# Patient Record
Sex: MALE | Race: WHITE | NOT HISPANIC OR LATINO | Employment: UNEMPLOYED | URBAN - METROPOLITAN AREA
[De-identification: names, ages, dates, MRNs, and addresses within clinical notes are randomized per-mention and may not be internally consistent; named-entity substitution may affect disease eponyms.]

---

## 2020-04-23 ENCOUNTER — HOSPITAL ENCOUNTER (INPATIENT)
Facility: HOSPITAL | Age: 25
LOS: 20 days | Discharge: HOME | End: 2020-05-13
Attending: EMERGENCY MEDICINE | Admitting: INTERNAL MEDICINE

## 2020-04-23 ENCOUNTER — APPOINTMENT (OUTPATIENT)
Dept: GENERAL RADIOLOGY | Facility: HOSPITAL | Age: 25
End: 2020-04-23

## 2020-04-23 ENCOUNTER — APPOINTMENT (OUTPATIENT)
Dept: ULTRASOUND IMAGING | Facility: HOSPITAL | Age: 25
End: 2020-04-23

## 2020-04-23 DIAGNOSIS — K72.00 SEPSIS WITH ACUTE LIVER FAILURE WITHOUT HEPATIC COMA OR SEPTIC SHOCK, DUE TO UNSPECIFIED ORGANISM (HCC): Primary | ICD-10-CM

## 2020-04-23 DIAGNOSIS — R79.89 ELEVATED LACTIC ACID LEVEL: ICD-10-CM

## 2020-04-23 DIAGNOSIS — K70.11 ASCITES DUE TO ALCOHOLIC HEPATITIS: ICD-10-CM

## 2020-04-23 DIAGNOSIS — A41.9 SEPSIS WITH ACUTE RENAL FAILURE WITHOUT SEPTIC SHOCK, DUE TO UNSPECIFIED ORGANISM, UNSPECIFIED ACUTE RENAL FAILURE TYPE (HCC): ICD-10-CM

## 2020-04-23 DIAGNOSIS — F10.10 ALCOHOL ABUSE: ICD-10-CM

## 2020-04-23 DIAGNOSIS — R65.20 SEPSIS WITH ACUTE RENAL FAILURE WITHOUT SEPTIC SHOCK, DUE TO UNSPECIFIED ORGANISM, UNSPECIFIED ACUTE RENAL FAILURE TYPE (HCC): ICD-10-CM

## 2020-04-23 DIAGNOSIS — R65.20 SEPSIS WITH ACUTE LIVER FAILURE WITHOUT HEPATIC COMA OR SEPTIC SHOCK, DUE TO UNSPECIFIED ORGANISM (HCC): Primary | ICD-10-CM

## 2020-04-23 DIAGNOSIS — N17.9 SEPSIS WITH ACUTE RENAL FAILURE WITHOUT SEPTIC SHOCK, DUE TO UNSPECIFIED ORGANISM, UNSPECIFIED ACUTE RENAL FAILURE TYPE (HCC): ICD-10-CM

## 2020-04-23 DIAGNOSIS — D72.829 LEUKOCYTOSIS, UNSPECIFIED TYPE: ICD-10-CM

## 2020-04-23 DIAGNOSIS — B17.9 ACUTE HEPATITIS: ICD-10-CM

## 2020-04-23 DIAGNOSIS — N17.9 ACUTE RENAL FAILURE, UNSPECIFIED ACUTE RENAL FAILURE TYPE (HCC): ICD-10-CM

## 2020-04-23 DIAGNOSIS — R09.02 HYPOXIA: ICD-10-CM

## 2020-04-23 DIAGNOSIS — R13.12 OROPHARYNGEAL DYSPHAGIA: ICD-10-CM

## 2020-04-23 DIAGNOSIS — A41.9 SEPSIS WITH ACUTE LIVER FAILURE WITHOUT HEPATIC COMA OR SEPTIC SHOCK, DUE TO UNSPECIFIED ORGANISM (HCC): Primary | ICD-10-CM

## 2020-04-23 DIAGNOSIS — J96.01 ACUTE RESPIRATORY FAILURE WITH HYPOXIA (HCC): ICD-10-CM

## 2020-04-23 DIAGNOSIS — K92.2 ACUTE UPPER GI BLEED: ICD-10-CM

## 2020-04-23 DIAGNOSIS — R41.841 COGNITIVE COMMUNICATION DEFICIT: ICD-10-CM

## 2020-04-23 PROBLEM — D72.825 BANDEMIA: Status: ACTIVE | Noted: 2020-04-23

## 2020-04-23 PROBLEM — K70.10 ACUTE ALCOHOLIC HEPATITIS: Status: ACTIVE | Noted: 2020-04-23

## 2020-04-23 LAB
ALBUMIN SERPL-MCNC: 2 G/DL (ref 3.5–5.2)
ALBUMIN/GLOB SERPL: 0.6 G/DL
ALP SERPL-CCNC: 173 U/L (ref 39–117)
ALT SERPL W P-5'-P-CCNC: 90 U/L (ref 1–41)
AMMONIA BLD-SCNC: 101 UMOL/L (ref 16–60)
AMYLASE SERPL-CCNC: 69 U/L (ref 28–100)
ANION GAP SERPL CALCULATED.3IONS-SCNC: 22 MMOL/L (ref 5–15)
APTT PPP: 44.3 SECONDS (ref 24–37)
ARTERIAL PATENCY WRIST A: ABNORMAL
AST SERPL-CCNC: 229 U/L (ref 1–40)
ATMOSPHERIC PRESS: ABNORMAL MM[HG]
BASE EXCESS BLDA CALC-SCNC: 2.4 MMOL/L (ref 0–2)
BASOPHILS # BLD MANUAL: 0.42 10*3/MM3 (ref 0–0.2)
BASOPHILS NFR BLD AUTO: 1 % (ref 0–1.5)
BDY SITE: ABNORMAL
BILIRUB SERPL-MCNC: 8.3 MG/DL (ref 0.2–1.2)
BODY TEMPERATURE: 37 C
BUN BLD-MCNC: 47 MG/DL (ref 6–20)
BUN/CREAT SERPL: 18.1 (ref 7–25)
CALCIUM SPEC-SCNC: 7.8 MG/DL (ref 8.6–10.5)
CHLORIDE SERPL-SCNC: 88 MMOL/L (ref 98–107)
CK SERPL-CCNC: 124 U/L (ref 20–200)
CO2 BLDA-SCNC: 25.6 MMOL/L (ref 22–33)
CO2 SERPL-SCNC: 24 MMOL/L (ref 22–29)
COHGB MFR BLD: 2.2 % (ref 0–2)
CREAT BLD-MCNC: 2.59 MG/DL (ref 0.76–1.27)
D-LACTATE SERPL-SCNC: 6.6 MMOL/L (ref 0.5–2)
DEPRECATED RDW RBC AUTO: 63.2 FL (ref 37–54)
EOSINOPHIL # BLD MANUAL: 0.42 10*3/MM3 (ref 0–0.4)
EOSINOPHIL NFR BLD MANUAL: 1 % (ref 0.3–6.2)
ERYTHROCYTE [DISTWIDTH] IN BLOOD BY AUTOMATED COUNT: 16.9 % (ref 12.3–15.4)
ETHANOL BLD-MCNC: <10 MG/DL (ref 0–10)
GFR SERPL CREATININE-BSD FRML MDRD: 30 ML/MIN/1.73
GFR SERPL CREATININE-BSD FRML MDRD: 37 ML/MIN/1.73
GLOBULIN UR ELPH-MCNC: 3.5 GM/DL
GLUCOSE BLD-MCNC: 111 MG/DL (ref 65–99)
HCO3 BLDA-SCNC: 24.7 MMOL/L (ref 20–26)
HCT VFR BLD AUTO: 25.5 % (ref 37.5–51)
HCT VFR BLD CALC: 24 %
HGB BLD-MCNC: 8.9 G/DL (ref 13–17.7)
HGB BLDA-MCNC: 7.8 G/DL (ref 13.5–17.5)
HOLD SPECIMEN: NORMAL
HOLD SPECIMEN: NORMAL
HOROWITZ INDEX BLD+IHG-RTO: 44 %
INR PPP: 1.88 (ref 0.85–1.16)
LIPASE SERPL-CCNC: 148 U/L (ref 13–60)
LYMPHOCYTES # BLD MANUAL: 7.48 10*3/MM3 (ref 0.7–3.1)
LYMPHOCYTES NFR BLD MANUAL: 18 % (ref 19.6–45.3)
LYMPHOCYTES NFR BLD MANUAL: 5 % (ref 5–12)
MACROCYTES BLD QL SMEAR: ABNORMAL
MCH RBC QN AUTO: 36.5 PG (ref 26.6–33)
MCHC RBC AUTO-ENTMCNC: 34.9 G/DL (ref 31.5–35.7)
MCV RBC AUTO: 104.5 FL (ref 79–97)
METHGB BLD QL: ABNORMAL
MODALITY: ABNORMAL
MONOCYTES # BLD AUTO: 2.08 10*3/MM3 (ref 0.1–0.9)
NEUTROPHILS # BLD AUTO: 31.16 10*3/MM3 (ref 1.7–7)
NEUTROPHILS NFR BLD MANUAL: 61 % (ref 42.7–76)
NEUTS BAND NFR BLD MANUAL: 14 % (ref 0–5)
NOTE: ABNORMAL
OXYHGB MFR BLDV: 86 % (ref 94–99)
PCO2 BLDA: 28.4 MM HG (ref 35–45)
PCO2 TEMP ADJ BLD: 28.4 MM HG (ref 35–48)
PH BLDA: 7.55 PH UNITS (ref 7.35–7.45)
PH, TEMP CORRECTED: 7.55 PH UNITS
PLAT MORPH BLD: NORMAL
PLATELET # BLD AUTO: 380 10*3/MM3 (ref 140–450)
PMV BLD AUTO: 10.5 FL (ref 6–12)
PO2 BLDA: 53.9 MM HG (ref 83–108)
PO2 TEMP ADJ BLD: 53.9 MM HG (ref 83–108)
POTASSIUM BLD-SCNC: 4 MMOL/L (ref 3.5–5.2)
PROCALCITONIN SERPL-MCNC: 2.62 NG/ML (ref 0.1–0.25)
PROT SERPL-MCNC: 5.5 G/DL (ref 6–8.5)
PROTHROMBIN TIME: 21.3 SECONDS (ref 11.5–14)
RBC # BLD AUTO: 2.44 10*6/MM3 (ref 4.14–5.8)
SCAN SLIDE: NORMAL
SMUDGE CELLS BLD QL SMEAR: ABNORMAL
SODIUM BLD-SCNC: 134 MMOL/L (ref 136–145)
VENTILATOR MODE: ABNORMAL
WBC NRBC COR # BLD: 41.55 10*3/MM3 (ref 3.4–10.8)
WHOLE BLOOD HOLD SPECIMEN: NORMAL
WHOLE BLOOD HOLD SPECIMEN: NORMAL

## 2020-04-23 PROCEDURE — 82805 BLOOD GASES W/O2 SATURATION: CPT

## 2020-04-23 PROCEDURE — 36600 WITHDRAWAL OF ARTERIAL BLOOD: CPT

## 2020-04-23 PROCEDURE — 85025 COMPLETE CBC W/AUTO DIFF WBC: CPT | Performed by: EMERGENCY MEDICINE

## 2020-04-23 PROCEDURE — 86901 BLOOD TYPING SEROLOGIC RH(D): CPT | Performed by: PHYSICIAN ASSISTANT

## 2020-04-23 PROCEDURE — 82150 ASSAY OF AMYLASE: CPT | Performed by: PHYSICIAN ASSISTANT

## 2020-04-23 PROCEDURE — 82550 ASSAY OF CK (CPK): CPT | Performed by: NURSE PRACTITIONER

## 2020-04-23 PROCEDURE — 85007 BL SMEAR W/DIFF WBC COUNT: CPT | Performed by: EMERGENCY MEDICINE

## 2020-04-23 PROCEDURE — 86850 RBC ANTIBODY SCREEN: CPT | Performed by: PHYSICIAN ASSISTANT

## 2020-04-23 PROCEDURE — 93005 ELECTROCARDIOGRAM TRACING: CPT | Performed by: EMERGENCY MEDICINE

## 2020-04-23 PROCEDURE — 86900 BLOOD TYPING SEROLOGIC ABO: CPT | Performed by: PHYSICIAN ASSISTANT

## 2020-04-23 PROCEDURE — 85610 PROTHROMBIN TIME: CPT | Performed by: PHYSICIAN ASSISTANT

## 2020-04-23 PROCEDURE — 99223 1ST HOSP IP/OBS HIGH 75: CPT | Performed by: INTERNAL MEDICINE

## 2020-04-23 PROCEDURE — 83690 ASSAY OF LIPASE: CPT | Performed by: EMERGENCY MEDICINE

## 2020-04-23 PROCEDURE — 85730 THROMBOPLASTIN TIME PARTIAL: CPT | Performed by: NURSE PRACTITIONER

## 2020-04-23 PROCEDURE — 25010000002 ONDANSETRON PER 1 MG: Performed by: PHYSICIAN ASSISTANT

## 2020-04-23 PROCEDURE — 80053 COMPREHEN METABOLIC PANEL: CPT | Performed by: EMERGENCY MEDICINE

## 2020-04-23 PROCEDURE — 25010000002 OCTREOTIDE PER 25 MCG: Performed by: PHYSICIAN ASSISTANT

## 2020-04-23 PROCEDURE — 83605 ASSAY OF LACTIC ACID: CPT | Performed by: EMERGENCY MEDICINE

## 2020-04-23 PROCEDURE — 84145 PROCALCITONIN (PCT): CPT | Performed by: EMERGENCY MEDICINE

## 2020-04-23 PROCEDURE — 80074 ACUTE HEPATITIS PANEL: CPT | Performed by: PHYSICIAN ASSISTANT

## 2020-04-23 PROCEDURE — 80307 DRUG TEST PRSMV CHEM ANLYZR: CPT | Performed by: NURSE PRACTITIONER

## 2020-04-23 PROCEDURE — 86923 COMPATIBILITY TEST ELECTRIC: CPT

## 2020-04-23 PROCEDURE — 87040 BLOOD CULTURE FOR BACTERIA: CPT | Performed by: EMERGENCY MEDICINE

## 2020-04-23 PROCEDURE — 99285 EMERGENCY DEPT VISIT HI MDM: CPT

## 2020-04-23 PROCEDURE — 82140 ASSAY OF AMMONIA: CPT | Performed by: PHYSICIAN ASSISTANT

## 2020-04-23 PROCEDURE — 85060 BLOOD SMEAR INTERPRETATION: CPT | Performed by: EMERGENCY MEDICINE

## 2020-04-23 PROCEDURE — 71045 X-RAY EXAM CHEST 1 VIEW: CPT

## 2020-04-23 RX ORDER — SODIUM CHLORIDE 0.9 % (FLUSH) 0.9 %
10 SYRINGE (ML) INJECTION AS NEEDED
Status: DISCONTINUED | OUTPATIENT
Start: 2020-04-23 | End: 2020-05-13 | Stop reason: HOSPADM

## 2020-04-23 RX ORDER — SODIUM CHLORIDE 0.9 % (FLUSH) 0.9 %
10 SYRINGE (ML) INJECTION EVERY 12 HOURS SCHEDULED
Status: DISCONTINUED | OUTPATIENT
Start: 2020-04-23 | End: 2020-04-25

## 2020-04-23 RX ORDER — DEXTROSE AND SODIUM CHLORIDE 5; .9 G/100ML; G/100ML
100 INJECTION, SOLUTION INTRAVENOUS CONTINUOUS
Status: DISCONTINUED | OUTPATIENT
Start: 2020-04-23 | End: 2020-04-26

## 2020-04-23 RX ORDER — SODIUM CHLORIDE 0.9 % (FLUSH) 0.9 %
10 SYRINGE (ML) INJECTION AS NEEDED
Status: DISCONTINUED | OUTPATIENT
Start: 2020-04-23 | End: 2020-04-25

## 2020-04-23 RX ORDER — ONDANSETRON 2 MG/ML
4 INJECTION INTRAMUSCULAR; INTRAVENOUS ONCE
Status: COMPLETED | OUTPATIENT
Start: 2020-04-23 | End: 2020-04-23

## 2020-04-23 RX ORDER — ONDANSETRON 2 MG/ML
4 INJECTION INTRAMUSCULAR; INTRAVENOUS EVERY 6 HOURS PRN
Status: DISCONTINUED | OUTPATIENT
Start: 2020-04-23 | End: 2020-05-13 | Stop reason: HOSPADM

## 2020-04-23 RX ORDER — NOREPINEPHRINE BIT/0.9 % NACL 8 MG/250ML
.02-.3 INFUSION BOTTLE (ML) INTRAVENOUS
Status: DISCONTINUED | OUTPATIENT
Start: 2020-04-23 | End: 2020-04-28

## 2020-04-23 RX ORDER — ALBUMIN (HUMAN) 12.5 G/50ML
50 SOLUTION INTRAVENOUS ONCE
Status: COMPLETED | OUTPATIENT
Start: 2020-04-23 | End: 2020-04-24

## 2020-04-23 RX ORDER — SODIUM CHLORIDE 0.9 % (FLUSH) 0.9 %
10 SYRINGE (ML) INJECTION EVERY 12 HOURS SCHEDULED
Status: DISCONTINUED | OUTPATIENT
Start: 2020-04-23 | End: 2020-04-28

## 2020-04-23 RX ORDER — SODIUM CHLORIDE 0.9 % (FLUSH) 0.9 %
10 SYRINGE (ML) INJECTION AS NEEDED
Status: DISCONTINUED | OUTPATIENT
Start: 2020-04-23 | End: 2020-05-01

## 2020-04-23 RX ORDER — PANTOPRAZOLE SODIUM 40 MG/10ML
80 INJECTION, POWDER, LYOPHILIZED, FOR SOLUTION INTRAVENOUS ONCE
Status: COMPLETED | OUTPATIENT
Start: 2020-04-23 | End: 2020-04-23

## 2020-04-23 RX ADMIN — THIAMINE HYDROCHLORIDE 250 MG: 100 INJECTION, SOLUTION INTRAMUSCULAR; INTRAVENOUS at 23:00

## 2020-04-23 RX ADMIN — OCTREOTIDE ACETATE 25 MCG/HR: 500 INJECTION, SOLUTION INTRAVENOUS; SUBCUTANEOUS at 23:31

## 2020-04-23 RX ADMIN — PANTOPRAZOLE SODIUM 80 MG: 40 INJECTION, POWDER, FOR SOLUTION INTRAVENOUS at 22:50

## 2020-04-23 RX ADMIN — ONDANSETRON 4 MG: 2 INJECTION INTRAMUSCULAR; INTRAVENOUS at 22:28

## 2020-04-23 RX ADMIN — SODIUM CHLORIDE 1000 ML: 9 INJECTION, SOLUTION INTRAVENOUS at 22:27

## 2020-04-23 RX ADMIN — SODIUM CHLORIDE 1000 ML: 9 INJECTION, SOLUTION INTRAVENOUS at 23:30

## 2020-04-24 ENCOUNTER — APPOINTMENT (OUTPATIENT)
Dept: CT IMAGING | Facility: HOSPITAL | Age: 25
End: 2020-04-24

## 2020-04-24 ENCOUNTER — APPOINTMENT (OUTPATIENT)
Dept: GENERAL RADIOLOGY | Facility: HOSPITAL | Age: 25
End: 2020-04-24

## 2020-04-24 ENCOUNTER — ANESTHESIA EVENT (OUTPATIENT)
Dept: GASTROENTEROLOGY | Facility: HOSPITAL | Age: 25
End: 2020-04-24

## 2020-04-24 ENCOUNTER — APPOINTMENT (OUTPATIENT)
Dept: CARDIOLOGY | Facility: HOSPITAL | Age: 25
End: 2020-04-24

## 2020-04-24 ENCOUNTER — ANESTHESIA (OUTPATIENT)
Dept: GASTROENTEROLOGY | Facility: HOSPITAL | Age: 25
End: 2020-04-24

## 2020-04-24 PROBLEM — N17.9 SEPSIS WITH ACUTE RENAL FAILURE WITHOUT SEPTIC SHOCK (HCC): Status: ACTIVE | Noted: 2020-04-24

## 2020-04-24 PROBLEM — K76.82 HEPATIC ENCEPHALOPATHY (HCC): Status: ACTIVE | Noted: 2020-04-24

## 2020-04-24 PROBLEM — A41.9 SEPSIS WITH ACUTE RENAL FAILURE WITHOUT SEPTIC SHOCK: Status: ACTIVE | Noted: 2020-04-24

## 2020-04-24 PROBLEM — F10.931 ALCOHOL WITHDRAWAL DELIRIUM: Status: ACTIVE | Noted: 2020-04-24

## 2020-04-24 PROBLEM — K70.11 ASCITES DUE TO ALCOHOLIC HEPATITIS: Status: ACTIVE | Noted: 2020-04-24

## 2020-04-24 PROBLEM — J96.01 ACUTE RESPIRATORY FAILURE WITH HYPOXIA: Status: ACTIVE | Noted: 2020-04-24

## 2020-04-24 PROBLEM — R65.20 SEPSIS WITH ACUTE RENAL FAILURE WITHOUT SEPTIC SHOCK (HCC): Status: ACTIVE | Noted: 2020-04-24

## 2020-04-24 PROBLEM — J90 PLEURAL EFFUSION ON LEFT: Status: ACTIVE | Noted: 2020-04-24

## 2020-04-24 LAB
ABO GROUP BLD: NORMAL
ABO GROUP BLD: NORMAL
ALBUMIN SERPL-MCNC: 3 G/DL (ref 3.5–5.2)
ALBUMIN/GLOB SERPL: 1.3 G/DL
ALP SERPL-CCNC: 117 U/L (ref 39–117)
ALPHA-FETOPROTEIN: 1.8 NG/ML (ref 0–8.3)
ALT SERPL W P-5'-P-CCNC: 68 U/L (ref 1–41)
AMPHET+METHAMPHET UR QL: NEGATIVE
AMPHETAMINES UR QL: NEGATIVE
ANION GAP SERPL CALCULATED.3IONS-SCNC: 16 MMOL/L (ref 5–15)
APPEARANCE FLD: CLEAR
APTT PPP: 44.8 SECONDS (ref 24–37)
ARTERIAL PATENCY WRIST A: ABNORMAL
AST SERPL-CCNC: 181 U/L (ref 1–40)
ATMOSPHERIC PRESS: ABNORMAL MM[HG]
BACTERIA UR QL AUTO: ABNORMAL /HPF
BARBITURATES UR QL SCN: POSITIVE
BASE EXCESS BLDA CALC-SCNC: 1.9 MMOL/L (ref 0–2)
BASOPHILS # BLD AUTO: 0.19 10*3/MM3 (ref 0–0.2)
BASOPHILS NFR BLD AUTO: 0.7 % (ref 0–1.5)
BDY SITE: ABNORMAL
BENZODIAZ UR QL SCN: POSITIVE
BH CV ECHO MEAS - AO MAX PG (FULL): 8.5 MMHG
BH CV ECHO MEAS - AO MAX PG: 17.6 MMHG
BH CV ECHO MEAS - AO MEAN PG (FULL): 3.8 MMHG
BH CV ECHO MEAS - AO MEAN PG: 7.8 MMHG
BH CV ECHO MEAS - AO ROOT AREA (BSA CORRECTED): 1.7
BH CV ECHO MEAS - AO ROOT AREA: 7.6 CM^2
BH CV ECHO MEAS - AO ROOT DIAM: 3.1 CM
BH CV ECHO MEAS - AO V2 MAX: 209.6 CM/SEC
BH CV ECHO MEAS - AO V2 MEAN: 127.2 CM/SEC
BH CV ECHO MEAS - AO V2 VTI: 37.3 CM
BH CV ECHO MEAS - AVA(I,A): 2.7 CM^2
BH CV ECHO MEAS - AVA(I,D): 2.7 CM^2
BH CV ECHO MEAS - AVA(V,A): 2.5 CM^2
BH CV ECHO MEAS - AVA(V,D): 2.5 CM^2
BH CV ECHO MEAS - BSA(HAYCOCK): 1.8 M^2
BH CV ECHO MEAS - BSA: 1.8 M^2
BH CV ECHO MEAS - BZI_BMI: 22 KILOGRAMS/M^2
BH CV ECHO MEAS - BZI_METRIC_HEIGHT: 172.7 CM
BH CV ECHO MEAS - BZI_METRIC_WEIGHT: 65.8 KG
BH CV ECHO MEAS - EDV(CUBED): 78 ML
BH CV ECHO MEAS - EDV(MOD-SP2): 93 ML
BH CV ECHO MEAS - EDV(MOD-SP4): 100 ML
BH CV ECHO MEAS - EDV(TEICH): 81.9 ML
BH CV ECHO MEAS - EF(CUBED): 92.4 %
BH CV ECHO MEAS - EF(MOD-BP): 75 %
BH CV ECHO MEAS - EF(MOD-SP2): 73.1 %
BH CV ECHO MEAS - EF(MOD-SP4): 79 %
BH CV ECHO MEAS - EF(TEICH): 88 %
BH CV ECHO MEAS - ESV(CUBED): 5.9 ML
BH CV ECHO MEAS - ESV(MOD-SP2): 25 ML
BH CV ECHO MEAS - ESV(MOD-SP4): 21 ML
BH CV ECHO MEAS - ESV(TEICH): 9.9 ML
BH CV ECHO MEAS - FS: 57.7 %
BH CV ECHO MEAS - IVS/LVPW: 0.81
BH CV ECHO MEAS - IVSD: 0.59 CM
BH CV ECHO MEAS - LA DIMENSION: 3.9 CM
BH CV ECHO MEAS - LA/AO: 1.3
BH CV ECHO MEAS - LAD MAJOR: 4.6 CM
BH CV ECHO MEAS - LAT PEAK E' VEL: 14.6 CM/SEC
BH CV ECHO MEAS - LATERAL E/E' RATIO: 8.8
BH CV ECHO MEAS - LV DIASTOLIC VOL/BSA (35-75): 56.1 ML/M^2
BH CV ECHO MEAS - LV MASS(C)D: 80.7 GRAMS
BH CV ECHO MEAS - LV MASS(C)DI: 45.2 GRAMS/M^2
BH CV ECHO MEAS - LV MAX PG: 9 MMHG
BH CV ECHO MEAS - LV MEAN PG: 4 MMHG
BH CV ECHO MEAS - LV SYSTOLIC VOL/BSA (12-30): 11.8 ML/M^2
BH CV ECHO MEAS - LV V1 MAX: 150.1 CM/SEC
BH CV ECHO MEAS - LV V1 MEAN: 91.5 CM/SEC
BH CV ECHO MEAS - LV V1 VTI: 29.2 CM
BH CV ECHO MEAS - LVIDD: 4.3 CM
BH CV ECHO MEAS - LVIDS: 1.8 CM
BH CV ECHO MEAS - LVLD AP2: 8.2 CM
BH CV ECHO MEAS - LVLD AP4: 9.1 CM
BH CV ECHO MEAS - LVLS AP2: 7 CM
BH CV ECHO MEAS - LVLS AP4: 5.7 CM
BH CV ECHO MEAS - LVOT AREA (M): 3.5 CM^2
BH CV ECHO MEAS - LVOT AREA: 3.5 CM^2
BH CV ECHO MEAS - LVOT DIAM: 2.1 CM
BH CV ECHO MEAS - LVPWD: 0.72 CM
BH CV ECHO MEAS - MED PEAK E' VEL: 17.2 CM/SEC
BH CV ECHO MEAS - MEDIAL E/E' RATIO: 7.5
BH CV ECHO MEAS - MV DEC SLOPE: 1456 CM/SEC^2
BH CV ECHO MEAS - MV E MAX VEL: 130.3 CM/SEC
BH CV ECHO MEAS - MV P1/2T MAX VEL: 217.2 CM/SEC
BH CV ECHO MEAS - MV P1/2T: 43.7 MSEC
BH CV ECHO MEAS - MVA P1/2T LCG: 1 CM^2
BH CV ECHO MEAS - MVA(P1/2T): 5 CM^2
BH CV ECHO MEAS - PA ACC SLOPE: 999.8 CM/SEC^2
BH CV ECHO MEAS - PA ACC TIME: 0.12 SEC
BH CV ECHO MEAS - PA PR(ACCEL): 23.1 MMHG
BH CV ECHO MEAS - RAP SYSTOLE: 8 MMHG
BH CV ECHO MEAS - RVSP: 24.8 MMHG
BH CV ECHO MEAS - SI(AO): 157.9 ML/M^2
BH CV ECHO MEAS - SI(CUBED): 40.5 ML/M^2
BH CV ECHO MEAS - SI(LVOT): 57.1 ML/M^2
BH CV ECHO MEAS - SI(MOD-SP2): 38.1 ML/M^2
BH CV ECHO MEAS - SI(MOD-SP4): 44.3 ML/M^2
BH CV ECHO MEAS - SI(TEICH): 40.4 ML/M^2
BH CV ECHO MEAS - SV(AO): 281.6 ML
BH CV ECHO MEAS - SV(CUBED): 72.1 ML
BH CV ECHO MEAS - SV(LVOT): 101.8 ML
BH CV ECHO MEAS - SV(MOD-SP2): 68 ML
BH CV ECHO MEAS - SV(MOD-SP4): 79 ML
BH CV ECHO MEAS - SV(TEICH): 72 ML
BH CV ECHO MEAS - TAPSE (>1.6): 2.5 CM2
BH CV ECHO MEAS - TR MAX PG: 16.8 MMHG
BH CV ECHO MEAS - TR MAX VEL: 205 CM/SEC
BH CV ECHO MEASUREMENTS AVERAGE E/E' RATIO: 8.19
BH CV VAS BP LEFT ARM: NORMAL MMHG
BH CV XLRA - RV BASE: 3.3 CM
BH CV XLRA - RV LENGTH: 5.8 CM
BH CV XLRA - RV MID: 3.7 CM
BILIRUB SERPL-MCNC: 9.8 MG/DL (ref 0.2–1.2)
BILIRUB UR QL STRIP: ABNORMAL
BLD GP AB SCN SERPL QL: NEGATIVE
BODY TEMPERATURE: 37 C
BUN BLD-MCNC: 53 MG/DL (ref 6–20)
BUN/CREAT SERPL: 24 (ref 7–25)
BUPRENORPHINE SERPL-MCNC: NEGATIVE NG/ML
CA-I SERPL ISE-MCNC: 1.03 MMOL/L (ref 1.12–1.32)
CALCIUM SPEC-SCNC: 7.5 MG/DL (ref 8.6–10.5)
CANNABINOIDS SERPL QL: POSITIVE
CHLORIDE SERPL-SCNC: 96 MMOL/L (ref 98–107)
CLARITY UR: ABNORMAL
CO2 BLDA-SCNC: 26.4 MMOL/L (ref 22–33)
CO2 SERPL-SCNC: 24 MMOL/L (ref 22–29)
COCAINE UR QL: NEGATIVE
COHGB MFR BLD: 1.9 % (ref 0–2)
COLOR FLD: YELLOW
COLOR UR: ABNORMAL
CREAT BLD-MCNC: 2.21 MG/DL (ref 0.76–1.27)
CREAT UR-MCNC: 168.9 MG/DL
CRP SERPL-MCNC: 4.58 MG/DL (ref 0–0.5)
CYTOLOGIST CVX/VAG CYTO: NORMAL
D-LACTATE SERPL-SCNC: 5.5 MMOL/L (ref 0.5–2)
DEPRECATED RDW RBC AUTO: 63.9 FL (ref 37–54)
EOSINOPHIL # BLD AUTO: 0.32 10*3/MM3 (ref 0–0.4)
EOSINOPHIL NFR BLD AUTO: 1.2 % (ref 0.3–6.2)
ERYTHROCYTE [DISTWIDTH] IN BLOOD BY AUTOMATED COUNT: 18.6 % (ref 12.3–15.4)
GFR SERPL CREATININE-BSD FRML MDRD: 36 ML/MIN/1.73
GLOBULIN UR ELPH-MCNC: 2.4 GM/DL
GLUCOSE BLD-MCNC: 118 MG/DL (ref 65–99)
GLUCOSE BLDC GLUCOMTR-MCNC: 106 MG/DL (ref 70–130)
GLUCOSE BLDC GLUCOMTR-MCNC: 114 MG/DL (ref 70–130)
GLUCOSE BLDC GLUCOMTR-MCNC: 126 MG/DL (ref 70–130)
GLUCOSE UR STRIP-MCNC: NEGATIVE MG/DL
HAV IGM SERPL QL IA: NORMAL
HBV CORE IGM SERPL QL IA: NORMAL
HBV SURFACE AB SER RIA-ACNC: NORMAL
HBV SURFACE AG SERPL QL IA: NORMAL
HCO3 BLDA-SCNC: 25.4 MMOL/L (ref 20–26)
HCT VFR BLD AUTO: 24.8 % (ref 37.5–51)
HCT VFR BLD AUTO: 26.2 % (ref 37.5–51)
HCT VFR BLD AUTO: 28.6 % (ref 37.5–51)
HCT VFR BLD CALC: 19.6 %
HCV AB SER DONR QL: NORMAL
HGB BLD-MCNC: 8.5 G/DL (ref 13–17.7)
HGB BLD-MCNC: 8.8 G/DL (ref 13–17.7)
HGB BLD-MCNC: 9.7 G/DL (ref 13–17.7)
HGB BLDA-MCNC: 6.4 G/DL (ref 13.5–17.5)
HGB UR QL STRIP.AUTO: NEGATIVE
HOLD SPECIMEN: NORMAL
HOROWITZ INDEX BLD+IHG-RTO: 100 %
HYALINE CASTS UR QL AUTO: ABNORMAL /LPF
IMM GRANULOCYTES # BLD AUTO: 0.25 10*3/MM3 (ref 0–0.05)
IMM GRANULOCYTES NFR BLD AUTO: 0.9 % (ref 0–0.5)
INR PPP: 1.97 (ref 0.85–1.16)
KETONES UR QL STRIP: ABNORMAL
LACTATE HOLD SPECIMEN: NORMAL
LEFT ATRIUM VOLUME INDEX: 29.2 ML/M^2
LEFT ATRIUM VOLUME: 52 ML
LEUKOCYTE ESTERASE UR QL STRIP.AUTO: ABNORMAL
LIPASE SERPL-CCNC: 110 U/L (ref 13–60)
LV EF 2D ECHO EST: 70 %
LYMPHOCYTES # BLD AUTO: 3.64 10*3/MM3 (ref 0.7–3.1)
LYMPHOCYTES NFR BLD AUTO: 13.7 % (ref 19.6–45.3)
LYMPHOCYTES NFR FLD MANUAL: 28 %
Lab: ABNORMAL
MACROPHAGE FLUID: 38 %
MAGNESIUM SERPL-MCNC: 1.8 MG/DL (ref 1.6–2.6)
MCH RBC QN AUTO: 33.7 PG (ref 26.6–33)
MCHC RBC AUTO-ENTMCNC: 33.6 G/DL (ref 31.5–35.7)
MCV RBC AUTO: 100.4 FL (ref 79–97)
MESOTHL CELL NFR FLD MANUAL: 16 %
METHADONE UR QL SCN: NEGATIVE
METHGB BLD QL: 0.6 % (ref 0–1.5)
MODALITY: ABNORMAL
MONOCYTES # BLD AUTO: 2.16 10*3/MM3 (ref 0.1–0.9)
MONOCYTES NFR BLD AUTO: 8.1 % (ref 5–12)
MONOCYTES NFR FLD: 12 %
NEUTROPHILS # BLD AUTO: 20.04 10*3/MM3 (ref 1.7–7)
NEUTROPHILS NFR BLD AUTO: 75.4 % (ref 42.7–76)
NEUTROPHILS NFR FLD MANUAL: 6 %
NITRITE UR QL STRIP: POSITIVE
NOTE: ABNORMAL
NOTIFIED BY: ABNORMAL
NOTIFIED WHO: ABNORMAL
NRBC BLD AUTO-RTO: 0 /100 WBC (ref 0–0.2)
OPIATES UR QL: NEGATIVE
OXYCODONE UR QL SCN: NEGATIVE
OXYHGB MFR BLDV: 84.2 % (ref 94–99)
PATH INTERP BLD-IMP: NORMAL
PCO2 BLDA: 33.1 MM HG (ref 35–45)
PCO2 TEMP ADJ BLD: 33.1 MM HG (ref 35–48)
PCP UR QL SCN: POSITIVE
PH BLDA: 7.49 PH UNITS (ref 7.35–7.45)
PH UR STRIP.AUTO: <=5 [PH] (ref 5–8)
PH, TEMP CORRECTED: 7.49 PH UNITS
PHOSPHATE SERPL-MCNC: 4.4 MG/DL (ref 2.5–4.5)
PLATELET # BLD AUTO: 228 10*3/MM3 (ref 140–450)
PMV BLD AUTO: 10.4 FL (ref 6–12)
PO2 BLDA: 53.8 MM HG (ref 83–108)
PO2 TEMP ADJ BLD: 53.8 MM HG (ref 83–108)
POTASSIUM BLD-SCNC: 3.8 MMOL/L (ref 3.5–5.2)
PROCALCITONIN SERPL-MCNC: 2.36 NG/ML (ref 0.1–0.25)
PROPOXYPH UR QL: NEGATIVE
PROT SERPL-MCNC: 5.4 G/DL (ref 6–8.5)
PROT UR QL STRIP: ABNORMAL
PROTHROMBIN TIME: 22.1 SECONDS (ref 11.5–14)
RBC # BLD AUTO: 2.61 10*6/MM3 (ref 4.14–5.8)
RBC # FLD AUTO: <2000 /MM3
RBC # UR: ABNORMAL /HPF
REF LAB TEST METHOD: ABNORMAL
RH BLD: POSITIVE
RH BLD: POSITIVE
SODIUM BLD-SCNC: 136 MMOL/L (ref 136–145)
SODIUM UR-SCNC: 27 MMOL/L
SP GR UR STRIP: 1.02 (ref 1–1.03)
SQUAMOUS #/AREA URNS HPF: ABNORMAL /HPF
T&S EXPIRATION DATE: NORMAL
TRICYCLICS UR QL SCN: NEGATIVE
UROBILINOGEN UR QL STRIP: ABNORMAL
VANCOMYCIN SERPL-MCNC: 11.8 MCG/ML (ref 5–40)
VENTILATOR MODE: ABNORMAL
WBC # FLD AUTO: 46 /MM3
WBC NRBC COR # BLD: 26.6 10*3/MM3 (ref 3.4–10.8)
WBC UR QL AUTO: ABNORMAL /HPF

## 2020-04-24 PROCEDURE — 25010000002 VANCOMYCIN 10 G RECONSTITUTED SOLUTION: Performed by: INTERNAL MEDICINE

## 2020-04-24 PROCEDURE — 82570 ASSAY OF URINE CREATININE: CPT | Performed by: NURSE PRACTITIONER

## 2020-04-24 PROCEDURE — 83605 ASSAY OF LACTIC ACID: CPT | Performed by: EMERGENCY MEDICINE

## 2020-04-24 PROCEDURE — 25010000002 SUCCINYLCHOLINE PER 20 MG: Performed by: NURSE ANESTHETIST, CERTIFIED REGISTERED

## 2020-04-24 PROCEDURE — 86901 BLOOD TYPING SEROLOGIC RH(D): CPT

## 2020-04-24 PROCEDURE — 89051 BODY FLUID CELL COUNT: CPT | Performed by: INTERNAL MEDICINE

## 2020-04-24 PROCEDURE — 25010000002 PHENYLEPHRINE PER 1 ML: Performed by: NURSE ANESTHETIST, CERTIFIED REGISTERED

## 2020-04-24 PROCEDURE — 49082 ABD PARACENTESIS: CPT | Performed by: INTERNAL MEDICINE

## 2020-04-24 PROCEDURE — 25010000002 ALBUMIN HUMAN 5% PER 50 ML: Performed by: NURSE PRACTITIONER

## 2020-04-24 PROCEDURE — 5A1945Z RESPIRATORY VENTILATION, 24-96 CONSECUTIVE HOURS: ICD-10-PCS | Performed by: INTERNAL MEDICINE

## 2020-04-24 PROCEDURE — 80306 DRUG TEST PRSMV INSTRMNT: CPT | Performed by: NURSE PRACTITIONER

## 2020-04-24 PROCEDURE — 71045 X-RAY EXAM CHEST 1 VIEW: CPT

## 2020-04-24 PROCEDURE — 71250 CT THORAX DX C-: CPT

## 2020-04-24 PROCEDURE — 25010000002 ALBUMIN HUMAN 25% PER 50 ML: Performed by: NURSE PRACTITIONER

## 2020-04-24 PROCEDURE — 82105 ALPHA-FETOPROTEIN SERUM: CPT | Performed by: PHYSICIAN ASSISTANT

## 2020-04-24 PROCEDURE — 25010000002 PROPOFOL 10 MG/ML EMULSION: Performed by: NURSE ANESTHETIST, CERTIFIED REGISTERED

## 2020-04-24 PROCEDURE — 86706 HEP B SURFACE ANTIBODY: CPT | Performed by: PHYSICIAN ASSISTANT

## 2020-04-24 PROCEDURE — 94799 UNLISTED PULMONARY SVC/PX: CPT

## 2020-04-24 PROCEDURE — 84145 PROCALCITONIN (PCT): CPT | Performed by: INTERNAL MEDICINE

## 2020-04-24 PROCEDURE — 93306 TTE W/DOPPLER COMPLETE: CPT

## 2020-04-24 PROCEDURE — 83690 ASSAY OF LIPASE: CPT | Performed by: NURSE PRACTITIONER

## 2020-04-24 PROCEDURE — 36600 WITHDRAWAL OF ARTERIAL BLOOD: CPT

## 2020-04-24 PROCEDURE — 87086 URINE CULTURE/COLONY COUNT: CPT | Performed by: INTERNAL MEDICINE

## 2020-04-24 PROCEDURE — 85610 PROTHROMBIN TIME: CPT | Performed by: INTERNAL MEDICINE

## 2020-04-24 PROCEDURE — 99222 1ST HOSP IP/OBS MODERATE 55: CPT | Performed by: PHYSICIAN ASSISTANT

## 2020-04-24 PROCEDURE — 83735 ASSAY OF MAGNESIUM: CPT | Performed by: NURSE PRACTITIONER

## 2020-04-24 PROCEDURE — P9016 RBC LEUKOCYTES REDUCED: HCPCS

## 2020-04-24 PROCEDURE — 25010000002 THIAMINE PER 100 MG: Performed by: INTERNAL MEDICINE

## 2020-04-24 PROCEDURE — 85730 THROMBOPLASTIN TIME PARTIAL: CPT | Performed by: INTERNAL MEDICINE

## 2020-04-24 PROCEDURE — 85014 HEMATOCRIT: CPT | Performed by: INTERNAL MEDICINE

## 2020-04-24 PROCEDURE — 25010000002 LORAZEPAM PER 2 MG: Performed by: NURSE PRACTITIONER

## 2020-04-24 PROCEDURE — 74176 CT ABD & PELVIS W/O CONTRAST: CPT

## 2020-04-24 PROCEDURE — 82330 ASSAY OF CALCIUM: CPT | Performed by: INTERNAL MEDICINE

## 2020-04-24 PROCEDURE — 86900 BLOOD TYPING SEROLOGIC ABO: CPT

## 2020-04-24 PROCEDURE — 80053 COMPREHEN METABOLIC PANEL: CPT | Performed by: NURSE PRACTITIONER

## 2020-04-24 PROCEDURE — 25010000002 VITAMIN K1 PER 1 MG: Performed by: PHYSICIAN ASSISTANT

## 2020-04-24 PROCEDURE — 74018 RADEX ABDOMEN 1 VIEW: CPT

## 2020-04-24 PROCEDURE — P9047 ALBUMIN (HUMAN), 25%, 50ML: HCPCS | Performed by: PHYSICIAN ASSISTANT

## 2020-04-24 PROCEDURE — 25010000002 THIAMINE PER 100 MG: Performed by: NURSE PRACTITIONER

## 2020-04-24 PROCEDURE — 36430 TRANSFUSION BLD/BLD COMPNT: CPT

## 2020-04-24 PROCEDURE — 87205 SMEAR GRAM STAIN: CPT | Performed by: INTERNAL MEDICINE

## 2020-04-24 PROCEDURE — 81001 URINALYSIS AUTO W/SCOPE: CPT | Performed by: INTERNAL MEDICINE

## 2020-04-24 PROCEDURE — 85018 HEMOGLOBIN: CPT | Performed by: INTERNAL MEDICINE

## 2020-04-24 PROCEDURE — 94002 VENT MGMT INPAT INIT DAY: CPT

## 2020-04-24 PROCEDURE — 82805 BLOOD GASES W/O2 SATURATION: CPT

## 2020-04-24 PROCEDURE — 84100 ASSAY OF PHOSPHORUS: CPT | Performed by: NURSE PRACTITIONER

## 2020-04-24 PROCEDURE — 25010000002 ALBUMIN HUMAN 25% PER 50 ML: Performed by: PHYSICIAN ASSISTANT

## 2020-04-24 PROCEDURE — 25010000002 VANCOMYCIN PER 500 MG

## 2020-04-24 PROCEDURE — P9047 ALBUMIN (HUMAN), 25%, 50ML: HCPCS | Performed by: NURSE PRACTITIONER

## 2020-04-24 PROCEDURE — P9041 ALBUMIN (HUMAN),5%, 50ML: HCPCS | Performed by: NURSE PRACTITIONER

## 2020-04-24 PROCEDURE — 87070 CULTURE OTHR SPECIMN AEROBIC: CPT | Performed by: INTERNAL MEDICINE

## 2020-04-24 PROCEDURE — 93306 TTE W/DOPPLER COMPLETE: CPT | Performed by: INTERNAL MEDICINE

## 2020-04-24 PROCEDURE — 85025 COMPLETE CBC W/AUTO DIFF WBC: CPT | Performed by: NURSE PRACTITIONER

## 2020-04-24 PROCEDURE — 94660 CPAP INITIATION&MGMT: CPT

## 2020-04-24 PROCEDURE — 80202 ASSAY OF VANCOMYCIN: CPT | Performed by: INTERNAL MEDICINE

## 2020-04-24 PROCEDURE — 86140 C-REACTIVE PROTEIN: CPT | Performed by: INTERNAL MEDICINE

## 2020-04-24 PROCEDURE — 99291 CRITICAL CARE FIRST HOUR: CPT | Performed by: INTERNAL MEDICINE

## 2020-04-24 PROCEDURE — 25010000002 NEOSTIGMINE 10 MG/10ML SOLUTION: Performed by: NURSE ANESTHETIST, CERTIFIED REGISTERED

## 2020-04-24 PROCEDURE — 0W9G3ZX DRAINAGE OF PERITONEAL CAVITY, PERCUTANEOUS APPROACH, DIAGNOSTIC: ICD-10-PCS | Performed by: INTERNAL MEDICINE

## 2020-04-24 PROCEDURE — 25010000002 OCTREOTIDE PER 25 MCG: Performed by: INTERNAL MEDICINE

## 2020-04-24 PROCEDURE — 82962 GLUCOSE BLOOD TEST: CPT

## 2020-04-24 PROCEDURE — 84300 ASSAY OF URINE SODIUM: CPT | Performed by: NURSE PRACTITIONER

## 2020-04-24 PROCEDURE — 0BH17EZ INSERTION OF ENDOTRACHEAL AIRWAY INTO TRACHEA, VIA NATURAL OR ARTIFICIAL OPENING: ICD-10-PCS | Performed by: INTERNAL MEDICINE

## 2020-04-24 PROCEDURE — 0DJ08ZZ INSPECTION OF UPPER INTESTINAL TRACT, VIA NATURAL OR ARTIFICIAL OPENING ENDOSCOPIC: ICD-10-PCS | Performed by: INTERNAL MEDICINE

## 2020-04-24 RX ORDER — ALBUMIN, HUMAN INJ 5% 5 %
500 SOLUTION INTRAVENOUS ONCE
Status: COMPLETED | OUTPATIENT
Start: 2020-04-24 | End: 2020-04-24

## 2020-04-24 RX ORDER — NEOSTIGMINE METHYLSULFATE 1 MG/ML
INJECTION, SOLUTION INTRAVENOUS AS NEEDED
Status: DISCONTINUED | OUTPATIENT
Start: 2020-04-24 | End: 2020-04-24

## 2020-04-24 RX ORDER — ESMOLOL HYDROCHLORIDE 10 MG/ML
INJECTION INTRAVENOUS AS NEEDED
Status: DISCONTINUED | OUTPATIENT
Start: 2020-04-24 | End: 2020-04-24 | Stop reason: SURG

## 2020-04-24 RX ORDER — LORAZEPAM 2 MG/ML
2 INJECTION INTRAMUSCULAR
Status: DISCONTINUED | OUTPATIENT
Start: 2020-04-24 | End: 2020-05-04

## 2020-04-24 RX ORDER — LORAZEPAM 2 MG/ML
1 INJECTION INTRAMUSCULAR
Status: DISCONTINUED | OUTPATIENT
Start: 2020-04-24 | End: 2020-05-04

## 2020-04-24 RX ORDER — PROPOFOL 10 MG/ML
VIAL (ML) INTRAVENOUS AS NEEDED
Status: DISCONTINUED | OUTPATIENT
Start: 2020-04-24 | End: 2020-04-24 | Stop reason: SURG

## 2020-04-24 RX ORDER — DEXMEDETOMIDINE HYDROCHLORIDE 4 UG/ML
.2-1.5 INJECTION, SOLUTION INTRAVENOUS
Status: DISCONTINUED | OUTPATIENT
Start: 2020-04-24 | End: 2020-05-06

## 2020-04-24 RX ORDER — LORAZEPAM 1 MG/1
2 TABLET ORAL
Status: DISCONTINUED | OUTPATIENT
Start: 2020-04-24 | End: 2020-05-04

## 2020-04-24 RX ORDER — ROCURONIUM BROMIDE 10 MG/ML
INJECTION, SOLUTION INTRAVENOUS AS NEEDED
Status: DISCONTINUED | OUTPATIENT
Start: 2020-04-24 | End: 2020-04-24 | Stop reason: SURG

## 2020-04-24 RX ORDER — NEOSTIGMINE METHYLSULFATE 1 MG/ML
INJECTION, SOLUTION INTRAVENOUS AS NEEDED
Status: DISCONTINUED | OUTPATIENT
Start: 2020-04-24 | End: 2020-04-24 | Stop reason: SURG

## 2020-04-24 RX ORDER — LORAZEPAM 1 MG/1
1 TABLET ORAL
Status: DISCONTINUED | OUTPATIENT
Start: 2020-04-24 | End: 2020-05-04

## 2020-04-24 RX ORDER — SODIUM CHLORIDE, SODIUM LACTATE, POTASSIUM CHLORIDE, CALCIUM CHLORIDE 600; 310; 30; 20 MG/100ML; MG/100ML; MG/100ML; MG/100ML
INJECTION, SOLUTION INTRAVENOUS CONTINUOUS PRN
Status: DISCONTINUED | OUTPATIENT
Start: 2020-04-24 | End: 2020-04-24 | Stop reason: SURG

## 2020-04-24 RX ORDER — SUCCINYLCHOLINE CHLORIDE 20 MG/ML
INJECTION INTRAMUSCULAR; INTRAVENOUS AS NEEDED
Status: DISCONTINUED | OUTPATIENT
Start: 2020-04-24 | End: 2020-04-24 | Stop reason: SURG

## 2020-04-24 RX ORDER — ALBUMIN (HUMAN) 12.5 G/50ML
50 SOLUTION INTRAVENOUS ONCE
Status: COMPLETED | OUTPATIENT
Start: 2020-04-24 | End: 2020-04-24

## 2020-04-24 RX ORDER — LIDOCAINE HYDROCHLORIDE 10 MG/ML
INJECTION, SOLUTION EPIDURAL; INFILTRATION; INTRACAUDAL; PERINEURAL AS NEEDED
Status: DISCONTINUED | OUTPATIENT
Start: 2020-04-24 | End: 2020-04-24 | Stop reason: SURG

## 2020-04-24 RX ORDER — DEXAMETHASONE SODIUM PHOSPHATE 4 MG/ML
INJECTION, SOLUTION INTRA-ARTICULAR; INTRALESIONAL; INTRAMUSCULAR; INTRAVENOUS; SOFT TISSUE AS NEEDED
Status: DISCONTINUED | OUTPATIENT
Start: 2020-04-24 | End: 2020-04-24

## 2020-04-24 RX ORDER — GLYCOPYRROLATE 0.2 MG/ML
INJECTION INTRAMUSCULAR; INTRAVENOUS AS NEEDED
Status: DISCONTINUED | OUTPATIENT
Start: 2020-04-24 | End: 2020-04-24 | Stop reason: SURG

## 2020-04-24 RX ORDER — DEXMEDETOMIDINE HYDROCHLORIDE 4 UG/ML
INJECTION, SOLUTION INTRAVENOUS
Status: COMPLETED
Start: 2020-04-24 | End: 2020-04-24

## 2020-04-24 RX ADMIN — SODIUM CHLORIDE 8 MG/HR: 900 INJECTION INTRAVENOUS at 00:31

## 2020-04-24 RX ADMIN — GLYCOPYRROLATE 0.4 MG: 0.2 INJECTION INTRAMUSCULAR; INTRAVENOUS at 11:39

## 2020-04-24 RX ADMIN — SODIUM CHLORIDE, PRESERVATIVE FREE 10 ML: 5 INJECTION INTRAVENOUS at 08:58

## 2020-04-24 RX ADMIN — LORAZEPAM 1 MG: 2 INJECTION INTRAMUSCULAR; INTRAVENOUS at 02:56

## 2020-04-24 RX ADMIN — DEXMEDETOMIDINE HYDROCHLORIDE 0.4 MCG/KG/HR: 4 INJECTION, SOLUTION INTRAVENOUS at 19:38

## 2020-04-24 RX ADMIN — SODIUM CHLORIDE 8 MG/HR: 900 INJECTION INTRAVENOUS at 08:57

## 2020-04-24 RX ADMIN — PHENYLEPHRINE HYDROCHLORIDE 160 MCG: 10 INJECTION INTRAVENOUS at 11:24

## 2020-04-24 RX ADMIN — SODIUM CHLORIDE 2 G: 900 INJECTION INTRAVENOUS at 00:30

## 2020-04-24 RX ADMIN — LIDOCAINE HYDROCHLORIDE 50 MG: 10 INJECTION, SOLUTION EPIDURAL; INFILTRATION; INTRACAUDAL; PERINEURAL at 11:14

## 2020-04-24 RX ADMIN — ALBUMIN HUMAN 500 ML: 0.05 INJECTION, SOLUTION INTRAVENOUS at 05:55

## 2020-04-24 RX ADMIN — SUCCINYLCHOLINE CHLORIDE 140 MG: 20 INJECTION, SOLUTION INTRAMUSCULAR; INTRAVENOUS; PARENTERAL at 11:14

## 2020-04-24 RX ADMIN — PROPOFOL 150 MG: 10 INJECTION, EMULSION INTRAVENOUS at 11:14

## 2020-04-24 RX ADMIN — PHENYLEPHRINE HYDROCHLORIDE 160 MCG: 10 INJECTION INTRAVENOUS at 11:30

## 2020-04-24 RX ADMIN — NEOSTIGMINE 3 MG: 1 INJECTION INTRAVENOUS at 11:39

## 2020-04-24 RX ADMIN — FOLIC ACID 100 ML/HR: 5 INJECTION, SOLUTION INTRAMUSCULAR; INTRAVENOUS; SUBCUTANEOUS at 02:34

## 2020-04-24 RX ADMIN — DEXMEDETOMIDINE HYDROCHLORIDE 0.6 MCG/KG/HR: 100 INJECTION, SOLUTION INTRAVENOUS at 11:06

## 2020-04-24 RX ADMIN — DEXMEDETOMIDINE HYDROCHLORIDE 400 MCG: 4 INJECTION, SOLUTION INTRAVENOUS at 11:30

## 2020-04-24 RX ADMIN — ALBUMIN HUMAN 50 G: 0.25 SOLUTION INTRAVENOUS at 13:48

## 2020-04-24 RX ADMIN — AZTREONAM 1 G: 1 INJECTION, POWDER, LYOPHILIZED, FOR SOLUTION INTRAMUSCULAR; INTRAVENOUS at 23:16

## 2020-04-24 RX ADMIN — SODIUM CHLORIDE, POTASSIUM CHLORIDE, SODIUM LACTATE AND CALCIUM CHLORIDE: 600; 310; 30; 20 INJECTION, SOLUTION INTRAVENOUS at 11:06

## 2020-04-24 RX ADMIN — ROCURONIUM BROMIDE 5 MG: 10 INJECTION INTRAVENOUS at 11:14

## 2020-04-24 RX ADMIN — PHENYLEPHRINE HYDROCHLORIDE 80 MCG: 10 INJECTION INTRAVENOUS at 11:22

## 2020-04-24 RX ADMIN — THIAMINE HYDROCHLORIDE 250 MG: 100 INJECTION, SOLUTION INTRAMUSCULAR; INTRAVENOUS at 08:57

## 2020-04-24 RX ADMIN — AZTREONAM 1 G: 1 INJECTION, POWDER, LYOPHILIZED, FOR SOLUTION INTRAMUSCULAR; INTRAVENOUS at 15:40

## 2020-04-24 RX ADMIN — ESMOLOL HYDROCHLORIDE 20 MG: 10 INJECTION, SOLUTION INTRAVENOUS at 11:14

## 2020-04-24 RX ADMIN — OCTREOTIDE ACETATE 25 MCG/HR: 500 INJECTION, SOLUTION INTRAVENOUS; SUBCUTANEOUS at 16:11

## 2020-04-24 RX ADMIN — SODIUM CHLORIDE 8 MG/HR: 900 INJECTION INTRAVENOUS at 05:55

## 2020-04-24 RX ADMIN — SODIUM CHLORIDE 10 MG: 900 INJECTION, SOLUTION INTRAVENOUS at 10:29

## 2020-04-24 RX ADMIN — ROCURONIUM BROMIDE 25 MG: 10 INJECTION INTRAVENOUS at 11:18

## 2020-04-24 RX ADMIN — THIAMINE HYDROCHLORIDE 250 MG: 100 INJECTION, SOLUTION INTRAMUSCULAR; INTRAVENOUS at 23:16

## 2020-04-24 RX ADMIN — DEXTROSE AND SODIUM CHLORIDE 100 ML/HR: 5; 900 INJECTION, SOLUTION INTRAVENOUS at 01:44

## 2020-04-24 RX ADMIN — RIFAXIMIN 550 MG: 550 TABLET ORAL at 13:47

## 2020-04-24 RX ADMIN — AZTREONAM 1 G: 1 INJECTION, POWDER, LYOPHILIZED, FOR SOLUTION INTRAMUSCULAR; INTRAVENOUS at 08:57

## 2020-04-24 RX ADMIN — SODIUM CHLORIDE 8 MG/HR: 900 INJECTION INTRAVENOUS at 23:16

## 2020-04-24 RX ADMIN — SODIUM CHLORIDE 1000 MG: 9 INJECTION, SOLUTION INTRAVENOUS at 01:23

## 2020-04-24 RX ADMIN — VANCOMYCIN HYDROCHLORIDE 500 MG: 500 INJECTION, POWDER, LYOPHILIZED, FOR SOLUTION INTRAVENOUS at 10:13

## 2020-04-24 RX ADMIN — LORAZEPAM 2 MG: 2 INJECTION INTRAMUSCULAR; INTRAVENOUS at 10:04

## 2020-04-24 RX ADMIN — THIAMINE HYDROCHLORIDE 250 MG: 100 INJECTION, SOLUTION INTRAMUSCULAR; INTRAVENOUS at 16:11

## 2020-04-24 RX ADMIN — ALBUMIN HUMAN 50 G: 0.25 SOLUTION INTRAVENOUS at 01:48

## 2020-04-24 NOTE — ANESTHESIA PROCEDURE NOTES
Airway  Urgency: elective    Date/Time: 4/24/2020 11:15 AM  Airway not difficult    General Information and Staff    Patient location during procedure: OR  CRNA: Zohreh Roque CRNA    Indications and Patient Condition  Indications for airway management: airway protection    Preoxygenated: yes  MILS not maintained throughout  Mask difficulty assessment: 0 - not attempted    Final Airway Details  Final airway type: endotracheal airway      Successful airway: ETT  Cuffed: yes   Successful intubation technique: video laryngoscopy and RSI  Endotracheal tube insertion site: oral  Blade size: 3  ETT size (mm): 7.5  Cormack-Lehane Classification: grade I - full view of glottis  Placement verified by: chest auscultation and capnometry   Measured from: teeth  ETT/EBT  to teeth (cm): 22  Number of attempts at approach: 1  Assessment: lips, teeth, and gum same as pre-op and atraumatic intubation    Additional Comments  Bilateral equal symmetric chest rise, bilateral breath sounds verified, epigastric sounds negative

## 2020-04-24 NOTE — ANESTHESIA POSTPROCEDURE EVALUATION
Patient: John Varma    Procedure Summary     Date:  04/24/20 Room / Location:  [facility] ENDOSCOPY 1 /  [facility]    Anesthesia Start:  1106 Anesthesia Stop:      Procedure:  ESOPHAGOGASTRODUODENOSCOPY (N/A ) Diagnosis:      Surgeon:  Brunner, Mark I, MD Provider:  Kris Leos MD    Anesthesia Type:  general ASA Status:  4          Anesthesia Type: general    Vitals  Vitals Value Taken Time   BP 94/64 4/24/2020 12:05 PM   Temp 98.1 °F (36.7 °C) 4/24/2020 11:10 AM   Pulse 83 4/24/2020 12:09 PM   Resp 30 4/24/2020 11:10 AM   SpO2 95 % 4/24/2020 12:09 PM   Vitals shown include unvalidated device data.        Post Anesthesia Care and Evaluation    Patient location during evaluation: ICU  Patient participation: complete - patient cannot participate  Post-procedure mental status: sedated.  Pain management: adequate  Airway patency: patent  Anesthetic complications: No anesthetic complications  PONV Status: none  Cardiovascular status: hemodynamically stable and acceptable  Respiratory status: acceptable and ETT  Hydration status: acceptable    Comments: Transported to ICU on 100% O2, vital signs stable, report given to ICU team.

## 2020-04-24 NOTE — ANESTHESIA PREPROCEDURE EVALUATION
Anesthesia Evaluation     Patient summary reviewed and Nursing notes reviewed                Airway   Mallampati: I  TM distance: >3 FB  Neck ROM: full  No difficulty expected  Dental - normal exam     Pulmonary - normal exam   (+) a smoker Current,     ROS comment: ON BIPAP  Cardiovascular - negative cardio ROS and normal exam        Neuro/Psych  (+) psychiatric history,     GI/Hepatic/Renal/Endo    (+)  GERD, GI bleeding , liver disease (ALCOHOLIC HEPATITIS), renal disease ARF,     Musculoskeletal (-) negative ROS    Abdominal  - normal exam    Bowel sounds: normal.   Substance History   (+) alcohol use,      OB/GYN negative ob/gyn ROS         Other                        Anesthesia Plan    ASA 4     general     intravenous induction   Postoperative Plan: Expected vent after surgery  Anesthetic plan, all risks, benefits, and alternatives have been provided, discussed and informed consent has been obtained with: patient.    Plan discussed with CRNA.

## 2020-04-25 ENCOUNTER — APPOINTMENT (OUTPATIENT)
Dept: GENERAL RADIOLOGY | Facility: HOSPITAL | Age: 25
End: 2020-04-25

## 2020-04-25 LAB
ALBUMIN SERPL-MCNC: 2.8 G/DL (ref 3.5–5.2)
ALBUMIN/GLOB SERPL: 1.2 G/DL
ALP SERPL-CCNC: 102 U/L (ref 39–117)
ALT SERPL W P-5'-P-CCNC: 82 U/L (ref 1–41)
AMMONIA BLD-SCNC: 84 UMOL/L (ref 16–60)
ANION GAP SERPL CALCULATED.3IONS-SCNC: 15 MMOL/L (ref 5–15)
ARTERIAL PATENCY WRIST A: ABNORMAL
AST SERPL-CCNC: 256 U/L (ref 1–40)
ATMOSPHERIC PRESS: ABNORMAL MM[HG]
BACTERIA SPEC AEROBE CULT: NO GROWTH
BASE EXCESS BLDA CALC-SCNC: 1 MMOL/L (ref 0–2)
BASOPHILS # BLD AUTO: 0.2 10*3/MM3 (ref 0–0.2)
BASOPHILS NFR BLD AUTO: 0.9 % (ref 0–1.5)
BDY SITE: ABNORMAL
BH BB BLOOD EXPIRATION DATE: NORMAL
BH BB BLOOD EXPIRATION DATE: NORMAL
BH BB BLOOD TYPE BARCODE: 6200
BH BB BLOOD TYPE BARCODE: 6200
BH BB DISPENSE STATUS: NORMAL
BH BB DISPENSE STATUS: NORMAL
BH BB PRODUCT CODE: NORMAL
BH BB PRODUCT CODE: NORMAL
BH BB UNIT NUMBER: NORMAL
BH BB UNIT NUMBER: NORMAL
BILIRUB SERPL-MCNC: 10.7 MG/DL (ref 0.2–1.2)
BODY TEMPERATURE: 37 C
BUN BLD-MCNC: 59 MG/DL (ref 6–20)
BUN/CREAT SERPL: 36 (ref 7–25)
CALCIUM SPEC-SCNC: 7.9 MG/DL (ref 8.6–10.5)
CHLORIDE SERPL-SCNC: 102 MMOL/L (ref 98–107)
CO2 BLDA-SCNC: 24.6 MMOL/L (ref 22–33)
CO2 SERPL-SCNC: 22 MMOL/L (ref 22–29)
COHGB MFR BLD: 1.4 % (ref 0–2)
CREAT BLD-MCNC: 1.64 MG/DL (ref 0.76–1.27)
CROSSMATCH INTERPRETATION: NORMAL
CROSSMATCH INTERPRETATION: NORMAL
DEPRECATED RDW RBC AUTO: 70.9 FL (ref 37–54)
DEPRECATED RDW RBC AUTO: 72.4 FL (ref 37–54)
EOSINOPHIL # BLD AUTO: 0.45 10*3/MM3 (ref 0–0.4)
EOSINOPHIL NFR BLD AUTO: 1.9 % (ref 0.3–6.2)
ERYTHROCYTE [DISTWIDTH] IN BLOOD BY AUTOMATED COUNT: 21 % (ref 12.3–15.4)
ERYTHROCYTE [DISTWIDTH] IN BLOOD BY AUTOMATED COUNT: 21 % (ref 12.3–15.4)
GFR SERPL CREATININE-BSD FRML MDRD: 51 ML/MIN/1.73
GLOBULIN UR ELPH-MCNC: 2.3 GM/DL
GLUCOSE BLD-MCNC: 103 MG/DL (ref 65–99)
GLUCOSE BLDC GLUCOMTR-MCNC: 101 MG/DL (ref 70–130)
GLUCOSE BLDC GLUCOMTR-MCNC: 106 MG/DL (ref 70–130)
GLUCOSE BLDC GLUCOMTR-MCNC: 120 MG/DL (ref 70–130)
GLUCOSE BLDC GLUCOMTR-MCNC: 154 MG/DL (ref 70–130)
HCO3 BLDA-SCNC: 23.7 MMOL/L (ref 20–26)
HCT VFR BLD AUTO: 24.2 % (ref 37.5–51)
HCT VFR BLD AUTO: 24.2 % (ref 37.5–51)
HCT VFR BLD AUTO: 24.3 % (ref 37.5–51)
HCT VFR BLD AUTO: 24.9 % (ref 37.5–51)
HCT VFR BLD AUTO: 25.2 % (ref 37.5–51)
HCT VFR BLD CALC: 26 %
HGB BLD-MCNC: 8.4 G/DL (ref 13–17.7)
HGB BLD-MCNC: 8.5 G/DL (ref 13–17.7)
HGB BLD-MCNC: 8.6 G/DL (ref 13–17.7)
HGB BLD-MCNC: 8.6 G/DL (ref 13–17.7)
HGB BLD-MCNC: 8.9 G/DL (ref 13–17.7)
HGB BLDA-MCNC: 8.5 G/DL (ref 13.5–17.5)
HOROWITZ INDEX BLD+IHG-RTO: 90 %
IMM GRANULOCYTES # BLD AUTO: 0.22 10*3/MM3 (ref 0–0.05)
IMM GRANULOCYTES NFR BLD AUTO: 0.9 % (ref 0–0.5)
INR PPP: 1.99 (ref 0.85–1.16)
LYMPHOCYTES # BLD AUTO: 2.56 10*3/MM3 (ref 0.7–3.1)
LYMPHOCYTES NFR BLD AUTO: 10.9 % (ref 19.6–45.3)
MAGNESIUM SERPL-MCNC: 1.8 MG/DL (ref 1.6–2.6)
MCH RBC QN AUTO: 34.3 PG (ref 26.6–33)
MCH RBC QN AUTO: 35 PG (ref 26.6–33)
MCHC RBC AUTO-ENTMCNC: 34.1 G/DL (ref 31.5–35.7)
MCHC RBC AUTO-ENTMCNC: 35.5 G/DL (ref 31.5–35.7)
MCV RBC AUTO: 100.4 FL (ref 79–97)
MCV RBC AUTO: 98.4 FL (ref 79–97)
METHGB BLD QL: 0.5 % (ref 0–1.5)
MODALITY: ABNORMAL
MONOCYTES # BLD AUTO: 1.74 10*3/MM3 (ref 0.1–0.9)
MONOCYTES NFR BLD AUTO: 7.4 % (ref 5–12)
MRSA DNA SPEC QL NAA+PROBE: POSITIVE
NEUTROPHILS # BLD AUTO: 18.26 10*3/MM3 (ref 1.7–7)
NEUTROPHILS NFR BLD AUTO: 78 % (ref 42.7–76)
NOTE: ABNORMAL
NRBC BLD AUTO-RTO: 0.1 /100 WBC (ref 0–0.2)
OXYHGB MFR BLDV: 98.9 % (ref 94–99)
PCO2 BLDA: 29.6 MM HG (ref 35–45)
PCO2 TEMP ADJ BLD: 29.6 MM HG (ref 35–48)
PEEP RESPIRATORY: 10 CM[H2O]
PH BLDA: 7.51 PH UNITS (ref 7.35–7.45)
PH, TEMP CORRECTED: 7.51 PH UNITS
PHOSPHATE SERPL-MCNC: 4.3 MG/DL (ref 2.5–4.5)
PLATELET # BLD AUTO: 212 10*3/MM3 (ref 140–450)
PLATELET # BLD AUTO: 218 10*3/MM3 (ref 140–450)
PMV BLD AUTO: 10.4 FL (ref 6–12)
PMV BLD AUTO: 10.6 FL (ref 6–12)
PO2 BLDA: 234 MM HG (ref 83–108)
PO2 TEMP ADJ BLD: 234 MM HG (ref 83–108)
POTASSIUM BLD-SCNC: 3.3 MMOL/L (ref 3.5–5.2)
POTASSIUM BLD-SCNC: 3.9 MMOL/L (ref 3.5–5.2)
PROT SERPL-MCNC: 5.1 G/DL (ref 6–8.5)
PROTHROMBIN TIME: 22.3 SECONDS (ref 11.5–14)
RBC # BLD AUTO: 2.46 10*6/MM3 (ref 4.14–5.8)
RBC # BLD AUTO: 2.48 10*6/MM3 (ref 4.14–5.8)
SET MECH RESP RATE: 12
SODIUM BLD-SCNC: 139 MMOL/L (ref 136–145)
TOTAL RATE: 12 BREATHS/MINUTE
UNIT  ABO: NORMAL
UNIT  ABO: NORMAL
UNIT  RH: NORMAL
UNIT  RH: NORMAL
VENTILATOR MODE: ABNORMAL
VT ON VENT VENT: 0.55 ML
WBC NRBC COR # BLD: 22.71 10*3/MM3 (ref 3.4–10.8)
WBC NRBC COR # BLD: 23.43 10*3/MM3 (ref 3.4–10.8)

## 2020-04-25 PROCEDURE — 85014 HEMATOCRIT: CPT | Performed by: INTERNAL MEDICINE

## 2020-04-25 PROCEDURE — 94799 UNLISTED PULMONARY SVC/PX: CPT

## 2020-04-25 PROCEDURE — 99291 CRITICAL CARE FIRST HOUR: CPT | Performed by: INTERNAL MEDICINE

## 2020-04-25 PROCEDURE — 36600 WITHDRAWAL OF ARTERIAL BLOOD: CPT

## 2020-04-25 PROCEDURE — 85025 COMPLETE CBC W/AUTO DIFF WBC: CPT | Performed by: INTERNAL MEDICINE

## 2020-04-25 PROCEDURE — 85018 HEMOGLOBIN: CPT | Performed by: INTERNAL MEDICINE

## 2020-04-25 PROCEDURE — 25010000002 LORAZEPAM PER 2 MG: Performed by: INTERNAL MEDICINE

## 2020-04-25 PROCEDURE — 85610 PROTHROMBIN TIME: CPT | Performed by: INTERNAL MEDICINE

## 2020-04-25 PROCEDURE — 84100 ASSAY OF PHOSPHORUS: CPT | Performed by: INTERNAL MEDICINE

## 2020-04-25 PROCEDURE — 99232 SBSQ HOSP IP/OBS MODERATE 35: CPT | Performed by: INTERNAL MEDICINE

## 2020-04-25 PROCEDURE — 36556 INSERT NON-TUNNEL CV CATH: CPT | Performed by: NURSE PRACTITIONER

## 2020-04-25 PROCEDURE — 71045 X-RAY EXAM CHEST 1 VIEW: CPT

## 2020-04-25 PROCEDURE — 82962 GLUCOSE BLOOD TEST: CPT

## 2020-04-25 PROCEDURE — 82805 BLOOD GASES W/O2 SATURATION: CPT

## 2020-04-25 PROCEDURE — 25010000002 VANCOMYCIN 10 G RECONSTITUTED SOLUTION: Performed by: INTERNAL MEDICINE

## 2020-04-25 PROCEDURE — 86708 HEPATITIS A ANTIBODY: CPT | Performed by: INTERNAL MEDICINE

## 2020-04-25 PROCEDURE — 85027 COMPLETE CBC AUTOMATED: CPT | Performed by: INTERNAL MEDICINE

## 2020-04-25 PROCEDURE — 25010000002 OCTREOTIDE PER 25 MCG: Performed by: INTERNAL MEDICINE

## 2020-04-25 PROCEDURE — 25010000002 THIAMINE PER 100 MG: Performed by: INTERNAL MEDICINE

## 2020-04-25 PROCEDURE — 25010000003 MAGNESIUM SULFATE 4 GM/100ML SOLUTION: Performed by: INTERNAL MEDICINE

## 2020-04-25 PROCEDURE — 80053 COMPREHEN METABOLIC PANEL: CPT | Performed by: INTERNAL MEDICINE

## 2020-04-25 PROCEDURE — B543ZZA ULTRASONOGRAPHY OF RIGHT JUGULAR VEINS, GUIDANCE: ICD-10-PCS | Performed by: NURSE PRACTITIONER

## 2020-04-25 PROCEDURE — 94003 VENT MGMT INPAT SUBQ DAY: CPT

## 2020-04-25 PROCEDURE — 87641 MR-STAPH DNA AMP PROBE: CPT | Performed by: INTERNAL MEDICINE

## 2020-04-25 PROCEDURE — 82140 ASSAY OF AMMONIA: CPT | Performed by: INTERNAL MEDICINE

## 2020-04-25 PROCEDURE — 94770: CPT

## 2020-04-25 PROCEDURE — 05HM33Z INSERTION OF INFUSION DEVICE INTO RIGHT INTERNAL JUGULAR VEIN, PERCUTANEOUS APPROACH: ICD-10-PCS | Performed by: NURSE PRACTITIONER

## 2020-04-25 PROCEDURE — 84132 ASSAY OF SERUM POTASSIUM: CPT | Performed by: INTERNAL MEDICINE

## 2020-04-25 PROCEDURE — 83735 ASSAY OF MAGNESIUM: CPT | Performed by: INTERNAL MEDICINE

## 2020-04-25 RX ORDER — POTASSIUM CHLORIDE 1.5 G/1.77G
40 POWDER, FOR SOLUTION ORAL AS NEEDED
Status: DISCONTINUED | OUTPATIENT
Start: 2020-04-25 | End: 2020-05-07

## 2020-04-25 RX ORDER — POTASSIUM CHLORIDE 750 MG/1
40 CAPSULE, EXTENDED RELEASE ORAL AS NEEDED
Status: DISCONTINUED | OUTPATIENT
Start: 2020-04-25 | End: 2020-05-07

## 2020-04-25 RX ORDER — MAGNESIUM SULFATE HEPTAHYDRATE 40 MG/ML
2 INJECTION, SOLUTION INTRAVENOUS AS NEEDED
Status: DISCONTINUED | OUTPATIENT
Start: 2020-04-25 | End: 2020-05-13 | Stop reason: HOSPADM

## 2020-04-25 RX ORDER — POTASSIUM CHLORIDE 7.45 MG/ML
10 INJECTION INTRAVENOUS
Status: DISCONTINUED | OUTPATIENT
Start: 2020-04-25 | End: 2020-05-07

## 2020-04-25 RX ORDER — PANTOPRAZOLE SODIUM 40 MG/10ML
40 INJECTION, POWDER, LYOPHILIZED, FOR SOLUTION INTRAVENOUS EVERY 12 HOURS SCHEDULED
Status: DISCONTINUED | OUTPATIENT
Start: 2020-04-25 | End: 2020-04-26

## 2020-04-25 RX ORDER — MAGNESIUM SULFATE HEPTAHYDRATE 40 MG/ML
4 INJECTION, SOLUTION INTRAVENOUS AS NEEDED
Status: DISCONTINUED | OUTPATIENT
Start: 2020-04-25 | End: 2020-05-13 | Stop reason: HOSPADM

## 2020-04-25 RX ADMIN — POTASSIUM CHLORIDE 40 MEQ: 1.5 FOR SOLUTION ORAL at 15:25

## 2020-04-25 RX ADMIN — VANCOMYCIN HYDROCHLORIDE 1000 MG: 10 INJECTION, POWDER, LYOPHILIZED, FOR SOLUTION INTRAVENOUS at 05:53

## 2020-04-25 RX ADMIN — AZTREONAM 1 G: 1 INJECTION, POWDER, LYOPHILIZED, FOR SOLUTION INTRAMUSCULAR; INTRAVENOUS at 08:25

## 2020-04-25 RX ADMIN — RIFAXIMIN 550 MG: 550 TABLET ORAL at 08:24

## 2020-04-25 RX ADMIN — SODIUM CHLORIDE 8 MG/HR: 900 INJECTION INTRAVENOUS at 04:13

## 2020-04-25 RX ADMIN — LORAZEPAM 2 MG: 2 INJECTION INTRAMUSCULAR; INTRAVENOUS at 18:32

## 2020-04-25 RX ADMIN — SODIUM CHLORIDE, PRESERVATIVE FREE 10 ML: 5 INJECTION INTRAVENOUS at 08:27

## 2020-04-25 RX ADMIN — SODIUM CHLORIDE 8 MG/HR: 900 INJECTION INTRAVENOUS at 08:25

## 2020-04-25 RX ADMIN — FOLIC ACID 100 ML/HR: 5 INJECTION, SOLUTION INTRAMUSCULAR; INTRAVENOUS; SUBCUTANEOUS at 08:27

## 2020-04-25 RX ADMIN — AZTREONAM 1 G: 1 INJECTION, POWDER, LYOPHILIZED, FOR SOLUTION INTRAMUSCULAR; INTRAVENOUS at 15:25

## 2020-04-25 RX ADMIN — MAGNESIUM SULFATE HEPTAHYDRATE 4 G: 40 INJECTION, SOLUTION INTRAVENOUS at 11:29

## 2020-04-25 RX ADMIN — OCTREOTIDE ACETATE 25 MCG/HR: 500 INJECTION, SOLUTION INTRAVENOUS; SUBCUTANEOUS at 08:24

## 2020-04-25 RX ADMIN — THIAMINE HYDROCHLORIDE 250 MG: 100 INJECTION, SOLUTION INTRAMUSCULAR; INTRAVENOUS at 15:25

## 2020-04-25 RX ADMIN — DEXMEDETOMIDINE HYDROCHLORIDE 1.2 MCG/KG/HR: 4 INJECTION, SOLUTION INTRAVENOUS at 21:20

## 2020-04-25 RX ADMIN — PANTOPRAZOLE SODIUM 40 MG: 40 INJECTION, POWDER, FOR SOLUTION INTRAVENOUS at 20:38

## 2020-04-25 RX ADMIN — DEXMEDETOMIDINE HYDROCHLORIDE 0.4 MCG/KG/HR: 4 INJECTION, SOLUTION INTRAVENOUS at 15:24

## 2020-04-25 RX ADMIN — SODIUM CHLORIDE, PRESERVATIVE FREE 10 ML: 5 INJECTION INTRAVENOUS at 20:38

## 2020-04-25 RX ADMIN — POTASSIUM CHLORIDE 40 MEQ: 1.5 FOR SOLUTION ORAL at 11:29

## 2020-04-25 RX ADMIN — THIAMINE HYDROCHLORIDE 250 MG: 100 INJECTION, SOLUTION INTRAMUSCULAR; INTRAVENOUS at 08:25

## 2020-04-25 RX ADMIN — RIFAXIMIN 550 MG: 550 TABLET ORAL at 20:38

## 2020-04-25 RX ADMIN — DEXMEDETOMIDINE HYDROCHLORIDE 0.4 MCG/KG/HR: 4 INJECTION, SOLUTION INTRAVENOUS at 05:58

## 2020-04-26 LAB
ANION GAP SERPL CALCULATED.3IONS-SCNC: 13 MMOL/L (ref 5–15)
ARTERIAL PATENCY WRIST A: ABNORMAL
ATMOSPHERIC PRESS: ABNORMAL MM[HG]
BACTERIA SPEC RESP CULT: NORMAL
BASE EXCESS BLDA CALC-SCNC: -1.7 MMOL/L (ref 0–2)
BDY SITE: ABNORMAL
BODY TEMPERATURE: 37 C
BUN BLD-MCNC: 62 MG/DL (ref 6–20)
BUN/CREAT SERPL: 55.4 (ref 7–25)
CALCIUM SPEC-SCNC: 7.8 MG/DL (ref 8.6–10.5)
CHLORIDE SERPL-SCNC: 107 MMOL/L (ref 98–107)
CO2 BLDA-SCNC: 22.9 MMOL/L (ref 22–33)
CO2 SERPL-SCNC: 20 MMOL/L (ref 22–29)
COHGB MFR BLD: 1.7 % (ref 0–2)
CREAT BLD-MCNC: 1.12 MG/DL (ref 0.76–1.27)
GFR SERPL CREATININE-BSD FRML MDRD: 80 ML/MIN/1.73
GLUCOSE BLD-MCNC: 94 MG/DL (ref 65–99)
GLUCOSE BLDC GLUCOMTR-MCNC: 135 MG/DL (ref 70–130)
GLUCOSE BLDC GLUCOMTR-MCNC: 143 MG/DL (ref 70–130)
GLUCOSE BLDC GLUCOMTR-MCNC: 156 MG/DL (ref 70–130)
GLUCOSE BLDC GLUCOMTR-MCNC: 92 MG/DL (ref 70–130)
GRAM STN SPEC: NORMAL
HAV AB SER QL IA: POSITIVE
HCO3 BLDA-SCNC: 21.9 MMOL/L (ref 20–26)
HCT VFR BLD AUTO: 26.1 % (ref 37.5–51)
HCT VFR BLD AUTO: 26.6 % (ref 37.5–51)
HCT VFR BLD CALC: 26.8 %
HGB BLD-MCNC: 8.9 G/DL (ref 13–17.7)
HGB BLD-MCNC: 8.9 G/DL (ref 13–17.7)
HGB BLDA-MCNC: 8.7 G/DL (ref 13.5–17.5)
HOROWITZ INDEX BLD+IHG-RTO: 45 %
MAGNESIUM SERPL-MCNC: 2.9 MG/DL (ref 1.6–2.6)
METHGB BLD QL: 0.7 % (ref 0–1.5)
MODALITY: ABNORMAL
NOTE: ABNORMAL
OXYHGB MFR BLDV: 92.6 % (ref 94–99)
PCO2 BLDA: 31.7 MM HG (ref 35–45)
PCO2 TEMP ADJ BLD: 31.7 MM HG (ref 35–48)
PEEP RESPIRATORY: 8 CM[H2O]
PH BLDA: 7.45 PH UNITS (ref 7.35–7.45)
PH, TEMP CORRECTED: 7.45 PH UNITS
PHOSPHATE SERPL-MCNC: 3.3 MG/DL (ref 2.5–4.5)
PO2 BLDA: 69.7 MM HG (ref 83–108)
PO2 TEMP ADJ BLD: 69.7 MM HG (ref 83–108)
POTASSIUM BLD-SCNC: 3.9 MMOL/L (ref 3.5–5.2)
SET MECH RESP RATE: 12
SODIUM BLD-SCNC: 140 MMOL/L (ref 136–145)
TOTAL RATE: 16 BREATHS/MINUTE
VENTILATOR MODE: ABNORMAL
VT ON VENT VENT: 0.46 ML

## 2020-04-26 PROCEDURE — 85018 HEMOGLOBIN: CPT | Performed by: INTERNAL MEDICINE

## 2020-04-26 PROCEDURE — 25010000002 ENOXAPARIN PER 10 MG: Performed by: INTERNAL MEDICINE

## 2020-04-26 PROCEDURE — 99291 CRITICAL CARE FIRST HOUR: CPT | Performed by: INTERNAL MEDICINE

## 2020-04-26 PROCEDURE — 85014 HEMATOCRIT: CPT | Performed by: INTERNAL MEDICINE

## 2020-04-26 PROCEDURE — 82962 GLUCOSE BLOOD TEST: CPT

## 2020-04-26 PROCEDURE — 94003 VENT MGMT INPAT SUBQ DAY: CPT

## 2020-04-26 PROCEDURE — 83735 ASSAY OF MAGNESIUM: CPT | Performed by: INTERNAL MEDICINE

## 2020-04-26 PROCEDURE — 84100 ASSAY OF PHOSPHORUS: CPT | Performed by: INTERNAL MEDICINE

## 2020-04-26 PROCEDURE — 94770: CPT

## 2020-04-26 PROCEDURE — 25010000002 OCTREOTIDE PER 25 MCG: Performed by: INTERNAL MEDICINE

## 2020-04-26 PROCEDURE — 82805 BLOOD GASES W/O2 SATURATION: CPT

## 2020-04-26 PROCEDURE — 25010000002 FUROSEMIDE PER 20 MG: Performed by: INTERNAL MEDICINE

## 2020-04-26 PROCEDURE — 94799 UNLISTED PULMONARY SVC/PX: CPT

## 2020-04-26 PROCEDURE — 25010000002 ONDANSETRON PER 1 MG: Performed by: INTERNAL MEDICINE

## 2020-04-26 PROCEDURE — 99232 SBSQ HOSP IP/OBS MODERATE 35: CPT | Performed by: INTERNAL MEDICINE

## 2020-04-26 PROCEDURE — 80048 BASIC METABOLIC PNL TOTAL CA: CPT | Performed by: INTERNAL MEDICINE

## 2020-04-26 PROCEDURE — 36600 WITHDRAWAL OF ARTERIAL BLOOD: CPT

## 2020-04-26 PROCEDURE — 25010000002 THIAMINE PER 100 MG: Performed by: INTERNAL MEDICINE

## 2020-04-26 RX ORDER — FUROSEMIDE 10 MG/ML
40 INJECTION INTRAMUSCULAR; INTRAVENOUS ONCE
Status: COMPLETED | OUTPATIENT
Start: 2020-04-26 | End: 2020-04-26

## 2020-04-26 RX ORDER — PANTOPRAZOLE SODIUM 40 MG/10ML
40 INJECTION, POWDER, LYOPHILIZED, FOR SOLUTION INTRAVENOUS
Status: DISCONTINUED | OUTPATIENT
Start: 2020-04-27 | End: 2020-05-08

## 2020-04-26 RX ADMIN — SODIUM CHLORIDE, PRESERVATIVE FREE 10 ML: 5 INJECTION INTRAVENOUS at 11:22

## 2020-04-26 RX ADMIN — RIFAXIMIN 550 MG: 550 TABLET ORAL at 11:27

## 2020-04-26 RX ADMIN — AZTREONAM 1 G: 1 INJECTION, POWDER, LYOPHILIZED, FOR SOLUTION INTRAMUSCULAR; INTRAVENOUS at 16:32

## 2020-04-26 RX ADMIN — FUROSEMIDE 40 MG: 10 INJECTION, SOLUTION INTRAMUSCULAR; INTRAVENOUS at 11:27

## 2020-04-26 RX ADMIN — FOLIC ACID 100 ML/HR: 5 INJECTION, SOLUTION INTRAMUSCULAR; INTRAVENOUS; SUBCUTANEOUS at 11:23

## 2020-04-26 RX ADMIN — DEXMEDETOMIDINE HYDROCHLORIDE 1.4 MCG/KG/HR: 4 INJECTION, SOLUTION INTRAVENOUS at 16:32

## 2020-04-26 RX ADMIN — ONDANSETRON 4 MG: 2 INJECTION INTRAMUSCULAR; INTRAVENOUS at 11:22

## 2020-04-26 RX ADMIN — AZTREONAM 1 G: 1 INJECTION, POWDER, LYOPHILIZED, FOR SOLUTION INTRAMUSCULAR; INTRAVENOUS at 00:04

## 2020-04-26 RX ADMIN — PANTOPRAZOLE SODIUM 40 MG: 40 INJECTION, POWDER, FOR SOLUTION INTRAVENOUS at 11:27

## 2020-04-26 RX ADMIN — OCTREOTIDE ACETATE 25 MCG/HR: 500 INJECTION, SOLUTION INTRAVENOUS; SUBCUTANEOUS at 06:15

## 2020-04-26 RX ADMIN — RIFAXIMIN 550 MG: 550 TABLET ORAL at 20:17

## 2020-04-26 RX ADMIN — AZTREONAM 1 G: 1 INJECTION, POWDER, LYOPHILIZED, FOR SOLUTION INTRAMUSCULAR; INTRAVENOUS at 11:23

## 2020-04-26 RX ADMIN — DEXMEDETOMIDINE HYDROCHLORIDE 1.4 MCG/KG/HR: 4 INJECTION, SOLUTION INTRAVENOUS at 12:17

## 2020-04-26 RX ADMIN — DEXMEDETOMIDINE HYDROCHLORIDE 1.4 MCG/KG/HR: 4 INJECTION, SOLUTION INTRAVENOUS at 12:22

## 2020-04-26 RX ADMIN — DEXMEDETOMIDINE HYDROCHLORIDE 1.4 MCG/KG/HR: 4 INJECTION, SOLUTION INTRAVENOUS at 20:17

## 2020-04-26 RX ADMIN — SODIUM CHLORIDE, PRESERVATIVE FREE 10 ML: 5 INJECTION INTRAVENOUS at 20:17

## 2020-04-26 RX ADMIN — ENOXAPARIN SODIUM 40 MG: 40 INJECTION SUBCUTANEOUS at 11:21

## 2020-04-26 RX ADMIN — DEXMEDETOMIDINE HYDROCHLORIDE 1.2 MCG/KG/HR: 4 INJECTION, SOLUTION INTRAVENOUS at 02:02

## 2020-04-27 ENCOUNTER — APPOINTMENT (OUTPATIENT)
Dept: GENERAL RADIOLOGY | Facility: HOSPITAL | Age: 25
End: 2020-04-27

## 2020-04-27 LAB
ALBUMIN SERPL-MCNC: 2.6 G/DL (ref 3.5–5.2)
ALP SERPL-CCNC: 135 U/L (ref 39–117)
ALT SERPL W P-5'-P-CCNC: 105 U/L (ref 1–41)
ANION GAP SERPL CALCULATED.3IONS-SCNC: 12 MMOL/L (ref 5–15)
ARTERIAL PATENCY WRIST A: ABNORMAL
AST SERPL-CCNC: 250 U/L (ref 1–40)
ATMOSPHERIC PRESS: ABNORMAL MM[HG]
BASE EXCESS BLDA CALC-SCNC: -1.7 MMOL/L (ref 0–2)
BDY SITE: ABNORMAL
BILIRUB CONJ SERPL-MCNC: 5.1 MG/DL (ref 0.2–0.3)
BILIRUB INDIRECT SERPL-MCNC: 3.1 MG/DL
BILIRUB SERPL-MCNC: 8.2 MG/DL (ref 0.2–1.2)
BODY TEMPERATURE: 37 C
BUN BLD-MCNC: 54 MG/DL (ref 6–20)
BUN/CREAT SERPL: 61.4 (ref 7–25)
CALCIUM SPEC-SCNC: 7.6 MG/DL (ref 8.6–10.5)
CHLORIDE SERPL-SCNC: 110 MMOL/L (ref 98–107)
CO2 BLDA-SCNC: 23 MMOL/L (ref 22–33)
CO2 SERPL-SCNC: 21 MMOL/L (ref 22–29)
COHGB MFR BLD: 1.8 % (ref 0–2)
CREAT BLD-MCNC: 0.88 MG/DL (ref 0.76–1.27)
DEPRECATED RDW RBC AUTO: 74.5 FL (ref 37–54)
ERYTHROCYTE [DISTWIDTH] IN BLOOD BY AUTOMATED COUNT: 21.2 % (ref 12.3–15.4)
GFR SERPL CREATININE-BSD FRML MDRD: 106 ML/MIN/1.73
GLUCOSE BLD-MCNC: 148 MG/DL (ref 65–99)
GLUCOSE BLDC GLUCOMTR-MCNC: 123 MG/DL (ref 70–130)
GLUCOSE BLDC GLUCOMTR-MCNC: 130 MG/DL (ref 70–130)
GLUCOSE BLDC GLUCOMTR-MCNC: 162 MG/DL (ref 70–130)
GLUCOSE BLDC GLUCOMTR-MCNC: 162 MG/DL (ref 70–130)
HCO3 BLDA-SCNC: 22 MMOL/L (ref 20–26)
HCT VFR BLD AUTO: 27.5 % (ref 37.5–51)
HCT VFR BLD CALC: 27.7 %
HGB BLD-MCNC: 9 G/DL (ref 13–17.7)
HGB BLDA-MCNC: 9 G/DL (ref 13.5–17.5)
HOROWITZ INDEX BLD+IHG-RTO: 40 %
MAGNESIUM SERPL-MCNC: 2.3 MG/DL (ref 1.6–2.6)
MCH RBC QN AUTO: 34.2 PG (ref 26.6–33)
MCHC RBC AUTO-ENTMCNC: 32.7 G/DL (ref 31.5–35.7)
MCV RBC AUTO: 104.6 FL (ref 79–97)
METHGB BLD QL: 0.4 % (ref 0–1.5)
MODALITY: ABNORMAL
NOTE: ABNORMAL
OXYHGB MFR BLDV: 82 % (ref 94–99)
PCO2 BLDA: 32.6 MM HG (ref 35–45)
PCO2 TEMP ADJ BLD: 32.6 MM HG (ref 35–48)
PH BLDA: 7.44 PH UNITS (ref 7.35–7.45)
PH, TEMP CORRECTED: 7.44 PH UNITS
PHOSPHATE SERPL-MCNC: 2.8 MG/DL (ref 2.5–4.5)
PLATELET # BLD AUTO: 232 10*3/MM3 (ref 140–450)
PMV BLD AUTO: 10.9 FL (ref 6–12)
PO2 BLDA: 47.3 MM HG (ref 83–108)
PO2 TEMP ADJ BLD: 47.3 MM HG (ref 83–108)
POTASSIUM BLD-SCNC: 3.8 MMOL/L (ref 3.5–5.2)
PROT SERPL-MCNC: 5.2 G/DL (ref 6–8.5)
RBC # BLD AUTO: 2.63 10*6/MM3 (ref 4.14–5.8)
SODIUM BLD-SCNC: 143 MMOL/L (ref 136–145)
VENTILATOR MODE: ABNORMAL
WBC NRBC COR # BLD: 25.08 10*3/MM3 (ref 3.4–10.8)

## 2020-04-27 PROCEDURE — 82805 BLOOD GASES W/O2 SATURATION: CPT

## 2020-04-27 PROCEDURE — 94799 UNLISTED PULMONARY SVC/PX: CPT

## 2020-04-27 PROCEDURE — 99291 CRITICAL CARE FIRST HOUR: CPT | Performed by: INTERNAL MEDICINE

## 2020-04-27 PROCEDURE — 25010000002 LORAZEPAM PER 2 MG: Performed by: INTERNAL MEDICINE

## 2020-04-27 PROCEDURE — 85027 COMPLETE CBC AUTOMATED: CPT | Performed by: INTERNAL MEDICINE

## 2020-04-27 PROCEDURE — 94770: CPT

## 2020-04-27 PROCEDURE — 99232 SBSQ HOSP IP/OBS MODERATE 35: CPT | Performed by: INTERNAL MEDICINE

## 2020-04-27 PROCEDURE — 36600 WITHDRAWAL OF ARTERIAL BLOOD: CPT

## 2020-04-27 PROCEDURE — 83735 ASSAY OF MAGNESIUM: CPT | Performed by: INTERNAL MEDICINE

## 2020-04-27 PROCEDURE — 25010000002 FUROSEMIDE PER 20 MG: Performed by: INTERNAL MEDICINE

## 2020-04-27 PROCEDURE — 25010000002 ENOXAPARIN PER 10 MG: Performed by: INTERNAL MEDICINE

## 2020-04-27 PROCEDURE — 25010000003 MAGNESIUM SULFATE 4 GM/100ML SOLUTION: Performed by: INTERNAL MEDICINE

## 2020-04-27 PROCEDURE — 84100 ASSAY OF PHOSPHORUS: CPT | Performed by: INTERNAL MEDICINE

## 2020-04-27 PROCEDURE — 25010000002 ONDANSETRON PER 1 MG: Performed by: INTERNAL MEDICINE

## 2020-04-27 PROCEDURE — 82962 GLUCOSE BLOOD TEST: CPT

## 2020-04-27 PROCEDURE — 74018 RADEX ABDOMEN 1 VIEW: CPT

## 2020-04-27 PROCEDURE — 80048 BASIC METABOLIC PNL TOTAL CA: CPT | Performed by: INTERNAL MEDICINE

## 2020-04-27 PROCEDURE — 80076 HEPATIC FUNCTION PANEL: CPT | Performed by: INTERNAL MEDICINE

## 2020-04-27 PROCEDURE — 25010000002 OCTREOTIDE PER 25 MCG: Performed by: INTERNAL MEDICINE

## 2020-04-27 PROCEDURE — 94003 VENT MGMT INPAT SUBQ DAY: CPT

## 2020-04-27 RX ORDER — BISACODYL 10 MG
10 SUPPOSITORY, RECTAL RECTAL DAILY PRN
Status: DISCONTINUED | OUTPATIENT
Start: 2020-04-27 | End: 2020-05-13 | Stop reason: HOSPADM

## 2020-04-27 RX ORDER — SPIRONOLACTONE 25 MG/1
25 TABLET ORAL DAILY
Status: DISCONTINUED | OUTPATIENT
Start: 2020-04-28 | End: 2020-05-07

## 2020-04-27 RX ORDER — SPIRONOLACTONE 25 MG/1
25 TABLET ORAL DAILY
Status: DISCONTINUED | OUTPATIENT
Start: 2020-04-27 | End: 2020-04-27

## 2020-04-27 RX ORDER — FUROSEMIDE 10 MG/ML
40 INJECTION INTRAMUSCULAR; INTRAVENOUS ONCE
Status: COMPLETED | OUTPATIENT
Start: 2020-04-27 | End: 2020-04-27

## 2020-04-27 RX ADMIN — DEXMEDETOMIDINE HYDROCHLORIDE 0.6 MCG/KG/HR: 4 INJECTION, SOLUTION INTRAVENOUS at 10:15

## 2020-04-27 RX ADMIN — LORAZEPAM 2 MG: 2 INJECTION INTRAMUSCULAR; INTRAVENOUS at 12:10

## 2020-04-27 RX ADMIN — LORAZEPAM 2 MG: 2 INJECTION INTRAMUSCULAR; INTRAVENOUS at 17:16

## 2020-04-27 RX ADMIN — FUROSEMIDE 40 MG: 10 INJECTION, SOLUTION INTRAMUSCULAR; INTRAVENOUS at 09:05

## 2020-04-27 RX ADMIN — DEXMEDETOMIDINE HYDROCHLORIDE 1.5 MCG/KG/HR: 4 INJECTION, SOLUTION INTRAVENOUS at 03:30

## 2020-04-27 RX ADMIN — LORAZEPAM 1 MG: 2 INJECTION INTRAMUSCULAR; INTRAVENOUS at 21:06

## 2020-04-27 RX ADMIN — LORAZEPAM 2 MG: 2 INJECTION INTRAMUSCULAR; INTRAVENOUS at 17:12

## 2020-04-27 RX ADMIN — SODIUM CHLORIDE, PRESERVATIVE FREE 10 ML: 5 INJECTION INTRAVENOUS at 09:11

## 2020-04-27 RX ADMIN — MAGNESIUM SULFATE HEPTAHYDRATE 4 G: 40 INJECTION, SOLUTION INTRAVENOUS at 09:03

## 2020-04-27 RX ADMIN — DEXMEDETOMIDINE HYDROCHLORIDE 1.5 MCG/KG/HR: 4 INJECTION, SOLUTION INTRAVENOUS at 14:37

## 2020-04-27 RX ADMIN — BISACODYL 10 MG: 10 SUPPOSITORY RECTAL at 18:39

## 2020-04-27 RX ADMIN — LORAZEPAM 2 MG: 2 INJECTION INTRAMUSCULAR; INTRAVENOUS at 01:50

## 2020-04-27 RX ADMIN — DEXMEDETOMIDINE HYDROCHLORIDE 1.5 MCG/KG/HR: 4 INJECTION, SOLUTION INTRAVENOUS at 22:36

## 2020-04-27 RX ADMIN — RIFAXIMIN 550 MG: 550 TABLET ORAL at 09:03

## 2020-04-27 RX ADMIN — AZTREONAM 1 G: 1 INJECTION, POWDER, LYOPHILIZED, FOR SOLUTION INTRAMUSCULAR; INTRAVENOUS at 00:49

## 2020-04-27 RX ADMIN — ENOXAPARIN SODIUM 40 MG: 40 INJECTION SUBCUTANEOUS at 10:17

## 2020-04-27 RX ADMIN — SPIRONOLACTONE 25 MG: 25 TABLET ORAL at 09:02

## 2020-04-27 RX ADMIN — PANTOPRAZOLE SODIUM 40 MG: 40 INJECTION, POWDER, FOR SOLUTION INTRAVENOUS at 05:59

## 2020-04-27 RX ADMIN — ONDANSETRON 4 MG: 2 INJECTION INTRAMUSCULAR; INTRAVENOUS at 01:50

## 2020-04-27 RX ADMIN — AZTREONAM 1 G: 1 INJECTION, POWDER, LYOPHILIZED, FOR SOLUTION INTRAMUSCULAR; INTRAVENOUS at 16:07

## 2020-04-27 RX ADMIN — RIFAXIMIN 550 MG: 550 TABLET ORAL at 20:53

## 2020-04-27 RX ADMIN — DEXMEDETOMIDINE HYDROCHLORIDE 1.5 MCG/KG/HR: 4 INJECTION, SOLUTION INTRAVENOUS at 18:39

## 2020-04-27 RX ADMIN — AZTREONAM 1 G: 1 INJECTION, POWDER, LYOPHILIZED, FOR SOLUTION INTRAMUSCULAR; INTRAVENOUS at 09:03

## 2020-04-28 ENCOUNTER — APPOINTMENT (OUTPATIENT)
Dept: ULTRASOUND IMAGING | Facility: HOSPITAL | Age: 25
End: 2020-04-28

## 2020-04-28 ENCOUNTER — APPOINTMENT (OUTPATIENT)
Dept: GENERAL RADIOLOGY | Facility: HOSPITAL | Age: 25
End: 2020-04-28

## 2020-04-28 LAB
ANION GAP SERPL CALCULATED.3IONS-SCNC: 11 MMOL/L (ref 5–15)
BACTERIA SPEC AEROBE CULT: NORMAL
BACTERIA SPEC AEROBE CULT: NORMAL
BASOPHILS # BLD AUTO: 0.19 10*3/MM3 (ref 0–0.2)
BASOPHILS NFR BLD AUTO: 0.8 % (ref 0–1.5)
BUN BLD-MCNC: 48 MG/DL (ref 6–20)
BUN/CREAT SERPL: 70.6 (ref 7–25)
CALCIUM SPEC-SCNC: 8 MG/DL (ref 8.6–10.5)
CHLORIDE SERPL-SCNC: 111 MMOL/L (ref 98–107)
CO2 SERPL-SCNC: 21 MMOL/L (ref 22–29)
CREAT BLD-MCNC: 0.68 MG/DL (ref 0.76–1.27)
DEPRECATED RDW RBC AUTO: 78.8 FL (ref 37–54)
EOSINOPHIL # BLD AUTO: 0.24 10*3/MM3 (ref 0–0.4)
EOSINOPHIL NFR BLD AUTO: 1 % (ref 0.3–6.2)
ERYTHROCYTE [DISTWIDTH] IN BLOOD BY AUTOMATED COUNT: 21.9 % (ref 12.3–15.4)
GFR SERPL CREATININE-BSD FRML MDRD: 142 ML/MIN/1.73
GLUCOSE BLD-MCNC: 137 MG/DL (ref 65–99)
GLUCOSE BLDC GLUCOMTR-MCNC: 103 MG/DL (ref 70–130)
GLUCOSE BLDC GLUCOMTR-MCNC: 140 MG/DL (ref 70–130)
GLUCOSE BLDC GLUCOMTR-MCNC: 153 MG/DL (ref 70–130)
GLUCOSE BLDC GLUCOMTR-MCNC: 173 MG/DL (ref 70–130)
HCT VFR BLD AUTO: 28.5 % (ref 37.5–51)
HGB BLD-MCNC: 9.4 G/DL (ref 13–17.7)
IMM GRANULOCYTES # BLD AUTO: 0.26 10*3/MM3 (ref 0–0.05)
IMM GRANULOCYTES NFR BLD AUTO: 1 % (ref 0–0.5)
LYMPHOCYTES # BLD AUTO: 2.45 10*3/MM3 (ref 0.7–3.1)
LYMPHOCYTES NFR BLD AUTO: 9.7 % (ref 19.6–45.3)
MAGNESIUM SERPL-MCNC: 2.5 MG/DL (ref 1.6–2.6)
MCH RBC QN AUTO: 35.5 PG (ref 26.6–33)
MCHC RBC AUTO-ENTMCNC: 33 G/DL (ref 31.5–35.7)
MCV RBC AUTO: 107.5 FL (ref 79–97)
MONOCYTES # BLD AUTO: 2.82 10*3/MM3 (ref 0.1–0.9)
MONOCYTES NFR BLD AUTO: 11.2 % (ref 5–12)
NEUTROPHILS # BLD AUTO: 19.25 10*3/MM3 (ref 1.7–7)
NEUTROPHILS NFR BLD AUTO: 76.3 % (ref 42.7–76)
NRBC BLD AUTO-RTO: 0 /100 WBC (ref 0–0.2)
PHOSPHATE SERPL-MCNC: 2.9 MG/DL (ref 2.5–4.5)
PLATELET # BLD AUTO: 253 10*3/MM3 (ref 140–450)
PMV BLD AUTO: 10.9 FL (ref 6–12)
POTASSIUM BLD-SCNC: 3.8 MMOL/L (ref 3.5–5.2)
RBC # BLD AUTO: 2.65 10*6/MM3 (ref 4.14–5.8)
SODIUM BLD-SCNC: 143 MMOL/L (ref 136–145)
WBC NRBC COR # BLD: 25.21 10*3/MM3 (ref 3.4–10.8)

## 2020-04-28 PROCEDURE — 25010000002 LORAZEPAM PER 2 MG: Performed by: INTERNAL MEDICINE

## 2020-04-28 PROCEDURE — C1751 CATH, INF, PER/CENT/MIDLINE: HCPCS

## 2020-04-28 PROCEDURE — 63710000001 INSULIN REGULAR HUMAN PER 5 UNITS: Performed by: INTERNAL MEDICINE

## 2020-04-28 PROCEDURE — 25010000002 ENOXAPARIN PER 10 MG: Performed by: INTERNAL MEDICINE

## 2020-04-28 PROCEDURE — 99232 SBSQ HOSP IP/OBS MODERATE 35: CPT | Performed by: INTERNAL MEDICINE

## 2020-04-28 PROCEDURE — 99291 CRITICAL CARE FIRST HOUR: CPT | Performed by: INTERNAL MEDICINE

## 2020-04-28 PROCEDURE — 80048 BASIC METABOLIC PNL TOTAL CA: CPT | Performed by: INTERNAL MEDICINE

## 2020-04-28 PROCEDURE — 02HV33Z INSERTION OF INFUSION DEVICE INTO SUPERIOR VENA CAVA, PERCUTANEOUS APPROACH: ICD-10-PCS | Performed by: FAMILY MEDICINE

## 2020-04-28 PROCEDURE — 71045 X-RAY EXAM CHEST 1 VIEW: CPT

## 2020-04-28 PROCEDURE — 94799 UNLISTED PULMONARY SVC/PX: CPT

## 2020-04-28 PROCEDURE — 85025 COMPLETE CBC W/AUTO DIFF WBC: CPT | Performed by: INTERNAL MEDICINE

## 2020-04-28 PROCEDURE — 25010000002 FUROSEMIDE PER 20 MG: Performed by: INTERNAL MEDICINE

## 2020-04-28 PROCEDURE — 76705 ECHO EXAM OF ABDOMEN: CPT

## 2020-04-28 PROCEDURE — 25010000002 DIAZEPAM PER 5 MG: Performed by: INTERNAL MEDICINE

## 2020-04-28 PROCEDURE — 74018 RADEX ABDOMEN 1 VIEW: CPT

## 2020-04-28 PROCEDURE — 25010000002 OCTREOTIDE PER 25 MCG: Performed by: INTERNAL MEDICINE

## 2020-04-28 PROCEDURE — 82962 GLUCOSE BLOOD TEST: CPT

## 2020-04-28 PROCEDURE — C1894 INTRO/SHEATH, NON-LASER: HCPCS

## 2020-04-28 PROCEDURE — 83735 ASSAY OF MAGNESIUM: CPT | Performed by: INTERNAL MEDICINE

## 2020-04-28 PROCEDURE — B548ZZA ULTRASONOGRAPHY OF SUPERIOR VENA CAVA, GUIDANCE: ICD-10-PCS | Performed by: FAMILY MEDICINE

## 2020-04-28 PROCEDURE — 84100 ASSAY OF PHOSPHORUS: CPT | Performed by: INTERNAL MEDICINE

## 2020-04-28 RX ORDER — DIAZEPAM 5 MG/ML
10 INJECTION, SOLUTION INTRAMUSCULAR; INTRAVENOUS EVERY 8 HOURS
Status: COMPLETED | OUTPATIENT
Start: 2020-04-28 | End: 2020-05-01

## 2020-04-28 RX ORDER — SODIUM CHLORIDE 0.9 % (FLUSH) 0.9 %
20 SYRINGE (ML) INJECTION AS NEEDED
Status: DISCONTINUED | OUTPATIENT
Start: 2020-04-28 | End: 2020-05-13 | Stop reason: HOSPADM

## 2020-04-28 RX ORDER — LEVOFLOXACIN 5 MG/ML
500 INJECTION, SOLUTION INTRAVENOUS
Status: COMPLETED | OUTPATIENT
Start: 2020-04-29 | End: 2020-04-29

## 2020-04-28 RX ORDER — SODIUM CHLORIDE 0.9 % (FLUSH) 0.9 %
10 SYRINGE (ML) INJECTION AS NEEDED
Status: DISCONTINUED | OUTPATIENT
Start: 2020-04-28 | End: 2020-05-13 | Stop reason: HOSPADM

## 2020-04-28 RX ORDER — AMINO ACIDS/PROTEIN HYDROLYS 15G-100/30
30 LIQUID (ML) ORAL 2 TIMES DAILY
Status: DISCONTINUED | OUTPATIENT
Start: 2020-04-28 | End: 2020-05-13

## 2020-04-28 RX ORDER — SODIUM CHLORIDE 0.9 % (FLUSH) 0.9 %
10 SYRINGE (ML) INJECTION EVERY 12 HOURS SCHEDULED
Status: DISCONTINUED | OUTPATIENT
Start: 2020-04-28 | End: 2020-04-29

## 2020-04-28 RX ORDER — LACTULOSE 10 G/15ML
30 SOLUTION ORAL 2 TIMES DAILY
Status: DISCONTINUED | OUTPATIENT
Start: 2020-04-28 | End: 2020-04-30

## 2020-04-28 RX ORDER — FUROSEMIDE 10 MG/ML
40 INJECTION INTRAMUSCULAR; INTRAVENOUS ONCE
Status: COMPLETED | OUTPATIENT
Start: 2020-04-28 | End: 2020-04-28

## 2020-04-28 RX ADMIN — AZTREONAM 1 G: 1 INJECTION, POWDER, LYOPHILIZED, FOR SOLUTION INTRAMUSCULAR; INTRAVENOUS at 00:16

## 2020-04-28 RX ADMIN — RIFAXIMIN 550 MG: 550 TABLET ORAL at 20:39

## 2020-04-28 RX ADMIN — DEXMEDETOMIDINE HYDROCHLORIDE 1.4 MCG/KG/HR: 4 INJECTION, SOLUTION INTRAVENOUS at 05:50

## 2020-04-28 RX ADMIN — DEXMEDETOMIDINE HYDROCHLORIDE 1.5 MCG/KG/HR: 4 INJECTION, SOLUTION INTRAVENOUS at 02:00

## 2020-04-28 RX ADMIN — LORAZEPAM 2 MG: 2 INJECTION INTRAMUSCULAR; INTRAVENOUS at 13:01

## 2020-04-28 RX ADMIN — FUROSEMIDE 40 MG: 10 INJECTION, SOLUTION INTRAMUSCULAR; INTRAVENOUS at 08:10

## 2020-04-28 RX ADMIN — DEXMEDETOMIDINE HYDROCHLORIDE 1.4 MCG/KG/HR: 4 INJECTION, SOLUTION INTRAVENOUS at 13:01

## 2020-04-28 RX ADMIN — DIAZEPAM 10 MG: 5 INJECTION, SOLUTION INTRAMUSCULAR; INTRAVENOUS at 17:26

## 2020-04-28 RX ADMIN — SODIUM CHLORIDE, PRESERVATIVE FREE 10 ML: 5 INJECTION INTRAVENOUS at 20:39

## 2020-04-28 RX ADMIN — PANTOPRAZOLE SODIUM 40 MG: 40 INJECTION, POWDER, FOR SOLUTION INTRAVENOUS at 05:51

## 2020-04-28 RX ADMIN — LORAZEPAM 1 MG: 2 INJECTION INTRAMUSCULAR; INTRAVENOUS at 01:53

## 2020-04-28 RX ADMIN — DIAZEPAM 10 MG: 5 INJECTION, SOLUTION INTRAMUSCULAR; INTRAVENOUS at 09:59

## 2020-04-28 RX ADMIN — SPIRONOLACTONE 25 MG: 25 TABLET ORAL at 08:10

## 2020-04-28 RX ADMIN — ENOXAPARIN SODIUM 40 MG: 40 INJECTION SUBCUTANEOUS at 10:00

## 2020-04-28 RX ADMIN — LACTULOSE 30 ML: 20 SOLUTION ORAL at 10:00

## 2020-04-28 RX ADMIN — AZTREONAM 1 G: 1 INJECTION, POWDER, LYOPHILIZED, FOR SOLUTION INTRAMUSCULAR; INTRAVENOUS at 17:26

## 2020-04-28 RX ADMIN — LORAZEPAM 2 MG: 2 INJECTION INTRAMUSCULAR; INTRAVENOUS at 10:45

## 2020-04-28 RX ADMIN — LACTULOSE 30 ML: 20 SOLUTION ORAL at 20:39

## 2020-04-28 RX ADMIN — OCTREOTIDE ACETATE 25 MCG/HR: 500 INJECTION, SOLUTION INTRAVENOUS; SUBCUTANEOUS at 17:37

## 2020-04-28 RX ADMIN — LORAZEPAM 1 MG: 2 INJECTION INTRAMUSCULAR; INTRAVENOUS at 02:37

## 2020-04-28 RX ADMIN — AZTREONAM 1 G: 1 INJECTION, POWDER, LYOPHILIZED, FOR SOLUTION INTRAMUSCULAR; INTRAVENOUS at 08:14

## 2020-04-28 RX ADMIN — DEXMEDETOMIDINE HYDROCHLORIDE 1.4 MCG/KG/HR: 4 INJECTION, SOLUTION INTRAVENOUS at 20:41

## 2020-04-28 RX ADMIN — RIFAXIMIN 550 MG: 550 TABLET ORAL at 08:10

## 2020-04-28 RX ADMIN — LORAZEPAM 1 MG: 2 INJECTION INTRAMUSCULAR; INTRAVENOUS at 08:10

## 2020-04-28 RX ADMIN — DEXMEDETOMIDINE HYDROCHLORIDE 1.4 MCG/KG/HR: 4 INJECTION, SOLUTION INTRAVENOUS at 09:45

## 2020-04-28 RX ADMIN — LORAZEPAM 2 MG: 2 INJECTION INTRAMUSCULAR; INTRAVENOUS at 03:03

## 2020-04-28 RX ADMIN — SODIUM CHLORIDE, PRESERVATIVE FREE 10 ML: 5 INJECTION INTRAVENOUS at 08:12

## 2020-04-28 RX ADMIN — INSULIN HUMAN 2 UNITS: 100 INJECTION, SOLUTION PARENTERAL at 09:59

## 2020-04-28 RX ADMIN — DEXMEDETOMIDINE HYDROCHLORIDE 1.4 MCG/KG/HR: 4 INJECTION, SOLUTION INTRAVENOUS at 17:35

## 2020-04-28 RX ADMIN — OCTREOTIDE ACETATE 25 MCG/HR: 500 INJECTION, SOLUTION INTRAVENOUS; SUBCUTANEOUS at 00:17

## 2020-04-29 ENCOUNTER — ANESTHESIA (OUTPATIENT)
Dept: INTERVENTIONAL RADIOLOGY/VASCULAR | Facility: HOSPITAL | Age: 25
End: 2020-04-29

## 2020-04-29 ENCOUNTER — APPOINTMENT (OUTPATIENT)
Dept: GENERAL RADIOLOGY | Facility: HOSPITAL | Age: 25
End: 2020-04-29

## 2020-04-29 ENCOUNTER — ANESTHESIA EVENT (OUTPATIENT)
Dept: INTERVENTIONAL RADIOLOGY/VASCULAR | Facility: HOSPITAL | Age: 25
End: 2020-04-29

## 2020-04-29 ENCOUNTER — APPOINTMENT (OUTPATIENT)
Dept: INTERVENTIONAL RADIOLOGY/VASCULAR | Facility: HOSPITAL | Age: 25
End: 2020-04-29

## 2020-04-29 PROBLEM — Z95.828 S/P TIPS (TRANSJUGULAR INTRAHEPATIC PORTOSYSTEMIC SHUNT): Status: ACTIVE | Noted: 2020-04-29

## 2020-04-29 LAB
ABO GROUP BLD: NORMAL
ALBUMIN SERPL-MCNC: 2.2 G/DL (ref 3.5–5.2)
ALBUMIN/GLOB SERPL: 0.8 G/DL
ALP SERPL-CCNC: 126 U/L (ref 39–117)
ALT SERPL W P-5'-P-CCNC: 112 U/L (ref 1–41)
ANION GAP SERPL CALCULATED.3IONS-SCNC: 10 MMOL/L (ref 5–15)
ARTERIAL PATENCY WRIST A: ABNORMAL
AST SERPL-CCNC: 221 U/L (ref 1–40)
ATMOSPHERIC PRESS: ABNORMAL MM[HG]
BASE EXCESS BLDA CALC-SCNC: -2.7 MMOL/L (ref 0–2)
BASE EXCESS BLDA CALC-SCNC: -2.9 MMOL/L (ref 0–2)
BASE EXCESS BLDA CALC-SCNC: -3.1 MMOL/L (ref 0–2)
BDY SITE: ABNORMAL
BILIRUB SERPL-MCNC: 7.1 MG/DL (ref 0.2–1.2)
BLD GP AB SCN SERPL QL: NEGATIVE
BODY TEMPERATURE: 37 C
BUN BLD-MCNC: 35 MG/DL (ref 6–20)
BUN/CREAT SERPL: 76.1 (ref 7–25)
CALCIUM SPEC-SCNC: 7.7 MG/DL (ref 8.6–10.5)
CHLORIDE SERPL-SCNC: 117 MMOL/L (ref 98–107)
CO2 BLDA-SCNC: 21.4 MMOL/L (ref 22–33)
CO2 BLDA-SCNC: 21.7 MMOL/L (ref 22–33)
CO2 BLDA-SCNC: 21.8 MMOL/L (ref 22–33)
CO2 SERPL-SCNC: 19 MMOL/L (ref 22–29)
COHGB MFR BLD: 1.9 % (ref 0–2)
COHGB MFR BLD: 2 % (ref 0–2)
COHGB MFR BLD: 2.4 % (ref 0–2)
CREAT BLD-MCNC: 0.46 MG/DL (ref 0.76–1.27)
DEPRECATED RDW RBC AUTO: 81.5 FL (ref 37–54)
EPAP: 0
EPAP: 0
ERYTHROCYTE [DISTWIDTH] IN BLOOD BY AUTOMATED COUNT: 22.1 % (ref 12.3–15.4)
GFR SERPL CREATININE-BSD FRML MDRD: >150 ML/MIN/1.73
GLOBULIN UR ELPH-MCNC: 2.7 GM/DL
GLUCOSE BLD-MCNC: 132 MG/DL (ref 65–99)
GLUCOSE BLDC GLUCOMTR-MCNC: 138 MG/DL (ref 70–130)
GLUCOSE BLDC GLUCOMTR-MCNC: 146 MG/DL (ref 70–130)
GLUCOSE BLDC GLUCOMTR-MCNC: 191 MG/DL (ref 70–130)
HCO3 BLDA-SCNC: 20.5 MMOL/L (ref 20–26)
HCO3 BLDA-SCNC: 20.8 MMOL/L (ref 20–26)
HCO3 BLDA-SCNC: 20.9 MMOL/L (ref 20–26)
HCT VFR BLD AUTO: 22.8 % (ref 37.5–51)
HCT VFR BLD AUTO: 24.3 % (ref 37.5–51)
HCT VFR BLD AUTO: 26.7 % (ref 37.5–51)
HCT VFR BLD CALC: 23.1 %
HCT VFR BLD CALC: 23.6 %
HCT VFR BLD CALC: 25.2 %
HGB BLD-MCNC: 7.4 G/DL (ref 13–17.7)
HGB BLD-MCNC: 8 G/DL (ref 13–17.7)
HGB BLD-MCNC: 8.8 G/DL (ref 13–17.7)
HGB BLDA-MCNC: 7.5 G/DL (ref 13.5–17.5)
HGB BLDA-MCNC: 7.7 G/DL (ref 13.5–17.5)
HGB BLDA-MCNC: 8.2 G/DL (ref 13.5–17.5)
HOROWITZ INDEX BLD+IHG-RTO: 100 %
HOROWITZ INDEX BLD+IHG-RTO: 50 %
HOROWITZ INDEX BLD+IHG-RTO: 50 %
INR PPP: 1.98 (ref 0.85–1.16)
IPAP: 0
IPAP: 0
MAGNESIUM SERPL-MCNC: 2.1 MG/DL (ref 1.6–2.6)
MCH RBC QN AUTO: 35.6 PG (ref 26.6–33)
MCHC RBC AUTO-ENTMCNC: 33 G/DL (ref 31.5–35.7)
MCV RBC AUTO: 108.1 FL (ref 79–97)
METHGB BLD QL: 0.3 % (ref 0–1.5)
METHGB BLD QL: 0.3 % (ref 0–1.5)
METHGB BLD QL: ABNORMAL
MODALITY: ABNORMAL
NOTE: ABNORMAL
OXYHGB MFR BLDV: 91.3 % (ref 94–99)
OXYHGB MFR BLDV: 92.6 % (ref 94–99)
OXYHGB MFR BLDV: 94.6 % (ref 94–99)
PAW @ PEAK INSP FLOW SETTING VENT: 0 CMH2O
PAW @ PEAK INSP FLOW SETTING VENT: 0 CMH2O
PCO2 BLDA: 28.9 MM HG (ref 35–45)
PCO2 BLDA: 30.1 MM HG (ref 35–45)
PCO2 BLDA: 31.5 MM HG (ref 35–45)
PCO2 TEMP ADJ BLD: 28.9 MM HG (ref 35–48)
PCO2 TEMP ADJ BLD: 30.1 MM HG (ref 35–48)
PCO2 TEMP ADJ BLD: 31.5 MM HG (ref 35–48)
PEEP RESPIRATORY: 8 CM[H2O]
PH BLDA: 7.43 PH UNITS (ref 7.35–7.45)
PH BLDA: 7.45 PH UNITS (ref 7.35–7.45)
PH BLDA: 7.46 PH UNITS (ref 7.35–7.45)
PH, TEMP CORRECTED: 7.43 PH UNITS
PH, TEMP CORRECTED: 7.45 PH UNITS
PH, TEMP CORRECTED: 7.46 PH UNITS
PHOSPHATE SERPL-MCNC: 3.5 MG/DL (ref 2.5–4.5)
PLATELET # BLD AUTO: 254 10*3/MM3 (ref 140–450)
PMV BLD AUTO: 11.2 FL (ref 6–12)
PO2 BLDA: 59.1 MM HG (ref 83–108)
PO2 BLDA: 67.2 MM HG (ref 83–108)
PO2 BLDA: 78.9 MM HG (ref 83–108)
PO2 TEMP ADJ BLD: 59.1 MM HG (ref 83–108)
PO2 TEMP ADJ BLD: 67.2 MM HG (ref 83–108)
PO2 TEMP ADJ BLD: 78.9 MM HG (ref 83–108)
POTASSIUM BLD-SCNC: 3.9 MMOL/L (ref 3.5–5.2)
PROT SERPL-MCNC: 4.9 G/DL (ref 6–8.5)
PROTHROMBIN TIME: 22.2 SECONDS (ref 11.5–14)
RBC # BLD AUTO: 2.47 10*6/MM3 (ref 4.14–5.8)
RH BLD: POSITIVE
SODIUM BLD-SCNC: 146 MMOL/L (ref 136–145)
T&S EXPIRATION DATE: NORMAL
TOTAL RATE: 0 BREATHS/MINUTE
TOTAL RATE: 0 BREATHS/MINUTE
VENTILATOR MODE: ABNORMAL
WBC NRBC COR # BLD: 26.1 10*3/MM3 (ref 3.4–10.8)

## 2020-04-29 PROCEDURE — 25010000002 DEXAMETHASONE PER 1 MG: Performed by: NURSE ANESTHETIST, CERTIFIED REGISTERED

## 2020-04-29 PROCEDURE — 85610 PROTHROMBIN TIME: CPT | Performed by: INTERNAL MEDICINE

## 2020-04-29 PROCEDURE — 85018 HEMOGLOBIN: CPT | Performed by: NURSE PRACTITIONER

## 2020-04-29 PROCEDURE — C1725 CATH, TRANSLUMIN NON-LASER: HCPCS

## 2020-04-29 PROCEDURE — C1894 INTRO/SHEATH, NON-LASER: HCPCS

## 2020-04-29 PROCEDURE — 85027 COMPLETE CBC AUTOMATED: CPT | Performed by: INTERNAL MEDICINE

## 2020-04-29 PROCEDURE — 80053 COMPREHEN METABOLIC PANEL: CPT | Performed by: INTERNAL MEDICINE

## 2020-04-29 PROCEDURE — 25010000002 PHENYLEPHRINE PER 1 ML: Performed by: NURSE ANESTHETIST, CERTIFIED REGISTERED

## 2020-04-29 PROCEDURE — 94799 UNLISTED PULMONARY SVC/PX: CPT

## 2020-04-29 PROCEDURE — C1874 STENT, COATED/COV W/DEL SYS: HCPCS

## 2020-04-29 PROCEDURE — 03HB33Z INSERTION OF INFUSION DEVICE INTO RIGHT RADIAL ARTERY, PERCUTANEOUS APPROACH: ICD-10-PCS | Performed by: NURSE PRACTITIONER

## 2020-04-29 PROCEDURE — 25010000002 ONDANSETRON PER 1 MG: Performed by: NURSE ANESTHETIST, CERTIFIED REGISTERED

## 2020-04-29 PROCEDURE — 97110 THERAPEUTIC EXERCISES: CPT

## 2020-04-29 PROCEDURE — 25010000002 LEVOFLOXACIN PER 250 MG: Performed by: RADIOLOGY

## 2020-04-29 PROCEDURE — C1769 GUIDE WIRE: HCPCS

## 2020-04-29 PROCEDURE — 25010000002 DIAZEPAM PER 5 MG: Performed by: INTERNAL MEDICINE

## 2020-04-29 PROCEDURE — 85018 HEMOGLOBIN: CPT | Performed by: RADIOLOGY

## 2020-04-29 PROCEDURE — 97162 PT EVAL MOD COMPLEX 30 MIN: CPT

## 2020-04-29 PROCEDURE — 37182 INSERT HEPATIC SHUNT (TIPS): CPT

## 2020-04-29 PROCEDURE — 99291 CRITICAL CARE FIRST HOUR: CPT | Performed by: INTERNAL MEDICINE

## 2020-04-29 PROCEDURE — 84100 ASSAY OF PHOSPHORUS: CPT | Performed by: INTERNAL MEDICINE

## 2020-04-29 PROCEDURE — 94770: CPT

## 2020-04-29 PROCEDURE — 86850 RBC ANTIBODY SCREEN: CPT | Performed by: RADIOLOGY

## 2020-04-29 PROCEDURE — B51V1ZA FLUOROSCOPY OF OTHER VEINS USING LOW OSMOLAR CONTRAST, GUIDANCE: ICD-10-PCS | Performed by: RADIOLOGY

## 2020-04-29 PROCEDURE — 25010000003 LIDOCAINE 1 % SOLUTION: Performed by: NURSE ANESTHETIST, CERTIFIED REGISTERED

## 2020-04-29 PROCEDURE — 86901 BLOOD TYPING SEROLOGIC RH(D): CPT | Performed by: RADIOLOGY

## 2020-04-29 PROCEDURE — 25010000002 PROPOFOL 10 MG/ML EMULSION: Performed by: NURSE ANESTHETIST, CERTIFIED REGISTERED

## 2020-04-29 PROCEDURE — 36620 INSERTION CATHETER ARTERY: CPT | Performed by: NURSE PRACTITIONER

## 2020-04-29 PROCEDURE — 83735 ASSAY OF MAGNESIUM: CPT | Performed by: INTERNAL MEDICINE

## 2020-04-29 PROCEDURE — 94002 VENT MGMT INPAT INIT DAY: CPT

## 2020-04-29 PROCEDURE — 25010000002 LORAZEPAM PER 2 MG: Performed by: INTERNAL MEDICINE

## 2020-04-29 PROCEDURE — 82962 GLUCOSE BLOOD TEST: CPT

## 2020-04-29 PROCEDURE — 85014 HEMATOCRIT: CPT | Performed by: NURSE PRACTITIONER

## 2020-04-29 PROCEDURE — 86900 BLOOD TYPING SEROLOGIC ABO: CPT | Performed by: RADIOLOGY

## 2020-04-29 PROCEDURE — 25010000002 FENTANYL CITRATE (PF) 100 MCG/2ML SOLUTION: Performed by: INTERNAL MEDICINE

## 2020-04-29 PROCEDURE — 06183J4 BYPASS PORTAL VEIN TO HEPATIC VEIN WITH SYNTHETIC SUBSTITUTE, PERCUTANEOUS APPROACH: ICD-10-PCS | Performed by: RADIOLOGY

## 2020-04-29 PROCEDURE — 85014 HEMATOCRIT: CPT | Performed by: RADIOLOGY

## 2020-04-29 PROCEDURE — 82805 BLOOD GASES W/O2 SATURATION: CPT

## 2020-04-29 PROCEDURE — 0BH17EZ INSERTION OF ENDOTRACHEAL AIRWAY INTO TRACHEA, VIA NATURAL OR ARTIFICIAL OPENING: ICD-10-PCS | Performed by: RADIOLOGY

## 2020-04-29 PROCEDURE — 25010000002 IOPAMIDOL 61 % SOLUTION

## 2020-04-29 PROCEDURE — 71045 X-RAY EXAM CHEST 1 VIEW: CPT

## 2020-04-29 PROCEDURE — 5A1945Z RESPIRATORY VENTILATION, 24-96 CONSECUTIVE HOURS: ICD-10-PCS | Performed by: RADIOLOGY

## 2020-04-29 PROCEDURE — 36600 WITHDRAWAL OF ARTERIAL BLOOD: CPT

## 2020-04-29 RX ORDER — DEXAMETHASONE SODIUM PHOSPHATE 4 MG/ML
INJECTION, SOLUTION INTRA-ARTICULAR; INTRALESIONAL; INTRAMUSCULAR; INTRAVENOUS; SOFT TISSUE AS NEEDED
Status: DISCONTINUED | OUTPATIENT
Start: 2020-04-29 | End: 2020-04-29 | Stop reason: SURG

## 2020-04-29 RX ORDER — CHLORHEXIDINE GLUCONATE 0.12 MG/ML
15 RINSE ORAL EVERY 12 HOURS SCHEDULED
Status: DISCONTINUED | OUTPATIENT
Start: 2020-04-29 | End: 2020-05-02

## 2020-04-29 RX ORDER — DIPHENHYDRAMINE HYDROCHLORIDE 50 MG/ML
12.5 INJECTION INTRAMUSCULAR; INTRAVENOUS EVERY 8 HOURS PRN
Status: DISCONTINUED | OUTPATIENT
Start: 2020-04-29 | End: 2020-04-29

## 2020-04-29 RX ORDER — ONDANSETRON 2 MG/ML
INJECTION INTRAMUSCULAR; INTRAVENOUS AS NEEDED
Status: DISCONTINUED | OUTPATIENT
Start: 2020-04-29 | End: 2020-04-29 | Stop reason: SURG

## 2020-04-29 RX ORDER — ROCURONIUM BROMIDE 10 MG/ML
INJECTION, SOLUTION INTRAVENOUS AS NEEDED
Status: DISCONTINUED | OUTPATIENT
Start: 2020-04-29 | End: 2020-04-29 | Stop reason: SURG

## 2020-04-29 RX ORDER — ALBUTEROL SULFATE 2.5 MG/3ML
2.5 SOLUTION RESPIRATORY (INHALATION) EVERY 4 HOURS PRN
Status: DISCONTINUED | OUTPATIENT
Start: 2020-04-29 | End: 2020-05-13 | Stop reason: HOSPADM

## 2020-04-29 RX ORDER — FENTANYL CITRATE 50 UG/ML
INJECTION, SOLUTION INTRAMUSCULAR; INTRAVENOUS
Status: DISPENSED
Start: 2020-04-29 | End: 2020-04-30

## 2020-04-29 RX ORDER — FUROSEMIDE 40 MG/1
40 TABLET ORAL DAILY
Status: DISCONTINUED | OUTPATIENT
Start: 2020-04-29 | End: 2020-04-30

## 2020-04-29 RX ORDER — LIDOCAINE HYDROCHLORIDE 10 MG/ML
INJECTION, SOLUTION INFILTRATION; PERINEURAL AS NEEDED
Status: DISCONTINUED | OUTPATIENT
Start: 2020-04-29 | End: 2020-04-29 | Stop reason: SURG

## 2020-04-29 RX ORDER — FENTANYL CITRATE 50 UG/ML
50 INJECTION, SOLUTION INTRAMUSCULAR; INTRAVENOUS
Status: DISCONTINUED | OUTPATIENT
Start: 2020-04-29 | End: 2020-05-04

## 2020-04-29 RX ORDER — NOREPINEPHRINE BIT/0.9 % NACL 8 MG/250ML
.02-.3 INFUSION BOTTLE (ML) INTRAVENOUS
Status: DISCONTINUED | OUTPATIENT
Start: 2020-04-29 | End: 2020-04-29

## 2020-04-29 RX ORDER — LIDOCAINE HYDROCHLORIDE 10 MG/ML
INJECTION, SOLUTION EPIDURAL; INFILTRATION; INTRACAUDAL; PERINEURAL
Status: COMPLETED
Start: 2020-04-29 | End: 2020-04-29

## 2020-04-29 RX ORDER — PROPOFOL 10 MG/ML
VIAL (ML) INTRAVENOUS AS NEEDED
Status: DISCONTINUED | OUTPATIENT
Start: 2020-04-29 | End: 2020-04-29 | Stop reason: SURG

## 2020-04-29 RX ORDER — SODIUM CHLORIDE, SODIUM LACTATE, POTASSIUM CHLORIDE, CALCIUM CHLORIDE 600; 310; 30; 20 MG/100ML; MG/100ML; MG/100ML; MG/100ML
INJECTION, SOLUTION INTRAVENOUS CONTINUOUS PRN
Status: DISCONTINUED | OUTPATIENT
Start: 2020-04-29 | End: 2020-04-29 | Stop reason: SURG

## 2020-04-29 RX ADMIN — LORAZEPAM 2 MG: 2 INJECTION INTRAMUSCULAR; INTRAVENOUS at 01:43

## 2020-04-29 RX ADMIN — LORAZEPAM 2 MG: 2 INJECTION INTRAMUSCULAR; INTRAVENOUS at 03:54

## 2020-04-29 RX ADMIN — PROPOFOL 200 MG: 10 INJECTION, EMULSION INTRAVENOUS at 10:52

## 2020-04-29 RX ADMIN — LORAZEPAM 1 MG: 2 INJECTION INTRAMUSCULAR; INTRAVENOUS at 00:21

## 2020-04-29 RX ADMIN — LACTULOSE 30 ML: 20 SOLUTION ORAL at 20:36

## 2020-04-29 RX ADMIN — Medication 30 ML: at 20:36

## 2020-04-29 RX ADMIN — LEVOFLOXACIN 500 MG: 5 INJECTION, SOLUTION INTRAVENOUS at 11:18

## 2020-04-29 RX ADMIN — PHENYLEPHRINE HYDROCHLORIDE 80 MCG: 10 INJECTION, SOLUTION INTRAMUSCULAR; INTRAVENOUS; SUBCUTANEOUS at 12:36

## 2020-04-29 RX ADMIN — IOPAMIDOL 30 ML: 612 INJECTION, SOLUTION INTRAVENOUS at 10:15

## 2020-04-29 RX ADMIN — PHENYLEPHRINE HYDROCHLORIDE 80 MCG: 10 INJECTION, SOLUTION INTRAMUSCULAR; INTRAVENOUS; SUBCUTANEOUS at 13:17

## 2020-04-29 RX ADMIN — PHENYLEPHRINE HYDROCHLORIDE 80 MCG: 10 INJECTION, SOLUTION INTRAMUSCULAR; INTRAVENOUS; SUBCUTANEOUS at 11:45

## 2020-04-29 RX ADMIN — PHENYLEPHRINE HYDROCHLORIDE 80 MCG: 10 INJECTION, SOLUTION INTRAMUSCULAR; INTRAVENOUS; SUBCUTANEOUS at 11:05

## 2020-04-29 RX ADMIN — DEXMEDETOMIDINE HYDROCHLORIDE 1.4 MCG/KG/HR: 4 INJECTION, SOLUTION INTRAVENOUS at 06:13

## 2020-04-29 RX ADMIN — ONDANSETRON 4 MG: 2 INJECTION INTRAMUSCULAR; INTRAVENOUS at 13:36

## 2020-04-29 RX ADMIN — ROCURONIUM BROMIDE 10 MG: 10 SOLUTION INTRAVENOUS at 11:57

## 2020-04-29 RX ADMIN — PANTOPRAZOLE SODIUM 40 MG: 40 INJECTION, POWDER, FOR SOLUTION INTRAVENOUS at 06:09

## 2020-04-29 RX ADMIN — AZTREONAM 1 G: 1 INJECTION, POWDER, LYOPHILIZED, FOR SOLUTION INTRAMUSCULAR; INTRAVENOUS at 09:20

## 2020-04-29 RX ADMIN — DEXMEDETOMIDINE HYDROCHLORIDE 1.4 MCG/KG/HR: 4 INJECTION, SOLUTION INTRAVENOUS at 10:18

## 2020-04-29 RX ADMIN — PHENYLEPHRINE HYDROCHLORIDE 80 MCG: 10 INJECTION, SOLUTION INTRAMUSCULAR; INTRAVENOUS; SUBCUTANEOUS at 13:01

## 2020-04-29 RX ADMIN — PHENYLEPHRINE HYDROCHLORIDE 80 MCG: 10 INJECTION, SOLUTION INTRAMUSCULAR; INTRAVENOUS; SUBCUTANEOUS at 13:43

## 2020-04-29 RX ADMIN — IOPAMIDOL 20 ML: 612 INJECTION, SOLUTION INTRAVENOUS at 12:15

## 2020-04-29 RX ADMIN — LIDOCAINE HYDROCHLORIDE 4 ML: 10 INJECTION, SOLUTION EPIDURAL; INFILTRATION; INTRACAUDAL; PERINEURAL at 11:20

## 2020-04-29 RX ADMIN — RIFAXIMIN 550 MG: 550 TABLET ORAL at 20:35

## 2020-04-29 RX ADMIN — PHENYLEPHRINE HYDROCHLORIDE 80 MCG: 10 INJECTION, SOLUTION INTRAMUSCULAR; INTRAVENOUS; SUBCUTANEOUS at 12:59

## 2020-04-29 RX ADMIN — DEXMEDETOMIDINE HYDROCHLORIDE 1.4 MCG/KG/HR: 4 INJECTION, SOLUTION INTRAVENOUS at 03:41

## 2020-04-29 RX ADMIN — LORAZEPAM 2 MG: 2 INJECTION INTRAMUSCULAR; INTRAVENOUS at 17:29

## 2020-04-29 RX ADMIN — LIDOCAINE HYDROCHLORIDE 50 MG: 10 INJECTION, SOLUTION INFILTRATION; PERINEURAL at 10:52

## 2020-04-29 RX ADMIN — SPIRONOLACTONE 25 MG: 25 TABLET ORAL at 10:18

## 2020-04-29 RX ADMIN — DEXMEDETOMIDINE HYDROCHLORIDE 1.4 MCG/KG/HR: 4 INJECTION, SOLUTION INTRAVENOUS at 00:07

## 2020-04-29 RX ADMIN — PHENYLEPHRINE HYDROCHLORIDE 80 MCG: 10 INJECTION, SOLUTION INTRAMUSCULAR; INTRAVENOUS; SUBCUTANEOUS at 11:29

## 2020-04-29 RX ADMIN — PHENYLEPHRINE HYDROCHLORIDE 80 MCG: 10 INJECTION, SOLUTION INTRAMUSCULAR; INTRAVENOUS; SUBCUTANEOUS at 13:07

## 2020-04-29 RX ADMIN — SODIUM CHLORIDE, POTASSIUM CHLORIDE, SODIUM LACTATE AND CALCIUM CHLORIDE: 600; 310; 30; 20 INJECTION, SOLUTION INTRAVENOUS at 10:42

## 2020-04-29 RX ADMIN — RIFAXIMIN 550 MG: 550 TABLET ORAL at 10:17

## 2020-04-29 RX ADMIN — IOPAMIDOL 37 ML: 612 INJECTION, SOLUTION INTRAVENOUS at 10:15

## 2020-04-29 RX ADMIN — DIAZEPAM 10 MG: 5 INJECTION, SOLUTION INTRAMUSCULAR; INTRAVENOUS at 01:15

## 2020-04-29 RX ADMIN — FENTANYL CITRATE 50 MCG: 0.05 INJECTION, SOLUTION INTRAMUSCULAR; INTRAVENOUS at 17:27

## 2020-04-29 RX ADMIN — Medication 30 ML: at 10:18

## 2020-04-29 RX ADMIN — DEXMEDETOMIDINE HYDROCHLORIDE 1.4 MCG/KG/HR: 4 INJECTION, SOLUTION INTRAVENOUS at 18:34

## 2020-04-29 RX ADMIN — PHENYLEPHRINE HYDROCHLORIDE 80 MCG: 10 INJECTION, SOLUTION INTRAMUSCULAR; INTRAVENOUS; SUBCUTANEOUS at 13:16

## 2020-04-29 RX ADMIN — DEXMEDETOMIDINE HYDROCHLORIDE 1.4 MCG/KG/HR: 4 INJECTION, SOLUTION INTRAVENOUS at 22:04

## 2020-04-29 RX ADMIN — LORAZEPAM 2 MG: 2 INJECTION INTRAMUSCULAR; INTRAVENOUS at 21:01

## 2020-04-29 RX ADMIN — AZTREONAM 1 G: 1 INJECTION, POWDER, LYOPHILIZED, FOR SOLUTION INTRAMUSCULAR; INTRAVENOUS at 00:07

## 2020-04-29 RX ADMIN — DEXAMETHASONE SODIUM PHOSPHATE 8 MG: 4 INJECTION, SOLUTION INTRAMUSCULAR; INTRAVENOUS at 11:09

## 2020-04-29 RX ADMIN — DIAZEPAM 10 MG: 5 INJECTION, SOLUTION INTRAMUSCULAR; INTRAVENOUS at 09:15

## 2020-04-29 RX ADMIN — AZTREONAM 1 G: 1 INJECTION, POWDER, LYOPHILIZED, FOR SOLUTION INTRAMUSCULAR; INTRAVENOUS at 16:29

## 2020-04-29 RX ADMIN — ROCURONIUM BROMIDE 50 MG: 10 SOLUTION INTRAVENOUS at 10:52

## 2020-04-29 RX ADMIN — Medication 30 ML: at 00:06

## 2020-04-29 RX ADMIN — DIAZEPAM 10 MG: 5 INJECTION, SOLUTION INTRAMUSCULAR; INTRAVENOUS at 16:29

## 2020-04-29 RX ADMIN — CHLORHEXIDINE GLUCONATE 0.12% ORAL RINSE 15 ML: 1.2 LIQUID ORAL at 20:36

## 2020-04-29 RX ADMIN — ROCURONIUM BROMIDE 20 MG: 10 SOLUTION INTRAVENOUS at 12:43

## 2020-04-29 NOTE — ANESTHESIA POSTPROCEDURE EVALUATION
Patient: John Varma    Procedure Summary     Date:  04/29/20 Room / Location:  [facility] INTERVENTIONAL RADIOLOGY    Anesthesia Start:  1042 Anesthesia Stop:      Procedure:  IR TRANSJUGULAR INTRAHEPATIC PORTOSYSTEMIC SHUNT Diagnosis:  (TIPS)    Scheduled Providers:   Provider:  Kris Leos MD    Anesthesia Type:  general ASA Status:  4          Anesthesia Type: general    Vitals  Vitals Value Taken Time   BP 98/47 4/29/2020  2:21 PM   Temp     Pulse 72 4/29/2020  2:21 PM   Resp     SpO2 85 % 4/29/2020  2:21 PM           Post Anesthesia Care and Evaluation    Patient location during evaluation: ICU  Patient participation: complete - patient cannot participate  Pain management: adequate  Airway patency: patent  Anesthetic complications: No anesthetic complications    Cardiovascular status: hypotensive  Respiratory status: unstable and ventilator  Hydration status: acceptable

## 2020-04-29 NOTE — ANESTHESIA PROCEDURE NOTES
Airway  Urgency: elective    Date/Time: 4/29/2020 10:53 AM  Airway not difficult    General Information and Staff    Patient location during procedure: OR  CRNA: Mindy Panda CRNA    Indications and Patient Condition  Indications for airway management: airway protection    Preoxygenated: yes  MILS not maintained throughout  Mask difficulty assessment: 0 - not attempted    Final Airway Details  Final airway type: endotracheal airway      Successful airway: ETT  Cuffed: yes   Successful intubation technique: direct laryngoscopy and video laryngoscopy  Endotracheal tube insertion site: oral  Blade: Henley  Blade size: 3  ETT size (mm): 7.5  Cormack-Lehane Classification: grade I - full view of glottis  Placement verified by: chest auscultation and capnometry   Measured from: lips  ETT/EBT  to lips (cm): 20  Number of attempts at approach: 1  Assessment: lips, teeth, and gum same as pre-op and atraumatic intubation    Additional Comments  Negative epigastric sounds, Breath sound equal bilaterally with symmetric chest rise and fall

## 2020-04-29 NOTE — ANESTHESIA PREPROCEDURE EVALUATION
Anesthesia Evaluation     Patient summary reviewed and Nursing notes reviewed   NPO Solid Status: > 8 hours  NPO Liquid Status: > 8 hours           Airway   Mallampati: I  TM distance: >3 FB  Neck ROM: full  No difficulty expected  Dental      Pulmonary    (+) pleural effusion (L side ),     ROS comment: Acute resp failure w hypoxia   Cardiovascular         Neuro/Psych  (+) psychiatric history,       ROS Comment: DT'S FROM ETOH W/D   GI/Hepatic/Renal/Endo    (+)  GERD, GI bleeding , liver disease, renal disease, diabetes mellitus type 2 using insulin,     Musculoskeletal     Abdominal    Substance History   (+) alcohol use,      OB/GYN          Other        ROS/Med Hx Other: Ativan Precedex Valium   Admitted with GI Bleed   Etoh Hepatitis c ascites   sepsis possible resp origin ARF   Elevated PT and INR       Phys Exam Other: VL size 3 GRADE 1 View   4/24/20 Farmer               Anesthesia Plan    ASA 4     general     intravenous induction     Anesthetic plan, all risks, benefits, and alternatives have been provided, discussed and informed consent has been obtained with: patient.    Plan discussed with CRNA.

## 2020-04-30 ENCOUNTER — APPOINTMENT (OUTPATIENT)
Dept: GENERAL RADIOLOGY | Facility: HOSPITAL | Age: 25
End: 2020-04-30

## 2020-04-30 LAB
ANION GAP SERPL CALCULATED.3IONS-SCNC: 10 MMOL/L (ref 5–15)
APPEARANCE FLD: ABNORMAL
ARTERIAL PATENCY WRIST A: ABNORMAL
ATMOSPHERIC PRESS: ABNORMAL MM[HG]
BACTERIA FLD CULT: NORMAL
BASE EXCESS BLDA CALC-SCNC: -2.6 MMOL/L (ref 0–2)
BASOPHILS # BLD AUTO: 0.06 10*3/MM3 (ref 0–0.2)
BASOPHILS NFR BLD AUTO: 0.2 % (ref 0–1.5)
BDY SITE: ABNORMAL
BODY TEMPERATURE: 37 C
BUN BLD-MCNC: 49 MG/DL (ref 6–20)
BUN/CREAT SERPL: 80.3 (ref 7–25)
CALCIUM SPEC-SCNC: 8 MG/DL (ref 8.6–10.5)
CHLORIDE SERPL-SCNC: 115 MMOL/L (ref 98–107)
CO2 BLDA-SCNC: 21.1 MMOL/L (ref 22–33)
CO2 SERPL-SCNC: 19 MMOL/L (ref 22–29)
COHGB MFR BLD: 2 % (ref 0–2)
COLOR FLD: ABNORMAL
CREAT BLD-MCNC: 0.61 MG/DL (ref 0.76–1.27)
DEPRECATED RDW RBC AUTO: 89.4 FL (ref 37–54)
EOSINOPHIL # BLD AUTO: 0.01 10*3/MM3 (ref 0–0.4)
EOSINOPHIL NFR BLD AUTO: 0 % (ref 0.3–6.2)
ERYTHROCYTE [DISTWIDTH] IN BLOOD BY AUTOMATED COUNT: 22.8 % (ref 12.3–15.4)
GFR SERPL CREATININE-BSD FRML MDRD: >150 ML/MIN/1.73
GLUCOSE BLD-MCNC: 203 MG/DL (ref 65–99)
GLUCOSE BLDC GLUCOMTR-MCNC: 223 MG/DL (ref 70–130)
GLUCOSE BLDC GLUCOMTR-MCNC: 240 MG/DL (ref 70–130)
GLUCOSE BLDC GLUCOMTR-MCNC: 263 MG/DL (ref 70–130)
GLUCOSE BLDC GLUCOMTR-MCNC: 288 MG/DL (ref 70–130)
HCO3 BLDA-SCNC: 20.3 MMOL/L (ref 20–26)
HCT VFR BLD AUTO: 22.9 % (ref 37.5–51)
HCT VFR BLD AUTO: 23 % (ref 37.5–51)
HCT VFR BLD CALC: 23.5 %
HGB BLD-MCNC: 7.5 G/DL (ref 13–17.7)
HGB BLD-MCNC: 7.5 G/DL (ref 13–17.7)
HGB BLDA-MCNC: 7.7 G/DL (ref 13.5–17.5)
HOROWITZ INDEX BLD+IHG-RTO: 50 %
IMM GRANULOCYTES # BLD AUTO: 0.3 10*3/MM3 (ref 0–0.05)
IMM GRANULOCYTES NFR BLD AUTO: 1 % (ref 0–0.5)
LYMPHOCYTES # BLD AUTO: 2.46 10*3/MM3 (ref 0.7–3.1)
LYMPHOCYTES NFR BLD AUTO: 8.2 % (ref 19.6–45.3)
LYMPHOCYTES NFR FLD MANUAL: 12 %
MACROCYTES BLD QL SMEAR: NORMAL
MACROPHAGE FLUID: 1 %
MAGNESIUM SERPL-MCNC: 2.3 MG/DL (ref 1.6–2.6)
MCH RBC QN AUTO: 36.1 PG (ref 26.6–33)
MCHC RBC AUTO-ENTMCNC: 32.8 G/DL (ref 31.5–35.7)
MCV RBC AUTO: 110.1 FL (ref 79–97)
MESOTHL CELL NFR FLD MANUAL: 4 %
METHGB BLD QL: 0.5 % (ref 0–1.5)
MODALITY: ABNORMAL
MONOCYTES # BLD AUTO: 3.5 10*3/MM3 (ref 0.1–0.9)
MONOCYTES NFR BLD AUTO: 11.7 % (ref 5–12)
MONOCYTES NFR FLD: 6 %
NEUTROPHILS # BLD AUTO: 23.64 10*3/MM3 (ref 1.7–7)
NEUTROPHILS NFR BLD AUTO: 78.9 % (ref 42.7–76)
NEUTROPHILS NFR FLD MANUAL: 77 %
NOTE: ABNORMAL
NRBC BLD AUTO-RTO: 0 /100 WBC (ref 0–0.2)
OXYHGB MFR BLDV: 91.9 % (ref 94–99)
PCO2 BLDA: 27.1 MM HG (ref 35–45)
PCO2 TEMP ADJ BLD: 27.1 MM HG (ref 35–48)
PEEP RESPIRATORY: 8 CM[H2O]
PH BLDA: 7.48 PH UNITS (ref 7.35–7.45)
PH, TEMP CORRECTED: 7.48 PH UNITS
PHOSPHATE SERPL-MCNC: 4.5 MG/DL (ref 2.5–4.5)
PLAT MORPH BLD: NORMAL
PLATELET # BLD AUTO: 275 10*3/MM3 (ref 140–450)
PMV BLD AUTO: 11.7 FL (ref 6–12)
PO2 BLDA: 64.4 MM HG (ref 83–108)
PO2 TEMP ADJ BLD: 64.4 MM HG (ref 83–108)
POTASSIUM BLD-SCNC: 4.7 MMOL/L (ref 3.5–5.2)
RBC # BLD AUTO: 2.08 10*6/MM3 (ref 4.14–5.8)
RBC # FLD AUTO: ABNORMAL /MM3
SODIUM BLD-SCNC: 144 MMOL/L (ref 136–145)
VENTILATOR MODE: ABNORMAL
WBC # FLD AUTO: 1018 /MM3
WBC MORPH BLD: NORMAL
WBC NRBC COR # BLD: 29.97 10*3/MM3 (ref 3.4–10.8)

## 2020-04-30 PROCEDURE — 99232 SBSQ HOSP IP/OBS MODERATE 35: CPT | Performed by: INTERNAL MEDICINE

## 2020-04-30 PROCEDURE — 89051 BODY FLUID CELL COUNT: CPT | Performed by: INTERNAL MEDICINE

## 2020-04-30 PROCEDURE — 63710000001 INSULIN REGULAR HUMAN PER 5 UNITS: Performed by: INTERNAL MEDICINE

## 2020-04-30 PROCEDURE — 25010000002 ONDANSETRON PER 1 MG: Performed by: INTERNAL MEDICINE

## 2020-04-30 PROCEDURE — 82805 BLOOD GASES W/O2 SATURATION: CPT

## 2020-04-30 PROCEDURE — 84100 ASSAY OF PHOSPHORUS: CPT | Performed by: INTERNAL MEDICINE

## 2020-04-30 PROCEDURE — 25010000002 DIAZEPAM PER 5 MG: Performed by: INTERNAL MEDICINE

## 2020-04-30 PROCEDURE — 0W9G3ZZ DRAINAGE OF PERITONEAL CAVITY, PERCUTANEOUS APPROACH: ICD-10-PCS | Performed by: INTERNAL MEDICINE

## 2020-04-30 PROCEDURE — 94770: CPT

## 2020-04-30 PROCEDURE — 94799 UNLISTED PULMONARY SVC/PX: CPT

## 2020-04-30 PROCEDURE — 85025 COMPLETE CBC W/AUTO DIFF WBC: CPT | Performed by: NURSE PRACTITIONER

## 2020-04-30 PROCEDURE — 82962 GLUCOSE BLOOD TEST: CPT

## 2020-04-30 PROCEDURE — 85007 BL SMEAR W/DIFF WBC COUNT: CPT | Performed by: NURSE PRACTITIONER

## 2020-04-30 PROCEDURE — 71045 X-RAY EXAM CHEST 1 VIEW: CPT

## 2020-04-30 PROCEDURE — 83735 ASSAY OF MAGNESIUM: CPT | Performed by: INTERNAL MEDICINE

## 2020-04-30 PROCEDURE — 94003 VENT MGMT INPAT SUBQ DAY: CPT

## 2020-04-30 PROCEDURE — 25010000002 FENTANYL CITRATE (PF) 100 MCG/2ML SOLUTION: Performed by: INTERNAL MEDICINE

## 2020-04-30 PROCEDURE — 25010000002 ENOXAPARIN PER 10 MG: Performed by: INTERNAL MEDICINE

## 2020-04-30 PROCEDURE — 80048 BASIC METABOLIC PNL TOTAL CA: CPT | Performed by: INTERNAL MEDICINE

## 2020-04-30 PROCEDURE — 49083 ABD PARACENTESIS W/IMAGING: CPT | Performed by: INTERNAL MEDICINE

## 2020-04-30 PROCEDURE — 99291 CRITICAL CARE FIRST HOUR: CPT | Performed by: INTERNAL MEDICINE

## 2020-04-30 RX ORDER — LACTULOSE 10 G/15ML
30 SOLUTION ORAL 2 TIMES DAILY
Status: DISCONTINUED | OUTPATIENT
Start: 2020-04-30 | End: 2020-05-01

## 2020-04-30 RX ORDER — FUROSEMIDE 40 MG/1
40 TABLET ORAL DAILY
Status: DISCONTINUED | OUTPATIENT
Start: 2020-05-01 | End: 2020-05-02

## 2020-04-30 RX ADMIN — AZTREONAM 1 G: 1 INJECTION, POWDER, LYOPHILIZED, FOR SOLUTION INTRAMUSCULAR; INTRAVENOUS at 00:02

## 2020-04-30 RX ADMIN — DEXMEDETOMIDINE HYDROCHLORIDE 1.4 MCG/KG/HR: 4 INJECTION, SOLUTION INTRAVENOUS at 06:00

## 2020-04-30 RX ADMIN — AZTREONAM 1 G: 1 INJECTION, POWDER, LYOPHILIZED, FOR SOLUTION INTRAMUSCULAR; INTRAVENOUS at 07:57

## 2020-04-30 RX ADMIN — INSULIN HUMAN 3 UNITS: 100 INJECTION, SOLUTION PARENTERAL at 06:14

## 2020-04-30 RX ADMIN — DIAZEPAM 10 MG: 5 INJECTION, SOLUTION INTRAMUSCULAR; INTRAVENOUS at 09:07

## 2020-04-30 RX ADMIN — CHLORHEXIDINE GLUCONATE 0.12% ORAL RINSE 15 ML: 1.2 LIQUID ORAL at 20:15

## 2020-04-30 RX ADMIN — INSULIN HUMAN 4 UNITS: 100 INJECTION, SOLUTION PARENTERAL at 11:48

## 2020-04-30 RX ADMIN — INSULIN HUMAN 3 UNITS: 100 INJECTION, SOLUTION PARENTERAL at 23:55

## 2020-04-30 RX ADMIN — LACTULOSE 30 ML: 20 SOLUTION ORAL at 08:02

## 2020-04-30 RX ADMIN — Medication 30 ML: at 08:31

## 2020-04-30 RX ADMIN — DEXMEDETOMIDINE HYDROCHLORIDE 1.4 MCG/KG/HR: 4 INJECTION, SOLUTION INTRAVENOUS at 02:05

## 2020-04-30 RX ADMIN — SPIRONOLACTONE 25 MG: 25 TABLET ORAL at 08:02

## 2020-04-30 RX ADMIN — DEXMEDETOMIDINE HYDROCHLORIDE 1.5 MCG/KG/HR: 4 INJECTION, SOLUTION INTRAVENOUS at 22:01

## 2020-04-30 RX ADMIN — DEXMEDETOMIDINE HYDROCHLORIDE 1.5 MCG/KG/HR: 4 INJECTION, SOLUTION INTRAVENOUS at 09:57

## 2020-04-30 RX ADMIN — ENOXAPARIN SODIUM 40 MG: 40 INJECTION SUBCUTANEOUS at 10:34

## 2020-04-30 RX ADMIN — DEXMEDETOMIDINE HYDROCHLORIDE 1.5 MCG/KG/HR: 4 INJECTION, SOLUTION INTRAVENOUS at 18:21

## 2020-04-30 RX ADMIN — FENTANYL CITRATE 50 MCG: 0.05 INJECTION, SOLUTION INTRAMUSCULAR; INTRAVENOUS at 20:56

## 2020-04-30 RX ADMIN — FENTANYL CITRATE 50 MCG: 0.05 INJECTION, SOLUTION INTRAMUSCULAR; INTRAVENOUS at 00:59

## 2020-04-30 RX ADMIN — FENTANYL CITRATE 50 MCG: 0.05 INJECTION, SOLUTION INTRAMUSCULAR; INTRAVENOUS at 04:10

## 2020-04-30 RX ADMIN — LACTULOSE 30 ML: 20 SOLUTION ORAL at 20:24

## 2020-04-30 RX ADMIN — DIAZEPAM 10 MG: 5 INJECTION, SOLUTION INTRAMUSCULAR; INTRAVENOUS at 18:21

## 2020-04-30 RX ADMIN — PANTOPRAZOLE SODIUM 40 MG: 40 INJECTION, POWDER, FOR SOLUTION INTRAVENOUS at 06:00

## 2020-04-30 RX ADMIN — ONDANSETRON 4 MG: 2 INJECTION INTRAMUSCULAR; INTRAVENOUS at 00:33

## 2020-04-30 RX ADMIN — CHLORHEXIDINE GLUCONATE 0.12% ORAL RINSE 15 ML: 1.2 LIQUID ORAL at 08:02

## 2020-04-30 RX ADMIN — RIFAXIMIN 550 MG: 550 TABLET ORAL at 20:24

## 2020-04-30 RX ADMIN — FENTANYL CITRATE 50 MCG: 0.05 INJECTION, SOLUTION INTRAMUSCULAR; INTRAVENOUS at 09:56

## 2020-04-30 RX ADMIN — INSULIN HUMAN 4 UNITS: 100 INJECTION, SOLUTION PARENTERAL at 18:20

## 2020-04-30 RX ADMIN — DIAZEPAM 10 MG: 5 INJECTION, SOLUTION INTRAMUSCULAR; INTRAVENOUS at 02:06

## 2020-04-30 RX ADMIN — DEXMEDETOMIDINE HYDROCHLORIDE 1.5 MCG/KG/HR: 4 INJECTION, SOLUTION INTRAVENOUS at 13:54

## 2020-04-30 RX ADMIN — FUROSEMIDE 40 MG: 40 TABLET ORAL at 08:02

## 2020-04-30 RX ADMIN — AZTREONAM 1 G: 1 INJECTION, POWDER, LYOPHILIZED, FOR SOLUTION INTRAMUSCULAR; INTRAVENOUS at 15:45

## 2020-04-30 RX ADMIN — Medication 30 ML: at 23:55

## 2020-04-30 RX ADMIN — INSULIN HUMAN 2 UNITS: 100 INJECTION, SOLUTION PARENTERAL at 00:02

## 2020-04-30 RX ADMIN — RIFAXIMIN 550 MG: 550 TABLET ORAL at 08:02

## 2020-05-01 LAB
ANION GAP SERPL CALCULATED.3IONS-SCNC: 12 MMOL/L (ref 5–15)
ARTERIAL PATENCY WRIST A: ABNORMAL
ATMOSPHERIC PRESS: ABNORMAL MM[HG]
BASE EXCESS BLDA CALC-SCNC: -1.8 MMOL/L (ref 0–2)
BASOPHILS # BLD AUTO: 0.05 10*3/MM3 (ref 0–0.2)
BASOPHILS NFR BLD AUTO: 0.2 % (ref 0–1.5)
BDY SITE: ABNORMAL
BODY TEMPERATURE: 37 C
BUN BLD-MCNC: 36 MG/DL (ref 6–20)
BUN/CREAT SERPL: 83.7 (ref 7–25)
CALCIUM SPEC-SCNC: 7.6 MG/DL (ref 8.6–10.5)
CHLORIDE SERPL-SCNC: 118 MMOL/L (ref 98–107)
CO2 BLDA-SCNC: 21.9 MMOL/L (ref 22–33)
CO2 SERPL-SCNC: 17 MMOL/L (ref 22–29)
COHGB MFR BLD: 1.7 % (ref 0–2)
CREAT BLD-MCNC: 0.43 MG/DL (ref 0.76–1.27)
DEPRECATED RDW RBC AUTO: 92 FL (ref 37–54)
EOSINOPHIL # BLD AUTO: 0.04 10*3/MM3 (ref 0–0.4)
EOSINOPHIL NFR BLD AUTO: 0.1 % (ref 0.3–6.2)
ERYTHROCYTE [DISTWIDTH] IN BLOOD BY AUTOMATED COUNT: 22.9 % (ref 12.3–15.4)
GFR SERPL CREATININE-BSD FRML MDRD: >150 ML/MIN/1.73
GLUCOSE BLD-MCNC: 281 MG/DL (ref 65–99)
GLUCOSE BLDC GLUCOMTR-MCNC: 153 MG/DL (ref 70–130)
GLUCOSE BLDC GLUCOMTR-MCNC: 177 MG/DL (ref 70–130)
GLUCOSE BLDC GLUCOMTR-MCNC: 239 MG/DL (ref 70–130)
GLUCOSE BLDC GLUCOMTR-MCNC: 271 MG/DL (ref 70–130)
HBA1C MFR BLD: 4.3 % (ref 4.8–5.6)
HCO3 BLDA-SCNC: 21.1 MMOL/L (ref 20–26)
HCT VFR BLD AUTO: 23.1 % (ref 37.5–51)
HCT VFR BLD CALC: 22 %
HGB BLD-MCNC: 7.3 G/DL (ref 13–17.7)
HGB BLDA-MCNC: 7.2 G/DL (ref 13.5–17.5)
HOROWITZ INDEX BLD+IHG-RTO: 40 %
IMM GRANULOCYTES # BLD AUTO: 0.34 10*3/MM3 (ref 0–0.05)
IMM GRANULOCYTES NFR BLD AUTO: 1.2 % (ref 0–0.5)
LYMPHOCYTES # BLD AUTO: 2.3 10*3/MM3 (ref 0.7–3.1)
LYMPHOCYTES NFR BLD AUTO: 7.9 % (ref 19.6–45.3)
MAGNESIUM SERPL-MCNC: 2.1 MG/DL (ref 1.6–2.6)
MCH RBC QN AUTO: 35.8 PG (ref 26.6–33)
MCHC RBC AUTO-ENTMCNC: 31.6 G/DL (ref 31.5–35.7)
MCV RBC AUTO: 113.2 FL (ref 79–97)
METHGB BLD QL: 0.6 % (ref 0–1.5)
MODALITY: ABNORMAL
MONOCYTES # BLD AUTO: 3.72 10*3/MM3 (ref 0.1–0.9)
MONOCYTES NFR BLD AUTO: 12.8 % (ref 5–12)
NEUTROPHILS # BLD AUTO: 22.71 10*3/MM3 (ref 1.7–7)
NEUTROPHILS NFR BLD AUTO: 77.8 % (ref 42.7–76)
NOTE: ABNORMAL
NRBC BLD AUTO-RTO: 0 /100 WBC (ref 0–0.2)
OXYHGB MFR BLDV: 96.2 % (ref 94–99)
PCO2 BLDA: 27.1 MM HG (ref 35–45)
PCO2 TEMP ADJ BLD: 27.1 MM HG (ref 35–48)
PEEP RESPIRATORY: 5 CM[H2O]
PH BLDA: 7.5 PH UNITS (ref 7.35–7.45)
PH, TEMP CORRECTED: 7.5 PH UNITS
PHOSPHATE SERPL-MCNC: 3.2 MG/DL (ref 2.5–4.5)
PLATELET # BLD AUTO: 256 10*3/MM3 (ref 140–450)
PMV BLD AUTO: 11.8 FL (ref 6–12)
PO2 BLDA: 84.8 MM HG (ref 83–108)
PO2 TEMP ADJ BLD: 84.8 MM HG (ref 83–108)
POTASSIUM BLD-SCNC: 4.3 MMOL/L (ref 3.5–5.2)
PSV: 8 CMH2O
RBC # BLD AUTO: 2.04 10*6/MM3 (ref 4.14–5.8)
SODIUM BLD-SCNC: 147 MMOL/L (ref 136–145)
WBC NRBC COR # BLD: 29.16 10*3/MM3 (ref 3.4–10.8)

## 2020-05-01 PROCEDURE — 82962 GLUCOSE BLOOD TEST: CPT

## 2020-05-01 PROCEDURE — 63710000001 INSULIN DETEMIR PER 5 UNITS: Performed by: INTERNAL MEDICINE

## 2020-05-01 PROCEDURE — 63710000001 INSULIN REGULAR HUMAN PER 5 UNITS: Performed by: INTERNAL MEDICINE

## 2020-05-01 PROCEDURE — 94799 UNLISTED PULMONARY SVC/PX: CPT

## 2020-05-01 PROCEDURE — 25010000002 DIAZEPAM PER 5 MG: Performed by: INTERNAL MEDICINE

## 2020-05-01 PROCEDURE — 25010000002 LORAZEPAM PER 2 MG: Performed by: INTERNAL MEDICINE

## 2020-05-01 PROCEDURE — 99232 SBSQ HOSP IP/OBS MODERATE 35: CPT | Performed by: NURSE PRACTITIONER

## 2020-05-01 PROCEDURE — 25010000002 FENTANYL CITRATE (PF) 100 MCG/2ML SOLUTION: Performed by: INTERNAL MEDICINE

## 2020-05-01 PROCEDURE — 83036 HEMOGLOBIN GLYCOSYLATED A1C: CPT

## 2020-05-01 PROCEDURE — 82805 BLOOD GASES W/O2 SATURATION: CPT

## 2020-05-01 PROCEDURE — 25010000002 ENOXAPARIN PER 10 MG: Performed by: INTERNAL MEDICINE

## 2020-05-01 PROCEDURE — 99291 CRITICAL CARE FIRST HOUR: CPT | Performed by: INTERNAL MEDICINE

## 2020-05-01 PROCEDURE — 83735 ASSAY OF MAGNESIUM: CPT | Performed by: INTERNAL MEDICINE

## 2020-05-01 PROCEDURE — 85025 COMPLETE CBC W/AUTO DIFF WBC: CPT | Performed by: INTERNAL MEDICINE

## 2020-05-01 PROCEDURE — 94770: CPT

## 2020-05-01 PROCEDURE — 94003 VENT MGMT INPAT SUBQ DAY: CPT

## 2020-05-01 PROCEDURE — 80048 BASIC METABOLIC PNL TOTAL CA: CPT | Performed by: INTERNAL MEDICINE

## 2020-05-01 PROCEDURE — 84100 ASSAY OF PHOSPHORUS: CPT | Performed by: INTERNAL MEDICINE

## 2020-05-01 RX ORDER — LACTULOSE 10 G/15ML
30 SOLUTION ORAL 2 TIMES DAILY PRN
Status: DISCONTINUED | OUTPATIENT
Start: 2020-05-01 | End: 2020-05-04

## 2020-05-01 RX ADMIN — LORAZEPAM 2 MG: 2 INJECTION INTRAMUSCULAR; INTRAVENOUS at 16:40

## 2020-05-01 RX ADMIN — LORAZEPAM 2 MG: 2 INJECTION INTRAMUSCULAR; INTRAVENOUS at 21:01

## 2020-05-01 RX ADMIN — DIAZEPAM 10 MG: 5 INJECTION, SOLUTION INTRAMUSCULAR; INTRAVENOUS at 01:57

## 2020-05-01 RX ADMIN — INSULIN DETEMIR 10 UNITS: 100 INJECTION, SOLUTION SUBCUTANEOUS at 11:00

## 2020-05-01 RX ADMIN — SODIUM CHLORIDE, PRESERVATIVE FREE 10 ML: 5 INJECTION INTRAVENOUS at 08:30

## 2020-05-01 RX ADMIN — ENOXAPARIN SODIUM 40 MG: 40 INJECTION SUBCUTANEOUS at 10:18

## 2020-05-01 RX ADMIN — DEXMEDETOMIDINE HYDROCHLORIDE 1.5 MCG/KG/HR: 4 INJECTION, SOLUTION INTRAVENOUS at 09:30

## 2020-05-01 RX ADMIN — INSULIN HUMAN 2 UNITS: 100 INJECTION, SOLUTION PARENTERAL at 12:29

## 2020-05-01 RX ADMIN — CHLORHEXIDINE GLUCONATE 0.12% ORAL RINSE 15 ML: 1.2 LIQUID ORAL at 08:29

## 2020-05-01 RX ADMIN — LORAZEPAM 2 MG: 2 INJECTION INTRAMUSCULAR; INTRAVENOUS at 15:03

## 2020-05-01 RX ADMIN — FUROSEMIDE 40 MG: 40 TABLET ORAL at 08:29

## 2020-05-01 RX ADMIN — INSULIN HUMAN 2 UNITS: 100 INJECTION, SOLUTION PARENTERAL at 17:38

## 2020-05-01 RX ADMIN — DEXMEDETOMIDINE HYDROCHLORIDE 0.2 MCG/KG/HR: 4 INJECTION, SOLUTION INTRAVENOUS at 16:47

## 2020-05-01 RX ADMIN — INSULIN HUMAN 4 UNITS: 100 INJECTION, SOLUTION PARENTERAL at 05:45

## 2020-05-01 RX ADMIN — DEXMEDETOMIDINE HYDROCHLORIDE 1.5 MCG/KG/HR: 4 INJECTION, SOLUTION INTRAVENOUS at 01:57

## 2020-05-01 RX ADMIN — FENTANYL CITRATE 50 MCG: 0.05 INJECTION, SOLUTION INTRAMUSCULAR; INTRAVENOUS at 01:57

## 2020-05-01 RX ADMIN — SPIRONOLACTONE 25 MG: 25 TABLET ORAL at 08:29

## 2020-05-01 RX ADMIN — RIFAXIMIN 550 MG: 550 TABLET ORAL at 08:29

## 2020-05-01 RX ADMIN — PANTOPRAZOLE SODIUM 40 MG: 40 INJECTION, POWDER, FOR SOLUTION INTRAVENOUS at 05:45

## 2020-05-01 RX ADMIN — DEXMEDETOMIDINE HYDROCHLORIDE 1.5 MCG/KG/HR: 4 INJECTION, SOLUTION INTRAVENOUS at 05:44

## 2020-05-01 RX ADMIN — FENTANYL CITRATE 50 MCG: 0.05 INJECTION, SOLUTION INTRAMUSCULAR; INTRAVENOUS at 12:50

## 2020-05-01 RX ADMIN — Medication 30 ML: at 08:29

## 2020-05-01 RX ADMIN — FENTANYL CITRATE 50 MCG: 0.05 INJECTION, SOLUTION INTRAMUSCULAR; INTRAVENOUS at 16:45

## 2020-05-01 RX ADMIN — LORAZEPAM 1 MG: 2 INJECTION INTRAMUSCULAR; INTRAVENOUS at 12:50

## 2020-05-01 RX ADMIN — LACTULOSE 30 ML: 20 SOLUTION ORAL at 08:29

## 2020-05-01 RX ADMIN — FENTANYL CITRATE 50 MCG: 0.05 INJECTION, SOLUTION INTRAMUSCULAR; INTRAVENOUS at 04:21

## 2020-05-01 RX ADMIN — DEXMEDETOMIDINE HYDROCHLORIDE 1.1 MCG/KG/HR: 4 INJECTION, SOLUTION INTRAVENOUS at 20:14

## 2020-05-01 RX ADMIN — FENTANYL CITRATE 50 MCG: 0.05 INJECTION, SOLUTION INTRAMUSCULAR; INTRAVENOUS at 11:08

## 2020-05-01 NOTE — PLAN OF CARE
Problem: Patient Care Overview  Goal: Plan of Care Review  Flowsheets (Taken 5/1/2020 5947)  Plan of Care Reviewed With: patient  Outcome Summary: pt with no issues overnight. VSS. Pt had several BMs during the shift and had complaints of pain. PRN pain med administered during the shift with relief.

## 2020-05-01 NOTE — PROGRESS NOTES
"-GI Daily Progress Note-  Subjective:    Chief Complaint:  F/U decompensated cirrhosis    Patient remains intubated and sedated.  Unable to obtain any clinical information from patient.  Nursing notes, labs, imaging, vitals, and provider notes reviewed.  Patient does not appear to be any acute distress and remains calm while on mechanical ventilation.    Objective:    /50 (BP Location: Left arm, Patient Position: Lying)   Pulse 86   Temp 99.7 °F (37.6 °C) (Oral)   Resp 18   Ht 172.7 cm (68\")   Wt 78 kg (171 lb 15.3 oz)   SpO2 94%   BMI 26.15 kg/m²     Physical Exam   Constitutional: He appears well-developed and well-nourished. He is sleeping. He has a sickly appearance. He is sedated and intubated.   HENT:   Head: Normocephalic and atraumatic.   ETT in place; NJ tube in place and secured to nares.    Eyes: Scleral icterus is present.   Cardiovascular: Normal rate, regular rhythm and normal heart sounds. Exam reveals no gallop and no friction rub.   No murmur heard.  Pulmonary/Chest: Effort normal and breath sounds normal. No stridor. He is intubated. No respiratory distress. He has no wheezes. He has no rales.   ETT in place, receiving mechanical ventilation.    Abdominal: Soft. Bowel sounds are normal. He exhibits no distension and no mass. There is no tenderness. There is no rebound and no guarding. No hernia.   Genitourinary:   Genitourinary Comments: defer   Musculoskeletal: He exhibits edema.   Neurological:   Sedated;   Skin: Skin is warm, dry and intact.   Psychiatric:   sedated   Nursing note and vitals reviewed.      Lab  Lab Results   Component Value Date    WBC 29.16 (H) 05/01/2020    HGB 7.3 (L) 05/01/2020    HGB 7.5 (L) 04/30/2020    HGB 7.5 (L) 04/29/2020    .2 (H) 05/01/2020     05/01/2020    INR 1.98 (H) 04/29/2020    INR 1.99 (H) 04/25/2020    INR 1.97 (H) 04/24/2020    INR 1.88 (H) 04/23/2020       Lab Results   Component Value Date    GLUCOSE 281 (H) 05/01/2020    BUN " 36 (H) 05/01/2020    CREATININE 0.43 (L) 05/01/2020    EGFRIFNONA >150 05/01/2020    EGFRIFAFRI 37 (L) 04/23/2020    BCR 83.7 (H) 05/01/2020     (H) 05/01/2020    K 4.3 05/01/2020    CO2 17.0 (L) 05/01/2020    CALCIUM 7.6 (L) 05/01/2020    ALBUMIN 2.20 (L) 04/29/2020    ALKPHOS 126 (H) 04/29/2020    BILITOT 7.1 (H) 04/29/2020    BILIDIR 5.1 (H) 04/27/2020     (H) 04/29/2020     (H) 04/29/2020     Imaging:   CXR: 04/30/2020: read per radiology   FINDINGS: Right upper extremity PICC line is seen with its tip in the  distal SVC. The ET tube and NG tube appear to be in good position. The  heart is upper normal size. The vasculature is cephalized and there is  mild residual perihilar interstitial disease, which appears improved  from yesterday's study. There is again a small left pleural effusion.  There is probably some focal atelectasis in the medial left lung base,  but obscured by the left heart shadow.     IMPRESSION:  1.  Mildly improved perihilar interstitial disease, whether interstitial  edema or mild changes of ARDS.  2.  Very small left pleural effusion, and focal left basilar atelectasis  unchanged.  3.  No evidence of pneumothorax or other new chest pathology.    I have personally reviewed most recent cardiac tracings, lab results, microbiology results and radiology images and interpretations and agree with findings.     Assessment/Plan:   1.) Acute Blood Loss Anemia  >>> Today's H/H: 7.3/23.1  Platelets: 256  >>> Continue to trend with serial H/H.     2.) Gastric Varices (GOV type 1) with hemorrhage  >>> S/p TIPS per Dr. Walker.  >>> Noted pressure gradient reduction from 16 to 6.     3.) Large esophageal varices with red hanh sign  >>> no new s/s of esophageal varices bleeding.  >>> continue PPI therapy.    4.) Acute alcoholic hepatitis  >>> Bilirubin was trending down with most recent CMP.  >>> Cruz Discriminant Function was 56 at admission and was not a steroid candidate.   >>>  continue enteral feeds to increase protein.  >>> Will order AM CMP to reassess bili and LFTs    5.) Alcohol cirrhosis with ascites  >>> Meld-Na: 31.  Yesterday: 22  >>> Had large volume paracentesis on 4/24/2020 with 5.5L pulled and a repeat paracentesis on 4/30/2020 with 2.5L removed.  >>> Tap on 4/30/2020 fluid analysis was red, bloody with a WBC of 1,018 and RBC count of 201,000; corrected values on tap not concerning for SBP.  >>> Continue Xifaxin   >>> Continue Lactulose and titrate to maintain 3-4 bowel movements per day.  >>> will order AM INR.     6.) Alcohol withdrawal, ongoing.   >>> Continue CIWA and treating for s/s of withdraw.   >>> Continue treatment with thiamine and folic acid.     Tadeo Pierce, SMOOTH  05/01/20  12:40

## 2020-05-01 NOTE — PLAN OF CARE
Extubated at approximately 11:00. NC at 2L. Diuresed with adequate UOP. Restraints discontinued. Patient with moderate to severe visual hallucinations. Oriented to self and year only. PRN ativan given for withdrawal symptoms. Precedex gtt restarted per MD after patient heart rate elevated to 130s-150s. SBP stable. Arterial line discontinued per provider order.SLP/PT/OT orders resumed. Levemir added for consistent FSBS in the 200s. A1C 4.3.

## 2020-05-01 NOTE — PROGRESS NOTES
Intensive Care Follow-up     Hospital:  LOS: 8 days   Mr. John Varma, 24 y.o. male is followed for:   Acute GI bleeding            History of present illness:   Mr. Varma is a 23yo M with a history of ETOH abuse who presented to the [facility] ED with hematemesis on 4/23/20. He was recently admitted to the hospital in [place] and left AMA. He was then seen in the [place] ED and was diagnosed with possible cirrhosis vs hepatitis. He was not admitted there. He needed 6L at the time of admission and has progressed to Bipap overnight. He has acute renal failure as well. His MELD score is 32 at the time of admission which portends a 52% mortality at 3 months. His discriminate function is also elevated at 41.9 consistent with acute alcoholic hepatitis.   He underwent an EGD on 4/24/2020 showing esophageal varices, with stigmata of recent bleed.      Subjective   Interval History:  Patient stable this morning continues on mechanical ventilation although will wake up and follow commands.  He remains afebrile and normotensive.  He underwent a paracentesis yesterday with 2.5 L removed.  He has no signs of active bleeding.  Urine output remains adequate.         The patient's past medical, surgical and social history were reviewed and updated in Epic as appropriate.       Objective     Infusions:    dexmedetomidine 0.2-1.5 mcg/kg/hr Last Rate: 1.5 mcg/kg/hr (05/01/20 0930)     Medications:    chlorhexidine 15 mL Mouth/Throat Q12H   enoxaparin 40 mg Subcutaneous Q24H   furosemide 40 mg Nasogastric Daily   insulin detemir 10 Units Subcutaneous Daily   insulin regular 0-7 Units Subcutaneous Q6H   pantoprazole 40 mg Intravenous Q AM   PRO-STAT 30 mL Nasogastric BID   rifAXIMin 550 mg Nasogastric Q12H   spironolactone 25 mg Nasogastric Daily     I reviewed the patient's medications.    Vital Sign Min/Max for last 24 hours  Temp  Min: 98.6 °F (37 °C)  Max: 99.5 °F (37.5 °C)   BP  Min: 98/58  Max: 116/61   Pulse  Min: 84  Max:  98   Resp  Min: 26  Max: 30   SpO2  Min: 92 %  Max: 100 %   No data recorded       Input/Output for last 24 hour shift  04/30 0701 - 05/01 0700  In: 2876.2 [I.V.:715.5]  Out: 3940 [Urine:1350]   FiO2 (%):  [40 %] 40 %  S RR:  [12] 12  PEEP/CPAP (cm H2O):  [5 cm H20] 5 cm H20  NV SUP:  [8 cm H20] 8 cm H20  MAP (cm H2O):  [6.1-9.6] 8.4  GENERAL : lying in bed, NAD  RESPIRATORY/THORAX : ET tube in place, Coarse breath sounds bilaterally  CARDIOVASCULAR : Normal S1/S2, RRR. 1+ lower ext edema.  GASTROINTESTINAL : Soft, NT/ND. BS x 4 normoactive. No hepatosplenomegaly.  MUSCULOSKELETAL : No cyanosis, clubbing, or ischemia  NEUROLOGICAL: Moving all ext.    Results from last 7 days   Lab Units 05/01/20  0421 04/30/20 0517 04/29/20  2356  04/29/20  0511   WBC 10*3/mm3 29.16* 29.97*  --   --  26.10*   HEMOGLOBIN g/dL 7.3* 7.5* 7.5*   < > 8.8*   PLATELETS 10*3/mm3 256 275  --   --  254    < > = values in this interval not displayed.     Results from last 7 days   Lab Units 05/01/20  0421 04/30/20  0517 04/29/20  0511   SODIUM mmol/L 147* 144 146*   POTASSIUM mmol/L 4.3 4.7 3.9   CO2 mmol/L 17.0* 19.0* 19.0*   BUN mg/dL 36* 49* 35*   CREATININE mg/dL 0.43* 0.61* 0.46*   MAGNESIUM mg/dL 2.1 2.3 2.1   PHOSPHORUS mg/dL 3.2 4.5 3.5   GLUCOSE mg/dL 281* 203* 132*     Estimated Creatinine Clearance: 289.7 mL/min (A) (by C-G formula based on SCr of 0.43 mg/dL (L)).    Results from last 7 days   Lab Units 04/30/20  0347   PH, ARTERIAL pH units 7.484*   PCO2, ARTERIAL mm Hg 27.1*   PO2 ART mm Hg 64.4*       I reviewed the patient's new clinical results.  I reviewed the patient's new imaging results/reports including actual images and agree with reports.              Imaging Results (Last 24 Hours)     Procedure Component Value Units Date/Time    XR Chest 1 View [[id number]] Collected:  04/30/20 0838     Updated:  04/30/20 2122    Narrative:       EXAMINATION: XR CHEST 1 VW-04/30/2020:     INDICATION: Intubated, poss. aspiration;  A41.9-Sepsis, unspecified  organism; R65.20-Severe sepsis without septic shock; K72.00-Acute and  subacute hepatic failure without coma; R09.02-Hypoxemia; N17.9-Acute  kidney failure, unspecified; B17.9-Acute viral hepatitis, unspecified;  F10.10-Alcohol abuse, uncomplicated; K92.2-Gastrointestinal hemorrhage,  unspecified; R79.89-Other specified abnormal findings of blood.     COMPARISON: 04/29/2020.     FINDINGS: Right upper extremity PICC line is seen with its tip in the  distal SVC. The ET tube and NG tube appear to be in good position. The  heart is upper normal size. The vasculature is cephalized and there is  mild residual perihilar interstitial disease, which appears improved  from yesterday's study. There is again a small left pleural effusion.  There is probably some focal atelectasis in the medial left lung base,  but obscured by the left heart shadow.       Impression:       1.  Mildly improved perihilar interstitial disease, whether interstitial  edema or mild changes of ARDS.  2.  Very small left pleural effusion, and focal left basilar atelectasis  unchanged.  3.  No evidence of pneumothorax or other new chest pathology.     D:  04/30/2020  E:  04/30/2020            This report was finalized on 4/30/2020 9:19 PM by Dr. Moi Rich MD.       XR Chest 1 View [[id number]] Collected:  04/29/20 1513     Updated:  04/30/20 1038    Narrative:       EXAMINATION: XR CHEST 1 VW-      INDICATION: PICC placement; A41.9-Sepsis, unspecified organism;  R65.20-Severe sepsis without septic shock; K72.00-Acute and subacute  hepatic failure without coma; R09.02-Hypoxemia; N17.9-Acute kidney  failure, unspecified; B17.9-Acute viral hepatitis, unspecified;  F10.10-Alcohol abuse, uncomplicated; K92.2-Gastrointestinal hemorrhage,  unspecified; R79.89-Other specified abnormal findings of blood  chemistry.     COMPARISON: 04/28/2020.     FINDINGS: Portable chest reveals heart to be enlarged. Increased  pulmonary vascularity  bilaterally with bilateral pleural effusions. The  lines and tubes are in satisfactory position with nasogastric tube tip  below the level of the diaphragm. There is an endotracheal tube placed  in the interval with tip in satisfactory position above the antonina. PICC  line catheter identified on the right. Tip in the SVC.           Impression:       Right PICC line catheter tip in the SVC. Placement of an  endotracheal tube in satisfactory position above the antonina. Nasogastric  tube with tip below the diaphragm. The appearance of the chest is stable  with bilateral pleural effusions and increased pulmonary vascularity  bilaterally.     D:  04/29/2020  E:  04/29/2020     This report was finalized on 4/30/2020 10:35 AM by Dr. Carmen Morales MD.             Assessment/Plan   Impression        Acute variceal GI bleeding    Acute alcoholic hepatitis    BORIS (acute kidney injury) (CMS/HCC)    Bandemia    Acute upper GI bleeding    Sepsis with acute renal failure without septic shock (CMS/HCC)    Acute respiratory failure with hypoxia (CMS/HCC)    Alcohol withdrawal delirium (CMS/HCC)    Hepatic encephalopathy (CMS/HCC)    Ascites due to alcoholic hepatitis    Pleural effusion on left    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       Plan        Mr. Varma is a 25yo M with a history of ETOH abuse who presented to the [facility] ED with hematemesis on 4/23/20. He was recently admitted to the hospital in [place] and left AMA. He was then seen in the [place] ED and was diagnosed with possible cirrhosis vs hepatitis. He was not admitted there. He needed 6L remained intubated status post EGD on 4/24/2020. He had acute renal failure which gradually improved. His admission MELD score was 32 at the time of admission which portends a 52% mortality at 3 months. His admission discriminate function is also elevated at 41.9 consistent with acute alcoholic hepatitis.   He underwent an EGD on 4/24/2020  showing esophageal varices, with stigmata of recent bleed.  He also underwent a paracentesis on 4/24/2020 with removal of 5 L, fluid studies were negative for SBP.  He was extubated on 4/27/2020.  Patient underwent a TIPS procedure on 4/29/2020.  We continue to treat alcoholic withdrawal.  Patient returned to the ICU on 4/29/2020 on mechanical ventilation.  Patient underwent a second paracentesis on 4/30/2020 with 2.5 L removed.  Patient completed 7 days of aztreonam for possible infection on 4/30/2020.  Plan for extubation on 5/1/2020.     1. Patient to remain in the ICU as he is critically ill with acute hypoxic respiratory failure in the setting of an acute variceal GI bleed with underlying cirrhosis with possible sepsis and alcoholic hepatitis now undergoing severe alcohol withdrawal requiring Precedex drip.  He continues on mechanical ventilation status post TIPS.  2. Continue mechanical ventilation and wean vent, pressure support trial and possible extubation  3. Continue rifaximin and lactulose titrated 3-4 bowel movements a day for hepatic encephalopathy  4. Continue thiamine and folic acid  5. Continue spironolactone 25 mg daily  and 40 mg p.o. Lasix daily  6. Continue PPI  7. Continue Valium 10 mg every 8 hours for the next 1 day, continue Precedex drip  8. Continue tube feeds after the procedure will reassess swallowing in the next few days.  9. Start Levemir 10 units nightly, continue sliding scale insulin.  10. VTE prophylaxis with Lovenox  11. PPI  12. A.m. labs ordered  13. I updated the patient's father Fortino Varma over the phone, all questions were answered    Plan of care and goals reviewed with mulitdisciplinary/antibiotic stewardship team during rounds.   I discussed the patient's findings and my recommendations with patient, family and nursing staff     Critical Care time spent in direct patient care: 35 minutes (excluding procedure time, if applicable) including high complexity decision  making to assess, manipulate, and support vital organ system failure in this individual who has impairment of one or more vital organ systems such that there is a high probability of imminent or life threatening deterioration in the patient's condition.      Theodora Tan, DO  Pulmonary, Critical care and Sleep Medicine

## 2020-05-01 NOTE — NURSING NOTE
Daily Donovan screening done. Donovan today 14. No skin issues at this time. Skin interventions in place. Please contact WOC nurse as needed for concerns.

## 2020-05-01 NOTE — PROGRESS NOTES
Continued Stay Note  [facility]     Patient Name: John Varma  MRN: [id number]  Today's Date: 5/1/2020    Admit Date: 4/23/2020    Discharge Plan     Row Name 05/01/20 0927       Plan    Plan Comments  Spoke cheryl River RN c [facility] and she said to refer him when he is off the ventilator.  Discharge to [facility] would be best plan,  if he agrees to go. Spoke to him at  and he said he would go to [facility].          Discharge Codes    No documentation.       Expected Discharge Date and Time     Expected Discharge Date Expected Discharge Time    May 4, 2020             Brent Alvarado RN

## 2020-05-01 NOTE — PROGRESS NOTES
Adult Nutrition  Assessment/PES    Patient Name:  John Varma  YOB: 1995  MRN: [id number]  Admit Date:  4/23/2020    Assessment Date:  5/1/2020    Comments:  Pt tolerating EN support, w/ excellent delivery 104% daily goal volume; Na+ increasing , will increase FW per MDR discussion. Will continue to monitor EN support tolerance and adequacy of delivery.    Reason for Assessment     Row Name 05/01/20 1600          Reason for Assessment    Reason For Assessment  per organizational policy;identified at risk by screening criteria;TF/PN;follow-up protocol MDR, EN support monitor 30 mins     Diagnosis  liver disease;gastrointestinal disease acute GIB, esophageal varices, alcoholic hepatita, cirrhosis, ascites, hepatic encephalopathy, EtOH w/d,BORIS, sepsis HX GERD, substance abuse     Identified At Risk by Screening Criteria  tube feeding or parenteral nutrition         Nutrition/Diet History     Row Name 05/01/20 160          Nutrition/Diet History    Typical Food/Fluid Intake  RN reports pt doing great w/ PST, wakes and follows commands, MD states he will try to extubate later today, will add 10 units of insulin and give lactulose prn to titrate BMS.     Factors Affecting Nutritional Intake  altered gastrointestinal function;impaired cognitive status/motor control           Labs/Tests/Procedures/Meds     Row Name 05/01/20 9526          Labs/Procedures/Meds    Lab Results Reviewed  reviewed, pertinent     Lab Results Comments  glucoses elevated, Na+ 147        Medications    Pertinent Medications Reviewed  reviewed, pertinent     Pertinent Medications Comments  10 units of levemir added         Physical Findings     Row Name 05/01/20 8953          Physical Findings    Overall Physical Appearance  on ventilator support     Gastrointestinal  ascites     Tubes  nasogastric tube     Skin  jaundice;other (see comments) paracentesis incision           Nutrition Prescription Ordered     Row Name 05/01/20 0381        Nutrition Prescription PO    Current PO Diet  NPO       Nutrition Prescription EN    Enteral Route  NG    Product  Peptamen 1.5 (Vital 1.5)    Modulars  Liquid Protein (15 gm/30 mL)    Liquid Protein (15 gm/30 mL)  30 mL/1 packet    Protein Liquid Frequency  2 times a day    TF Delivery Method  Continuous    Continuous TF Goal Rate (mL/hr)  75 mL/hr    Continuous TF Goal Volume (mL)  1800 mL    Water flush (mL)   20 mL    Water Flush Frequency  Per hour        Evaluation of Received Nutrient/Fluid Intake     Row Name 05/01/20 1608          Nutrient/Fluid Evaluation    Number of Days Evaluated  1 day        Calories Evaluation    Enteral Calories (kcal)  2178     Other Calories (kcal)  200     Total Calories (kcal)  2378     % of Kcal Needs  103        Protein Evaluation    Enteral Protein (gm)  98     Other Protein (gm)  30     Total Protein (gm)  128     % of Protein Needs  110        Intake Assessment    Energy/Calorie Requirement Assessment  meeting needs     Protein Requirement Assessment  meeting needs     Fluid Requirement Assessment  not meeting needs     Tolerance  tolerating        Fluid Intake Evaluation    Enteral (Free Water) Fluid (mL)  1118     Free Water Flush Fluid (mL)  363     Total Free Water Intake (mL)  1481 mL        EN Evaluation    Number of Days EN Intake Evaluated  1 day     EN Average Volume Delivered (mL/day)  1452 mL/day     % Goal Volume   104 %               Problem/Interventions:  Problem 1     Row Name 05/01/20 1610          Nutrition Diagnoses Problem 1    Problem 1  No Nutrition Diagnosis at this Time     Inadequate Intake Type  Enteral     Macronutrient  Kcal;Protein     Micronutrient  Vitamin;Mineral     Etiology (related to)  Medical Diagnosis     Gastrointestinal  Gastrointestinal bleed;Esophageal varices     Pulmonary/Critical Care  Acute respiratory failure;Ventilator     Signs/Symptoms (evidenced by)  EN Intake Delivery     Percent (%) of EN goal  104 %     Resolved?   Yes               Intervention Goal     Row Name 05/01/20 1611          Intervention Goal    General  Nutrition support treatment;Meet nutritional needs for age/condition     TF/PN  Adjust TF/PN;Tolerate TF at goal         Nutrition Intervention     Row Name 05/01/20 1611          Nutrition Intervention    RD/Tech Action  Follow Tx progress;Care plan reviewd;Recommend/ordered     Recommended/Ordered  EN         Nutrition Prescription     Row Name 05/01/20 1612          Nutrition Prescription EN    Water flush (mL)   40 mL vo MD increase free water during MDR     Water Flush Frequency  Per hour        EN to Supply    TF Free H2O (mL)  1078 mL     Total Free H2O (mL/day)  2038 mL/day         Education/Evaluation     Row Name 05/01/20 1613          Monitor/Evaluation    Monitor  Per protocol;I&O;TF delivery/tolerance;Weight;Skin status;Symptoms           Electronically signed by:  Elissa Garay MS,RD,LD  05/01/20 16:14

## 2020-05-02 LAB
ALBUMIN SERPL-MCNC: 2.2 G/DL (ref 3.5–5.2)
ALBUMIN/GLOB SERPL: 0.7 G/DL
ALP SERPL-CCNC: 180 U/L (ref 39–117)
ALT SERPL W P-5'-P-CCNC: 126 U/L (ref 1–41)
ANION GAP SERPL CALCULATED.3IONS-SCNC: 9 MMOL/L (ref 5–15)
AST SERPL-CCNC: 148 U/L (ref 1–40)
BILIRUB SERPL-MCNC: 4.8 MG/DL (ref 0.2–1.2)
BUN BLD-MCNC: 28 MG/DL (ref 6–20)
BUN/CREAT SERPL: 52.8 (ref 7–25)
CALCIUM SPEC-SCNC: 7.8 MG/DL (ref 8.6–10.5)
CHLORIDE SERPL-SCNC: 119 MMOL/L (ref 98–107)
CO2 SERPL-SCNC: 21 MMOL/L (ref 22–29)
CREAT BLD-MCNC: 0.53 MG/DL (ref 0.76–1.27)
GFR SERPL CREATININE-BSD FRML MDRD: >150 ML/MIN/1.73
GLOBULIN UR ELPH-MCNC: 3 GM/DL
GLUCOSE BLD-MCNC: 256 MG/DL (ref 65–99)
GLUCOSE BLDC GLUCOMTR-MCNC: 266 MG/DL (ref 70–130)
GLUCOSE BLDC GLUCOMTR-MCNC: 271 MG/DL (ref 70–130)
GLUCOSE BLDC GLUCOMTR-MCNC: 288 MG/DL (ref 70–130)
INR PPP: 1.91 (ref 0.85–1.16)
MAGNESIUM SERPL-MCNC: 1.9 MG/DL (ref 1.6–2.6)
PHOSPHATE SERPL-MCNC: 2.8 MG/DL (ref 2.5–4.5)
POTASSIUM BLD-SCNC: 4.6 MMOL/L (ref 3.5–5.2)
PROT SERPL-MCNC: 5.2 G/DL (ref 6–8.5)
PROTHROMBIN TIME: 21.5 SECONDS (ref 11.5–14)
SODIUM BLD-SCNC: 149 MMOL/L (ref 136–145)

## 2020-05-02 PROCEDURE — 97164 PT RE-EVAL EST PLAN CARE: CPT

## 2020-05-02 PROCEDURE — 99291 CRITICAL CARE FIRST HOUR: CPT | Performed by: INTERNAL MEDICINE

## 2020-05-02 PROCEDURE — 63710000001 INSULIN REGULAR HUMAN PER 5 UNITS: Performed by: INTERNAL MEDICINE

## 2020-05-02 PROCEDURE — 99232 SBSQ HOSP IP/OBS MODERATE 35: CPT | Performed by: INTERNAL MEDICINE

## 2020-05-02 PROCEDURE — 25010000002 LEVOFLOXACIN PER 250 MG: Performed by: INTERNAL MEDICINE

## 2020-05-02 PROCEDURE — 82962 GLUCOSE BLOOD TEST: CPT

## 2020-05-02 PROCEDURE — 25010000002 LORAZEPAM PER 2 MG: Performed by: INTERNAL MEDICINE

## 2020-05-02 PROCEDURE — 97530 THERAPEUTIC ACTIVITIES: CPT

## 2020-05-02 PROCEDURE — 83735 ASSAY OF MAGNESIUM: CPT | Performed by: INTERNAL MEDICINE

## 2020-05-02 PROCEDURE — 63710000001 INSULIN DETEMIR PER 5 UNITS: Performed by: INTERNAL MEDICINE

## 2020-05-02 PROCEDURE — 25010000003 MAGNESIUM SULFATE 4 GM/100ML SOLUTION: Performed by: INTERNAL MEDICINE

## 2020-05-02 PROCEDURE — 92610 EVALUATE SWALLOWING FUNCTION: CPT

## 2020-05-02 PROCEDURE — 84100 ASSAY OF PHOSPHORUS: CPT | Performed by: INTERNAL MEDICINE

## 2020-05-02 PROCEDURE — 85610 PROTHROMBIN TIME: CPT | Performed by: NURSE PRACTITIONER

## 2020-05-02 PROCEDURE — 80053 COMPREHEN METABOLIC PANEL: CPT | Performed by: NURSE PRACTITIONER

## 2020-05-02 PROCEDURE — 97165 OT EVAL LOW COMPLEX 30 MIN: CPT

## 2020-05-02 PROCEDURE — 25010000002 ENOXAPARIN PER 10 MG: Performed by: INTERNAL MEDICINE

## 2020-05-02 RX ORDER — DEXTROSE MONOHYDRATE 50 MG/ML
100 INJECTION, SOLUTION INTRAVENOUS CONTINUOUS
Status: DISCONTINUED | OUTPATIENT
Start: 2020-05-02 | End: 2020-05-05

## 2020-05-02 RX ORDER — FUROSEMIDE 40 MG/1
40 TABLET ORAL
Status: DISCONTINUED | OUTPATIENT
Start: 2020-05-02 | End: 2020-05-04

## 2020-05-02 RX ORDER — LEVOFLOXACIN 5 MG/ML
500 INJECTION, SOLUTION INTRAVENOUS
Status: COMPLETED | OUTPATIENT
Start: 2020-05-02 | End: 2020-05-06

## 2020-05-02 RX ADMIN — DEXMEDETOMIDINE HYDROCHLORIDE 1.1 MCG/KG/HR: 4 INJECTION, SOLUTION INTRAVENOUS at 00:15

## 2020-05-02 RX ADMIN — LEVOFLOXACIN 500 MG: 5 INJECTION, SOLUTION INTRAVENOUS at 14:08

## 2020-05-02 RX ADMIN — ENOXAPARIN SODIUM 40 MG: 40 INJECTION SUBCUTANEOUS at 12:27

## 2020-05-02 RX ADMIN — DEXTROSE MONOHYDRATE 50 ML/HR: 50 INJECTION, SOLUTION INTRAVENOUS at 12:48

## 2020-05-02 RX ADMIN — DEXMEDETOMIDINE HYDROCHLORIDE 1.1 MCG/KG/HR: 4 INJECTION, SOLUTION INTRAVENOUS at 04:31

## 2020-05-02 RX ADMIN — INSULIN HUMAN 4 UNITS: 100 INJECTION, SOLUTION PARENTERAL at 06:09

## 2020-05-02 RX ADMIN — MAGNESIUM SULFATE HEPTAHYDRATE 4 G: 40 INJECTION, SOLUTION INTRAVENOUS at 06:14

## 2020-05-02 RX ADMIN — RIFAXIMIN 550 MG: 550 TABLET ORAL at 08:30

## 2020-05-02 RX ADMIN — PANTOPRAZOLE SODIUM 40 MG: 40 INJECTION, POWDER, FOR SOLUTION INTRAVENOUS at 06:08

## 2020-05-02 RX ADMIN — LORAZEPAM 2 MG: 2 INJECTION INTRAMUSCULAR; INTRAVENOUS at 04:59

## 2020-05-02 RX ADMIN — RIFAXIMIN 550 MG: 550 TABLET ORAL at 20:05

## 2020-05-02 RX ADMIN — INSULIN HUMAN 4 UNITS: 100 INJECTION, SOLUTION PARENTERAL at 12:48

## 2020-05-02 RX ADMIN — INSULIN HUMAN 4 UNITS: 100 INJECTION, SOLUTION PARENTERAL at 12:27

## 2020-05-02 RX ADMIN — DEXMEDETOMIDINE HYDROCHLORIDE 1 MCG/KG/HR: 4 INJECTION, SOLUTION INTRAVENOUS at 12:48

## 2020-05-02 RX ADMIN — FUROSEMIDE 40 MG: 40 TABLET ORAL at 08:30

## 2020-05-02 RX ADMIN — INSULIN HUMAN 4 UNITS: 100 INJECTION, SOLUTION PARENTERAL at 17:48

## 2020-05-02 RX ADMIN — DEXMEDETOMIDINE HYDROCHLORIDE 1.2 MCG/KG/HR: 4 INJECTION, SOLUTION INTRAVENOUS at 17:45

## 2020-05-02 RX ADMIN — SPIRONOLACTONE 25 MG: 25 TABLET ORAL at 08:30

## 2020-05-02 RX ADMIN — Medication 30 ML: at 08:30

## 2020-05-02 RX ADMIN — INSULIN HUMAN 4 UNITS: 100 INJECTION, SOLUTION PARENTERAL at 17:45

## 2020-05-02 RX ADMIN — CHLORHEXIDINE GLUCONATE 0.12% ORAL RINSE 15 ML: 1.2 LIQUID ORAL at 08:30

## 2020-05-02 RX ADMIN — LORAZEPAM 1 MG: 2 INJECTION INTRAMUSCULAR; INTRAVENOUS at 20:06

## 2020-05-02 RX ADMIN — INSULIN DETEMIR 10 UNITS: 100 INJECTION, SOLUTION SUBCUTANEOUS at 08:31

## 2020-05-02 RX ADMIN — DEXMEDETOMIDINE HYDROCHLORIDE 1.4 MCG/KG/HR: 4 INJECTION, SOLUTION INTRAVENOUS at 21:39

## 2020-05-02 RX ADMIN — INSULIN HUMAN 3 UNITS: 100 INJECTION, SOLUTION PARENTERAL at 00:00

## 2020-05-02 RX ADMIN — FUROSEMIDE 40 MG: 40 TABLET ORAL at 17:45

## 2020-05-02 RX ADMIN — DEXMEDETOMIDINE HYDROCHLORIDE 1.2 MCG/KG/HR: 4 INJECTION, SOLUTION INTRAVENOUS at 08:30

## 2020-05-02 RX ADMIN — Medication 30 ML: at 20:05

## 2020-05-02 NOTE — PLAN OF CARE
Problem: Patient Care Overview  Goal: Plan of Care Review  Outcome: Ongoing (interventions implemented as appropriate)  Flowsheets (Taken 5/2/2020 9871)  Progress: improving  Plan of Care Reviewed With: patient; father  Outcome Summary: Patient remains labile with mood. Precedex gtt infusing for agitation and still confused. States he wants to go home, refuses care at times. VSS.

## 2020-05-02 NOTE — THERAPY EVALUATION
Acute Care - Speech Language Pathology   Swallow Initial Evaluation [facility]   Clinical Swallow Evaluation     Patient Name: John Varma  : 1995  MRN: [id number]  Today's Date: 2020               Admit Date: 2020    Visit Dx:     ICD-10-CM ICD-9-CM   1. Sepsis with acute liver failure without hepatic coma or septic shock, due to unspecified organism (CMS/HCC) A41.9 038.9    R65.20 995.92    K72.00 570   2. Hypoxia R09.02 799.02   3. Acute renal failure, unspecified acute renal failure type (CMS/HCC) N17.9 584.9   4. Acute hepatitis B17.9 570   5. Alcohol abuse F10.10 305.00   6. Acute upper GI bleed K92.2 578.9   7. Elevated lactic acid level R79.89 276.2   8. Ascites due to alcoholic hepatitis K70.11 789.59   9. Leukocytosis, unspecified type D72.829 288.60   10. Sepsis with acute renal failure without septic shock, due to unspecified organism, unspecified acute renal failure type (CMS/HCC) A41.9 038.9    R65.20 995.92    N17.9 584.9   11. Dysphagia, unspecified type R13.10 787.20     Patient Active Problem List   Diagnosis   • Acute alcoholic hepatitis   • Acute variceal GI bleeding   • BORIS (acute kidney injury) (CMS/HCC)   • Bandemia   • Acute upper GI bleeding   • Sepsis with acute renal failure without septic shock (CMS/HCC)   • Acute respiratory failure with hypoxia (CMS/HCC)   • Alcohol withdrawal delirium (CMS/HCC)   • Hepatic encephalopathy (CMS/HCC)   • Ascites due to alcoholic hepatitis   • Pleural effusion on left   • S/P TIPS (transjugular intrahepatic portosystemic shunt) 2020     Past Medical History:   Diagnosis Date   • Alcohol abuse    • GERD (gastroesophageal reflux disease)    • GIB (gastrointestinal bleeding)    • Tobacco abuse      Past Surgical History:   Procedure Laterality Date   • ARM WOUND REPAIR / CLOSURE     • ENDOSCOPY N/A 2020    Procedure: ESOPHAGOGASTRODUODENOSCOPY;  Surgeon: Brunner, Mark I, MD;  Location: [facility] ENDOSCOPY;  Service:  Gastroenterology;  Laterality: N/A;        SWALLOW EVALUATION (last 72 hours)      SLP Adult Swallow Evaluation     Row Name 05/02/20 0915                   Rehab Evaluation    Document Type  evaluation  -        Subjective Information  no complaints  -SM        Patient Observations  cooperative alerts with cues  -           General Information    Patient Profile Reviewed  yes  -SM        Pertinent History Of Current Problem  Intubated 4/24-4/27, and again 4/29-5/1. s/p TIPA 4/29. Ativan last night 2' withdrawal  -        Current Method of Nutrition  NPO;nasogastric feedings  -        Prior Level of Function-Swallowing  safe, efficient swallowing in all situations  -        Plans/Goals Discussed with  patient  -SM        Barriers to Rehab  none identified  -        Patient's Goals for Discharge  return to PO diet  -           Pain Scale: FACES Pre/Post-Treatment    Pain: FACES Scale, Pretreatment  2-->hurts little bit  -SM        Pain: FACES Scale, Post-Treatment  2-->hurts little bit  -SM           Oral Musculature and Cranial Nerve Assessment    Oral Motor General Assessment  generalized oral motor weakness  -           Clinical Swallow Eval    Oral Prep Phase  impaired  -        Oral Transit  impaired  -        Pharyngeal Phase  suspected pharyngeal impairment  -           Oral Prep Concerns    Oral Prep Concerns  incomplete or weak lip closure around spoon  -        Incomplete or Weak Lip Closure Around Spoon  all consistencies  -           Oral Transit Concerns    Oral Transit Concerns  increased oral transit time  -        Increased Oral Transit Time  all consistencies  -           Pharyngeal Phase Concerns    Pharyngeal Phase Concerns  wet vocal quality;cough  -        Wet Vocal Quality  thin  -SM        Cough  thin  -SM        Pharyngeal Phase Concerns, Comment  Did not test puree. Not yet ready to consider PO intake. Generalized weakness and lethargy.   -           Clinical  Impression    SLP Swallowing Diagnosis  oral dysfunction;suspected pharyngeal dysfunction  -        Functional Impact  risk of aspiration/pneumonia  -           Recommendations    SLP Diet Recommendation  NPO;ice chips between meals after oral care, with supervision  -        Recommended Diagnostics  reassess via clinical swallow evaluation  -        SLP Rec. for Method of Medication Administration  meds via alternate route  -          User Key  (r) = Recorded By, (t) = Taken By, (c) = Cosigned By    Initials Name Effective Dates    Bernadette Alexander MS CCC-SLP 06/22/15 -           EDUCATION  The patient has been educated in the following areas:   Dysphagia (Swallowing Impairment).    SLP Recommendation and Plan  SLP Swallowing Diagnosis: oral dysfunction, suspected pharyngeal dysfunction  SLP Diet Recommendation: NPO, ice chips between meals after oral care, with supervision     SLP Rec. for Method of Medication Administration: meds via alternate route        Recommended Diagnostics: reassess via clinical swallow evaluation                      Plan of Care Reviewed With: patient           Time Calculation:   Time Calculation- SLP     Row Name 05/02/20 1244             Time Calculation- [facility]    SLP Start Time  0915  -      SLP Received On  05/02/20  -        User Key  (r) = Recorded By, (t) = Taken By, (c) = Cosigned By    Initials Name Provider Type    Bernadette Alexander MS CCC-SLP Speech and Language Pathologist          Therapy Charges for Today     Code Description Service Date Service Provider Modifiers Qty    02993563130 HC ST EVAL ORAL PHARYNG SWALLOW 3 5/2/2020 Bernadette Serra MS CCC-SLP GN 1               Bernadette Serra MS CCC-SLP  5/2/2020

## 2020-05-02 NOTE — PROGRESS NOTES
Pulmonary/Critical Care Follow-up     LOS: 9 days   Patient Care Team:  Provider, No Known as PCP - General        Chief Complaint   Patient presents with   • Abdominal Pain     Subjective      History of hospitalization (as of 5/1/2020 progress note):    Mr. Varma is a 25yo M with a history of ETOH abuse who presented to the [facility] ED with hematemesis on 4/23/20. He was recently admitted to the hospital in [place] and left AMA. He was then seen in the [place] ED and was diagnosed with possible cirrhosis vs hepatitis. He was not admitted there. He needed 6L at the time of admission and has progressed to Bipap overnight. He has acute renal failure as well. His MELD score is 32 at the time of admission which portends a 52% mortality at 3 months. His discriminate function is also elevated at 41.9 consistent with acute alcoholic hepatitis.   He underwent an EGD on 4/24/2020 showing esophageal varices, with stigmata of recent bleed.    (End of copied text).    Patient was extubated on 5/1/2020.    Interval History:   Patient is awake today but appears somewhat drowsy and confused.  He is tolerating tube feeding.  He continues to be edematous.  He is agitated at times.  No nausea or vomiting.  He is unable to relay any significant history.    History taken from: Staff, chart.    PMH/FH/Social History were reviewed and updated appropriately in the electronic medical record.     Review of Systems:    Review of 14 systems was completed with positives and pertinent negatives noted in the subjective section.  All other systems reviewed and are negative.   Exceptions are noted below:    Unable to obtain secondary to confusion.      Objective     Vital Signs  Temp:  [98.1 °F (36.7 °C)-100.2 °F (37.9 °C)] 100.1 °F (37.8 °C)  Heart Rate:  [] 93  Resp:  [18-24] 24  BP: ()/(46-61) 111/54  Arterial Line BP: (108-109)/(48-50) 109/48  05/01 0701 - 05/02 0700  In: 2456.1 [I.V.:491.1]  Out: 1285 [Urine:1285]  Body mass  index is 26.75 kg/m².     IV drips:    dexmedetomidine Last Rate: 1 mcg/kg/hr (05/02/20 0909)      Physical Exam:     Constitutional:    Somewhat drowsy appearing, cooperative, in no acute distress   Head:   Normocephalic, without obvious abnormality, atraumatic   Eyes:           Lids and lashes normal, anicteric sclera, no pallor, corneas clear, PER   ENMT:  Ears appear intact with no abnormalities noted     No oral lesions, no thrush, oral mucosa moist   Neck:  No adenopathy, supple, trachea midline, no thyromegaly, no JVD   Lungs/Resp:    Normal effort, symmetric chest rise, no crepitus, clear to      auscultation bilaterally, no chest wall tenderness                Heart/CV:   Regular rhythm and normal rate, normal S1 and S2, no            murmur   Abdomen/GI:    Normal bowel sounds, no masses, no organomegaly, soft, moderately distended, non-tender   :    Deferred   Extremities/MSK:  No clubbing or cyanosis.  2+ bilateral lower extremity edema.  Normal tone.    No deformities.    Pulses:  Pulses palpable and equal bilaterally   Skin:  No bleeding, bruising or rash.  Jaundiced.   Heme/Lymph:  No cervical or supraclavicular adenopathy.   Neurologic:    Psychiatric:    Moves all extremities with no obvious focal motor deficit.  Cranial nerves 2 - 12 grossly intact  Confused and disoriented, occasionally mildly agitated.    The above physical exam findings were reviewed and reflect exam findings as of today's exam.   Jacky Riojas MD 05/02/20 12:21      Results Review:     I reviewed the patient's new clinical results.   Results from last 7 days   Lab Units 05/02/20  0442 05/01/20  0421 04/30/20  0517 04/29/20  0511  04/27/20  0424   SODIUM mmol/L 149* 147* 144 146*   < > 143   POTASSIUM mmol/L 4.6 4.3 4.7 3.9   < > 3.8   CHLORIDE mmol/L 119* 118* 115* 117*   < > 110*   CO2 mmol/L 21.0* 17.0* 19.0* 19.0*   < > 21.0*   BUN mg/dL 28* 36* 49* 35*   < > 54*   CREATININE mg/dL 0.53* 0.43* 0.61* 0.46*   < > 0.88    CALCIUM mg/dL 7.8* 7.6* 8.0* 7.7*   < > 7.6*   BILIRUBIN mg/dL 4.8*  --   --  7.1*  --  8.2*   ALK PHOS U/L 180*  --   --  126*  --  135*   ALT (SGPT) U/L 126*  --   --  112*  --  105*   AST (SGOT) U/L 148*  --   --  221*  --  250*   GLUCOSE mg/dL 256* 281* 203* 132*   < > 148*    < > = values in this interval not displayed.     Results from last 7 days   Lab Units 05/01/20  0421 04/30/20  0517 04/29/20  2356  04/29/20  0511   WBC 10*3/mm3 29.16* 29.97*  --   --  26.10*   HEMOGLOBIN g/dL 7.3* 7.5* 7.5*   < > 8.8*   HEMATOCRIT % 23.1* 22.9* 23.0*   < > 26.7*   PLATELETS 10*3/mm3 256 275  --   --  254    < > = values in this interval not displayed.     Results from last 7 days   Lab Units 05/01/20  1016   PH, ARTERIAL pH units 7.499*   PO2 ART mm Hg 84.8   PCO2, ARTERIAL mm Hg 27.1*   HCO3 ART mmol/L 21.1     Results from last 7 days   Lab Units 05/02/20  0442 05/01/20  0421 04/30/20  0517   MAGNESIUM mg/dL 1.9 2.1 2.3   PHOSPHORUS mg/dL 2.8 3.2 4.5       I reviewed the patient's new imaging including images and reports.    Chest x-ray from 4/30/2020 shows endotracheal tube in place with low lung volumes and cardiomegaly.  It showed improved perihilar interstitial disease.        Medication Review:     enoxaparin 40 mg Subcutaneous Q24H   furosemide 40 mg Nasogastric Daily   insulin detemir 10 Units Subcutaneous Daily   insulin regular 0-7 Units Subcutaneous Q6H   pantoprazole 40 mg Intravenous Q AM   PRO-STAT 30 mL Nasogastric BID   rifAXIMin 550 mg Nasogastric Q12H   spironolactone 25 mg Nasogastric Daily       dexmedetomidine 0.2-1.5 mcg/kg/hr Last Rate: 1 mcg/kg/hr (05/02/20 0909)       Assessment/Plan       Acute variceal GI bleeding    Acute alcoholic hepatitis    BORIS (acute kidney injury) (CMS/HCC)    Bandemia    Acute upper GI bleeding    Sepsis with acute renal failure without septic shock (CMS/HCC)    Acute respiratory failure with hypoxia (CMS/HCC)    Alcohol withdrawal delirium (CMS/HCC)    Hepatic  encephalopathy (CMS/HCC)    Ascites due to alcoholic hepatitis    Pleural effusion on left    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    24 y.o. male with history of alcohol abuse who presented to [facility] emergency department on 4/23/2020 with hematemesis.  He was recently admitted to [facility] and left AGAINST MEDICAL ADVICE.  He was subsequently seen in [place] emergency department and diagnosed with cirrhosis versus hepatitis but was not admitted to the hospital.    The patient underwent EGD on 4/24/2020 which showed esophageal varices with stigmata of recent bleeding.  He underwent paracentesis of 5 L on 4/24/2020 and had no evidence of spontaneous bacterial peritonitis.  The patient subsequently underwent TIPS procedure by Dr. Walker on 4/29/2020.    The patient had acute renal failure on admission which has gradually improved.  His admission MELD score was 32 which portends a 52% mortality at 3 months.  His discriminate function was elevated at 41.9 consistent with acute alcoholic hepatitis.    The patient required intubation after his EGD for respiratory failure and was eventually extubated on 4/27/2020.  He was reintubated for TIPS procedure on 4/29/2020 and was extubated on 5/1/2020.  A second large volume paracentesis was done on 4/30/2020 with 2.5 L removed.    The patient completed 7 days of aztreonam for empiric coverage of possible infection on 4/30/2020.    The patient is critically ill secondary to ongoing encephalopathy and tenuous respiratory and gastrointestinal status and has high risk of life-threatening declining condition.  He requires continuous monitoring and frequent reassessment for consideration of adjustment in management to minimize this risk.  I checked on him multiple times today.    Plan:    1.  Speech evaluation.  2.  Monitor respiratory status postextubation.  3.  Continue rifaximin and lactulose for hepatic  encephalopathy.  4.  Continue thiamine and folic acid.  5.  Continue spironolactone 25 mg daily.  Increase Lasix to 40 mg twice daily.   6.  Discontinue scheduled Valium.  7.  Continue tube feeding.  8.  Monitor sodium.  9.  Monitor renal function and electrolytes.  10.  Precedex drip as needed.  11.  Ativan withdrawal protocol.  12.  Add regular insulin 4 units every 6 hours and continue Levemir and correction insulin.  13.  Lovenox for DVT prophylaxis.  14.  D5W at 50 mL/h for 10 hours.  15.  Continue proton pump inhibitor.    I updated the patient's father Fortino by phone.    Jacky Riojas MD  05/02/20  12:16        Critical care time : 38 minutes spent by me personally.(This excludes time spent performing separately reportable procedures and services). including high complexity decision making to assess, manipulate, and support vital organ system failure in this individual who has impairment of one or more vital organ systems such that there is a high probability of imminent or life threatening deterioration in the patient’s condition.    *. Please note that portions of this note were completed with [employer] - a voice recognition program.

## 2020-05-02 NOTE — THERAPY EVALUATION
[facility]ute [facility] - [facility]ational Therapy Initial Evaluation  [facility]     Patient Name: John Varma  : 1995  MRN: [id number]  Today's Date: 2020             Admit Date: 2020       ICD-10-CM ICD-9-CM   1. Sepsis with acute liver failure without hepatic coma or septic shock, due to unspecified organism (CMS/HCC) A41.9 038.9    R65.20 995.92    K72.00 570   2. Hypoxia R09.02 799.02   3. Acute renal failure, unspecified acute renal failure type (CMS/HCC) N17.9 584.9   4. Acute hepatitis B17.9 570   5. Alcohol abuse F10.10 305.00   6. Acute upper GI bleed K92.2 578.9   7. Elevated lactic acid level R79.89 276.2   8. Ascites due to alcoholic hepatitis K70.11 789.59   9. Leukocytosis, unspecified type D72.829 288.60   10. Sepsis with acute renal failure without septic shock, due to unspecified organism, unspecified acute renal failure type (CMS/HCC) A41.9 038.9    R65.20 995.92    N17.9 584.9   11. Dysphagia, unspecified type R13.10 787.20     Patient Active Problem List   Diagnosis   • Acute alcoholic hepatitis   • Acute variceal GI bleeding   • BORIS (acute kidney injury) (CMS/HCC)   • Bandemia   • Acute upper GI bleeding   • Sepsis with acute renal failure without septic shock (CMS/HCC)   • Acute respiratory failure with hypoxia (CMS/HCC)   • Alcohol withdrawal delirium (CMS/HCC)   • Hepatic encephalopathy (CMS/HCC)   • Ascites due to alcoholic hepatitis   • Pleural effusion on left   • S/P TIPS (transjugular intrahepatic portosystemic shunt) 2020     Past Medical History:   Diagnosis Date   • Alcohol abuse    • GERD (gastroesophageal reflux disease)    • GIB (gastrointestinal bleeding)    • Tobacco abuse      Past Surgical History:   Procedure Laterality Date   • ARM WOUND REPAIR / CLOSURE     • ENDOSCOPY N/A 2020    Procedure: ESOPHAGOGASTRODUODENOSCOPY;  Surgeon: Brunner, Mark I, MD;  Location: [facility] ENDOSCOPY;  Service: Gastroenterology;  Laterality: N/A;          OT ASSESSMENT FLOWSHEET  Getting migraine or headache right periorbital area.  Previously dry eye but doing OK now.  Notes one small bump on the right upper lid.  Massaging.    Had bilateral upper lid ptosis repair a few months ago. Her eyes are more open he is seeing better. She knows that they are not open all the way along.    On exam today, the incisions are actually healing very nicely. There is no significant erythema and there is only one small incisional, 1 mm area of incisional density in the central right upper lid. This is not raised and only palpable when she stretches her upper eyelid. Otherwise the incisions are fading nicely. She opens and closes well. Her lid margins are now above her pupil in about 1-1/2 mm to 2 mm on the left eye and 1 mm on the right eye.    Overall she is significantly improved and raised about 3-4 mm from preop photos. However, she has not open with a MRD about 4 mm bilaterally. She is much more functional and overall improved and there has been about a 1-2 mm drop after surgery. We discussed the options of retightening the lids and the pros and cons and risks and benefits. At this time she states she does not want to proceed with any further revision. I am not sure if significant tightening also will raise her leg much more. She has significant degeneration of the levator muscle. Further advancement will likely create more shortening and may cause some more risk of dry eye and temporary lagophthalmos. Overall he answered all questions and I asked her to continue to see ophthalmology as well. If her lids dropping or she has any other further questions and return to see me but otherwise we will leave her postoperative results where they are.       (last 12 hours)      Occupational Therapy Evaluation     Row Name 05/02/20 1059                   OT Evaluation Time/Intention    Subjective Information  no complaints  -CL        Document Type  evaluation  -CL        Mode of Treatment  occupational therapy  -CL        Patient Effort  good  -CL           General Information    Patient Profile Reviewed?  yes  -CL        Prior Level of Function  independent:;all household mobility;transfer;ADL's;dressing;bathing  -CL        Existing Precautions/Restrictions  fall;oxygen therapy device and L/min;other (see comments) NG, confusion, scrotal swelling  -CL        Barriers to Rehab  medically complex;cognitive status  -CL           Relationship/Environment    Lives With  parent(s)  -CL           Resource/Environmental Concerns    Current Living Arrangements  home/apartment/condo pt poor historian  -CL           Cognitive Assessment/Interventions    Additional Documentation  Cognitive Assessment/Intervention (Group)  -CL           Cognitive Assessment/Intervention- PT/OT    Affect/Mental Status (Cognitive)  confused;low arousal/lethargic  -CL        Orientation Status (Cognition)  oriented to;person;disoriented to;place;situation;time  -CL        Follows Commands (Cognition)  follows one step commands;50-74% accuracy;repetition of directions required;verbal cues/prompting required;increased processing time needed  -CL        Cognitive Function (Cognitive)  safety deficit  -CL        Safety Deficit (Cognitive)  moderate deficit;awareness of need for assistance;safety precautions awareness  -CL           Bed Mobility Assessment/Treatment    Bed Mobility Assessment/Treatment  supine-sit;sit-supine  -CL        Supine-Sit [place] (Bed Mobility)  maximum assist (25% patient effort);2 person assist;verbal cues  -CL        Sit-Supine [place] (Bed Mobility)  maximum assist (25% patient effort);2 person assist;verbal cues  -CL        Assistive Device (Bed Mobility)  bed  rails;draw sheet;head of bed elevated  -CL           Transfer Assessment/Treatment    Comment (Transfers)  Deferred d/t poor sitting balance/cognitive status  -CL           General ROM    GENERAL ROM COMMENTS  BUE grossly WFL  -CL           MMT (Manual Muscle Testing)    General MMT Comments  BUE grossly 3+/5  -CL           Motor Assessment/Interventions    Additional Documentation  Balance (Group);Therapeutic Exercise (Group)  -CL           Balance    Balance  static sitting balance  -CL           Static Sitting Balance    Level of [place] (Unsupported Sitting, Static Balance)  2 person assist;maximal assist, 25 to 49% patient effort  -CL        Sitting Position (Unsupported Sitting, Static Balance)  sitting on edge of bed  -CL        Time Able to Maintain Position (Unsupported Sitting, Static Balance)  3 to 4 minutes  -CL        Comment (Unsupported Sitting, Static Balance)  Pt w/ significant posterior lean, progressed to moments of Mod Ax1  -CL           Sensory Assessment/Intervention    Sensory General Assessment  no sensation deficits identified  -CL           Positioning and Restraints    Pre-Treatment Position  in bed  -CL        Post Treatment Position  bed  -CL        In Bed  notified nsg;fowlers;call light within reach;encouraged to call for assist;exit alarm on;side rails up x3;RUE elevated;LUE elevated;legs elevated;heels elevated  -CL           Pain Scale: FACES Pre/Post-Treatment    Pain: FACES Scale, Pretreatment  0-->no hurt  -CL        Pain: FACES Scale, Post-Treatment  0-->no hurt  -CL           Clinical Impression (OT)    OT Diagnosis  Decreased independence in ADLs.   -CL        Patient/Family Goals Statement (OT Eval)  Return to PLOF.   -CL        Criteria for Skilled Therapeutic Interventions Met (OT Eval)  yes;treatment indicated  -CL        Rehab Potential (OT Eval)  good, to achieve stated therapy goals  -CL        Therapy Frequency (OT Eval)  daily  -CL        Anticipated Equipment  Needs at Discharge (OT)  -- TBA further  -CL        Anticipated Discharge Disposition (OT)  inpatient rehabilitation facility  -CL           Vital Signs    Pre Systolic BP Rehab  -- VSS  -CL           OT Goals    Transfer Goal Selection (OT)  transfer, OT goal 1  -CL        Dressing Goal Selection (OT)  dressing, OT goal 1  -CL        Grooming Goal Selection (OT)  grooming, OT goal 1  -CL        Additional Documentation  Grooming Goal Selection (OT) (Row)  -CL           Transfer Goal 1 (OT)    Activity/Assistive Device (Transfer Goal 1, OT)  sit-to-stand/stand-to-sit;toilet  -CL        [place] Level/Cues Needed (Transfer Goal 1, OT)  minimum assist (75% or more patient effort);2 person assist;verbal cues required  -CL        Time Frame (Transfer Goal 1, OT)  long term goal (LTG);10 days  -CL        Progress/Outcome (Transfer Goal 1, OT)  goal ongoing  -CL           Dressing Goal 1 (OT)    Activity/Assistive Device (Dressing Goal 1, OT)  lower body dressing don/doff socks w/ AAD  -CL        [place]/Cues Needed (Dressing Goal 1, OT)  minimum assist (75% or more patient effort);verbal cues required  -CL        Time Frame (Dressing Goal 1, OT)  long term goal (LTG);10 days  -CL        Progress/Outcome (Dressing Goal 1, OT)  goal ongoing  -CL           Grooming Goal 1 (OT)    Activity/Device (Grooming Goal 1, OT)  hair care;wash face, hands unsupported sitting  -CL        [place] (Grooming Goal 1, OT)  supervision required  -CL        Time Frame (Grooming Goal 1, OT)  long term goal (LTG);10 days  -CL        Progress/Outcome (Grooming Goal 1, OT)  goal ongoing  -CL           OT Cognitive Goals    Orientation Goal Selection (OT)  orientation, OT goal 1  -CL           Orientation Goal 1 (OT)    Activity (Orientation Goal 1, OT)  oriented to person;oriented to place;oriented to time  -CL        [place]/Cues/Accuracy (Orientation Goal 1, OT)  with 75% accuracy;moderate cues for use of strategies  -CL         Time Frame (Orientation Goal 1, OT)  long term goal (LTG);10 days  -CL        Progress/Outcome (Orientation Goal 1, OT)  goal ongoing  -CL          User Key  (r) = Recorded By, (t) = Taken By, (c) = Cosigned By    Initials Name Effective Dates    Selena Martínez OT 04/03/18 -                OT Recommendation and Plan  Outcome Summary/Treatment Plan (OT)  Anticipated Equipment Needs at Discharge (OT): (TBA further)  Anticipated Discharge Disposition (OT): inpatient rehabilitation facility  Therapy Frequency (OT Eval): daily  Plan of Care Review  Plan of Care Reviewed With: patient  Plan of Care Reviewed With: patient  Outcome Summary: OT completed a brief chart review. Pt Max Ax2 for bed mobility, limited d/t confusion and lethargy. Recommend cont skilled IPOT POC. Recommend pt DC to IP rehab.    Outcome Measures     Row Name 05/02/20 1059             How much help from another is currently needed...    Putting on and taking off regular lower body clothing?  1  -CL      Bathing (including washing, rinsing, and drying)  1  -CL      Toileting (which includes using toilet bed pan or urinal)  1  -CL      Putting on and taking off regular upper body clothing  1  -CL      Taking care of personal grooming (such as brushing teeth)  2  -CL      Eating meals  1  -CL      AM-PAC 6 Clicks Score (OT)  7  -CL         Functional Assessment    Outcome Measure Options  AM-PAC 6 Clicks Daily Activity (OT)  -CL        User Key  (r) = Recorded By, (t) = Taken By, (c) = Cosigned By    Initials Name Provider Type    Selena Martínez OT Occupational Therapist          Time Calculation:   Time Calculation- OT     Row Name 05/02/20 1059             Time Calculation- OT    OT Start Time  1059  -CL      OT Received On  05/02/20  -CL      OT Goal Re-Cert Due Date  05/12/20  -CL        User Key  (r) = Recorded By, (t) = Taken By, (c) = Cosigned By    Initials Name Provider Type    Selena Martínez, OT Occupational Therapist        Therapy  Charges for Today     Code Description Service Date Service Provider Modifiers Qty    95547982922 HC OT EVAL LOW COMPLEXITY 4 5/2/2020 Selena Terrell, OT GO 1               Selena Terrell OT  5/2/2020

## 2020-05-02 NOTE — THERAPY RE-EVALUATION
Patient Name: John Varma  : 1995    MRN: [id number]                              Today's Date: 2020       Admit Date: 2020    Visit Dx:     ICD-10-CM ICD-9-CM   1. Sepsis with acute liver failure without hepatic coma or septic shock, due to unspecified organism (CMS/HCC) A41.9 038.9    R65.20 995.92    K72.00 570   2. Hypoxia R09.02 799.02   3. Acute renal failure, unspecified acute renal failure type (CMS/HCC) N17.9 584.9   4. Acute hepatitis B17.9 570   5. Alcohol abuse F10.10 305.00   6. Acute upper GI bleed K92.2 578.9   7. Elevated lactic acid level R79.89 276.2   8. Ascites due to alcoholic hepatitis K70.11 789.59   9. Leukocytosis, unspecified type D72.829 288.60   10. Sepsis with acute renal failure without septic shock, due to unspecified organism, unspecified acute renal failure type (CMS/HCC) A41.9 038.9    R65.20 995.92    N17.9 584.9   11. Dysphagia, unspecified type R13.10 787.20     Patient Active Problem List   Diagnosis   • Acute alcoholic hepatitis   • Acute variceal GI bleeding   • BORIS (acute kidney injury) (CMS/HCC)   • Bandemia   • Acute upper GI bleeding   • Sepsis with acute renal failure without septic shock (CMS/HCC)   • Acute respiratory failure with hypoxia (CMS/HCC)   • Alcohol withdrawal delirium (CMS/HCC)   • Hepatic encephalopathy (CMS/HCC)   • Ascites due to alcoholic hepatitis   • Pleural effusion on left   • S/P TIPS (transjugular intrahepatic portosystemic shunt) 2020     Past Medical History:   Diagnosis Date   • Alcohol abuse    • GERD (gastroesophageal reflux disease)    • GIB (gastrointestinal bleeding)    • Tobacco abuse      Past Surgical History:   Procedure Laterality Date   • ARM WOUND REPAIR / CLOSURE     • ENDOSCOPY N/A 2020    Procedure: ESOPHAGOGASTRODUODENOSCOPY;  Surgeon: Brunner, Mark I, MD;  [facility]: [facility] END[facility];  Service: Gastroenterology;  Laterality: N/A;     General Information     Row Name 20 1458          PT  Evaluation Time/Intention    Document Type  re-evaluation  -     Mode of Treatment  physical therapy  -     Row Name 05/02/20 1458          General Information    Patient Profile Reviewed?  yes  -LS     Prior Level of Function  -- see IE  -LS     Existing Precautions/Restrictions  fall;oxygen therapy device and L/min;other (see comments) NG; scrotal swelling  -     Barriers to Rehab  medically complex;cognitive status  -     Row Name 05/02/20 1458          Cognitive Assessment/Intervention- PT/OT    Orientation Status (Cognition)  oriented to;person;disoriented to;place;time  -     Row Name 05/02/20 1458          Safety Issues, Functional Mobility    Impairments Affecting Function (Mobility)  balance;cognition;endurance/activity tolerance;shortness of breath;strength;postural/trunk control  -       User Key  (r) = Recorded By, (t) = Taken By, (c) = Cosigned By    Initials Name Provider Type    LS Savanah Matthews, PT Physical Therapist        Mobility     Row Name 05/02/20 1500          Bed Mobility Assessment/Treatment    Supine-Sit [place] (Bed Mobility)  maximum assist (25% patient effort);2 person assist;verbal cues  -     Sit-Supine [place] (Bed Mobility)  maximum assist (25% patient effort);2 person assist;verbal cues  -     Assistive Device (Bed Mobility)  bed rails;draw sheet;head of bed elevated  -[facility] Name 05/02/20 1500          Transfer Assessment/Treatment    Comment (Transfers)  deferred attempt at STS due to cognition and poor trunk control  -[facility] Name 05/02/20 1500          Bed-Chair Transfer    Bed-Chair [place] (Transfers)  unable to assess  -[facility] Name 05/02/20 1500          Sit-Stand Transfer    Sit-Stand [place] (Transfers)  unable to assess  -[facility] Name 05/02/20 1500          Gait/Stairs Assessment/Training    [place] Level (Gait)  unable to assess  -       User Key  (r) = Recorded By, (t) = Taken By, (c) = Cosigned By    Initials  Name Provider Type     Savanah Matthews, PT Physical Therapist        Obj/Interventions     Row Name 05/02/20 1501          General ROM    GENERAL ROM COMMENTS  BLE WFL  -LS     Row Name 05/02/20 1501          MMT (Manual Muscle Testing)    General MMT Comments  BLE grossly 3-/5; difficulty following commands for formal testing  -LS     Row Name 05/02/20 1501          Static Sitting Balance    Level of [place] (Unsupported Sitting, Static Balance)  maximal assist, 25 to 49% patient effort;2 person assist  -LS     Sitting Position (Unsupported Sitting, Static Balance)  sitting on edge of bed  -LS     Time Able to Maintain Position (Unsupported Sitting, Static Balance)  3 to 4 minutes  -LS     Comment (Unsupported Sitting, Static Balance)  post lean; cues for upright  -LS     Row Name 05/02/20 1501          Sensory Assessment/Intervention    Sensory General Assessment  no sensation deficits identified BLE  -LS       User Key  (r) = Recorded By, (t) = Taken By, (c) = Cosigned By    Initials Name Provider Type     Savanah Matthews, PT Physical Therapist        Goals/Plan     Row Name 05/02/20 1505          Bed Mobility Goal 1 (PT)    Activity/Assistive Device (Bed Mobility Goal 1, PT)  sit to supine/supine to sit  -LS     [place] Level/Cues Needed (Bed Mobility Goal 1, PT)  minimum assist (75% or more patient effort)  -LS     Time Frame (Bed Mobility Goal 1, PT)  2 weeks  -LS     Progress/Outcomes (Bed Mobility Goal 1, PT)  goal ongoing  -     Row Name 05/02/20 1505          Transfer Goal 1 (PT)    Activity/Assistive Device (Transfer Goal 1, PT)  sit-to-stand/stand-to-sit;walker, rolling  -LS     [place] Level/Cues Needed (Transfer Goal 1, PT)  moderate assist (50-74% patient effort)  -LS     Time Frame (Transfer Goal 1, PT)  2 weeks  -LS     Progress/Outcome (Transfer Goal 1, PT)  goal revised this date  -     Row Name 05/02/20 1505          Gait Training Goal 1 (PT)    Activity/Assistive Device  (Gait Training Goal 1, PT)  gait (walking locomotion)  -     [place] Level (Gait Training Goal 1, PT)  moderate assist (50-74% patient effort);2 person assist  -LS     Distance (Gait Goal 1, PT)  25  -LS     Time Frame (Gait Training Goal 1, PT)  2 weeks  -LS     Progress/Outcome (Gait Training Goal 1, PT)  goal ongoing  -LS       User Key  (r) = Recorded By, (t) = Taken By, (c) = Cosigned By    Initials Name Provider Type    LS Savanah Matthews, PT Physical Therapist        Clinical Impression     Row Name 05/02/20 1502          Pain Assessment    Additional Documentation  Pain Scale: FACES Pre/Post-Treatment (Group)  -     Row Name 05/02/20 1502          Pain Scale: Numbers Pre/Post-Treatment    Pain Location  groin  -LS     Pain Intervention(s)  Repositioned;Ambulation/increased activity  -     Row Name 05/02/20 1502          Pain Scale: FACES Pre/Post-Treatment    Pain: FACES Scale, Pretreatment  2-->hurts little bit  -LS     Pain: FACES Scale, Post-Treatment  2-->hurts little bit  -LS     Pre/Post Treatment Pain Comment  tolerated; RN aware of scrotal edema  -     Row Name 05/02/20 1502          Plan of Care Review    Plan of Care Reviewed With  patient  -LS     Progress  improving  -LS     Outcome Summary  PT re-evaluation completed; pt continues to demonstrate significant weakness and decreased indep re: functional mobility warranting further skilled PT services to promote PLOF. Limited today by fatigue and cognitive status; max x2 A for bed mobility and sitting balance. Goals addressed to reflect current functional status. Recommend IP rehab at d/c.   -     Row Name 05/02/20 1502          Physical Therapy Clinical Impression    Patient/Family Goals Statement (PT Clinical Impression)  return to PLOF  -LS     Criteria for Skilled Interventions Met (PT Clinical Impression)  yes;treatment indicated  -LS     Rehab Potential (PT Clinical Summary)  good, to achieve stated therapy goals  -[facility]  Name 05/02/20 1502          Vital Signs    Pre Systolic BP Rehab  105  -LS     Pre Treatment Diastolic BP  54  -LS     Pretreatment Heart Rate (beats/min)  96  -LS     Posttreatment Heart Rate (beats/min)  99  -LS     Pre SpO2 (%)  94  -LS     O2 Delivery Pre Treatment  nasal cannula  -LS     O2 Delivery Intra Treatment  nasal cannula  -LS     Post SpO2 (%)  96  -LS     O2 Delivery Post Treatment  nasal cannula  -LS     Pre Patient Position  Supine  -LS     Intra Patient Position  Sitting  -LS     Post Patient Position  Supine  -LS     Row Name 05/02/20 1502          Positioning and Restraints    Pre-Treatment Position  in bed  -LS     Post Treatment Position  bed  -LS     In Bed  notified nsg;fowlers;exit alarm on;encouraged to call for assist;call light within reach;LUE elevated;RUE elevated;heels elevated;legs elevated  -LS       User Key  (r) = Recorded By, (t) = Taken By, (c) = Cosigned By    Initials Name Provider Type    Savanah Neri, PT Physical Therapist        Outcome Measures     Row Name 05/02/20 1507          How much help from another person do you currently need...    Turning from your back to your side while in flat bed without using bedrails?  2  -LS     Moving from lying on back to sitting on the side of a flat bed without bedrails?  2  -LS     Moving to and from a bed to a chair (including a wheelchair)?  1  -LS     Standing up from a chair using your arms (e.g., wheelchair, bedside chair)?  1  -LS     Climbing 3-5 steps with a railing?  1  -LS     To walk in hospital room?  1  -LS     AM-PAC 6 Clicks Score (PT)  8  -LS       User Key  (r) = Recorded By, (t) = Taken By, (c) = Cosigned By    Initials Name Provider Type    Savanah Neri, PT Physical Therapist          PT Recommendation and Plan  Planned Therapy Interventions (PT Eval): balance training, bed mobility training, gait training, home exercise program, patient/family education, strengthening, transfer training, stair  training  Outcome Summary/Treatment Plan (PT)  Anticipated Discharge Disposition (PT): inpatient rehabilitation facility  Plan of Care Reviewed With: patient  Progress: improving  Outcome Summary: PT re-evaluation completed; pt continues to demonstrate significant weakness and decreased indep re: functional mobility warranting further skilled PT services to promote PLOF. Limited today by fatigue and cognitive status; max x2 A for bed mobility and sitting balance. Goals addressed to reflect current functional status. Recommend IP rehab at d/c.      Time Calculation:   PT Charges     Row Name 05/02/20 1508             Time Calculation    Start Time  1107  -LS      PT Received On  05/02/20  -      PT Goal Re-Cert Due Date  05/12/20  -         Time Calculation- PT    Total Timed Code Minutes- PT  8 minute(s)  -LS         Timed Charges    20348 - PT Therapeutic Activity Minutes  8  -LS        User Key  (r) = Recorded By, (t) = Taken By, (c) = Cosigned By    Initials Name Provider Type     Savanah Matthews, PT Physical Therapist        Therapy Charges for Today     Code Description Service Date Service Provider Modifiers Qty    66127214760  PT RE-EVAL ESTABLISHED PLAN 2 5/2/2020 Savanah Matthews, PT GP 1    [id number]  PT THERAPEUTIC ACT EA 15 MIN 5/2/2020 Savanah Matthews, PT GP 1          PT G-Codes  Outcome Measure Options: AM-PAC 6 Clicks Daily Activity (OT)  AM-PAC 6 Clicks Score (PT): 8  AM-PAC 6 Clicks Score (OT): 7    Savanah Matthews, PT  5/2/2020

## 2020-05-02 NOTE — PLAN OF CARE
Problem: Patient Care Overview  Goal: Plan of Care Review  Outcome: Ongoing (interventions implemented as appropriate)  Flowsheets (Taken 5/2/2020 1243)  Plan of Care Reviewed With: patient  Note:   SLP evaluation completed. Not yet safe to consider PO intake. Will continue to address dysphagia. Please see note for further details and recommendations.

## 2020-05-02 NOTE — PLAN OF CARE
Problem: Patient Care Overview  Goal: Plan of Care Review  Outcome: Ongoing (interventions implemented as appropriate)  Flowsheets  Taken 5/2/2020 0732  Progress: improving  Taken 5/2/2020 0400  Plan of Care Reviewed With: patient  Note:   Pt s/p extubation. Pt spo2 >92 on 2L NC. Pt's was tachypnic with RR between 20-26 during the night. PT CIWA scores got up to 15 and 17 last night for which PRN ativan was given. Pt was uncooperative with most aspects of his care. PT continues to have dilutions of others being in the room. Pt's MAG replaced this AM. Pt is on Precedex gtt at 1.1 mg. WCTM.

## 2020-05-02 NOTE — PLAN OF CARE
Problem: Patient Care Overview  Goal: Plan of Care Review  Outcome: Ongoing (interventions implemented as appropriate)  Flowsheets (Taken 5/2/2020 1501)  Progress: improving  Plan of Care Reviewed With: patient  Outcome Summary: PT re-evaluation completed; pt continues to demonstrate significant weakness and decreased indep re: functional mobility warranting further skilled PT services to promote PLOF. Limited today by fatigue and cognitive status; max x2 A for bed mobility and sitting balance. Goals addressed to reflect current functional status. Recommend IP rehab at d/c.

## 2020-05-02 NOTE — PLAN OF CARE
Problem: Patient Care Overview  Goal: Plan of Care Review  Outcome: Ongoing (interventions implemented as appropriate)  Flowsheets (Taken 5/2/2020 9841)  Progress: improving  Plan of Care Reviewed With: patient  Outcome Summary: OT completed a brief chart review. Pt Max Ax2 for bed mobility, limited d/t confusion and lethargy. Recommend cont skilled IPOT POC. Recommend pt DC to IP rehab.

## 2020-05-02 NOTE — PROGRESS NOTES
"GI Daily Progress Note  Subjective     Bob Guevara is a 24 y.o. male who was admitted with Acute GI bleeding.   Now extubated.  Confused     Chief Complaint:  GIB    Objective     /55   Pulse 97   Temp 98.9 °F (37.2 °C) (Axillary)   Resp 22   Ht 172.7 cm (68\")   Wt 79.8 kg (175 lb 14.8 oz)   SpO2 93%   BMI 26.75 kg/m²     Intake/Output last 3 shifts:  I/O last 3 completed shifts:  In: 3725.1 [I.V.:825.1; Other:597; NG/GT:2303]  Out: 1935 [Urine:1935]  Intake/Output this shift:  I/O this shift:  In: -   Out: 570 [Urine:570]      Physical Exam  Wt Readings from Last 3 Encounters:   05/02/20 79.8 kg (175 lb 14.8 oz)   ,body mass index is 26.75 kg/m².,@FLOWAMB(6)@,@FLOWAMB(5)@,@FLOWAMB(8)@   CONSTITUTIONAL:lying in bed  Resp CTA; no rhonchi, rales, or wheezes.  Respiration effort normal  CV RRR; no M/R/G. 1-2 lower extremity edema  GI Abd soft, tender left upper quadrant, ND, normal active bowel sounds.  FMS in place  Psych: Awake and alert but confused    DATA:Results for BOB GUEVARA (MRN [id number]) as of 5/2/2020 12:35   Ref. Range 5/2/2020 04:42   eGFR Non  Am Latest Ref Range: >60 mL/min/1.73 >150   Alkaline Phosphatase Latest Ref Range: 39 - 117 U/L 180 (H)   Total Protein Latest Ref Range: 6.0 - 8.5 g/dL 5.2 (L)   ALT (SGPT) Latest Ref Range: 1 - 41 U/L 126 (H)   AST (SGOT) Latest Ref Range: 1 - 40 U/L 148 (H)   Total Bilirubin Latest Ref Range: 0.2 - 1.2 mg/dL 4.8 (H)   Albumin Latest Ref Range: 3.50 - 5.20 g/dL 2.20 (L)   Results for BOB GUEVARA (MRN [id number]) as of 5/2/2020 12:35   Ref. Range 4/23/2020 21:46 4/24/2020 09:04 4/25/2020 07:17 4/29/2020 05:11 5/2/2020 04:42   Protime Latest Ref Range: 11.5 - 14.0 Seconds 21.3 (H) 22.1 (H) 22.3 (H) 22.2 (H) 21.5 (H)   INR Latest Ref Range: 0.85 - 1.16  1.88 (H) 1.97 (H) 1.99 (H) 1.98 (H) 1.91 (H)   Results for BOB GUEVARA (MRN [id number]) as of 5/2/2020 12:35   Ref. Range 4/30/2020 11:09   Color, Fluid Unknown Red "   Appearance, Fluid Latest Ref Range: Clear  Bloody (A)   WBC, Fluid Latest Units: /mm3 1,018   RBC, Fluid Latest Ref Range:   /mm3 201,000   Neutrophils, Fluid Latest Units: % 77   Lymphocytes, Fluid Latest Units: % 12   Monocytes, Fluid Latest Units: % 6   Macrophage, Fluid Latest Units: % 1   Mesothelial Cells, Fluid Latest Units: % 4       Assessment/Plan       1.  Acute alcoholic hepatitis  2.  Acute variceal bleed, status post TIPS  3.  BORIS  4.  Hepatic encephalopathy  5.  Alcohol withdrawal  6.  Nutrition  7. SBP      Will add Levaquin for SBP.  Allergy to PCN  Alcoholic hepatitis appears to be improving.  No further bleeding post TIPS.  Ascites appears improved as well.  Hopefully his mental status will improve now that he is off the ventilator and hopefully his withdrawal symptoms will improve as well. 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

## 2020-05-03 ENCOUNTER — APPOINTMENT (OUTPATIENT)
Dept: GENERAL RADIOLOGY | Facility: HOSPITAL | Age: 25
End: 2020-05-03

## 2020-05-03 LAB
ABO GROUP BLD: NORMAL
AMMONIA BLD-SCNC: 62 UMOL/L (ref 16–60)
ANION GAP SERPL CALCULATED.3IONS-SCNC: 9 MMOL/L (ref 5–15)
ARTERIAL PATENCY WRIST A: ABNORMAL
ATMOSPHERIC PRESS: ABNORMAL MM[HG]
BASE EXCESS BLDA CALC-SCNC: -0.8 MMOL/L (ref 0–2)
BDY SITE: ABNORMAL
BLD GP AB SCN SERPL QL: NEGATIVE
BODY TEMPERATURE: 37 C
BUN BLD-MCNC: 28 MG/DL (ref 6–20)
BUN/CREAT SERPL: 49.1 (ref 7–25)
CALCIUM SPEC-SCNC: 7.3 MG/DL (ref 8.6–10.5)
CHLORIDE SERPL-SCNC: 119 MMOL/L (ref 98–107)
CO2 BLDA-SCNC: 21.7 MMOL/L (ref 22–33)
CO2 SERPL-SCNC: 21 MMOL/L (ref 22–29)
COHGB MFR BLD: 1.9 % (ref 0–2)
CREAT BLD-MCNC: 0.57 MG/DL (ref 0.76–1.27)
D-LACTATE SERPL-SCNC: 2 MMOL/L (ref 0.5–2)
EPAP: 0
GFR SERPL CREATININE-BSD FRML MDRD: >150 ML/MIN/1.73
GLUCOSE BLD-MCNC: 254 MG/DL (ref 65–99)
GLUCOSE BLDC GLUCOMTR-MCNC: 202 MG/DL (ref 70–130)
GLUCOSE BLDC GLUCOMTR-MCNC: 217 MG/DL (ref 70–130)
GLUCOSE BLDC GLUCOMTR-MCNC: 220 MG/DL (ref 70–130)
GLUCOSE BLDC GLUCOMTR-MCNC: 222 MG/DL (ref 70–130)
GLUCOSE BLDC GLUCOMTR-MCNC: 275 MG/DL (ref 70–130)
HCO3 BLDA-SCNC: 21 MMOL/L (ref 20–26)
HCT VFR BLD AUTO: 22.1 % (ref 37.5–51)
HCT VFR BLD AUTO: 27.3 % (ref 37.5–51)
HCT VFR BLD CALC: 22 %
HGB BLD-MCNC: 6.9 G/DL (ref 13–17.7)
HGB BLD-MCNC: 9.2 G/DL (ref 13–17.7)
HGB BLDA-MCNC: 7.2 G/DL (ref 13.5–17.5)
HOROWITZ INDEX BLD+IHG-RTO: 60 %
INR PPP: 1.85 (ref 0.85–1.16)
IPAP: 0
MAGNESIUM SERPL-MCNC: 2 MG/DL (ref 1.6–2.6)
METHGB BLD QL: 0.5 % (ref 0–1.5)
MODALITY: ABNORMAL
NOTE: ABNORMAL
OXYHGB MFR BLDV: 98.5 % (ref 94–99)
PAW @ PEAK INSP FLOW SETTING VENT: 0 CMH2O
PCO2 BLDA: 23.4 MM HG (ref 35–45)
PCO2 TEMP ADJ BLD: 23.4 MM HG (ref 35–48)
PH BLDA: 7.56 PH UNITS (ref 7.35–7.45)
PH, TEMP CORRECTED: 7.56 PH UNITS
PHOSPHATE SERPL-MCNC: 2.3 MG/DL (ref 2.5–4.5)
PO2 BLDA: 188 MM HG (ref 83–108)
PO2 TEMP ADJ BLD: 188 MM HG (ref 83–108)
POTASSIUM BLD-SCNC: 4.4 MMOL/L (ref 3.5–5.2)
PROCALCITONIN SERPL-MCNC: 0.83 NG/ML (ref 0.1–0.25)
PROTHROMBIN TIME: 21 SECONDS (ref 11.5–14)
RH BLD: POSITIVE
SODIUM BLD-SCNC: 149 MMOL/L (ref 136–145)
T&S EXPIRATION DATE: NORMAL
TOTAL RATE: 0 BREATHS/MINUTE

## 2020-05-03 PROCEDURE — 63710000001 INSULIN REGULAR HUMAN PER 5 UNITS: Performed by: INTERNAL MEDICINE

## 2020-05-03 PROCEDURE — 25010000002 LORAZEPAM PER 2 MG: Performed by: INTERNAL MEDICINE

## 2020-05-03 PROCEDURE — 87040 BLOOD CULTURE FOR BACTERIA: CPT | Performed by: INTERNAL MEDICINE

## 2020-05-03 PROCEDURE — 94660 CPAP INITIATION&MGMT: CPT

## 2020-05-03 PROCEDURE — 86900 BLOOD TYPING SEROLOGIC ABO: CPT

## 2020-05-03 PROCEDURE — P9016 RBC LEUKOCYTES REDUCED: HCPCS

## 2020-05-03 PROCEDURE — 85018 HEMOGLOBIN: CPT | Performed by: NURSE PRACTITIONER

## 2020-05-03 PROCEDURE — 85610 PROTHROMBIN TIME: CPT | Performed by: INTERNAL MEDICINE

## 2020-05-03 PROCEDURE — 63710000001 INSULIN DETEMIR PER 5 UNITS: Performed by: INTERNAL MEDICINE

## 2020-05-03 PROCEDURE — 84100 ASSAY OF PHOSPHORUS: CPT | Performed by: INTERNAL MEDICINE

## 2020-05-03 PROCEDURE — 25010000002 LEVOFLOXACIN PER 250 MG: Performed by: INTERNAL MEDICINE

## 2020-05-03 PROCEDURE — 99291 CRITICAL CARE FIRST HOUR: CPT | Performed by: INTERNAL MEDICINE

## 2020-05-03 PROCEDURE — 86850 RBC ANTIBODY SCREEN: CPT | Performed by: NURSE PRACTITIONER

## 2020-05-03 PROCEDURE — 86900 BLOOD TYPING SEROLOGIC ABO: CPT | Performed by: NURSE PRACTITIONER

## 2020-05-03 PROCEDURE — 86923 COMPATIBILITY TEST ELECTRIC: CPT

## 2020-05-03 PROCEDURE — 82140 ASSAY OF AMMONIA: CPT | Performed by: INTERNAL MEDICINE

## 2020-05-03 PROCEDURE — 83735 ASSAY OF MAGNESIUM: CPT | Performed by: INTERNAL MEDICINE

## 2020-05-03 PROCEDURE — 71045 X-RAY EXAM CHEST 1 VIEW: CPT

## 2020-05-03 PROCEDURE — 36430 TRANSFUSION BLD/BLD COMPNT: CPT

## 2020-05-03 PROCEDURE — 94799 UNLISTED PULMONARY SVC/PX: CPT

## 2020-05-03 PROCEDURE — 99232 SBSQ HOSP IP/OBS MODERATE 35: CPT | Performed by: INTERNAL MEDICINE

## 2020-05-03 PROCEDURE — 84145 PROCALCITONIN (PCT): CPT | Performed by: INTERNAL MEDICINE

## 2020-05-03 PROCEDURE — 85014 HEMATOCRIT: CPT | Performed by: INTERNAL MEDICINE

## 2020-05-03 PROCEDURE — 82962 GLUCOSE BLOOD TEST: CPT

## 2020-05-03 PROCEDURE — 85018 HEMOGLOBIN: CPT | Performed by: INTERNAL MEDICINE

## 2020-05-03 PROCEDURE — 85014 HEMATOCRIT: CPT | Performed by: NURSE PRACTITIONER

## 2020-05-03 PROCEDURE — 86901 BLOOD TYPING SEROLOGIC RH(D): CPT | Performed by: NURSE PRACTITIONER

## 2020-05-03 PROCEDURE — 83605 ASSAY OF LACTIC ACID: CPT | Performed by: INTERNAL MEDICINE

## 2020-05-03 PROCEDURE — 36600 WITHDRAWAL OF ARTERIAL BLOOD: CPT

## 2020-05-03 PROCEDURE — 80048 BASIC METABOLIC PNL TOTAL CA: CPT | Performed by: INTERNAL MEDICINE

## 2020-05-03 PROCEDURE — 82805 BLOOD GASES W/O2 SATURATION: CPT

## 2020-05-03 RX ORDER — LORAZEPAM 2 MG/ML
2 INJECTION INTRAMUSCULAR ONCE
Status: COMPLETED | OUTPATIENT
Start: 2020-05-03 | End: 2020-05-03

## 2020-05-03 RX ORDER — ACETAMINOPHEN 325 MG/1
650 TABLET ORAL EVERY 8 HOURS PRN
Status: DISCONTINUED | OUTPATIENT
Start: 2020-05-03 | End: 2020-05-13 | Stop reason: HOSPADM

## 2020-05-03 RX ADMIN — PANTOPRAZOLE SODIUM 40 MG: 40 INJECTION, POWDER, FOR SOLUTION INTRAVENOUS at 06:12

## 2020-05-03 RX ADMIN — DEXMEDETOMIDINE HYDROCHLORIDE 1.5 MCG/KG/HR: 4 INJECTION, SOLUTION INTRAVENOUS at 19:14

## 2020-05-03 RX ADMIN — INSULIN HUMAN 4 UNITS: 100 INJECTION, SOLUTION PARENTERAL at 06:12

## 2020-05-03 RX ADMIN — INSULIN HUMAN 3 UNITS: 100 INJECTION, SOLUTION PARENTERAL at 13:05

## 2020-05-03 RX ADMIN — INSULIN DETEMIR 10 UNITS: 100 INJECTION, SOLUTION SUBCUTANEOUS at 08:39

## 2020-05-03 RX ADMIN — DEXMEDETOMIDINE HYDROCHLORIDE 1.2 MCG/KG/HR: 4 INJECTION, SOLUTION INTRAVENOUS at 11:44

## 2020-05-03 RX ADMIN — Medication 30 ML: at 21:12

## 2020-05-03 RX ADMIN — INSULIN HUMAN 5 UNITS: 100 INJECTION, SOLUTION PARENTERAL at 18:02

## 2020-05-03 RX ADMIN — INSULIN HUMAN 4 UNITS: 100 INJECTION, SOLUTION PARENTERAL at 00:12

## 2020-05-03 RX ADMIN — Medication 30 ML: at 08:08

## 2020-05-03 RX ADMIN — RIFAXIMIN 550 MG: 550 TABLET ORAL at 08:08

## 2020-05-03 RX ADMIN — INSULIN HUMAN 4 UNITS: 100 INJECTION, SOLUTION PARENTERAL at 13:05

## 2020-05-03 RX ADMIN — RIFAXIMIN 550 MG: 550 TABLET ORAL at 21:12

## 2020-05-03 RX ADMIN — FUROSEMIDE 40 MG: 40 TABLET ORAL at 08:08

## 2020-05-03 RX ADMIN — ACETAMINOPHEN 650 MG: 325 TABLET, FILM COATED ORAL at 14:03

## 2020-05-03 RX ADMIN — FUROSEMIDE 40 MG: 40 TABLET ORAL at 18:01

## 2020-05-03 RX ADMIN — INSULIN HUMAN 3 UNITS: 100 INJECTION, SOLUTION PARENTERAL at 06:12

## 2020-05-03 RX ADMIN — DEXMEDETOMIDINE HYDROCHLORIDE 1.3 MCG/KG/HR: 4 INJECTION, SOLUTION INTRAVENOUS at 22:29

## 2020-05-03 RX ADMIN — DEXMEDETOMIDINE HYDROCHLORIDE 1.4 MCG/KG/HR: 4 INJECTION, SOLUTION INTRAVENOUS at 04:19

## 2020-05-03 RX ADMIN — INSULIN HUMAN 4 UNITS: 100 INJECTION, SOLUTION PARENTERAL at 00:11

## 2020-05-03 RX ADMIN — LORAZEPAM 2 MG: 2 INJECTION INTRAMUSCULAR; INTRAVENOUS at 02:51

## 2020-05-03 RX ADMIN — LEVOFLOXACIN 500 MG: 5 INJECTION, SOLUTION INTRAVENOUS at 14:03

## 2020-05-03 RX ADMIN — LORAZEPAM 2 MG: 2 INJECTION INTRAMUSCULAR; INTRAVENOUS at 01:44

## 2020-05-03 RX ADMIN — INSULIN HUMAN 3 UNITS: 100 INJECTION, SOLUTION PARENTERAL at 18:01

## 2020-05-03 RX ADMIN — LORAZEPAM 2 MG: 2 INJECTION INTRAMUSCULAR; INTRAVENOUS at 19:01

## 2020-05-03 RX ADMIN — DEXMEDETOMIDINE HYDROCHLORIDE 1.4 MCG/KG/HR: 4 INJECTION, SOLUTION INTRAVENOUS at 08:05

## 2020-05-03 RX ADMIN — SODIUM CHLORIDE, PRESERVATIVE FREE 10 ML: 5 INJECTION INTRAVENOUS at 08:07

## 2020-05-03 RX ADMIN — DEXMEDETOMIDINE HYDROCHLORIDE 1.4 MCG/KG/HR: 4 INJECTION, SOLUTION INTRAVENOUS at 00:49

## 2020-05-03 RX ADMIN — SPIRONOLACTONE 25 MG: 25 TABLET ORAL at 08:07

## 2020-05-03 RX ADMIN — LORAZEPAM 2 MG: 2 INJECTION INTRAMUSCULAR; INTRAVENOUS at 00:31

## 2020-05-03 NOTE — PROGRESS NOTES
"GI Daily Progress Note  Subjective     Bob Guevara is a 24 y.o. male who was admitted with Acute GI bleeding.  Has had increased aggitaoin and now back with some restraints and sedation.      Chief Complaint:  GIB    Objective     /48   Pulse 92   Temp (!) 101.5 °F (38.6 °C) (Axillary)   Resp 28   Ht 172.7 cm (68\")   Wt 80.3 kg (177 lb 0.5 oz)   SpO2 100%   BMI 26.92 kg/m²     Intake/Output last 3 shifts:  I/O last 3 completed shifts:  In: 5004.4 [I.V.:1737.4; Other:668; NG/GT:2599]  Out: 2085 [Urine:2085]  Intake/Output this shift:  I/O this shift:  In: 1088 [I.V.:175; Blood:331; Other:60; NG/GT:522]  Out: 525 [Urine:525]      Physical Exam  Wt Readings from Last 3 Encounters:   05/03/20 80.3 kg (177 lb 0.5 oz)   ,body mass index is 26.92 kg/m².,@FLOWAMB(6)@,@FLOWAMB(5)@,@FLOWAMB(8)@   CONSTITUTIONAL:lying in bed. Non verbal  Resp CTA; no rhonchi, rales, or wheezes.  Respiration effort normal  CV RRR; no M/R/G.  lower extremity edema  GI Abd soft, tender to palpation, ND, normal active bowel sounds.    Psych: Awake but does not answer questions.     DATA:Results for BOB GUEVARA (MRN [id number]) as of 5/3/2020 16:50   Ref. Range 4/29/2020 23:56 4/30/2020 05:17 5/1/2020 04:21 5/3/2020 02:16 5/3/2020 10:48   Hemoglobin Latest Ref Range: 13.0 - 17.7 g/dL 7.5 (L) 7.5 (L) 7.3 (L) 6.9 (C) 9.2 (L)   Hematocrit Latest Ref Range: 37.5 - 51.0 % 23.0 (L) 22.9 (L) 23.1 (L) 22.1 (L) 27.3 (L)   Results for BOB GUEVARA (MRN [id number]) as of 5/3/2020 16:50   Ref. Range 4/29/2020 05:11 4/30/2020 05:17 5/1/2020 04:21 5/2/2020 04:42 5/3/2020 02:16   BUN Latest Ref Range: 6 - 20 mg/dL 35 (H) 49 (H) 36 (H) 28 (H) 28 (H)   Results for BOB GUEVARA (MRN [id number]) as of 5/3/2020 16:50   Ref. Range 5/3/2020 14:21   Ammonia Latest Ref Range: 16 - 60 umol/L 62 (H)       Assessment/Plan   1.  Acute alcoholic hepatitis  2.  Acute variceal bleed, status post TIPS  3.  BORIS  4.  Hepatic encephalopathy  5.  " Alcohol withdrawal  6.  Nutrition  7. SBP      Hg dropped but BUN does not indicate further bleeding. Still with aggitation and withdrawal and elevated ammonia  Continue ABX        Acute variceal GI bleeding    Acute alcoholic hepatitis    BORIS (acute kidney injury) (CMS/HCC)    Bandemia    Acute upper GI bleeding    Sepsis with acute renal failure without septic shock (CMS/HCC)    Acute respiratory failure with hypoxia (CMS/HCC)    Alcohol withdrawal delirium (CMS/HCC)    Hepatic encephalopathy (CMS/HCC)    Ascites due to alcoholic hepatitis    Pleural effusion on left    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       LOS: 10 days     Kaz Arellano MD  05/03/20  16:54

## 2020-05-03 NOTE — PROGRESS NOTES
Pulmonary/Critical Care Follow-up     LOS: 10 days   Patient Care Team:  Provider, No Known as PCP - General        Chief Complaint   Patient presents with   • Abdominal Pain     Subjective      History of hospitalization (as of 5/1/2020 progress note):    Mr. Varma is a 25yo M with a history of ETOH abuse who presented to the [facility] ED with hematemesis on 4/23/20. He was recently admitted to the hospital in [place] and left AMA. He was then seen in the [place] ED and was diagnosed with possible cirrhosis vs hepatitis. He was not admitted there. He needed 6L at the time of admission and has progressed to Bipap overnight. He has acute renal failure as well. His MELD score is 32 at the time of admission which portends a 52% mortality at 3 months. His discriminate function is also elevated at 41.9 consistent with acute alcoholic hepatitis.   He underwent an EGD on 4/24/2020 showing esophageal varices, with stigmata of recent bleed.    (End of copied text).    Patient was extubated on 5/1/2020.    Interval History:   Patient required restraints overnight.  He was transfused a unit of blood this morning secondary to decline in hemoglobin but has no definite active bleeding currently.  He did develop some hypoxemia as well as tachypnea and was placed on BiPAP.  Patient is currently arousable but drowsy.  Precedex is being weaned.    History taken from: Staff, chart.    PMH/FH/Social History were reviewed and updated appropriately in the electronic medical record.     Review of Systems:    Review of 14 systems was completed with positives and pertinent negatives noted in the subjective section.  All other systems reviewed and are negative.   Exceptions are noted below:    Unable to obtain secondary to confusion.    Objective     Vital Signs  Temp:  [98.2 °F (36.8 °C)-101.5 °F (38.6 °C)] 101.5 °F (38.6 °C)  Heart Rate:  [] 91  Resp:  [15-35] 15  BP: (102-118)/(53-66) 111/63  05/02 0701 - 05/03 0700  In: 3568.4  [I.V.:1461.4]  Out: 1625 [Urine:1625]  Body mass index is 26.92 kg/m².     IV drips:    dexmedetomidine Last Rate: 0.8 mcg/kg/hr (05/03/20 1301)   dextrose Last Rate: Stopped (05/03/20 0419)      Physical Exam:     Constitutional:    Drowsy on BiPAP, cooperative, in no acute distress   Head:   Normocephalic, without obvious abnormality, atraumatic   Eyes:           Lids and lashes normal, anicteric sclera, no pallor, corneas clear, PER   ENMT:  Ears appear intact with no abnormalities noted     No oral lesions, no thrush, oral mucosa moist   Neck:  No adenopathy, supple, trachea midline, no thyromegaly, no JVD   Lungs/Resp:    Normal effort, symmetric chest rise, no crepitus, diminished breath sounds at bases bilaterally, no chest wall tenderness                Heart/CV:   Regular rhythm and normal rate, normal S1 and S2, no            murmur   Abdomen/GI:    Normal bowel sounds, no masses, no organomegaly, soft, moderately distended, non-tender   :    Deferred   Extremities/MSK:  No clubbing or cyanosis.  2+ bilateral lower extremity edema.  Normal tone.    No deformities.    Pulses:  Pulses palpable and equal bilaterally   Skin:  No bleeding, bruising or rash.  Jaundiced.   Heme/Lymph:  No cervical or supraclavicular adenopathy.   Neurologic:    Psychiatric:    Moves all extremities with no obvious focal motor deficit.  Cranial nerves 2 - 12 grossly intact  C drowsy but arousable, occasionally mildly agitated.    The above physical exam findings were reviewed and reflect exam findings as of today's exam.   Jacky Riojas MD 05/03/20 13:05      Results Review:     I reviewed the patient's new clinical results.   Results from last 7 days   Lab Units 05/03/20  0216 05/02/20  0442 05/01/20  0421  04/29/20  0511  04/27/20  0424   SODIUM mmol/L 149* 149* 147*   < > 146*   < > 143   POTASSIUM mmol/L 4.4 4.6 4.3   < > 3.9   < > 3.8   CHLORIDE mmol/L 119* 119* 118*   < > 117*   < > 110*   CO2 mmol/L 21.0* 21.0* 17.0*    < > 19.0*   < > 21.0*   BUN mg/dL 28* 28* 36*   < > 35*   < > 54*   CREATININE mg/dL 0.57* 0.53* 0.43*   < > 0.46*   < > 0.88   CALCIUM mg/dL 7.3* 7.8* 7.6*   < > 7.7*   < > 7.6*   BILIRUBIN mg/dL  --  4.8*  --   --  7.1*  --  8.2*   ALK PHOS U/L  --  180*  --   --  126*  --  135*   ALT (SGPT) U/L  --  126*  --   --  112*  --  105*   AST (SGOT) U/L  --  148*  --   --  221*  --  250*   GLUCOSE mg/dL 254* 256* 281*   < > 132*   < > 148*    < > = values in this interval not displayed.     Results from last 7 days   Lab Units 05/03/20  1048 05/03/20  0216 05/01/20  0421 04/30/20  0517  04/29/20  0511   WBC 10*3/mm3  --   --  29.16* 29.97*  --  26.10*   HEMOGLOBIN g/dL 9.2* 6.9* 7.3* 7.5*   < > 8.8*   HEMATOCRIT % 27.3* 22.1* 23.1* 22.9*   < > 26.7*   PLATELETS 10*3/mm3  --   --  256 275  --  254    < > = values in this interval not displayed.     Results from last 7 days   Lab Units 05/03/20  0115   PH, ARTERIAL pH units 7.561*   PO2 ART mm Hg 188.0*   PCO2, ARTERIAL mm Hg 23.4*   HCO3 ART mmol/L 21.0     Results from last 7 days   Lab Units 05/03/20  0216 05/02/20  0442 05/01/20  0421   MAGNESIUM mg/dL 2.0 1.9 2.1   PHOSPHORUS mg/dL 2.3* 2.8 3.2       I reviewed the patient's new imaging including images and reports.    Chest x-ray from 5/3/2020 shows cardiomegaly and bilateral basilar densities concerning for posteriorly layering effusions.  The radiologist's impression follows:    IMPRESSION:     1. Interval extubation.  2. Persistent bilateral infiltrates with slight worsening of bilateral pleural effusions.  3. Stable cardiac enlargement.    Medication Review:     enoxaparin 40 mg Subcutaneous Q24H   furosemide 40 mg Nasogastric BID   insulin detemir 10 Units Subcutaneous Daily   insulin regular 0-7 Units Subcutaneous Q6H   insulin regular 4 Units Subcutaneous Q6H   levoFLOXacin 500 mg Intravenous Q24H   pantoprazole 40 mg Intravenous Q AM   PRO-STAT 30 mL Nasogastric BID   rifAXIMin 550 mg Nasogastric Q12H    spironolactone 25 mg Nasogastric Daily       dexmedetomidine 0.2-1.5 mcg/kg/hr Last Rate: 0.8 mcg/kg/hr (05/03/20 2006)   dextrose 50 mL/hr Last Rate: Stopped (05/03/20 3092)       Assessment/Plan       Acute variceal GI bleeding    Acute alcoholic hepatitis    BORIS (acute kidney injury) (CMS/HCC)    Bandemia    Acute upper GI bleeding    Sepsis with acute renal failure without septic shock (CMS/HCC)    Acute respiratory failure with hypoxia (CMS/HCC)    Alcohol withdrawal delirium (CMS/HCC)    Hepatic encephalopathy (CMS/HCC)    Ascites due to alcoholic hepatitis    Pleural effusion on left    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    24 y.o. male with history of alcohol abuse who presented to [facility] emergency department on 4/23/2020 with hematemesis.  He was recently admitted to [facility] and left AGAINST MEDICAL ADVICE.  He was subsequently seen in [place] emergency department and diagnosed with cirrhosis versus hepatitis but was not admitted to the hospital.    The patient underwent EGD on 4/24/2020 which showed esophageal varices with stigmata of recent bleeding.  He underwent paracentesis of 5 L on 4/24/2020 and had no evidence of spontaneous bacterial peritonitis.  The patient subsequently underwent TIPS procedure by Dr. Walker on 4/29/2020.    The patient had acute renal failure on admission which has gradually improved.  His admission MELD score was 32 which portends a 52% mortality at 3 months.  His discriminate function was elevated at 41.9 consistent with acute alcoholic hepatitis.    The patient required intubation after his EGD for respiratory failure and was eventually extubated on 4/27/2020.  He was reintubated for TIPS procedure on 4/29/2020 and was extubated on 5/1/2020.  A second large volume paracentesis was done on 4/30/2020 with 2.5 L removed.    The patient completed 7 days of aztreonam for empiric coverage of possible  infection on 4/30/2020.  Repeat paracentesis was suggestive of SBP and he was started on a course of Levaquin on 5/2/2020.    The patient is critically ill secondary to ongoing encephalopathy and tenuous respiratory and gastrointestinal status and has high risk of life-threatening declining condition.  He requires continuous monitoring and frequent reassessment for consideration of adjustment in management to minimize this risk.  I checked on him multiple times today.    Plan:    1.  Speech following.  2.  Monitor respiratory status postextubation.  3.  Continue rifaximin and lactulose for hepatic encephalopathy.  4.  Continue thiamine and folic acid.  5.  Continue spironolactone 25 mg daily.  Continue Lasix 40 mg twice daily.   6.  Previously discontinued scheduled Valium.  7.  Continue tube feeding.  8.  Monitor sodium.  Increase free water.  9.  Monitor renal function and electrolytes.  10.  Precedex drip as needed.  11.  Ativan withdrawal protocol.  12.  Increase regular insulin to 5 units every 6 hours and continue Levemir and correction insulin.  13.  Lovenox for DVT prophylaxis.  14.  Continue proton pump inhibitor.  15.  BiPAP as needed.  Hopefully can be weaned off.   16.  Hold Lovenox for now given drop in hemoglobin and hematocrit and use SCDs.  17.  Might consider CT scan or ultrasound evaluation of the chest with possible chest tube placement if large effusions if patient has persistent respiratory issues despite diuresis.  18.  Check blood cultures x2, pro calcitonin, repeat INR, ammonia level.    I updated the patient's father Fortino by phone.    Jacky Riojas MD  05/03/20  13:05    Critical care time : 40 minutes spent by me personally.(This excludes time spent performing separately reportable procedures and services). including high complexity decision making to assess, manipulate, and support vital organ system failure in this individual who has impairment of one or more vital organ systems  such that there is a high probability of imminent or life threatening deterioration in the patient’s condition.    *. Please note that portions of this note were completed with [employer] - a voice recognition program.

## 2020-05-03 NOTE — SIGNIFICANT NOTE
05/03/20 1100   SLP Deferred Reason   SLP Deferred Reason   (Pt was not alert/appropriate for dysphagia re-eval this AM. Will check back for readiness.)

## 2020-05-03 NOTE — NURSING NOTE
Restraints initiated for BUE. Father notified. Patient reoriented to surroundings and updated on plan of care. Will continue to monitor.

## 2020-05-03 NOTE — PLAN OF CARE
Pt requiring BIPAP this am. Tried to wean off around noon and pt was nasal flaring and tachypneic and requesting bipap back on. However sats remained around  96%. Weaned to 4 L aroung 2 and pt tolerated. Temp max 101.5. BC x 2 drawn. Pt cooperative throughout the day despite having some hallucinations. No Ativan needed. Weaned precedex to 0.4. Holding Lovenox due to low H&H requiring 1 U PRBC today and potential for drainage of pleural effusion tomorrow. Repeat H&H 9.2/27.3. Increased free water to 40ml hour and monitor na. Overall pt has improved throughout the day.

## 2020-05-03 NOTE — PLAN OF CARE
Problem: Patient Care Overview  Goal: Plan of Care Review  Outcome: Ongoing (interventions implemented as appropriate)  Flowsheets (Taken 5/3/2020 0400)  Progress: no change  Note:   VSS. Afebrile. Patient required the re-initiation of BUE restraints due to combative, aggressive,and uncooperative behavior. CIWA score 8-15.  Ativan given per orders. Patient oxygen saturation decreased to 88% and did not rebound quickly. His respiration rate also increased to 40 BPM. ABG and Bipap ordered. Patient's oxygen saturation improved to 90's and respiratory rate fluctuating 27-35 BPM. Patient also required 1U PRBC's this shift for Hgb of 6.9. Father updated on status and plan of care. Patient appears in no apparent distress. Will continue to monitor.

## 2020-05-03 NOTE — NURSING NOTE
Patient attempting to pull otto catheter out, is combative, uncooperative and yelling profanities at nursing staff. APRN notified. See new orders. Will continue to monitor.

## 2020-05-04 ENCOUNTER — APPOINTMENT (OUTPATIENT)
Dept: CT IMAGING | Facility: HOSPITAL | Age: 25
End: 2020-05-04

## 2020-05-04 LAB
ALBUMIN SERPL-MCNC: 2.2 G/DL (ref 3.5–5.2)
ALP SERPL-CCNC: 199 U/L (ref 39–117)
ALT SERPL W P-5'-P-CCNC: 89 U/L (ref 1–41)
ANION GAP SERPL CALCULATED.3IONS-SCNC: 10 MMOL/L (ref 5–15)
AST SERPL-CCNC: 93 U/L (ref 1–40)
BASOPHILS # BLD AUTO: 0.11 10*3/MM3 (ref 0–0.2)
BASOPHILS NFR BLD AUTO: 0.3 % (ref 0–1.5)
BH BB BLOOD EXPIRATION DATE: NORMAL
BH BB BLOOD TYPE BARCODE: 6200
BH BB DISPENSE STATUS: NORMAL
BH BB PRODUCT CODE: NORMAL
BH BB UNIT NUMBER: NORMAL
BILIRUB CONJ SERPL-MCNC: 3.3 MG/DL (ref 0.2–0.3)
BILIRUB INDIRECT SERPL-MCNC: 2.5 MG/DL
BILIRUB SERPL-MCNC: 5.8 MG/DL (ref 0.2–1.2)
BUN BLD-MCNC: 32 MG/DL (ref 6–20)
BUN/CREAT SERPL: 52.5 (ref 7–25)
CALCIUM SPEC-SCNC: 7.6 MG/DL (ref 8.6–10.5)
CHLORIDE SERPL-SCNC: 115 MMOL/L (ref 98–107)
CO2 SERPL-SCNC: 22 MMOL/L (ref 22–29)
CREAT BLD-MCNC: 0.61 MG/DL (ref 0.76–1.27)
CROSSMATCH INTERPRETATION: NORMAL
CRP SERPL-MCNC: 4.02 MG/DL (ref 0–0.5)
DEPRECATED RDW RBC AUTO: 90.4 FL (ref 37–54)
EOSINOPHIL # BLD AUTO: 0.42 10*3/MM3 (ref 0–0.4)
EOSINOPHIL NFR BLD AUTO: 1.3 % (ref 0.3–6.2)
ERYTHROCYTE [DISTWIDTH] IN BLOOD BY AUTOMATED COUNT: 23.7 % (ref 12.3–15.4)
GFR SERPL CREATININE-BSD FRML MDRD: >150 ML/MIN/1.73
GLUCOSE BLD-MCNC: 215 MG/DL (ref 65–99)
GLUCOSE BLDC GLUCOMTR-MCNC: 144 MG/DL (ref 70–130)
GLUCOSE BLDC GLUCOMTR-MCNC: 168 MG/DL (ref 70–130)
GLUCOSE BLDC GLUCOMTR-MCNC: 182 MG/DL (ref 70–130)
GLUCOSE BLDC GLUCOMTR-MCNC: 224 MG/DL (ref 70–130)
HCT VFR BLD AUTO: 28 % (ref 37.5–51)
HGB BLD-MCNC: 9 G/DL (ref 13–17.7)
IMM GRANULOCYTES # BLD AUTO: 0.34 10*3/MM3 (ref 0–0.05)
IMM GRANULOCYTES NFR BLD AUTO: 1.1 % (ref 0–0.5)
INR PPP: 1.85 (ref 0.85–1.16)
LYMPHOCYTES # BLD AUTO: 2.69 10*3/MM3 (ref 0.7–3.1)
LYMPHOCYTES NFR BLD AUTO: 8.3 % (ref 19.6–45.3)
MAGNESIUM SERPL-MCNC: 2.1 MG/DL (ref 1.6–2.6)
MCH RBC QN AUTO: 35.2 PG (ref 26.6–33)
MCHC RBC AUTO-ENTMCNC: 32.1 G/DL (ref 31.5–35.7)
MCV RBC AUTO: 109.4 FL (ref 79–97)
MONOCYTES # BLD AUTO: 3.16 10*3/MM3 (ref 0.1–0.9)
MONOCYTES NFR BLD AUTO: 9.8 % (ref 5–12)
NEUTROPHILS # BLD AUTO: 25.61 10*3/MM3 (ref 1.7–7)
NEUTROPHILS NFR BLD AUTO: 79.2 % (ref 42.7–76)
NRBC BLD AUTO-RTO: 0 /100 WBC (ref 0–0.2)
PHOSPHATE SERPL-MCNC: 2.8 MG/DL (ref 2.5–4.5)
PLATELET # BLD AUTO: 252 10*3/MM3 (ref 140–450)
PMV BLD AUTO: 12 FL (ref 6–12)
POTASSIUM BLD-SCNC: 4.9 MMOL/L (ref 3.5–5.2)
PREALB SERPL-MCNC: 4.6 MG/DL (ref 20–40)
PROT SERPL-MCNC: 5.4 G/DL (ref 6–8.5)
PROTHROMBIN TIME: 21 SECONDS (ref 11.5–14)
RBC # BLD AUTO: 2.56 10*6/MM3 (ref 4.14–5.8)
SODIUM BLD-SCNC: 147 MMOL/L (ref 136–145)
UNIT  ABO: NORMAL
UNIT  RH: NORMAL
WBC NRBC COR # BLD: 32.33 10*3/MM3 (ref 3.4–10.8)

## 2020-05-04 PROCEDURE — 74170 CT ABD WO CNTRST FLWD CNTRST: CPT

## 2020-05-04 PROCEDURE — 94799 UNLISTED PULMONARY SVC/PX: CPT

## 2020-05-04 PROCEDURE — 99291 CRITICAL CARE FIRST HOUR: CPT | Performed by: INTERNAL MEDICINE

## 2020-05-04 PROCEDURE — 63710000001 INSULIN REGULAR HUMAN PER 5 UNITS: Performed by: INTERNAL MEDICINE

## 2020-05-04 PROCEDURE — 63710000001 INSULIN DETEMIR PER 5 UNITS: Performed by: INTERNAL MEDICINE

## 2020-05-04 PROCEDURE — 99232 SBSQ HOSP IP/OBS MODERATE 35: CPT | Performed by: INTERNAL MEDICINE

## 2020-05-04 PROCEDURE — 0W9G3ZZ DRAINAGE OF PERITONEAL CAVITY, PERCUTANEOUS APPROACH: ICD-10-PCS | Performed by: RADIOLOGY

## 2020-05-04 PROCEDURE — 94660 CPAP INITIATION&MGMT: CPT

## 2020-05-04 PROCEDURE — 25010000002 LEVOFLOXACIN PER 250 MG: Performed by: INTERNAL MEDICINE

## 2020-05-04 PROCEDURE — 25010000002 CHLOROTHIAZIDE PER 500 MG: Performed by: INTERNAL MEDICINE

## 2020-05-04 PROCEDURE — 84100 ASSAY OF PHOSPHORUS: CPT | Performed by: INTERNAL MEDICINE

## 2020-05-04 PROCEDURE — 85610 PROTHROMBIN TIME: CPT | Performed by: INTERNAL MEDICINE

## 2020-05-04 PROCEDURE — 85025 COMPLETE CBC W/AUTO DIFF WBC: CPT | Performed by: INTERNAL MEDICINE

## 2020-05-04 PROCEDURE — 25010000002 VITAMIN K1 1 MG/1 ML SOLUTION: Performed by: INTERNAL MEDICINE

## 2020-05-04 PROCEDURE — 97530 THERAPEUTIC ACTIVITIES: CPT

## 2020-05-04 PROCEDURE — 75989 ABSCESS DRAINAGE UNDER X-RAY: CPT

## 2020-05-04 PROCEDURE — 0 IOPAMIDOL PER 1 ML: Performed by: INTERNAL MEDICINE

## 2020-05-04 PROCEDURE — 25010000002 FUROSEMIDE PER 20 MG: Performed by: INTERNAL MEDICINE

## 2020-05-04 PROCEDURE — 82962 GLUCOSE BLOOD TEST: CPT

## 2020-05-04 PROCEDURE — 92610 EVALUATE SWALLOWING FUNCTION: CPT

## 2020-05-04 PROCEDURE — 84134 ASSAY OF PREALBUMIN: CPT

## 2020-05-04 PROCEDURE — 86140 C-REACTIVE PROTEIN: CPT

## 2020-05-04 PROCEDURE — 80076 HEPATIC FUNCTION PANEL: CPT | Performed by: INTERNAL MEDICINE

## 2020-05-04 PROCEDURE — 80048 BASIC METABOLIC PNL TOTAL CA: CPT | Performed by: INTERNAL MEDICINE

## 2020-05-04 PROCEDURE — 83735 ASSAY OF MAGNESIUM: CPT | Performed by: INTERNAL MEDICINE

## 2020-05-04 RX ORDER — LORAZEPAM 2 MG/ML
2 INJECTION INTRAMUSCULAR
Status: DISCONTINUED | OUTPATIENT
Start: 2020-05-04 | End: 2020-05-05

## 2020-05-04 RX ORDER — PHYTONADIONE 2 MG/ML
10 INJECTION, EMULSION INTRAMUSCULAR; INTRAVENOUS; SUBCUTANEOUS DAILY
Status: COMPLETED | OUTPATIENT
Start: 2020-05-04 | End: 2020-05-06

## 2020-05-04 RX ORDER — WATER 1000 ML/1000ML
INJECTION, SOLUTION INTRAVENOUS
Status: COMPLETED
Start: 2020-05-04 | End: 2020-05-04

## 2020-05-04 RX ORDER — MULTIVIT AND MINERALS-FERROUS GLUCONATE 9 MG IRON/15 ML ORAL LIQUID 9 MG/15 ML
15 LIQUID (ML) ORAL DAILY
Status: DISCONTINUED | OUTPATIENT
Start: 2020-05-04 | End: 2020-05-13 | Stop reason: HOSPADM

## 2020-05-04 RX ORDER — FOLIC ACID 1 MG/1
1 TABLET ORAL DAILY
Status: DISCONTINUED | OUTPATIENT
Start: 2020-05-04 | End: 2020-05-13 | Stop reason: HOSPADM

## 2020-05-04 RX ORDER — LIDOCAINE HYDROCHLORIDE 10 MG/ML
20 INJECTION, SOLUTION EPIDURAL; INFILTRATION; INTRACAUDAL; PERINEURAL ONCE
Status: COMPLETED | OUTPATIENT
Start: 2020-05-04 | End: 2020-05-04

## 2020-05-04 RX ORDER — LACTULOSE 10 G/15ML
20 SOLUTION ORAL 2 TIMES DAILY
Status: DISCONTINUED | OUTPATIENT
Start: 2020-05-04 | End: 2020-05-04

## 2020-05-04 RX ORDER — LACTULOSE 10 G/15ML
20 SOLUTION ORAL 2 TIMES DAILY
Status: DISCONTINUED | OUTPATIENT
Start: 2020-05-04 | End: 2020-05-13 | Stop reason: HOSPADM

## 2020-05-04 RX ORDER — ZINC GLUCONATE 50 MG
50 TABLET ORAL DAILY
Status: DISCONTINUED | OUTPATIENT
Start: 2020-05-04 | End: 2020-05-13 | Stop reason: HOSPADM

## 2020-05-04 RX ORDER — THIAMINE MONONITRATE (VIT B1) 100 MG
100 TABLET ORAL DAILY
Status: DISCONTINUED | OUTPATIENT
Start: 2020-05-04 | End: 2020-05-13 | Stop reason: HOSPADM

## 2020-05-04 RX ORDER — LORAZEPAM 2 MG/ML
1 INJECTION INTRAMUSCULAR
Status: DISCONTINUED | OUTPATIENT
Start: 2020-05-04 | End: 2020-05-13 | Stop reason: HOSPADM

## 2020-05-04 RX ORDER — FUROSEMIDE 10 MG/ML
40 INJECTION INTRAMUSCULAR; INTRAVENOUS EVERY 12 HOURS
Status: DISCONTINUED | OUTPATIENT
Start: 2020-05-04 | End: 2020-05-05

## 2020-05-04 RX ORDER — CHLOROTHIAZIDE SODIUM 500 MG/1
500 INJECTION INTRAVENOUS EVERY 12 HOURS
Status: COMPLETED | OUTPATIENT
Start: 2020-05-04 | End: 2020-05-04

## 2020-05-04 RX ADMIN — RIFAXIMIN 550 MG: 550 TABLET ORAL at 09:17

## 2020-05-04 RX ADMIN — DEXTROSE MONOHYDRATE 100 ML/HR: 50 INJECTION, SOLUTION INTRAVENOUS at 22:24

## 2020-05-04 RX ADMIN — DEXMEDETOMIDINE HYDROCHLORIDE 1.1 MCG/KG/HR: 4 INJECTION, SOLUTION INTRAVENOUS at 05:52

## 2020-05-04 RX ADMIN — FOLIC ACID 1 MG: 1 TABLET ORAL at 11:39

## 2020-05-04 RX ADMIN — DEXMEDETOMIDINE HYDROCHLORIDE 1 MCG/KG/HR: 4 INJECTION, SOLUTION INTRAVENOUS at 11:48

## 2020-05-04 RX ADMIN — INSULIN HUMAN 5 UNITS: 100 INJECTION, SOLUTION PARENTERAL at 13:00

## 2020-05-04 RX ADMIN — LACTULOSE 20 G: 20 SOLUTION ORAL at 11:39

## 2020-05-04 RX ADMIN — Medication 30 ML: at 09:17

## 2020-05-04 RX ADMIN — THIAMINE HCL TAB 100 MG 100 MG: 100 TAB at 11:43

## 2020-05-04 RX ADMIN — Medication 30 ML: at 20:06

## 2020-05-04 RX ADMIN — IOPAMIDOL 100 ML: 755 INJECTION, SOLUTION INTRAVENOUS at 14:00

## 2020-05-04 RX ADMIN — INSULIN HUMAN 5 UNITS: 100 INJECTION, SOLUTION PARENTERAL at 17:45

## 2020-05-04 RX ADMIN — LACTULOSE 20 G: 20 SOLUTION ORAL at 20:06

## 2020-05-04 RX ADMIN — SPIRONOLACTONE 25 MG: 25 TABLET ORAL at 09:17

## 2020-05-04 RX ADMIN — INSULIN HUMAN 5 UNITS: 100 INJECTION, SOLUTION PARENTERAL at 05:52

## 2020-05-04 RX ADMIN — DEXMEDETOMIDINE HYDROCHLORIDE 1 MCG/KG/HR: 4 INJECTION, SOLUTION INTRAVENOUS at 17:32

## 2020-05-04 RX ADMIN — DEXMEDETOMIDINE HYDROCHLORIDE 1 MCG/KG/HR: 4 INJECTION, SOLUTION INTRAVENOUS at 22:26

## 2020-05-04 RX ADMIN — Medication 50 MG: at 11:39

## 2020-05-04 RX ADMIN — INSULIN HUMAN 3 UNITS: 100 INJECTION, SOLUTION PARENTERAL at 00:02

## 2020-05-04 RX ADMIN — RIFAXIMIN 550 MG: 550 TABLET ORAL at 20:06

## 2020-05-04 RX ADMIN — DEXMEDETOMIDINE HYDROCHLORIDE 1.1 MCG/KG/HR: 4 INJECTION, SOLUTION INTRAVENOUS at 07:30

## 2020-05-04 RX ADMIN — CHLOROTHIAZIDE SODIUM 500 MG: 500 INJECTION, POWDER, LYOPHILIZED, FOR SOLUTION INTRAVENOUS at 22:24

## 2020-05-04 RX ADMIN — CHLOROTHIAZIDE SODIUM 500 MG: 500 INJECTION, POWDER, LYOPHILIZED, FOR SOLUTION INTRAVENOUS at 11:37

## 2020-05-04 RX ADMIN — MULTIVITAMIN 15 ML: LIQUID ORAL at 11:39

## 2020-05-04 RX ADMIN — INSULIN DETEMIR 5 UNITS: 100 INJECTION, SOLUTION SUBCUTANEOUS at 11:32

## 2020-05-04 RX ADMIN — FUROSEMIDE 40 MG: 10 INJECTION, SOLUTION INTRAMUSCULAR; INTRAVENOUS at 22:24

## 2020-05-04 RX ADMIN — INSULIN HUMAN 3 UNITS: 100 INJECTION, SOLUTION PARENTERAL at 13:00

## 2020-05-04 RX ADMIN — INSULIN DETEMIR 10 UNITS: 100 INJECTION, SOLUTION SUBCUTANEOUS at 09:24

## 2020-05-04 RX ADMIN — INSULIN HUMAN 2 UNITS: 100 INJECTION, SOLUTION PARENTERAL at 05:54

## 2020-05-04 RX ADMIN — ACETAMINOPHEN 650 MG: 325 TABLET, FILM COATED ORAL at 06:29

## 2020-05-04 RX ADMIN — DEXMEDETOMIDINE HYDROCHLORIDE 1.1 MCG/KG/HR: 4 INJECTION, SOLUTION INTRAVENOUS at 02:07

## 2020-05-04 RX ADMIN — PANTOPRAZOLE SODIUM 40 MG: 40 INJECTION, POWDER, FOR SOLUTION INTRAVENOUS at 05:52

## 2020-05-04 RX ADMIN — WATER: 1 INJECTION INTRAMUSCULAR; INTRAVENOUS; SUBCUTANEOUS at 22:24

## 2020-05-04 RX ADMIN — INSULIN HUMAN 5 UNITS: 100 INJECTION, SOLUTION PARENTERAL at 00:03

## 2020-05-04 RX ADMIN — PHYTONADIONE 10 MG: 1 INJECTION, EMULSION INTRAMUSCULAR; INTRAVENOUS; SUBCUTANEOUS at 11:30

## 2020-05-04 RX ADMIN — LEVOFLOXACIN 500 MG: 5 INJECTION, SOLUTION INTRAVENOUS at 16:29

## 2020-05-04 RX ADMIN — FUROSEMIDE 40 MG: 40 TABLET ORAL at 09:17

## 2020-05-04 RX ADMIN — LIDOCAINE HYDROCHLORIDE 20 ML: 10 INJECTION, SOLUTION EPIDURAL; INFILTRATION; INTRACAUDAL; PERINEURAL at 13:45

## 2020-05-04 NOTE — PROGRESS NOTES
INTENSIVIST / PULMONARY FOLLOW UP NOTE     Hospital:  LOS: 11 days   Mr. John Varma, 24 y.o. 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          SUBJECTIVE   Remains somnolent, responds to voice, plans for paracentesis today    The patient's relevant past medical, surgical, family, and social history were reviewed    Allergies and medications were reviewed    ROS:  Per subjective, all other systems were reviewed and were negative        OBJECTIVE     Vital Sign Min/Max for last 24 hours:  Temp  Min: 98.6 °F (37 °C)  Max: 101.5 °F (38.6 °C)   BP  Min: 72/37  Max: 118/63   Pulse  Min: 82  Max: 138   Resp  Min: 15  Max: 31   SpO2  Min: 88 %  Max: 100 %   No data recorded     Physical Exam:  General Appearance:  sleepy, in no acute distress  Eyes:  No scleral icterus or pallor, pupils normal  Ears, Nose, Mouth, Throat:  Atraumatic, oropharynx clear  Neck:  Trachea midline, thyroid normal  Respiratory:  Clear to auscultation bilaterally, normal effort, no tenderness to palpation  Cardiovascular:  Regular rate and rhythm, no murmurs, no peripheral edema, no thrill  Gastrointestinal:  Distended, non-tender  Skin:  Normal temperature, no rash  Psychiatric:  Responds to voice and pain, no agitation  Neuro:  No new focal neurologic deficits observed    Telemetry:              Hemodynamics:   CVP:     PAP:     PAOP:     CO:     CI:     SVI:     SVR:       SpO2: 96 % SpO2  Min: 88 %  Max: 100 %   Device:      Flow Rate:   No data recorded       Intake/Ouptut 24 hrs (7:00AM - 6:59  AM)  Intake & Output (last 3 days)       05/01 0701 - 05/02 0700 05/02 0701 - 05/03 0700 05/03 0701 - 05/04 0700 05/04 0701 - 05/05 0700    I.V. (mL/kg) 491.1 (6.2) 1461.4 (18.2) 524.6 (7)     Blood   331     Other 382 427 685     NG/GT 1583 1680 1680     IV Piggyback   100     Total Intake(mL/kg) 2456.1 (30.8) 3568.4 (44.4) 3320.6 (44.2)     Urine (mL/kg/hr) 1285 (0.7) 1625 (0.8) 1550 (0.9) 150 (0.5)    Emesis/NG output   0     Other        Stool        Total Output 1285 1625 1550 150    Net +1171.1 +1943.4 +1770.6 -150            Emesis Unmeasured Occurrence   1 x           Lines, Drains & Airways    Active LDAs     Name:   Placement date:   Placement time:   Site:   Days:    PICC Triple Lumen 04/28/20 Right Basilic   04/28/20    1116    Basilic   5    NG/OG Tube Nasogastric 16 Fr Right nostril   04/28/20    0430    Right nostril   6    Urethral Catheter Temperature probe   04/25/20    1200     8                Hematology:  Results from last 7 days   Lab Units 05/04/20  0414 05/03/20  1048 05/03/20  0216 05/01/20  0421 04/30/20  0517 04/29/20  2356 04/29/20  1750  04/29/20  0511 04/28/20  0955   WBC 10*3/mm3 32.33*  --   --  29.16* 29.97*  --   --   --  26.10* 25.21*   HEMOGLOBIN g/dL 9.0* 9.2* 6.9* 7.3* 7.5* 7.5* 7.4*   < > 8.8* 9.4*   HEMATOCRIT % 28.0* 27.3* 22.1* 23.1* 22.9* 23.0* 22.8*   < > 26.7* 28.5*   PLATELETS 10*3/mm3 252  --   --  256 275  --   --   --  254 253    < > = values in this interval not displayed.     Electrolytes, Magnesium and Phosphorus:  Results from last 7 days   Lab Units 05/04/20  0414 05/03/20  0216 05/02/20  0442 05/01/20  0421 04/30/20  0517 04/29/20  0511 04/28/20  0311   SODIUM mmol/L 147* 149* 149* 147* 144 146* 143   CHLORIDE mmol/L 115* 119* 119* 118* 115* 117* 111*   POTASSIUM mmol/L 4.9 4.4 4.6 4.3 4.7 3.9 3.8   CO2 mmol/L 22.0 21.0* 21.0* 17.0* 19.0* 19.0* 21.0*   MAGNESIUM mg/dL 2.1 2.0 1.9 2.1 2.3 2.1 2.5   PHOSPHORUS mg/dL 2.8 2.3* 2.8 3.2 4.5 3.5 2.9     Renal:  Results  from last 7 days   Lab Units 05/04/20  0414 05/03/20  0216 05/02/20  0442 05/01/20  0421 04/30/20  0517 04/29/20  0511 04/28/20  0311   CREATININE mg/dL 0.61* 0.57* 0.53* 0.43* 0.61* 0.46* 0.68*   BUN mg/dL 32* 28* 28* 36* 49* 35* 48*     Estimated Creatinine Clearance: 196.6 mL/min (A) (by C-G formula based on SCr of 0.61 mg/dL (L)).  Hepatic:  Results from last 7 days   Lab Units 05/04/20  0414 05/02/20  0442 04/29/20  0511   ALK PHOS U/L 199* 180* 126*   BILIRUBIN mg/dL 5.8* 4.8* 7.1*   BILIRUBIN DIRECT mg/dL 3.3*  --   --    ALT (SGPT) U/L 89* 126* 112*   AST (SGOT) U/L 93* 148* 221*     Arterial Blood Gases:  Results from last 7 days   Lab Units 05/03/20  0115 05/01/20  1016 04/30/20  0347 04/29/20  2006 04/29/20  1648 04/29/20  1424   PH, ARTERIAL pH units 7.561* 7.499* 7.484* 7.449 7.458* 7.426   PCO2, ARTERIAL mm Hg 23.4* 27.1* 27.1* 30.1* 28.9* 31.5*   PO2 ART mm Hg 188.0* 84.8 64.4* 67.2* 59.1* 78.9*   FIO2 % 60 40 50 50 50 100       Results from last 7 days   Lab Units 05/01/20  0421   HEMOGLOBIN A1C % 4.30*       Lab Results   Component Value Date    LACTATE 2.0 05/03/2020       Relevant imaging studies and labs from 05/04/20 were reviewed and interpreted by me    Medications (drips):    dexmedetomidine Last Rate: 1.1 mcg/kg/hr (05/04/20 0730)   dextrose Last Rate: Stopped (05/03/20 0419)         chlorothiazide 500 mg Intravenous Q12H   [START ON 5/5/2020] enoxaparin 40 mg Subcutaneous Q24H   furosemide 40 mg Intravenous Q12H   [START ON 5/5/2020] insulin detemir 15 Units Subcutaneous Daily   insulin detemir 5 Units Subcutaneous Once   insulin regular 0-7 Units Subcutaneous Q6H   insulin regular 5 Units Subcutaneous Q6H   lactulose 20 g Nasogastric BID   levoFLOXacin 500 mg Intravenous Q24H   pantoprazole 40 mg Intravenous Q AM   PRO-STAT 30 mL Nasogastric BID   rifAXIMin 550 mg Nasogastric Q12H   spironolactone 25 mg Nasogastric Daily   vitamin K1 10 mg Oral Daily       Assessment/Plan   IMPRESSION /  PLAN     Inpatient Problem List:  24 y.o.male:  Active Hospital Problems    Diagnosis   • **Acute variceal GI bleeding   • 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   • Sepsis with acute renal failure without septic shock (CMS/HCC)   • Acute respiratory failure with hypoxia (CMS/HCC)   • Alcohol withdrawal delirium (CMS/HCC)   • Hepatic encephalopathy (CMS/HCC)   • Ascites due to alcoholic hepatitis   • Pleural effusion on left   • Acute alcoholic hepatitis   • BORIS (acute kidney injury) (CMS/HCC)   • Bandemia   • Acute upper GI bleeding        Impression:  24 y.o.male with relevant PMH of alcoholism / alcoholic cirrhosis, MJ abuse, Tobacco abuse admitted 4/23/2020 with alcoholic hepatitis, decompensated cirrhosis, BORIS, Hepatic Encephalopathy, and hematemesis secondary to variceal bleed s/p EGD 4/24/20.  Patient was intubated for EGD and extubated on 4/27.  Underwent TIPS 4/29 (intubated, extubated again 5/1).  Has had paracentesis x 2 (4/24 - 5 liters, 4/30 2.5 liters).  Being treated for SBP w/ Levaquin (previoulsy on Azactam x 7 days / PCN allergy).    There are plans in place today for additional paracentesis.  Additional ongoing issues include refractory ascites, volume overload, and hepatic encephalopathy.    Plan:  PSE - on rifaxamin, add lactulose.  Wean dex.  Prn ativan.  Thiamin, folate, MVI, Zinc.    Hypernatremia - increase D5W    Refractory Ascites / Volume Overload - getting additional paracentesis today.  Change lasix to IV, on aldactone, add Diuril IV x 2 doses.    Stress Hyperglycemia - increase levemir    Coagulopathy - po vitamin k 10 mg x 3 doses    Variceal Bleed / TIPS / Cirrhosis / Alcoholic Hepatitis / SBP - per GI    DVT / PUD prophylaxis    Nutrition - Tube Feeds    Discussed with patients Father on phone    Plan of care and goals reviewed with mulitdisciplinary team at daily rounds    Critical Care time spent in direct patient care: 45 minutes (excluding procedure  time, if applicable) including high complexity decision making to assess, manipulate, and support vital organ system failure in this individual who has impairment of one or more vital organ systems such that there is a high probability of imminent or life threatening deterioration in the patient’s condition.       Valerio Emmanuel MD  Intensive Care Medicine  05/04/20 10:55

## 2020-05-04 NOTE — PLAN OF CARE
Problem: Patient Care Overview  Goal: Plan of Care Review  Outcome: Ongoing (interventions implemented as appropriate)  Flowsheets (Taken 5/4/2020 8700)  Progress: no change  Plan of Care Reviewed With: patient; family  Note:   VSS, had one episode of emesis after coughing, lung sounds improved, still confused and restrained, spoke with father on phone to update on patient status, will continue to monitor

## 2020-05-04 NOTE — PROGRESS NOTES
"GI Daily Progress Note  Subjective     Bob Guevara is a 24 y.o. male who was admitted with Acute GI bleeding.   More awake and alert today.  C/O SOA.  Has abd pain  Tolerating TF.  Still has FMS    Chief Complaint:  GIB    Objective     /61 (BP Location: Left arm)   Pulse 94   Temp (!) 101.1 °F (38.4 °C) (Axillary)   Resp 15   Ht 172.7 cm (68\")   Wt 75.1 kg (165 lb 9.1 oz)   SpO2 100%   BMI 25.17 kg/m²     Intake/Output last 3 shifts:  I/O last 3 completed shifts:  In: 5188 [I.V.:1364; Blood:331; Other:865; NG/GT:2528; IV Piggyback:100]  Out: 2450 [Urine:2450]  Intake/Output this shift:  No intake/output data recorded.      Physical Exam  Wt Readings from Last 3 Encounters:   05/04/20 75.1 kg (165 lb 9.1 oz)   ,body mass index is 25.17 kg/m².,@FLOWAMB(6)@,@FLOWAMB(5)@,@FLOWAMB(8)@   CONSTITUTIONAL:sitting in bed  Resp CTA; no rhonchi, rales, or wheezes.  Respiration effort normal  CV RRR; no M/R/G. No lower extremity edema  GI Abd soft, NT, ND, normal active bowel sounds.    Psych: Alert and awake.  Hard to understand. More verbal but unable to tell if oriented    DATA:Results for BOB GUEVARA (MRN [id number]) as of 5/4/2020 08:42   Ref. Range 4/30/2020 05:17 5/1/2020 04:21 5/3/2020 02:16 5/3/2020 10:48 5/4/2020 04:14   WBC Latest Ref Range: 3.40 - 10.80 10*3/mm3 29.97 (C) 29.16 (H)   32.33 (C)   RBC Latest Ref Range: 4.14 - 5.80 10*6/mm3 2.08 (L) 2.04 (L)   2.56 (L)   Hemoglobin Latest Ref Range: 13.0 - 17.7 g/dL 7.5 (L) 7.3 (L) 6.9 (C) 9.2 (L) 9.0 (L)   Hematocrit Latest Ref Range: 37.5 - 51.0 % 22.9 (L) 23.1 (L) 22.1 (L) 27.3 (L) 28.0 (L)     Results for BOB GUEVARA (MRN [id number]) as of 5/4/2020 08:42   Ref. Range 5/4/2020 04:14   Glucose Latest Ref Range: 65 - 99 mg/dL 215 (H)   Sodium Latest Ref Range: 136 - 145 mmol/L 147 (H)   Potassium Latest Ref Range: 3.5 - 5.2 mmol/L 4.9   CO2 Latest Ref Range: 22.0 - 29.0 mmol/L 22.0   Chloride Latest Ref Range: 98 - 107 mmol/L 115 (H) "   Anion Gap Latest Ref Range: 5.0 - 15.0 mmol/L 10.0   Creatinine Latest Ref Range: 0.76 - 1.27 mg/dL 0.61 (L)   BUN Latest Ref Range: 6 - 20 mg/dL 32 (H)   BUN/Creatinine Ratio Latest Ref Range: 7.0 - 25.0  52.5 (H)   Calcium Latest Ref Range: 8.6 - 10.5 mg/dL 7.6 (L)   eGFR Non African Am Latest Ref Range: >60 mL/min/1.73 >150   Alkaline Phosphatase Latest Ref Range: 39 - 117 U/L 199 (H)   Total Protein Latest Ref Range: 6.0 - 8.5 g/dL 5.4 (L)   ALT (SGPT) Latest Ref Range: 1 - 41 U/L 89 (H)   AST (SGOT) Latest Ref Range: 1 - 40 U/L 93 (H)   Total Bilirubin Latest Ref Range: 0.2 - 1.2 mg/dL 5.8 (H)   Albumin Latest Ref Range: 3.50 - 5.20 g/dL 2.20 (L)     Assessment/Plan     1.  Acute alcoholic hepatitis  2.  Acute variceal bleed, status post TIPS  3.  BORIS  4.  Hepatic encephalopathy  5.  Alcohol withdrawal  6.  Nutrition  7. SBP  8.  Abnormal CT liver    Seems to be improving.   Ascites appears worse today. WBC increasing.  Will repeat CT liver mass protocol and repeat paracentesis  Cont Xifaxan and lactulose  ALDEN appears improved  INR stable  LFT's improving        Acute variceal GI bleeding    Acute alcoholic hepatitis    BORIS (acute kidney injury) (CMS/HCC)    Bandemia    Acute upper GI bleeding    Sepsis with acute renal failure without septic shock (CMS/HCC)    Acute respiratory failure with hypoxia (CMS/HCC)    Alcohol withdrawal delirium (CMS/HCC)    Hepatic encephalopathy (CMS/HCC)    Ascites due to alcoholic hepatitis    Pleural effusion on left    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       LOS: 11 days     Kaz Arellano MD  05/04/20  08:56

## 2020-05-04 NOTE — NURSING NOTE
Daily low linda report and screen completed. No identifiable issues or concerns. All interventions are in place. Contact [facility] nurse if needs arise.     Thanks

## 2020-05-04 NOTE — PROGRESS NOTES
Continued Stay Note  [facility]     Patient Name: John Varma  MRN: [id number]  Today's Date: 5/4/2020    Admit Date: 4/23/2020    Discharge Plan     Row Name 05/04/20 1162       Plan    Plan  Update    Plan Comments  Per nurse patient having some confusion today. Pt is going for paracentesis. P.T. note states MAX Ax2 from bed mobility Mod Ax2 for sts. CM will follow.        Discharge Codes    No documentation.       Expected Discharge Date and Time     Expected Discharge Date Expected Discharge Time    May 4, 2020             Dory Jane RN

## 2020-05-04 NOTE — THERAPY RE-EVALUATION
Acute Care - Speech Language Pathology   Swallow Re-Evaluation [facility]   Clinical Swallow Re-Evaluation     Patient Name: John Vrama  : 1995  MRN: [id number]  Today's Date: 2020               Admit Date: 2020    Visit Dx:     ICD-10-CM ICD-9-CM   1. Sepsis with acute liver failure without hepatic coma or septic shock, due to unspecified organism (CMS/HCC) A41.9 038.9    R65.20 995.92    K72.00 570   2. Hypoxia R09.02 799.02   3. Acute renal failure, unspecified acute renal failure type (CMS/HCC) N17.9 584.9   4. Acute hepatitis B17.9 570   5. Alcohol abuse F10.10 305.00   6. Acute upper GI bleed K92.2 578.9   7. Elevated lactic acid level R79.89 276.2   8. Ascites due to alcoholic hepatitis K70.11 789.59   9. Leukocytosis, unspecified type D72.829 288.60   10. Sepsis with acute renal failure without septic shock, due to unspecified organism, unspecified acute renal failure type (CMS/HCC) A41.9 038.9    R65.20 995.92    N17.9 584.9   11. Dysphagia, unspecified type R13.10 787.20     Patient Active Problem List   Diagnosis   • Acute alcoholic hepatitis   • Acute variceal GI bleeding   • BORIS (acute kidney injury) (CMS/HCC)   • Bandemia   • Acute upper GI bleeding   • Sepsis with acute renal failure without septic shock (CMS/HCC)   • Acute respiratory failure with hypoxia (CMS/HCC)   • Alcohol withdrawal delirium (CMS/HCC)   • Hepatic encephalopathy (CMS/HCC)   • Ascites due to alcoholic hepatitis   • Pleural effusion on left   • S/P TIPS (transjugular intrahepatic portosystemic shunt) 2020     Past Medical History:   Diagnosis Date   • Alcohol abuse    • GERD (gastroesophageal reflux disease)    • GIB (gastrointestinal bleeding)    • Tobacco abuse      Past Surgical History:   Procedure Laterality Date   • ARM WOUND REPAIR / CLOSURE     • ENDOSCOPY N/A 2020    Procedure: ESOPHAGOGASTRODUODENOSCOPY;  Surgeon: Brunner, Mark I, MD;  Location: [facility] ENDOSCOPY;  Service:  Gastroenterology;  Laterality: N/A;        SWALLOW EVALUATION (last 72 hours)      SLP Adult Swallow Evaluation     Row Name 05/04/20 1015 05/02/20 0915                Rehab Evaluation    Document Type  re-evaluation  -  evaluation  -       Subjective Information  no complaints  -MP  no complaints  -SM       Patient Observations  alert;cooperative initially lethargic, but more alert t/o eval  -MP  cooperative alerts with cues  -       Patient/Family Observations  Pt in soft wrist restraints. No family present  -  --       Patient Effort  good  -  --          General Information    Patient Profile Reviewed  yes  -MP  yes  -SM       Pertinent History Of Current Problem  Adm 4/23 w/ acute variceal GI bleeding. Alcohol hepatitis, alcohol w/d. Intubated 4/24-4/27 & 4/29-5/1. Off of BiPAP this AM.  -MP  Intubated 4/24-4/27, and again 4/29-5/1. s/p TIPA 4/29. Ativan last night 2' withdrawal  -       Current Method of Nutrition  NPO;nasogastric feedings  -  NPO;nasogastric feedings  -       Precautions/Limitations, Vision  WFL;other (see comments)  -MP  --       Precautions/Limitations, Hearing  WFL;for purposes of eval  -MP  --       Prior Level of Function-Communication  WFL  -MP  --       Prior Level of Function-Swallowing  no diet consistency restrictions  -  safe, efficient swallowing in all situations  -       Plans/Goals Discussed with  patient;agreed upon  -  patient  -SM       Barriers to Rehab  none identified  -MP  none identified  -       Patient's Goals for Discharge  return to PO diet  -MP  return to PO diet  -          Pain Assessment    Additional Documentation  Pain Scale: FACES Pre/Post-Treatment (Group)  -MP  --          Pain Scale: FACES Pre/Post-Treatment    Pain: FACES Scale, Pretreatment  0-->no hurt  -MP  2-->hurts little bit  -SM       Pain: FACES Scale, Post-Treatment  0-->no hurt  -MP  2-->hurts little bit  -SM          Oral Motor and Function    Dentition Assessment   natural, present and adequate  -MP  --       Secretion Management  WNL/WFL  -MP  --       Mucosal Quality  moist, healthy  -MP  --          Oral Musculature and Cranial Nerve Assessment    Oral Motor General Assessment  generalized oral motor weakness  -MP  generalized oral motor weakness  -SM          General Eating/Swallowing Observations    Eating/Swallowing Skills  fed by SLP  -MP  --       Positioning During Eating  upright in bed  -MP  --       Utensils Used  spoon  -MP  --       Consistencies Trialed  thin liquids;pureed  -MP  --          Clinical Swallow Eval    Oral Prep Phase  --  -MP  impaired  -SM       Oral Transit  impaired  -MP  impaired  -SM       Pharyngeal Phase  suspected pharyngeal impairment  -MP  suspected pharyngeal impairment  -SM       Clinical Swallow Evaluation Summary  Clinical swallow re-evaluation completed. Pt given trials of ice chipx1, thin via tsp, and puree. Wet vocal quality & multiple swallows w/ small volumes of thin. Inconsistently clearing oral cavity w/ puree - required verbal cueing to swallow. Pt not yet appropriate for PO diet or instrumental eval. Rec continue NPO w/ NG. 3-4 ice chips/hr following oral care. SLP will f/u for re-eval.  -MP  --          Oral Prep Concerns    Oral Prep Concerns  --  incomplete or weak lip closure around spoon  -SM       Incomplete or Weak Lip Closure Around Spoon  --  all consistencies  -SM          Oral Transit Concerns    Oral Transit Concerns  delayed initiation of bolus transit;increased oral transit time  -MP  increased oral transit time  -SM       Delayed Intiation of Bolus Transit  pudding  -MP  --       Increased Oral Transit Time  pudding  -MP  all consistencies  -SM          Pharyngeal Phase Concerns    Pharyngeal Phase Concerns  wet vocal quality;multiple swallows  -MP  wet vocal quality;cough  -SM       Wet Vocal Quality  thin  -MP  thin  -SM       Multiple Swallows  thin  -MP  --       Cough  --  thin  -SM       Pharyngeal Phase  Concerns, Comment  --  Did not test puree. Not yet ready to consider PO intake. Generalized weakness and lethargy.   -          Clinical Impression    SLP Swallowing Diagnosis  oral dysfunction;suspected pharyngeal dysfunction  -MP  oral dysfunction;suspected pharyngeal dysfunction  -       Functional Impact  risk of aspiration/pneumonia  -MP  risk of aspiration/pneumonia  -       Rehab Potential/Prognosis, Swallowing  good, to achieve stated therapy goals  -  --       Swallow Criteria for Skilled Therapeutic Interventions Met  demonstrates skilled criteria  -  --          Recommendations    SLP Diet Recommendation  NPO;temporary alternate methods of nutrition/hydration;other (see comments) 3-4 ice chips/hr following oral care  -  NPO;ice chips between meals after oral care, with supervision  -       Recommended Diagnostics  reassess via clinical swallow evaluation  -  reassess via clinical swallow evaluation  -       Recommended Precautions and Strategies  other (see comments) oral care BID/PRN  -  --       SLP Rec. for Method of Medication Administration  meds via alternate route  -  meds via alternate route  -       Anticipated Dischage Disposition  unknown;anticipate therapy at next level of care  -  --         User Key  (r) = Recorded By, (t) = Taken By, (c) = Cosigned By    Initials Name Effective Dates     Bernadette Serra, MS CCC-SLP 06/22/15 -     Michael Stkoes MS CCC-SLP 06/19/19 -           EDUCATION  The patient has been educated in the following areas:   Dysphagia (Swallowing Impairment) NPO rationale.    SLP Recommendation and Plan  SLP Swallowing Diagnosis: oral dysfunction, suspected pharyngeal dysfunction  SLP Diet Recommendation: NPO, temporary alternate methods of nutrition/hydration, other (see comments)(3-4 ice chips/hr following oral care)  Recommended Precautions and Strategies: other (see comments)(oral care BID/PRN)  SLP Rec. for Method of Medication  Administration: meds via alternate route        Recommended Diagnostics: reassess via clinical swallow evaluation  Swallow Criteria for Skilled Therapeutic Interventions Met: demonstrates skilled criteria  Anticipated Dischage Disposition: unknown, anticipate therapy at next level of care  Rehab Potential/Prognosis, Swallowing: good, to achieve stated therapy goals             Plan of Care Reviewed With: patient           Time Calculation:   Time Calculation- SLP     Row Name 05/04/20 1357             Time Calculation- SLP    SLP Start Time  1015  -MP      SLP Received On  05/04/20  -MP        User Key  (r) = Recorded By, (t) = Taken By, (c) = Cosigned By    Initials Name Provider Type    Michael Stokes MS CCC-SLP Speech and Language Pathologist          Therapy Charges for Today     Code Description Service Date Service Provider Modifiers Qty    [id number] HC ST EVAL ORAL PHARYNG SWALLOW 3 5/4/2020 Michael Brown MS CCC-SLP GN 1               MS LIBERTY De La Torre  5/4/2020

## 2020-05-04 NOTE — PLAN OF CARE
Problem: Patient Care Overview  Goal: Plan of Care Review  Outcome: Ongoing (interventions implemented as appropriate)  Flowsheets (Taken 5/4/2020 1110)  Progress: improving  Plan of Care Reviewed With: patient  Outcome Summary: Pt demo improved activity tolerance and alertness this date. Pt Max Ax2 for bed mobility and Mod Ax2 for STS t/f, limited d/t generalized weakness and confusion. Recommend cont skilled IPOT POC. Recommend pt DC to IP rehab.

## 2020-05-04 NOTE — PLAN OF CARE
Problem: Patient Care Overview  Goal: Plan of Care Review  Outcome: Ongoing (interventions implemented as appropriate)  Flowsheets (Taken 5/4/2020 1831)  Outcome Summary: vs stable except for consistent low grade temp-drowsy most of the day unless stimulated or watching tv-speech much better after agressive oral care today but remains NPO and on tube feeding per NG in right nostril and is Peptamen 1.5 at 75ml/hr and flushed with 40ml/hr of free water as his sodium trends high-chest clear and diminished-abdomen distended and has bowel sound but no stool-went to CT scan for repeat abd and possible paracentesis but DR. Adame felt there was too little to risk the tap-scrotal wrap in place over very swollen scrotum-urine good after diuresis and per otto-very weak in legs but did stand at bedside with 2 person PT assist-ankles and feet very swollen and pitting-picc line patent and D5W started at 100/hr per DR. Cholo gutierrez

## 2020-05-04 NOTE — THERAPY TREATMENT NOTE
Acute Care - Occupational Therapy Treatment Note  [facility]     Patient Name: John Varma  : 1995  MRN: [id number]  Today's Date: 2020             Admit Date: 2020       ICD-10-CM ICD-9-CM   1. Sepsis with acute liver failure without hepatic coma or septic shock, due to unspecified organism (CMS/HCC) A41.9 038.9    R65.20 995.92    K72.00 570   2. Hypoxia R09.02 799.02   3. Acute renal failure, unspecified acute renal failure type (CMS/HCC) N17.9 584.9   4. Acute hepatitis B17.9 570   5. Alcohol abuse F10.10 305.00   6. Acute upper GI bleed K92.2 578.9   7. Elevated lactic acid level R79.89 276.2   8. Ascites due to alcoholic hepatitis K70.11 789.59   9. Leukocytosis, unspecified type D72.829 288.60   10. Sepsis with acute renal failure without septic shock, due to unspecified organism, unspecified acute renal failure type (CMS/HCC) A41.9 038.9    R65.20 995.92    N17.9 584.9   11. Dysphagia, unspecified type R13.10 787.20     Patient Active Problem List   Diagnosis   • Acute alcoholic hepatitis   • Acute variceal GI bleeding   • BORIS (acute kidney injury) (CMS/HCC)   • Bandemia   • Acute upper GI bleeding   • Sepsis with acute renal failure without septic shock (CMS/HCC)   • Acute respiratory failure with hypoxia (CMS/HCC)   • Alcohol withdrawal delirium (CMS/HCC)   • Hepatic encephalopathy (CMS/HCC)   • Ascites due to alcoholic hepatitis   • Pleural effusion on left   • S/P TIPS (transjugular intrahepatic portosystemic shunt) 2020     Past Medical History:   Diagnosis Date   • Alcohol abuse    • GERD (gastroesophageal reflux disease)    • GIB (gastrointestinal bleeding)    • Tobacco abuse      Past Surgical History:   Procedure Laterality Date   • ARM WOUND REPAIR / CLOSURE     • ENDOSCOPY N/A 2020    Procedure: ESOPHAGOGASTRODUODENOSCOPY;  Surgeon: Brunner, Mark I, MD;  Location: [facility] ENDOSCOPY;  Service: Gastroenterology;  Laterality: N/A;       Therapy  Treatment    Rehabilitation Treatment Summary     Row Name 05/04/20 1110             Treatment Time/Intention    Discipline  occupational therapist  -CL      Document Type  therapy note (daily note)  -CL      Subjective Information  complains of;weakness  -CL      Patient Effort  good  -CL      Existing Precautions/Restrictions  fall;oxygen therapy device and L/min;other (see comments) NG, scrotal swelling  -CL      Treatment Considerations/Comments  Pt w/ improved alertness upon sitting EOB  -CL      Recorded by [CL] Selena Terrell, OT 05/04/20 1325      Row Name 05/04/20 1110             Vital Signs    Pre Systolic BP Rehab  -- VSS  -CL      Recorded by [CL] Selena Terrell OT 05/04/20 1325      Row Name 05/04/20 1110             Cognitive Assessment/Intervention- PT/OT    Affect/Mental Status (Cognitive)  confused;low arousal/lethargic  -CL      Orientation Status (Cognition)  oriented to;person  -CL      Follows Commands (Cognition)  follows one step commands;over 90% accuracy;repetition of directions required;verbal cues/prompting required;increased processing time needed  -CL      Cognitive Function (Cognitive)  safety deficit  -CL      Safety Deficit (Cognitive)  moderate deficit;awareness of need for assistance;safety precautions awareness;insight into deficits/self awareness  -CL      Recorded by [CL] Selena Terrell OT 05/04/20 1325      Row Name 05/04/20 1110             Bed Mobility Assessment/Treatment    Bed Mobility Assessment/Treatment  supine-sit  -CL      Sit-Supine [place] (Bed Mobility)  maximum assist (25% patient effort);2 person assist;verbal cues  -CL      Assistive Device (Bed Mobility)  bed rails;draw sheet;head of bed elevated  -CL      Recorded by [CL] Selena Terrell OT 05/04/20 1325      Row Name 05/04/20 1110             Transfer Assessment/Treatment    Transfer Assessment/Treatment  sit-stand transfer;stand-sit transfer  -CL      Comment (Transfers)  Pt stood from EOB w/ BUE support and  B knees blocked.   -CL      Recorded by [CL] Selena Terrell, OT 05/04/20 1325      Row Name 05/04/20 1110             Sit-Stand Transfer    Sit-Stand [place] (Transfers)  moderate assist (50% patient effort);2 person assist;verbal cues  -CL      Recorded by [CL] Selena Terrell, OT 05/04/20 1325      Row Name 05/04/20 1110             Stand-Sit Transfer    Stand-Sit [place] (Transfers)  moderate assist (50% patient effort);2 person assist;verbal cues  -CL      Recorded by [CL] Selena Terrell, OT 05/04/20 1325      Row Name 05/04/20 1110             Motor Skills Assessment/Interventions    Additional Documentation  Therapeutic Exercise (Group);Balance (Group)  -CL      Recorded by [CL] Selena Terrell OT 05/04/20 1325      Row Name 05/04/20 1110             Therapeutic Exercise    Therapeutic Exercise  seated, upper extremities  -CL      Additional Documentation  Therapeutic Exercise (Row)  -CL      55319 - OT Therapeutic Exercise Minutes  4  -CL      71918 - OT Therapeutic Activity Minutes  6  -CL      Recorded by [CL] Selena Terrell, OT 05/04/20 1325      Row Name 05/04/20 1110             Upper Extremity Seated Therapeutic Exercise    Performed, Seated Upper Extremity (Therapeutic Exercise)  shoulder flexion/extension;shoulder external/internal rotation;elbow flexion/extension;digit flexion/extension  -CL      Exercise Type, Seated Upper Extremity (Therapeutic Exercise)  AAROM (active assistive range of motion)  -CL      Sets/Reps Detail, Seated Upper Extremity (Therapeutic Exercise)  1/6  -CL      Comment, Seated Upper Extremity (Therapeutic Exercise)  BUE  -CL      Recorded by [CL] Selena Terrell OT 05/04/20 1325      Row Name 05/04/20 1110             Balance    Balance  static sitting balance;static standing balance  -CL      Recorded by [CL] Selena Terrell OT 05/04/20 1325      Row Name 05/04/20 1110             Static Sitting Balance    Level of [place] (Unsupported Sitting, Static Balance)  moderate  assist, 50 to 74% patient effort  -CL      Sitting Position (Unsupported Sitting, Static Balance)  sitting on edge of bed  -CL      Time Able to Maintain Position (Unsupported Sitting, Static Balance)  3 to 4 minutes  -CL      Comment (Unsupported Sitting, Static Balance)  Posterior lean, progressed to moments of CGA  -CL      Recorded by [CL] Selena Terrell, STACY 05/04/20 1325      Row Name 05/04/20 1110             Static Standing Balance    Level of [place] (Supported Standing, Static Balance)  moderate assist, 50 to 74% patient effort;2 person assist  -CL      Recorded by [CL] Selena Terrell, OT 05/04/20 1325      Row Name 05/04/20 1110             Positioning and Restraints    Pre-Treatment Position  in bed  -CL      Post Treatment Position  bed  -CL      In Bed  notified nsg;fowlers;call light within reach;exit alarm on;encouraged to call for assist;with nsg;RUE elevated;LUE elevated;legs elevated;heels elevated  -CL      Restraints  released:;reapplied:;notified nsg:;soft limb  -CL      Recorded by [CL] Selena Terrell, OT 05/04/20 1325      Row Name 05/04/20 1110             Pain Scale: FACES Pre/Post-Treatment    Pain: FACES Scale, Pretreatment  2-->hurts little bit  -CL      Pain: FACES Scale, Post-Treatment  2-->hurts little bit  -CL      Pre/Post Treatment Pain Comment  tolerated  -CL      Recorded by [CL] Selena Terrell, OT 05/04/20 1325        User Key  (r) = Recorded By, (t) = Taken By, (c) = Cosigned By    Initials Name Effective Dates Discipline    CL Selena Terrell OT 04/03/18 -  OT                 OT Recommendation and Plan  Outcome Summary/Treatment Plan (OT)  Anticipated Equipment Needs at Discharge (OT): (TBA further)  Anticipated Discharge Disposition (OT): inpatient rehabilitation facility  Therapy Frequency (OT Eval): daily  Plan of Care Review  Plan of Care Reviewed With: patient  Plan of Care Reviewed With: patient  Outcome Summary: Pt demo improved activity tolerance and alertness this date. Pt  Max Ax2 for bed mobility and Mod Ax2 for STS t/f, limited d/t generalized weakness and confusion. Recommend cont skilled IPOT POC. Recommend pt DC to IP rehab.  Outcome Measures     Row Name 05/04/20 1110 05/02/20 1059          How much help from another is currently needed...    Putting on and taking off regular lower body clothing?  1  -CL  1  -CL     Bathing (including washing, rinsing, and drying)  1  -CL  1  -CL     Toileting (which includes using toilet bed pan or urinal)  1  -CL  1  -CL     Putting on and taking off regular upper body clothing  1  -CL  1  -CL     Taking care of personal grooming (such as brushing teeth)  2  -CL  2  -CL     Eating meals  1  -CL  1  -CL     AM-PAC 6 Clicks Score (OT)  7  -CL  7  -CL        Functional Assessment    Outcome Measure Options  AM-PAC 6 Clicks Daily Activity (OT)  -CL  AM-PAC 6 Clicks Daily Activity (OT)  -CL       User Key  (r) = Recorded By, (t) = Taken By, (c) = Cosigned By    Initials Name Provider Type    CL Selena Terrell OT Occupational Therapist           Time Calculation:   Time Calculation- OT     Row Name 05/04/20 1110             Time Calculation- OT    OT Start Time  1110  -CL      OT Received On  05/04/20  -CL      OT Goal Re-Cert Due Date  05/12/20  -CL         Timed Charges    53346 - OT Therapeutic Exercise Minutes  4  -CL      91439 - OT Therapeutic Activity Minutes  6  -CL        User Key  (r) = Recorded By, (t) = Taken By, (c) = Cosigned By    Initials Name Provider Type     Selena Terrell OT Occupational Therapist        Therapy Charges for Today     Code Description Service Date Service Provider Modifiers Qty    [id number]  OT THERAPEUTIC ACT EA 15 MIN 5/4/2020 Selena Terrell OT GO 1               Selena Terrell OT  5/4/2020

## 2020-05-04 NOTE — PLAN OF CARE
Problem: Patient Care Overview  Goal: Plan of Care Review  Outcome: Ongoing (interventions implemented as appropriate)  Flowsheets (Taken 5/4/2020 7074)  Plan of Care Reviewed With: patient  Note:   SLP re-evaluation completed. Will continue to address dysphagia. Please see note for further details and recommendations.

## 2020-05-04 NOTE — PROGRESS NOTES
"Adult Nutrition  Assessment/PES    Patient Name:  John Varma  YOB: 1995  MRN: [id number]  Admit Date:  4/23/2020    Assessment Date:  5/4/2020    Comments:  Pt tolerating EN support w/ adequate delivery over past 3 days at 107% daily goal volume ; he's receiiving 32 kcal/kg and 1.7 gm Pro/kg. RD will continue to monitor.    Reason for Assessment     Row Name 05/04/20 1225          Reason for Assessment    Reason For Assessment  per organizational policy;identified at risk by screening criteria;TF/PN;follow-up protocol MDR, EN support monitor 30 mins     Diagnosis  liver disease;gastrointestinal disease acute GIB, esophageal varices, alcoholic hepatita, cirrhosis, ascites, hepatic encephalopathy, EtOH w/d,BORIS, sepsis HX GERD, substance abuse     Identified At Risk by Screening Criteria  tube feeding or parenteral nutrition         Nutrition/Diet History     Row Name 05/04/20 1225          Nutrition/Diet History    Typical Food/Fluid Intake  RN reports pt having periods of confusion, possibly r/t W/D/encephalopathy; GI ordered CT guided paracentesis for today; tolerating TF at goal. MD will address hypernatremia w/ dextrose IVF, keep free water at 40 ml/hr     Factors Affecting Nutritional Intake  altered gastrointestinal function         Anthropometrics     Row Name 05/04/20 1235          Height  172.7 cm (68\")          Ideal Body Weight (IBW) (kg)  70.89    Row Name 05/04/20 1230          Height  172.7 cm (68\")    Current Weight Method  measured    Weight  75.1 kg (165 lb 9.1 oz)          Admit Weight Method  stated    Admit Weight  65.8 kg (145 lb)          Ideal Body Weight (IBW) (kg)  70.89    % Ideal Body Weight  105.94          BMI (kg/m2)  25.23    BMI Assessment  BMI 25-29.9: overweight        Labs/Tests/Procedures/Meds     Row Name 05/04/20 1231          Labs/Procedures/Meds    Lab Results Reviewed  reviewed, pertinent     Lab Results Comments  elev glucose somewhat improved; Na+- 147  " "      Diagnostic Tests/Procedures    Diagnostic Test/Procedure Reviewed  reviewed, pertinent     Diagnostic Test/Procedures Comments  paracentesis pending        Medications    Pertinent Medications Reviewed  reviewed, pertinent     Pertinent Medications Comments  D5w@100 ml/hr , insulin adjusted per MD         Physical Findings     Row Name 05/04/20 1232          Physical Findings    Overall Physical Appearance  on ventilator support;edematous scrotal     Gastrointestinal  ascites     Tubes  nasogastric tube     Skin  jaundice;other (see comments) paracentesis incision         Estimated/Assessed Needs     Row Name 05/04/20 1233       Calculation Measurements    Weight Used For Calculations  75.1 kg (165 lb 9.1 oz)    Height  172.7 cm (68\")       Estimated/Assessed Needs    Additional Documentation  KCAL/KG (Group)       Calorie Requirements    Estimated Calorie Requirement (kcal/day)  2300       KCAL/KG    KCAL/KG  30 Kcal/Kg (kcal);35 Kcal/Kg (kcal)    30 Kcal/Kg (kcal)  2253    35 Kcal/Kg (kcal)  2628.5       Protein Requirements    Weight Used For Protein Calculations  75.1 kg (165 lb 9.1 oz)    Est Protein Requirement Amount (gms/kg)  1.5 gm protein    Estimated Protein Requirements (gms/day)  112.65       Fluid Requirements    Estimated Fluid Requirements (mL/day)  2300    Estimated Fluid Requirement Method  RDA Method    RDA Method (mL)  2300    Row Name 05/04/20 1230          Calculation Measurements    Height  172.7 cm (68\")         Nutrition Prescription Ordered     Row Name 05/04/20 1235          Nutrition Prescription PO    Current PO Diet  NPO        Nutrition Prescription EN    Enteral Route  NG     Product  Peptamen 1.5 (Vital 1.5)     Modulars  Liquid Protein (15 gm/30 mL)     Liquid Protein (15 gm/30 mL)  30 mL/1 packet     Protein Liquid Frequency  2 times a day     TF Delivery Method  Continuous     Continuous TF Goal Rate (mL/hr)  75 mL/hr     Continuous TF Goal Volume (mL)  1800 mL     Water " "flush (mL)   20 mL     Water Flush Frequency  Per hour         Evaluation of Received Nutrient/Fluid Intake     Row Name 05/04/20 1235          Calculation Measurements    Weight Used For Calculations  75.1 kg (165 lb 9.1 oz)     Height  172.7 cm (68\")        Nutrient/Fluid Evaluation    Number of Days Evaluated  3 days     Additional Documentation  RDI (Group);Fiber (Group)        Calories Evaluation    Enteral Calories (kcal)  2253     Other Calories (kcal)  183     Total Calories (kcal)  2436     % of Kcal Needs  106        Protein Evaluation    Enteral Protein (gm)  102     Other Protein (gm)  27     Total Protein (gm)  129     % of Protein Needs  115        Intake Assessment    Energy/Calorie Requirement Assessment  meeting needs     Protein Requirement Assessment  meeting needs     Fluid Requirement Assessment  not meeting needs     Tolerance  tolerating        Fluid Intake Evaluation    Enteral (Free Water) Fluid (mL)  1157     Free Water Flush Fluid (mL)  643     Total Free Water Intake (mL)  1800 mL        Fiber Intake Evaluation    Fiber  0        Recommended Daily Intake Evaluation    RDI  Met        EN Evaluation    Number of Days EN Intake Evaluated  3 days     EN Average Volume Delivered (mL/day)  1502 mL/day     % Goal Volume   107 %               Problem/Interventions:  Problem 1     Row Name 05/04/20 1237          Nutrition Diagnoses Problem 1    Problem 1  No Nutrition Diagnosis at this Time     Inadequate Intake Type  Enteral     Macronutrient  Kcal;Protein     Micronutrient  Vitamin;Mineral     Etiology (related to)  Medical Diagnosis     Gastrointestinal  Gastrointestinal bleed;Esophageal varices     Pulmonary/Critical Care  Acute respiratory failure;Ventilator     Signs/Symptoms (evidenced by)  EN Intake Delivery     Percent (%) of EN goal  107 %     Resolved?  Yes         Problem 2     Row Name 05/04/20 1237          Nutrition Diagnoses Problem 2    Problem 2  Increased Nutrient Needs     " Micronutrient  Vitamin;Mineral;Other (comment) thiamine, folic acid, zinc     Etiology (related to)  Medical Diagnosis     Hepatic  Cirrhosis;Hepatitis     Substance Use  ETOH;Drug/illicit/recreational     Signs/Symptoms (evidenced by)  Other (comment);Biochemical substance w/d, jaundice, ascites     Labs Reviewed  Done     Resolved?  Yes             Intervention Goal     Row Name 05/04/20 1238          Intervention Goal    General  Nutrition support treatment;Meet nutritional needs for age/condition     TF/PN  Maintain TF/PN;Tolerate TF at goal         Nutrition Intervention     Row Name 05/04/20 1238          Nutrition Intervention    RD/Tech Action  Follow Tx progress;Care plan reviewd;Recommend/ordered         Nutrition Prescription     Row Name 05/04/20 1238          Nutrition Prescription EN    Enteral Prescription  Continue same protocol         Education/Evaluation     Row Name 05/04/20 1238          Monitor/Evaluation    Monitor  Per protocol;I&O;TF delivery/tolerance;Skin status;Symptoms;Weight           Electronically signed by:  Elissa Garay MS,RD,LD  05/04/20 12:39

## 2020-05-04 NOTE — PLAN OF CARE
Problem: Patient Care Overview  Goal: Plan of Care Review  Outcome: Ongoing (interventions implemented as appropriate)  Flowsheets (Taken 5/4/2020 1602)  Progress: improving  Plan of Care Reviewed With: patient  Outcome Summary: Pt demonstrated ability to progress to STS at and small sidesteps at EOB with max x2 A; cont to be limited by weakness/lethargy, requiring constant cues for attending to task. Will cont PT POC.

## 2020-05-04 NOTE — THERAPY TREATMENT NOTE
Patient Name: John Varma  : 1995    MRN: [id number]                              Today's Date: 2020       Admit Date: 2020    Visit Dx:     ICD-10-CM ICD-9-CM   1. Sepsis with acute liver failure without hepatic coma or septic shock, due to unspecified organism (CMS/HCC) A41.9 038.9    R65.20 995.92    K72.00 570   2. Hypoxia R09.02 799.02   3. Acute renal failure, unspecified acute renal failure type (CMS/HCC) N17.9 584.9   4. Acute hepatitis B17.9 570   5. Alcohol abuse F10.10 305.00   6. Acute upper GI bleed K92.2 578.9   7. Elevated lactic acid level R79.89 276.2   8. Ascites due to alcoholic hepatitis K70.11 789.59   9. Leukocytosis, unspecified type D72.829 288.60   10. Sepsis with acute renal failure without septic shock, due to unspecified organism, unspecified acute renal failure type (CMS/HCC) A41.9 038.9    R65.20 995.92    N17.9 584.9   11. Dysphagia, unspecified type R13.10 787.20     Patient Active Problem List   Diagnosis   • Acute alcoholic hepatitis   • Acute variceal GI bleeding   • BORIS (acute kidney injury) (CMS/HCC)   • Bandemia   • Acute upper GI bleeding   • Sepsis with acute renal failure without septic shock (CMS/HCC)   • Acute respiratory failure with hypoxia (CMS/HCC)   • Alcohol withdrawal delirium (CMS/HCC)   • Hepatic encephalopathy (CMS/HCC)   • Ascites due to alcoholic hepatitis   • Pleural effusion on left   • S/P TIPS (transjugular intrahepatic portosystemic shunt) 2020     Past Medical History:   Diagnosis Date   • Alcohol abuse    • GERD (gastroesophageal reflux disease)    • GIB (gastrointestinal bleeding)    • Tobacco abuse      Past Surgical History:   Procedure Laterality Date   • ARM WOUND REPAIR / CLOSURE     • ENDOSCOPY N/A 2020    Procedure: ESOPHAGOGASTRODUODENOSCOPY;  Surgeon: Brunner, Mark I, MD;  Location: [facility] ENDOSCOPY;  Service: Gastroenterology;  Laterality: N/A;     General Information     Row Name 20 1559          PT  Evaluation Time/Intention    Document Type  therapy note (daily note)  -LS     Mode of Treatment  physical therapy  -     Row Name 05/04/20 1559          General Information    Existing Precautions/Restrictions  fall;other (see comments) NG; scrotal edema  -     Row Name 05/04/20 1559          Cognitive Assessment/Intervention- PT/OT    Orientation Status (Cognition)  oriented to;person;place;verbal cues/prompts needed for orientation  -       User Key  (r) = Recorded By, (t) = Taken By, (c) = Cosigned By    Initials Name Provider Type    LS Savanah Matthews, PT Physical Therapist        Mobility     Row Name 05/04/20 1559          Bed Mobility Assessment/Treatment    Supine-Sit [place] (Bed Mobility)  maximum assist (25% patient effort);2 person assist;verbal cues  -LS     Sit-Supine [place] (Bed Mobility)  maximum assist (25% patient effort);2 person assist;verbal cues  -LS     Assistive Device (Bed Mobility)  bed rails;draw sheet;head of bed elevated  -     Row Name 05/04/20 1559          Transfer Assessment/Treatment    Comment (Transfers)  BUE support and blocking knees/feet; cues for upright posture.  -     Row Name 05/04/20 1559          Sit-Stand Transfer    Sit-Stand [place] (Transfers)  moderate assist (50% patient effort);2 person assist;verbal cues  -     Row Name 05/04/20 1559          Gait/Stairs Assessment/Training    [place] Level (Gait)  maximum assist (25% patient effort);2 person assist  -LS     Distance in Feet (Gait)  2  -LS     Bilateral Gait Deviations  weight shift ability decreased  -LS     Comment (Gait/Stairs)  Able to take 4 small sidesteps at EOB towards HOB with max x2 A for weightshifting and advancing LEs.  -LS       User Key  (r) = Recorded By, (t) = Taken By, (c) = Cosigned By    Initials Name Provider Type    LS Savanah Matthews, PT Physical Therapist        Obj/Interventions     Row Name 05/04/20 1601          Therapeutic Exercise    Lower Extremity  Range of Motion (Therapeutic Exercise)  hip flexion/extension, bilateral;knee flexion/extension, bilateral;ankle dorsiflexion/plantar flexion, bilateral  -     Exercise Type (Therapeutic Exercise)  AAROM (active assistive range of motion)  -LS     Position (Therapeutic Exercise)  supine  -LS     Sets/Reps (Therapeutic Exercise)  1/10  -     Row Name 05/04/20 1601          Static Sitting Balance    Level of [place] (Unsupported Sitting, Static Balance)  moderate assist, 50 to 74% patient effort  -LS     Sitting Position (Unsupported Sitting, Static Balance)  sitting on edge of bed  -LS     Comment (Unsupported Sitting, Static Balance)  progressed to CGA with cues for waking up and attending to task; very lethargic  -     Row Name 05/04/20 1601          Static Standing Balance    Level of [place] (Supported Standing, Static Balance)  moderate assist, 50 to 74% patient effort;2 person assist  -LS     Time Able to Maintain Position (Supported Standing, Static Balance)  15 to 30 seconds  -       User Key  (r) = Recorded By, (t) = Taken By, (c) = Cosigned By    Initials Name Provider Type    Savanah Neri, PT Physical Therapist        Goals/Plan    No documentation.       Clinical Impression     Row Name 05/04/20 1602          Pain Assessment    Additional Documentation  Pain Scale: FACES Pre/Post-Treatment (Group)  -     Row Name 05/04/20 1602          Pain Scale: Numbers Pre/Post-Treatment    Pain Location  groin  -LS     Pain Intervention(s)  Repositioned;Ambulation/increased activity  -     Row Name 05/04/20 1602          Pain Scale: FACES Pre/Post-Treatment    Pain: FACES Scale, Pretreatment  2-->hurts little bit  -     Pain: FACES Scale, Post-Treatment  2-->hurts little bit  -     Row Name 05/04/20 1602          Plan of Care Review    Plan of Care Reviewed With  patient  -LS     Progress  improving  -LS     Outcome Summary  Pt demonstrated ability to progress to STS at and small  sidesteps at EOB with max x2 A; cont to be limited by weakness/lethargy, requiring constant cues for attending to task. Will cont PT POC.   -LS     Row Name 05/04/20 1602          Vital Signs    Pre Systolic BP Rehab  102  -LS     Pre Treatment Diastolic BP  55  -LS     Pretreatment Heart Rate (beats/min)  87  -LS     Posttreatment Heart Rate (beats/min)  89  -LS     Pre SpO2 (%)  94  -LS     O2 Delivery Intra Treatment  nasal cannula  -LS     Post SpO2 (%)  96  -LS     O2 Delivery Post Treatment  nasal cannula  -LS     Pre Patient Position  Supine  -LS     Intra Patient Position  Standing  -LS     Post Patient Position  Supine  -LS     Row Name 05/04/20 1602          Positioning and Restraints    Pre-Treatment Position  in bed  -LS     Post Treatment Position  bed  -LS     In Bed  notified nsg;fowlers;call light within reach;encouraged to call for assist;with OT;RUE elevated;LUE elevated;legs elevated;heels elevated  -LS     Restraints  released:;notified nsg:;soft limb with OT who verbalized intention to replace   -LS       User Key  (r) = Recorded By, (t) = Taken By, (c) = Cosigned By    Initials Name Provider Type    Savanah Neri, PT Physical Therapist        Outcome Measures     Row Name 05/04/20 1604          How much help from another person do you currently need...    Turning from your back to your side while in flat bed without using bedrails?  2  -LS     Moving from lying on back to sitting on the side of a flat bed without bedrails?  2  -LS     Moving to and from a bed to a chair (including a wheelchair)?  2  -LS     Standing up from a chair using your arms (e.g., wheelchair, bedside chair)?  2  -LS     Climbing 3-5 steps with a railing?  1  -LS     To walk in hospital room?  2  -LS     AM-PAC 6 Clicks Score (PT)  11  -LS       User Key  (r) = Recorded By, (t) = Taken By, (c) = Cosigned By    Initials Name Provider Type    Savanah Neri, PT Physical Therapist          PT Recommendation and  Plan  Planned Therapy Interventions (PT Eval): balance training, bed mobility training, gait training, home exercise program, patient/family education, strengthening, transfer training, stair training  Outcome Summary/Treatment Plan (PT)  Anticipated Discharge Disposition (PT): inpatient rehabilitation facility  Plan of Care Reviewed With: patient  Progress: improving  Outcome Summary: Pt demonstrated ability to progress to STS at and small sidesteps at EOB with max x2 A; cont to be limited by weakness/lethargy, requiring constant cues for attending to task. Will cont PT POC.      Time Calculation:   PT Charges     Row Name 05/04/20 1605             Time Calculation    Start Time  1059  -LS      PT Received On  05/04/20  -         Time Calculation- PT    Total Timed Code Minutes- PT  17 minute(s)  -LS         Timed Charges    99995 - PT Therapeutic Exercise Minutes  5  -LS      12353 - PT Therapeutic Activity Minutes  12  -LS        User Key  (r) = Recorded By, (t) = Taken By, (c) = Cosigned By    Initials Name Provider Type    LS Savanah Matthews, PT Physical Therapist        Therapy Charges for Today     Code Description Service Date Service Provider Modifiers Qty    88201446456  PT THERAPEUTIC ACT EA 15 MIN 5/4/2020 Savanah Matthews, PT GP 1          PT G-Codes  Outcome Measure Options: AM-PAC 6 Clicks Daily Activity (OT)  AM-PAC 6 Clicks Score (PT): 11  AM-PAC 6 Clicks Score (OT): 7    Savanah Matthews, PT  5/4/2020

## 2020-05-05 ENCOUNTER — APPOINTMENT (OUTPATIENT)
Dept: GENERAL RADIOLOGY | Facility: HOSPITAL | Age: 25
End: 2020-05-05

## 2020-05-05 LAB
ALBUMIN SERPL-MCNC: 1.8 G/DL (ref 3.5–5.2)
ALBUMIN/GLOB SERPL: 0.6 G/DL
ALP SERPL-CCNC: 153 U/L (ref 39–117)
ALT SERPL W P-5'-P-CCNC: 77 U/L (ref 1–41)
ANION GAP SERPL CALCULATED.3IONS-SCNC: 14 MMOL/L (ref 5–15)
AST SERPL-CCNC: 91 U/L (ref 1–40)
BASOPHILS # BLD AUTO: 0.09 10*3/MM3 (ref 0–0.2)
BASOPHILS NFR BLD AUTO: 0.3 % (ref 0–1.5)
BILIRUB SERPL-MCNC: 5.2 MG/DL (ref 0.2–1.2)
BUN BLD-MCNC: 33 MG/DL (ref 6–20)
BUN/CREAT SERPL: 56.9 (ref 7–25)
CALCIUM SPEC-SCNC: 7.5 MG/DL (ref 8.6–10.5)
CHLORIDE SERPL-SCNC: 109 MMOL/L (ref 98–107)
CLUMPED PLATELETS: PRESENT
CO2 SERPL-SCNC: 20 MMOL/L (ref 22–29)
CREAT BLD-MCNC: 0.58 MG/DL (ref 0.76–1.27)
DEPRECATED RDW RBC AUTO: 90.2 FL (ref 37–54)
EOSINOPHIL # BLD AUTO: 0.28 10*3/MM3 (ref 0–0.4)
EOSINOPHIL NFR BLD AUTO: 0.9 % (ref 0.3–6.2)
ERYTHROCYTE [DISTWIDTH] IN BLOOD BY AUTOMATED COUNT: 23.3 % (ref 12.3–15.4)
GFR SERPL CREATININE-BSD FRML MDRD: >150 ML/MIN/1.73
GLOBULIN UR ELPH-MCNC: 3.1 GM/DL
GLUCOSE BLD-MCNC: 150 MG/DL (ref 65–99)
GLUCOSE BLDC GLUCOMTR-MCNC: 108 MG/DL (ref 70–130)
GLUCOSE BLDC GLUCOMTR-MCNC: 127 MG/DL (ref 70–130)
GLUCOSE BLDC GLUCOMTR-MCNC: 140 MG/DL (ref 70–130)
GLUCOSE BLDC GLUCOMTR-MCNC: 181 MG/DL (ref 70–130)
GLUCOSE BLDC GLUCOMTR-MCNC: 45 MG/DL (ref 70–130)
GLUCOSE BLDC GLUCOMTR-MCNC: 67 MG/DL (ref 70–130)
HCT VFR BLD AUTO: 28.5 % (ref 37.5–51)
HGB BLD-MCNC: 9.1 G/DL (ref 13–17.7)
IMM GRANULOCYTES # BLD AUTO: 0.39 10*3/MM3 (ref 0–0.05)
IMM GRANULOCYTES NFR BLD AUTO: 1.2 % (ref 0–0.5)
INR PPP: 1.84 (ref 0.85–1.16)
LYMPHOCYTES # BLD AUTO: 2.18 10*3/MM3 (ref 0.7–3.1)
LYMPHOCYTES NFR BLD AUTO: 7 % (ref 19.6–45.3)
MAGNESIUM SERPL-MCNC: 1.8 MG/DL (ref 1.6–2.6)
MCH RBC QN AUTO: 35 PG (ref 26.6–33)
MCHC RBC AUTO-ENTMCNC: 31.9 G/DL (ref 31.5–35.7)
MCV RBC AUTO: 109.6 FL (ref 79–97)
MONOCYTES # BLD AUTO: 3.26 10*3/MM3 (ref 0.1–0.9)
MONOCYTES NFR BLD AUTO: 10.4 % (ref 5–12)
NEUTROPHILS # BLD AUTO: 25.16 10*3/MM3 (ref 1.7–7)
NEUTROPHILS NFR BLD AUTO: 80.2 % (ref 42.7–76)
NRBC BLD AUTO-RTO: 0 /100 WBC (ref 0–0.2)
PHOSPHATE SERPL-MCNC: 2.7 MG/DL (ref 2.5–4.5)
PLATELET # BLD AUTO: 253 10*3/MM3 (ref 140–450)
PMV BLD AUTO: 11.7 FL (ref 6–12)
POTASSIUM BLD-SCNC: 4.1 MMOL/L (ref 3.5–5.2)
PROCALCITONIN SERPL-MCNC: 1.31 NG/ML (ref 0.1–0.25)
PROT SERPL-MCNC: 4.9 G/DL (ref 6–8.5)
PROTHROMBIN TIME: 20.9 SECONDS (ref 11.5–14)
RBC # BLD AUTO: 2.6 10*6/MM3 (ref 4.14–5.8)
RBC MORPH BLD: NORMAL
SODIUM BLD-SCNC: 143 MMOL/L (ref 136–145)
WBC MORPH BLD: NORMAL
WBC NRBC COR # BLD: 31.36 10*3/MM3 (ref 3.4–10.8)

## 2020-05-05 PROCEDURE — 82962 GLUCOSE BLOOD TEST: CPT

## 2020-05-05 PROCEDURE — 97530 THERAPEUTIC ACTIVITIES: CPT

## 2020-05-05 PROCEDURE — 25010000003 MAGNESIUM SULFATE 4 GM/100ML SOLUTION: Performed by: INTERNAL MEDICINE

## 2020-05-05 PROCEDURE — 25010000002 LEVOFLOXACIN PER 250 MG: Performed by: INTERNAL MEDICINE

## 2020-05-05 PROCEDURE — 84100 ASSAY OF PHOSPHORUS: CPT | Performed by: INTERNAL MEDICINE

## 2020-05-05 PROCEDURE — 94799 UNLISTED PULMONARY SVC/PX: CPT

## 2020-05-05 PROCEDURE — 25010000002 VITAMIN K1 PER 1 MG: Performed by: INTERNAL MEDICINE

## 2020-05-05 PROCEDURE — 85007 BL SMEAR W/DIFF WBC COUNT: CPT | Performed by: INTERNAL MEDICINE

## 2020-05-05 PROCEDURE — 25010000002 LORAZEPAM PER 2 MG: Performed by: INTERNAL MEDICINE

## 2020-05-05 PROCEDURE — 83735 ASSAY OF MAGNESIUM: CPT | Performed by: INTERNAL MEDICINE

## 2020-05-05 PROCEDURE — 80053 COMPREHEN METABOLIC PANEL: CPT | Performed by: INTERNAL MEDICINE

## 2020-05-05 PROCEDURE — 85025 COMPLETE CBC W/AUTO DIFF WBC: CPT | Performed by: INTERNAL MEDICINE

## 2020-05-05 PROCEDURE — 85610 PROTHROMBIN TIME: CPT | Performed by: INTERNAL MEDICINE

## 2020-05-05 PROCEDURE — 99291 CRITICAL CARE FIRST HOUR: CPT | Performed by: INTERNAL MEDICINE

## 2020-05-05 PROCEDURE — 99232 SBSQ HOSP IP/OBS MODERATE 35: CPT | Performed by: INTERNAL MEDICINE

## 2020-05-05 PROCEDURE — 25010000002 CHLOROTHIAZIDE PER 500 MG: Performed by: INTERNAL MEDICINE

## 2020-05-05 PROCEDURE — 94660 CPAP INITIATION&MGMT: CPT

## 2020-05-05 PROCEDURE — 71045 X-RAY EXAM CHEST 1 VIEW: CPT

## 2020-05-05 PROCEDURE — 92610 EVALUATE SWALLOWING FUNCTION: CPT

## 2020-05-05 PROCEDURE — 25010000002 ENOXAPARIN PER 10 MG: Performed by: INTERNAL MEDICINE

## 2020-05-05 PROCEDURE — 25010000002 FUROSEMIDE PER 20 MG: Performed by: INTERNAL MEDICINE

## 2020-05-05 PROCEDURE — 63710000001 INSULIN DETEMIR PER 5 UNITS: Performed by: INTERNAL MEDICINE

## 2020-05-05 PROCEDURE — 84145 PROCALCITONIN (PCT): CPT | Performed by: INTERNAL MEDICINE

## 2020-05-05 RX ORDER — CHLOROTHIAZIDE SODIUM 500 MG/1
500 INJECTION INTRAVENOUS EVERY 12 HOURS
Status: COMPLETED | OUTPATIENT
Start: 2020-05-05 | End: 2020-05-06

## 2020-05-05 RX ORDER — FUROSEMIDE 10 MG/ML
40 INJECTION INTRAMUSCULAR; INTRAVENOUS EVERY 8 HOURS
Status: DISCONTINUED | OUTPATIENT
Start: 2020-05-05 | End: 2020-05-06

## 2020-05-05 RX ORDER — DEXTROSE MONOHYDRATE 25 G/50ML
INJECTION, SOLUTION INTRAVENOUS
Status: COMPLETED
Start: 2020-05-05 | End: 2020-05-05

## 2020-05-05 RX ORDER — POTASSIUM CHLORIDE 1.5 G/1.77G
20 POWDER, FOR SOLUTION ORAL EVERY 8 HOURS
Status: COMPLETED | OUTPATIENT
Start: 2020-05-05 | End: 2020-05-06

## 2020-05-05 RX ADMIN — SPIRONOLACTONE 25 MG: 25 TABLET ORAL at 11:09

## 2020-05-05 RX ADMIN — Medication 30 ML: at 11:10

## 2020-05-05 RX ADMIN — PHYTONADIONE 10 MG: 1 INJECTION, EMULSION INTRAMUSCULAR; INTRAVENOUS; SUBCUTANEOUS at 11:15

## 2020-05-05 RX ADMIN — INSULIN HUMAN 5 UNITS: 100 INJECTION, SOLUTION PARENTERAL at 06:07

## 2020-05-05 RX ADMIN — LACTULOSE 20 G: 20 SOLUTION ORAL at 20:18

## 2020-05-05 RX ADMIN — DEXMEDETOMIDINE HYDROCHLORIDE 1.2 MCG/KG/HR: 4 INJECTION, SOLUTION INTRAVENOUS at 23:08

## 2020-05-05 RX ADMIN — MULTIVITAMIN 15 ML: LIQUID ORAL at 11:15

## 2020-05-05 RX ADMIN — THIAMINE HCL TAB 100 MG 100 MG: 100 TAB at 11:09

## 2020-05-05 RX ADMIN — INSULIN HUMAN 2 UNITS: 100 INJECTION, SOLUTION PARENTERAL at 00:25

## 2020-05-05 RX ADMIN — PANTOPRAZOLE SODIUM 40 MG: 40 INJECTION, POWDER, FOR SOLUTION INTRAVENOUS at 06:07

## 2020-05-05 RX ADMIN — ACETAMINOPHEN 650 MG: 325 TABLET, FILM COATED ORAL at 16:59

## 2020-05-05 RX ADMIN — CHLOROTHIAZIDE SODIUM 500 MG: 500 INJECTION, POWDER, LYOPHILIZED, FOR SOLUTION INTRAVENOUS at 15:40

## 2020-05-05 RX ADMIN — INSULIN DETEMIR 15 UNITS: 100 INJECTION, SOLUTION SUBCUTANEOUS at 11:23

## 2020-05-05 RX ADMIN — DEXTROSE 50 % IN WATER (D50W) INTRAVENOUS SYRINGE 50 ML: at 23:08

## 2020-05-05 RX ADMIN — RIFAXIMIN 550 MG: 550 TABLET ORAL at 20:18

## 2020-05-05 RX ADMIN — RIFAXIMIN 550 MG: 550 TABLET ORAL at 11:09

## 2020-05-05 RX ADMIN — INSULIN HUMAN 5 UNITS: 100 INJECTION, SOLUTION PARENTERAL at 00:25

## 2020-05-05 RX ADMIN — LORAZEPAM 1 MG: 2 INJECTION INTRAMUSCULAR; INTRAVENOUS at 04:30

## 2020-05-05 RX ADMIN — POTASSIUM CHLORIDE 20 MEQ: 1.5 POWDER, FOR SOLUTION ORAL at 22:53

## 2020-05-05 RX ADMIN — LEVOFLOXACIN 500 MG: 5 INJECTION, SOLUTION INTRAVENOUS at 15:37

## 2020-05-05 RX ADMIN — DEXMEDETOMIDINE HYDROCHLORIDE 1.2 MCG/KG/HR: 4 INJECTION, SOLUTION INTRAVENOUS at 06:44

## 2020-05-05 RX ADMIN — LACTULOSE 20 G: 20 SOLUTION ORAL at 11:04

## 2020-05-05 RX ADMIN — INSULIN HUMAN 2 UNITS: 100 INJECTION, SOLUTION PARENTERAL at 06:08

## 2020-05-05 RX ADMIN — WATER 300 ML: 1 IRRIGANT IRRIGATION at 17:39

## 2020-05-05 RX ADMIN — INSULIN HUMAN 5 UNITS: 100 INJECTION, SOLUTION PARENTERAL at 13:06

## 2020-05-05 RX ADMIN — MAGNESIUM SULFATE HEPTAHYDRATE 4 G: 40 INJECTION, SOLUTION INTRAVENOUS at 06:39

## 2020-05-05 RX ADMIN — FOLIC ACID 1 MG: 1 TABLET ORAL at 11:09

## 2020-05-05 RX ADMIN — DEXMEDETOMIDINE HYDROCHLORIDE 0.7 MCG/KG/HR: 4 INJECTION, SOLUTION INTRAVENOUS at 17:31

## 2020-05-05 RX ADMIN — ENOXAPARIN SODIUM 40 MG: 40 INJECTION SUBCUTANEOUS at 11:04

## 2020-05-05 RX ADMIN — DEXMEDETOMIDINE HYDROCHLORIDE 0.7 MCG/KG/HR: 4 INJECTION, SOLUTION INTRAVENOUS at 11:33

## 2020-05-05 RX ADMIN — FUROSEMIDE 40 MG: 10 INJECTION, SOLUTION INTRAMUSCULAR; INTRAVENOUS at 20:18

## 2020-05-05 RX ADMIN — Medication 50 MG: at 11:09

## 2020-05-05 RX ADMIN — POTASSIUM CHLORIDE 20 MEQ: 1.5 POWDER, FOR SOLUTION ORAL at 15:36

## 2020-05-05 RX ADMIN — INSULIN HUMAN 5 UNITS: 100 INJECTION, SOLUTION PARENTERAL at 18:14

## 2020-05-05 RX ADMIN — Medication 30 ML: at 20:18

## 2020-05-05 RX ADMIN — FUROSEMIDE 40 MG: 10 INJECTION, SOLUTION INTRAMUSCULAR; INTRAVENOUS at 11:05

## 2020-05-05 RX ADMIN — DEXMEDETOMIDINE HYDROCHLORIDE 1.4 MCG/KG/HR: 4 INJECTION, SOLUTION INTRAVENOUS at 04:09

## 2020-05-05 NOTE — PROGRESS NOTES
INTENSIVIST / PULMONARY FOLLOW UP NOTE     Hospital:  LOS: 12 days   Mr. John Varma, 24 y.o. 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          SUBJECTIVE   Remains somnolent, responds to voice    The patient's relevant past medical, surgical, family, and social history were reviewed    Allergies and medications were reviewed    ROS:  Per subjective, all other systems were reviewed and were negative        OBJECTIVE     Vital Sign Min/Max for last 24 hours:  Temp  Min: 98.1 °F (36.7 °C)  Max: 99.6 °F (37.6 °C)   BP  Min: 89/49  Max: 133/78   Pulse  Min: 82  Max: 134   Resp  Min: 16  Max: 36   SpO2  Min: 91 %  Max: 100 %   No data recorded     Physical Exam:  General Appearance:  sleepy, in no acute distress  Eyes:  No scleral icterus or pallor, pupils normal  Ears, Nose, Mouth, Throat:  Atraumatic, oropharynx clear  Neck:  Trachea midline, thyroid normal  Respiratory:  Clear to auscultation bilaterally, normal effort, no tenderness to palpation  Cardiovascular:  Regular rate and rhythm, no murmurs, no peripheral edema, no thrill  Gastrointestinal:  Distended, non-tender  Skin:  Normal temperature, no rash  Psychiatric:  Responds to voice and pain, no agitation  Neuro:  No new focal neurologic deficits observed    Telemetry:              Hemodynamics:   CVP:     PAP:     PAOP:     CO:     CI:     SVI:     SVR:       SpO2: 99 % SpO2  Min: 91 %  Max: 100 %   Device:      Flow Rate:   No data recorded       Intake/Ouptut 24 hrs (7:00AM - 6:59 AM)  Intake & Output (last 3  days)       05/02 0701 - 05/03 0700 05/03 0701 - 05/04 0700 05/04 0701 - 05/05 0700 05/05 0701 - 05/06 0700    I.V. (mL/kg) 1461.4 (18.2) 524.6 (7) 2166.9 (28.3)     Blood  331      Other 427 685 827 241    NG/GT 1680 1680 1591 538    IV Piggyback  100 100     Total Intake(mL/kg) 3568.4 (44.4) 3320.6 (44.2) 4684.9 (61.1) 779 (10.2)    Urine (mL/kg/hr) 1625 (0.8) 1550 (0.9) 2975 (1.6) 700 (1)    Emesis/NG output  0      Total Output 1625 1550 2975 700    Net +1943.4 +1770.6 +1709.9 +79            Emesis Unmeasured Occurrence  1 x            Lines, Drains & Airways    Active LDAs     Name:   Placement date:   Placement time:   Site:   Days:    PICC Triple Lumen 04/28/20 Right Basilic   04/28/20    1116    Basilic   5    NG/OG Tube Nasogastric 16 Fr Right nostril   04/28/20    0430    Right nostril   6    Urethral Catheter Temperature probe   04/25/20    1200     8                Hematology:  Results from last 7 days   Lab Units 05/05/20  0318 05/04/20  0414 05/03/20  1048 05/03/20  0216 05/01/20  0421 04/30/20  0517 04/29/20  2356  04/29/20  0511   WBC 10*3/mm3 31.36* 32.33*  --   --  29.16* 29.97*  --   --  26.10*   HEMOGLOBIN g/dL 9.1* 9.0* 9.2* 6.9* 7.3* 7.5* 7.5*   < > 8.8*   HEMATOCRIT % 28.5* 28.0* 27.3* 22.1* 23.1* 22.9* 23.0*   < > 26.7*   PLATELETS 10*3/mm3 253 252  --   --  256 275  --   --  254    < > = values in this interval not displayed.     Electrolytes, Magnesium and Phosphorus:  Results from last 7 days   Lab Units 05/05/20 0318 05/04/20  0414 05/03/20  0216 05/02/20  0442 05/01/20  0421 04/30/20  0517 04/29/20  0511   SODIUM mmol/L 143 147* 149* 149* 147* 144 146*   CHLORIDE mmol/L 109* 115* 119* 119* 118* 115* 117*   POTASSIUM mmol/L 4.1 4.9 4.4 4.6 4.3 4.7 3.9   CO2 mmol/L 20.0* 22.0 21.0* 21.0* 17.0* 19.0* 19.0*   MAGNESIUM mg/dL 1.8 2.1 2.0 1.9 2.1 2.3 2.1   PHOSPHORUS mg/dL 2.7 2.8 2.3* 2.8 3.2 4.5 3.5     Renal:  Results from last 7 days   Lab Units 05/05/20 0318 05/04/20  0414  05/03/20  0216 05/02/20  0442 05/01/20  0421 04/30/20  0517 04/29/20  0511   CREATININE mg/dL 0.58* 0.61* 0.57* 0.53* 0.43* 0.61* 0.46*   BUN mg/dL 33* 32* 28* 28* 36* 49* 35*     Estimated Creatinine Clearance: 211.2 mL/min (A) (by C-G formula based on SCr of 0.58 mg/dL (L)).  Hepatic:  Results from last 7 days   Lab Units 05/05/20  0318 05/04/20  0414 05/02/20  0442 04/29/20  0511   ALK PHOS U/L 153* 199* 180* 126*   BILIRUBIN mg/dL 5.2* 5.8* 4.8* 7.1*   BILIRUBIN DIRECT mg/dL  --  3.3*  --   --    ALT (SGPT) U/L 77* 89* 126* 112*   AST (SGOT) U/L 91* 93* 148* 221*     Arterial Blood Gases:  Results from last 7 days   Lab Units 05/03/20  0115 05/01/20  1016 04/30/20  0347 04/29/20  2006 04/29/20  1648 04/29/20  1424   PH, ARTERIAL pH units 7.561* 7.499* 7.484* 7.449 7.458* 7.426   PCO2, ARTERIAL mm Hg 23.4* 27.1* 27.1* 30.1* 28.9* 31.5*   PO2 ART mm Hg 188.0* 84.8 64.4* 67.2* 59.1* 78.9*   FIO2 % 60 40 50 50 50 100       Results from last 7 days   Lab Units 05/01/20  0421   HEMOGLOBIN A1C % 4.30*       Lab Results   Component Value Date    LACTATE 2.0 05/03/2020       Relevant imaging studies and labs from 05/05/20 were reviewed and interpreted by me    Medications (drips):    dexmedetomidine Last Rate: 0.7 mcg/kg/hr (05/05/20 1133)         chlorothiazide 500 mg Intravenous Q12H   enoxaparin 40 mg Subcutaneous Q24H   folic acid 1 mg Oral Daily   furosemide 40 mg Intravenous Q8H   insulin detemir 15 Units Subcutaneous Daily   insulin regular 0-7 Units Subcutaneous Q6H   insulin regular 5 Units Subcutaneous Q6H   lactulose 20 g Nasogastric BID   lactulose enema 300 mL Rectal Once   levoFLOXacin 500 mg Intravenous Q24H   multivitamin and minerals 15 mL Oral Daily   pantoprazole 40 mg Intravenous Q AM   potassium chloride 20 mEq Oral Q8H   PRO-STAT 30 mL Nasogastric BID   rifAXIMin 550 mg Nasogastric Q12H   spironolactone 25 mg Nasogastric Daily   thiamine 100 mg Oral Daily   vitamin K1 10 mg Oral Daily   zinc  gluconate 50 mg Oral Daily       Assessment/Plan   IMPRESSION / PLAN     Inpatient Problem List:  24 y.o.male:  Active Hospital Problems    Diagnosis   • **Acute variceal GI bleeding   • 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   • Sepsis with acute renal failure without septic shock (CMS/HCC)   • Acute respiratory failure with hypoxia (CMS/HCC)   • Alcohol withdrawal delirium (CMS/HCC)   • Hepatic encephalopathy (CMS/HCC)   • Ascites due to alcoholic hepatitis   • Pleural effusion on left   • Acute alcoholic hepatitis   • BORIS (acute kidney injury) (CMS/HCC)   • Bandemia   • Acute upper GI bleeding        Impression:  24 y.o.male with relevant PMH of alcoholism / alcoholic cirrhosis, MJ abuse, Tobacco abuse admitted 4/23/2020 with alcoholic hepatitis, decompensated cirrhosis, BORIS, Hepatic Encephalopathy, and hematemesis secondary to variceal bleed s/p EGD 4/24/20.  Patient was intubated for EGD and extubated on 4/27.  Underwent TIPS 4/29 (intubated, extubated again 5/1).  Has had paracentesis x 2 (4/24 - 5 liters, 4/30 2.5 liters).  Being treated for SBP w/ Levaquin (previoulsy on Azactam x 7 days / PCN allergy).    Additional ongoing issues include refractory ascites, volume overload, and hepatic encephalopathy.  Patient may have aspirated tube feeds overnight.    Plan:  PSE - on rifaxamin, add lactulose.  Wean dex.  Prn ativan.  Thiamin, folate, MVI, Zinc.  Give lactulose enema.    Hypernatremia - increase D5W    Refractory Ascites / Volume Overload - continue diuresis as tolerated    Stress Hyperglycemia - increased levemir    Coagulopathy - po vitamin k 10 mg x 3 doses    Variceal Bleed / TIPS / Cirrhosis / Alcoholic Hepatitis / SBP - per GI    DVT / PUD prophylaxis    Nutrition - Tube Feeds    Plan of care and goals reviewed with mulitdisciplinary team at daily rounds    D/w family on phone    Critical Care time spent in direct patient care: 30 minutes (excluding procedure time, if  applicable) including high complexity decision making to assess, manipulate, and support vital organ system failure in this individual who has impairment of one or more vital organ systems such that there is a high probability of imminent or life threatening deterioration in the patient’s condition.       Valerio Emmanuel MD  Intensive Care Medicine  05/05/20 15:51

## 2020-05-05 NOTE — PROGRESS NOTES
"GI Daily Progress Note  Subjective     Bob Guevara is a 24 y.o. male who was admitted with Acute GI bleeding.   Off Vent.  Having hallucinations last night.  Back on Precedex.  Not enough ascites yesterday to do paracentesis  Chief Complaint:  GIB    Objective     /65   Pulse 90   Temp 99.6 °F (37.6 °C) (Oral)   Resp (!) 36   Ht 172.7 cm (68\")   Wt 76.7 kg (169 lb 1.5 oz)   SpO2 92%   BMI 25.71 kg/m²     Intake/Output last 3 shifts:  I/O last 3 completed shifts:  In: 6297.5 [I.V.:2453.5; Other:1292; NG/GT:2452; IV Piggyback:100]  Out: 3875 [Urine:3875]  Intake/Output this shift:  No intake/output data recorded.      Physical Exam  Wt Readings from Last 3 Encounters:   05/05/20 76.7 kg (169 lb 1.5 oz)   ,body mass index is 25.71 kg/m².,@FLOWAMB(6)@,@FLOWAMB(5)@,@FLOWAMB(8)@   CONSTITUTIONAL:lying in bed.  Arouses but doesn't respond  Resp CTA; no rhonchi, rales, or wheezes.  Respiration effort normal  CV RRR; no M/R/G. 2+ lower extremity edema  GI Abd soft, NT, distended, normal active bowel sounds.    Psych: Awake but sedated    DATA:Results for BOB GUEVARA (MRN [id number]) as of 5/5/2020 08:51   Ref. Range 5/5/2020 03:18   Glucose Latest Ref Range: 65 - 99 mg/dL 150 (H)   Sodium Latest Ref Range: 136 - 145 mmol/L 143   Potassium Latest Ref Range: 3.5 - 5.2 mmol/L 4.1   CO2 Latest Ref Range: 22.0 - 29.0 mmol/L 20.0 (L)   Chloride Latest Ref Range: 98 - 107 mmol/L 109 (H)   Anion Gap Latest Ref Range: 5.0 - 15.0 mmol/L 14.0   Creatinine Latest Ref Range: 0.76 - 1.27 mg/dL 0.58 (L)   BUN Latest Ref Range: 6 - 20 mg/dL 33 (H)   BUN/Creatinine Ratio Latest Ref Range: 7.0 - 25.0  56.9 (H)   Calcium Latest Ref Range: 8.6 - 10.5 mg/dL 7.5 (L)   eGFR Non African Am Latest Ref Range: >60 mL/min/1.73 >150   Alkaline Phosphatase Latest Ref Range: 39 - 117 U/L 153 (H)   Total Protein Latest Ref Range: 6.0 - 8.5 g/dL 4.9 (L)   ALT (SGPT) Latest Ref Range: 1 - 41 U/L 77 (H)   AST (SGOT) Latest Ref Range: 1 " - 40 U/L 91 (H)   Total Bilirubin Latest Ref Range: 0.2 - 1.2 mg/dL 5.2 (H)   Albumin Latest Ref Range: 3.50 - 5.20 g/dL 1.80 (L)   Globulin Latest Units: gm/dL 3.1   A/G Ratio Latest Units: g/dL 0.6   Phosphorus Latest Ref Range: 2.5 - 4.5 mg/dL 2.7   Magnesium Latest Ref Range: 1.6 - 2.6 mg/dL 1.8   Procalcitonin Latest Ref Range: 0.10 - 0.25 ng/mL 1.31 (H)   Protime Latest Ref Range: 11.5 - 14.0 Seconds 20.9 (H)   INR Latest Ref Range: 0.85 - 1.16  1.84 (H)   WBC Latest Ref Range: 3.40 - 10.80 10*3/mm3 31.36 (C)   RBC Latest Ref Range: 4.14 - 5.80 10*6/mm3 2.60 (L)   Hemoglobin Latest Ref Range: 13.0 - 17.7 g/dL 9.1 (L)   Hematocrit Latest Ref Range: 37.5 - 51.0 % 28.5 (L)   Results for BOB GUEVARA (MRN [id number]) as of 5/5/2020 08:51   Ref. Range 5/3/2020 14:21 5/4/2020 04:14 5/5/2020 03:18   INR Latest Ref Range: 0.85 - 1.16  1.85 (H) 1.85 (H) 1.84 (H)       CT liver mass protocol - Pending    Assessment/Plan     1.  Acute alcoholic hepatitis  2.  Acute variceal bleed, status post TIPS  3.  BORIS  4.  Hepatic encephalopathy  5.  Alcohol withdrawal  6.  Nutrition  7. SBP  8.  Abnormal CT liver  9  Leukocytosis  10 pleural effusions  11,  SBP  On Levaquin    Await CT report.    Continue Lactulose and Xifaxin  Continue nutrition  ETOH hepatitis is improving  Continue diuresis  Not enough fluid to tap        Acute variceal GI bleeding    Acute alcoholic hepatitis    BORIS (acute kidney injury) (CMS/HCC)    Bandemia    Acute upper GI bleeding    Sepsis with acute renal failure without septic shock (CMS/HCC)    Acute respiratory failure with hypoxia (CMS/HCC)    Alcohol withdrawal delirium (CMS/HCC)    Hepatic encephalopathy (CMS/HCC)    Ascites due to alcoholic hepatitis    Pleural effusion on left    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       LOS: 12 days     Kaz Arellano MD  05/05/20  08:48

## 2020-05-05 NOTE — PLAN OF CARE
Problem: Patient Care Overview  Goal: Plan of Care Review  Outcome: Ongoing (interventions implemented as appropriate)  Flowsheets (Taken 5/5/2020 3552)  Progress: improving  Plan of Care Reviewed With: patient  Outcome Summary: Pt performed STS transfer and SPT from bed to chair with maxA x2 and B UE support. Pt required modA for static sitting balance at EOB. Pt's mobility limited by significant weakness and impaired coordination; pt confused with periods of difficulty following commands. Continue to progress as appropriate.

## 2020-05-05 NOTE — NURSING NOTE
Daily low linda report and screen completed. No identifiable issues or concerns. All interventions are in place. Contact [facility] nurse if needs arise.     Thanks

## 2020-05-05 NOTE — THERAPY RE-EVALUATION
Acute Care - Speech Language Pathology   Swallow Re-Evaluation [facility]   Clinical Swallow Evaluation     Patient Name: John Varma  : 1995  MRN: [id number]  Today's Date: 2020               Admit Date: 2020    Visit Dx:     ICD-10-CM ICD-9-CM   1. Sepsis with acute liver failure without hepatic coma or septic shock, due to unspecified organism (CMS/HCC) A41.9 038.9    R65.20 995.92    K72.00 570   2. Hypoxia R09.02 799.02   3. Acute renal failure, unspecified acute renal failure type (CMS/HCC) N17.9 584.9   4. Acute hepatitis B17.9 570   5. Alcohol abuse F10.10 305.00   6. Acute upper GI bleed K92.2 578.9   7. Elevated lactic acid level R79.89 276.2   8. Ascites due to alcoholic hepatitis K70.11 789.59   9. Leukocytosis, unspecified type D72.829 288.60   10. Sepsis with acute renal failure without septic shock, due to unspecified organism, unspecified acute renal failure type (CMS/HCC) A41.9 038.9    R65.20 995.92    N17.9 584.9   11. Dysphagia, unspecified type R13.10 787.20     Patient Active Problem List   Diagnosis   • Acute alcoholic hepatitis   • Acute variceal GI bleeding   • BORIS (acute kidney injury) (CMS/HCC)   • Bandemia   • Acute upper GI bleeding   • Sepsis with acute renal failure without septic shock (CMS/HCC)   • Acute respiratory failure with hypoxia (CMS/HCC)   • Alcohol withdrawal delirium (CMS/HCC)   • Hepatic encephalopathy (CMS/HCC)   • Ascites due to alcoholic hepatitis   • Pleural effusion on left   • S/P TIPS (transjugular intrahepatic portosystemic shunt) 2020     Past Medical History:   Diagnosis Date   • Alcohol abuse    • GERD (gastroesophageal reflux disease)    • GIB (gastrointestinal bleeding)    • Tobacco abuse      Past Surgical History:   Procedure Laterality Date   • ARM WOUND REPAIR / CLOSURE     • ENDOSCOPY N/A 2020    Procedure: ESOPHAGOGASTRODUODENOSCOPY;  Surgeon: Brunner, Mark I, MD;  Location: [facility] ENDOSCOPY;  Service: Gastroenterology;   Laterality: N/A;        SWALLOW EVALUATION (last 72 hours)      SLP Adult Swallow Evaluation     Row Name 05/05/20 1200 05/04/20 1015                Rehab Evaluation    Document Type  re-evaluation  -AC  re-evaluation  -MP       Subjective Information  no complaints  -AC  no complaints  -MP       Patient Observations  alert;cooperative  -AC  alert;cooperative initially lethargic, but more alert t/o eval  -MP       Patient/Family Observations  Pt intermittently confused. No family present.  -AC  Pt in soft wrist restraints. No family present  -MP       Patient Effort  good  -AC  good  -MP          General Information    Patient Profile Reviewed  yes  -AC  yes  -MP       Pertinent History Of Current Problem  See initial eval.  -AC  Adm 4/23 w/ acute variceal GI bleeding. Alcohol hepatitis, alcohol w/d. Intubated 4/24-4/27 & 4/29-5/1. Off of BiPAP this AM.  -MP       Current Method of Nutrition  NPO;nasogastric feedings  -AC  NPO;nasogastric feedings  -MP       Precautions/Limitations, Vision  --  WFL;other (see comments)  -MP       Precautions/Limitations, Hearing  --  WFL;for purposes of eval  -MP       Prior Level of Function-Communication  --  WFL  -MP       Prior Level of Function-Swallowing  --  no diet consistency restrictions  -MP       Plans/Goals Discussed with  patient;agreed upon  -AC  patient;agreed upon  -MP       Barriers to Rehab  medically complex  -AC  none identified  -       Patient's Goals for Discharge  return to PO diet  -AC  return to PO diet  -MP          Pain Assessment    Additional Documentation  --  Pain Scale: FACES Pre/Post-Treatment (Group)  -MP          Pain Scale: FACES Pre/Post-Treatment    Pain: FACES Scale, Pretreatment  0-->no hurt  -AC  0-->no hurt  -MP       Pain: FACES Scale, Post-Treatment  0-->no hurt  -AC  0-->no hurt  -MP          Oral Motor and Function    Dentition Assessment  natural, present and adequate  -AC  natural, present and adequate  -MP       Secretion  Management  WNL/WFL  -AC  WNL/WFL  -MP       Mucosal Quality  dry  -AC  moist, healthy  -MP          Oral Musculature and Cranial Nerve Assessment    Oral Motor General Assessment  generalized oral motor weakness  -AC  generalized oral motor weakness  -MP          General Eating/Swallowing Observations    Respiratory Support Currently in Use  nasal cannula  -AC  --       Eating/Swallowing Skills  fed by SLP  -AC  fed by SLP  -MP       Positioning During Eating  upright in bed  -AC  upright in bed  -MP       Utensils Used  spoon;cup  -AC  spoon  -MP       Consistencies Trialed  thin liquids;nectar/syrup-thick liquids;pureed  -AC  thin liquids;pureed  -MP          Respiratory    Respiratory Status  increase in respiratory rate;during swallowing/eating;other (see comments) and @ baseline  -AC  --          Clinical Swallow Eval    Oral Prep Phase  WFL  -AC  --  -MP       Oral Transit  WFL  -AC  impaired  -MP       Oral Residue  WFL  -AC  --       Pharyngeal Phase  suspected pharyngeal impairment  -AC  suspected pharyngeal impairment  -MP       Clinical Swallow Evaluation Summary  --  Clinical swallow re-evaluation completed. Pt given trials of ice chipx1, thin via tsp, and puree. Wet vocal quality & multiple swallows w/ small volumes of thin. Inconsistently clearing oral cavity w/ puree - required verbal cueing to swallow. Pt not yet appropriate for PO diet or instrumental eval. Rec continue NPO w/ NG. 3-4 ice chips/hr following oral care. SLP will f/u for re-eval.  -MP          Oral Transit Concerns    Oral Transit Concerns  --  delayed initiation of bolus transit;increased oral transit time  -MP       Delayed Intiation of Bolus Transit  --  pudding  -MP       Increased Oral Transit Time  --  pudding  -MP          Pharyngeal Phase Concerns    Pharyngeal Phase Concerns  cough;multiple swallows  -AC  wet vocal quality;multiple swallows  -MP       Wet Vocal Quality  --  thin  -MP       Multiple Swallows   thin;nectar;pudding  -AC  thin  -MP       Cough  nectar  -AC  --       Pharyngeal Phase Concerns, Comment  Oral phase seemingly WFL for consistencies trialed. DNT solid 2' aspiration risk.  Slow improvements, cont'd s/sxs aspiration.  -AC  --          Clinical Impression    SLP Swallowing Diagnosis  suspected pharyngeal dysfunction  -AC  oral dysfunction;suspected pharyngeal dysfunction  -MP       Functional Impact  risk of aspiration/pneumonia  -AC  risk of aspiration/pneumonia  -MP       Rehab Potential/Prognosis, Swallowing  good, to achieve stated therapy goals  -AC  good, to achieve stated therapy goals  -MP       Swallow Criteria for Skilled Therapeutic Interventions Met  demonstrates skilled criteria  -AC  demonstrates skilled criteria  -MP          Recommendations    Predicted Duration Therapy Intervention (Days)  until discharge  -AC  --       SLP Diet Recommendation  NPO;temporary alternate methods of nutrition/hydration;other (see comments) 4 icechips/hr after oral care w/ supervision-d/c if s/s asp  -AC  NPO;temporary alternate methods of nutrition/hydration;other (see comments) 3-4 ice chips/hr following oral care  -MP       Recommended Diagnostics  reassess via clinical swallow evaluation  -AC  reassess via clinical swallow evaluation  -MP       Recommended Precautions and Strategies  --  other (see comments) oral care BID/PRN  -MP       SLP Rec. for Method of Medication Administration  meds via alternate route  -AC  meds via alternate route  -MP       Anticipated Dischage Disposition  anticipate therapy at next level of care  -AC  unknown;anticipate therapy at next level of care  -MP         User Key  (r) = Recorded By, (t) = Taken By, (c) = Cosigned By    Initials Name Effective Dates    AC Rebekah Tsai MS CCC-SLP 07/27/17 -     Michael Stokes MS CCC-SLP 06/19/19 -           EDUCATION  The patient has been educated in the following areas:   Dysphagia (Swallowing Impairment) Oral  Care/Hydration NPO rationale.    SLP Recommendation and Plan  SLP Swallowing Diagnosis: suspected pharyngeal dysfunction  SLP Diet Recommendation: NPO, temporary alternate methods of nutrition/hydration, other (see comments)(4 icechips/hr after oral care w/ supervision-d/c if s/s asp)     SLP Rec. for Method of Medication Administration: meds via alternate route        Recommended Diagnostics: reassess via clinical swallow evaluation  Swallow Criteria for Skilled Therapeutic Interventions Met: demonstrates skilled criteria  Anticipated Dischage Disposition: anticipate therapy at next level of care  Rehab Potential/Prognosis, Swallowing: good, to achieve stated therapy goals     Predicted Duration Therapy Intervention (Days): until discharge       Plan of Care Reviewed With: patient           Time Calculation:   Time Calculation- SLP     Row Name 05/05/20 1431             Time Calculation- SLP    SLP Start Time  1200  -AC      SLP Received On  05/05/20  -        User Key  (r) = Recorded By, (t) = Taken By, (c) = Cosigned By    Initials Name Provider Type     Rebekah Tsai MS CCC-SLP Speech and Language Pathologist          Therapy Charges for Today     Code Description Service Date Service Provider Modifiers Qty    [id number]  ST EVAL ORAL PHARYNG SWALLOW 4 5/5/2020 Rebekah Tsai MS CCC-SLP GN 1               Rebekah Tsai MS CCC-SLP  5/5/2020

## 2020-05-05 NOTE — PLAN OF CARE
Problem: Patient Care Overview  Goal: Plan of Care Review  Flowsheets (Taken 5/5/2020 0501)  Outcome Summary: At beginning of shift, patient was calm and cooperative on Precedex @1. Conts to be confused to time, place, and situation. Lactulose given, hyperactive bowl sounds and flatus present, but no BM so far this shift. Right paracentesis site continues to have small amount serous leakage. Around 0200, patient began to have hallucinations, seeing a woman in the room with a gun. Patient was reoriented and assured of safety. Precedex increased. Approx 0400, patient became very anxious, thinking staff was trying to harm him, yelling he was going to die. At this time, patient was tachypneic (RR in 30's) and tachycardic (HR in 130's). CIWA scored 12. PRN ativan given and Precedex gtt increased. Patient appears more calm at this time. Critical WBC count called to APRN. Other VSS. WCM.

## 2020-05-05 NOTE — DISCHARGE PLACEMENT REQUEST
"John Varma (24 y.o. Male)     Dory Jane  [phone]    Date of Birth Social Security Number Address Home Phone MRN    1995  [id number] [facility] [place] [address] [phone] [id number]    Islam Marital Status          None Single       Admission Date Admission Type Admitting Provider Attending Provider Department, Room/Bed    4/23/20 Emergency Pavel Tan, Pavel Marks,  [facility] 2B ICU, N222/1    Discharge Date Discharge Disposition Discharge Destination                       Attending Provider:  Pavel Tan DO    Allergies:  Penicillins    Isolation:  None   Infection:  MRSA (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)   Code Status:  CPR    Ht:  172.7 cm (68\")   Wt:  76.7 kg (169 lb 1.5 oz)    Admission Cmt:  None   Principal Problem:  Acute variceal GI bleeding [K92.2]                 Active Insurance as of 4/23/2020     Primary Coverage     Payor Plan Insurance Group Employer/Plan Group    [employer] [employer] [place] [employer] [place]      Payor Plan Address Payor Plan Phone Number Payor Plan Fax Number Effective Dates    [address] [address]   10/1/2019 - None Entered    [place] [address]       Subscriber Name Subscriber Birth Date Member ID       JOHN VARMA 1995 [id number]                 Emergency Contacts      (Rel.) Home Phone Work Phone Mobile Phone    ROBERTA VARMA (Father) -- -- [phone]            Insurance Information                [employer] [employer] [place] [place] [employer] [place] [place] Phone:     Subscriber: John Varma Subscriber#: [id number]    Group#:  Precert#:              History & Physical      Dory Wells MD at 04/23/20 2242             ICU ADMISSION NOTE    Chief complaint : GIB, hepatic failure, BORIS    Subjective     Patient is a 24 y.o. male who presented to our ED w/ abdominal pain & hematemesis ongoing for 3-4 D w/ a \"large amouts of BRB w/ clots\" today.  Patient was in an ICU in " [place] (he is uncertain as to which) 2-3 weeks ago and left AMA. Reports of possible ascites during that visit.   He is supposed to be on Protonix for a H/O GIB, but he is not taking it.     Per patient report he developed jaundice, abdominal and BLE edema reportedly 1 week ago, for which he was seen in [place] ED.    he now admits to me that he has been jaundiced for several months.  He has been unemployed for several years.  He resides with his father and grandparents.  He has been staying at home during the COVID-19 pandemic.  He started consuming beer and high school.  Then approximately 5 years ago he broke up with a girlfriend and started drinking daily.  He consumes approximately 1/5 of liquor daily.  Emergency room physician spoke with his father who reports that 3 weeks ago he was hospitalized in [place] in their intensive care unit.  He left AGAINST MEDICAL ADVICE.  Last week he was seen in the [place] emergency room and was told that they suspected he had cirrhosis or hepatitis.  Patient specifically denies any IV drug use.  He denies ever attempting to stop drinking.  His father witnessed 3-4 episodes of hematemesis this morning.  Patient admits to black stools.  He also smokes cigarettes.  He denies a cough or chest pain.  In the emergency room his saturation was 87% on room air.  He is requiring 6 L nasal cannula.  His temperature was 99.6 on arrival to our emergency room.    Review of Systems  Review of Systems   Constitutional: Positive for appetite change, chills, fatigue and fever.   Respiratory: Negative for cough, chest tightness, shortness of breath and wheezing.    Cardiovascular: Positive for leg swelling. Negative for chest pain and palpitations.   Gastrointestinal: Positive for abdominal distention, abdominal pain, nausea and vomiting.        Loose dark stools   Genitourinary: Positive for decreased urine volume.   Skin: Positive for color change.   Neurological: Positive for  "weakness.   Psychiatric/Behavioral: Positive for confusion.        Home Medications    (Not in a hospital admission)  Does not take any medication regularly    History  Past Medical History:   Diagnosis Date   • GERD (gastroesophageal reflux disease)    • GIB (gastrointestinal bleeding)      Past Surgical History:   Procedure Laterality Date   • ARM WOUND REPAIR / CLOSURE       History reviewed. No pertinent family history.  Social History     Tobacco Use   • Smoking status: Current Some Day Smoker   • Tobacco comment: USED TO SMOKE MORE FREQUENTLY BUT NOW ONLY SMOKES ON OCCASION   Substance Use Topics   • Alcohol use: Yes     Comment: PT STATES THAT HIS ALCOHOL INTAKE VARIES - \"SOMETIMES I DRINK 3 BEERS, SOMETIMES I DRINK A PINT\". PT REPORTS LAST DRINK WAS 4/20/20   • Drug use: Yes     Types: Marijuana     Comment: PIPE - 3-4 TIMES PER DAY       (Not in a hospital admission)  Allergies:  Penicillins - hives    Objective     Vital Signs  Blood pressure 129/74, pulse (!) 141, temperature 99.6 °F (37.6 °C), temperature source Oral, resp. rate 22, height 172.7 cm (68\"), weight 65.8 kg (145 lb), SpO2 (!) 87 %.    Physical Exam:  General Appearance:   Jaundice young white male in no respiratory distress   Head:   Atraumatic   Eyes:          Conjunctiva pale, sclera icteric, pupils reactive   Ears:     Throat:  Oral mucosa dry.  Dried blood staining on his lips   Neck:  Trachea midline, no adenopathy   Back:      Lungs:    Diminished chest expansion.  Decreased breath sounds in the lower lobes posteriorly at the bases.  Chest is clear anteriorly    Heart:   Increased rate, regular, S1, S2 auscultated   Abdomen:    Distended, firm, fluid wave present, nontender   Rectal:     Deferred   Extremities:  Pretibial edema, nailbeds pale   Pulses:      Skin:  Cool and dry   Lymph nodes:    Neurologic:  Tremulous, wide base stance, ataxic finger-to-nose, oriented x3       Results Review:   Lab Results (last 24 hours)     Procedure " Component Value Units Date/Time    CBC & Differential [[id number]] Collected:  04/23/20 2146    Specimen:  Blood Updated:  04/23/20 2341    Narrative:       The following orders were created for panel order CBC & Differential.  Procedure                               Abnormality         Status                     ---------                               -----------         ------                     CBC Auto Differential[[id number]]        Abnormal            Final result                 Please view results for these tests on the individual orders.    CBC Auto Differential [[id number]]  (Abnormal) Collected:  04/23/20 2146    Specimen:  Blood Updated:  04/23/20 2341     WBC 41.55 10*3/mm3      RBC 2.44 10*6/mm3      Hemoglobin 8.9 g/dL      Hematocrit 25.5 %      .5 fL      MCH 36.5 pg      MCHC 34.9 g/dL      RDW 16.9 %      RDW-SD 63.2 fl      MPV 10.5 fL      Platelets 380 10*3/mm3     Scan Slide [[id number]] Collected:  04/23/20 2146    Specimen:  Blood Updated:  04/23/20 2341     Scan Slide --     Comment: See Manual Differential Results       Manual Differential [[id number]]  (Abnormal) Collected:  04/23/20 2146    Specimen:  Blood Updated:  04/23/20 2341     Neutrophil % 61.0 %      Lymphocyte % 18.0 %      Monocyte % 5.0 %      Eosinophil % 1.0 %      Basophil % 1.0 %      Bands %  14.0 %      Neutrophils Absolute 31.16 10*3/mm3      Lymphocytes Absolute 7.48 10*3/mm3      Monocytes Absolute 2.08 10*3/mm3      Eosinophils Absolute 0.42 10*3/mm3      Basophils Absolute 0.42 10*3/mm3      Macrocytes Slight/1+     Smudge Cells Slight/1+     Platelet Morphology Normal    Slide Review, Hematology [[id number]] Collected:  04/23/20 2146    Specimen:  Blood Updated:  04/23/20 2340    aPTT [[id number]]  (Abnormal) Collected:  04/23/20 2146    Specimen:  Blood Updated:  04/23/20 2338     PTT 44.3 seconds     Narrative:       PTT = The equivalent PTT values for the therapeutic range of heparin levels at 0.3 to 0.5 U/ml  are 55 to 70 seconds.    Protime-INR [[id number]]  (Abnormal) Collected:  04/23/20 2146    Specimen:  Blood Updated:  04/23/20 2338     Protime 21.3 Seconds      INR 1.88    Amylase [[id number]] Collected:  04/23/20 2146    Specimen:  Blood Updated:  04/23/20 2335    CK [[id number]] Collected:  04/23/20 2146    Specimen:  Blood Updated:  04/23/20 2335    Ethanol [[id number]] Collected:  04/23/20 2146    Specimen:  Blood Updated:  04/23/20 2335    Hepatitis Panel, Acute [[id number]] Collected:  04/23/20 2146    Specimen:  Blood Updated:  04/23/20 2335    Narrative:       The following orders were created for panel order Hepatitis Panel, Acute.  Procedure                               Abnormality         Status                     ---------                               -----------         ------                     Hepatitis Panel, Acute[[id number]]                           In process                 Hep B Confirmation Tube[[id number]]                          In process                   Please view results for these tests on the individual orders.    Hepatitis Panel, Acute [[id number]] Collected:  04/23/20 2146    Specimen:  Blood Updated:  04/23/20 2335    Hep B Confirmation Tube [[id number]] Collected:  04/23/20 2146    Specimen:  Blood Updated:  04/23/20 2335    Ammonia [[id number]]  (Abnormal) Collected:  04/23/20 2227    Specimen:  Blood Updated:  04/23/20 2329     Ammonia 101 umol/L     Blood Gas, Arterial With Co-Ox [[id number]]  (Abnormal) Collected:  04/23/20 2312    Specimen:  Arterial Blood Updated:  04/23/20 2312     Site Left Brachial     Sergio's Test N/A     pH, Arterial 7.548 pH units      Comment: 83 Value above reference range        pCO2, Arterial 28.4 mm Hg      Comment: 84 Value below reference range        pO2, Arterial 53.9 mm Hg      Comment: 84 Value below reference range        HCO3, Arterial 24.7 mmol/L      Base Excess, Arterial 2.4 mmol/L      Hemoglobin, Blood Gas 7.8 g/dL      Comment: 84 Value  below reference range        Hematocrit, Blood Gas 24.0 %      Oxyhemoglobin 86.0 %      Comment: 84 Value below reference range        Methemoglobin --     Comment: 94 Value below reportable range < 0.0        Carboxyhemoglobin 2.2 %      Comment: 83 Value above reference range        CO2 Content 25.6 mmol/L      Temperature 37.0 C      Barometric Pressure for Blood Gas --     Comment: N/A        Modality Nasal Cannula     FIO2 44 %      Ventilator Mode       Comment: Meter: [id number]     :  [id number]        Note --     pH, Temp Corrected 7.548 pH Units      pCO2, Temperature Corrected 28.4 mm Hg      pO2, Temperature Corrected 53.9 mm Hg     [place] Draw [[id number]] Collected:  04/23/20 2146    Specimen:  Blood Updated:  04/23/20 2300    Narrative:       The following orders were created for panel order [place] Draw.  Procedure                               Abnormality         Status                     ---------                               -----------         ------                     Light Blue Top[[id number]]                                   Final result               Green Top (Gel)[[id number]]                                  Final result               Lavender Top[[id number]]                                     Final result               Gold Top - SST[[id number]]                                   Final result                 Please view results for these tests on the individual orders.    Light Blue Top [[id number]] Collected:  04/23/20 2146    Specimen:  Blood Updated:  04/23/20 2300     Extra Tube hold for add-on     Comment: Auto resulted       Green Top (Gel) [[id number]] Collected:  04/23/20 2146    Specimen:  Blood Updated:  04/23/20 2300     Extra Tube Hold for add-ons.     Comment: Auto resulted.       Lavender Top [[id number]] Collected:  04/23/20 2146    Specimen:  Blood Updated:  04/23/20 2300     Extra Tube hold for add-on     Comment: Auto resulted       Gold Top - SST [[id number]]  Collected:  04/23/20 2146    Specimen:  Blood Updated:  04/23/20 2300     Extra Tube Hold for add-ons.     Comment: Auto resulted.       Comprehensive Metabolic Panel [[id number]]  (Abnormal) Collected:  04/23/20 2146    Specimen:  Blood Updated:  04/23/20 2229     Glucose 111 mg/dL      BUN 47 mg/dL      Creatinine 2.59 mg/dL      Sodium 134 mmol/L      Potassium 4.0 mmol/L      Chloride 88 mmol/L      CO2 24.0 mmol/L      Calcium 7.8 mg/dL      Total Protein 5.5 g/dL      Albumin 2.00 g/dL      ALT (SGPT) 90 U/L      AST (SGOT) 229 U/L      Alkaline Phosphatase 173 U/L      Total Bilirubin 8.3 mg/dL      eGFR Non African Amer 30 mL/min/1.73      eGFR  African Amer 37 mL/min/1.73      Globulin 3.5 gm/dL      A/G Ratio 0.6 g/dL      BUN/Creatinine Ratio 18.1     Anion Gap 22.0 mmol/L     Narrative:       GFR Normal >60  Chronic Kidney Disease <60  Kidney Failure <15      Lactic Acid, Plasma [[id number]]  (Abnormal) Collected:  04/23/20 2146    Specimen:  Blood Updated:  04/23/20 2229     Lactate 6.6 mmol/L      Comment: Falsely depressed results may occur on samples drawn from patients receiving N-Acetylcysteine (NAC) or Metamizole.       Lactic Acid, Reflex Timer (This will reflex a repeat order 3-3:15 hours after ordered.) [[id number]] Collected:  04/23/20 2146    Specimen:  Blood Updated:  04/23/20 2229    Procalcitonin [[id number]]  (Abnormal) Collected:  04/23/20 2146    Specimen:  Blood Updated:  04/23/20 2223     Procalcitonin 2.62 ng/mL     Narrative:       As a Marker for Sepsis (Non-Neonates):   1. <0.5 ng/mL represents a low risk of severe sepsis and/or septic shock.  1. >2 ng/mL represents a high risk of severe sepsis and/or septic shock.    As a Marker for Lower Respiratory Tract Infections that require antibiotic therapy:  PCT on Admission     Antibiotic Therapy             6-12 Hrs later  > 0.5                Strongly Recommended            >0.25 - <0.5         Recommended  0.1 - 0.25            "Discouraged                   Remeasure/reassess PCT  <0.1                 Strongly Discouraged          Remeasure/reassess PCT      As 28 day mortality risk marker: \"Change in Procalcitonin Result\" (> 80 % or <=80 %) if Day 0 (or Day 1) and Day 4 values are available. Refer to http://[email or web address]-calculator.com/   Change in PCT <=80 %   A decrease of PCT levels below or equal to 80 % defines a positive change in PCT test result representing a higher risk for 28-day all-cause mortality of patients diagnosed with severe sepsis or septic shock.  Change in PCT > 80 %   A decrease of PCT levels of more than 80 % defines a negative change in PCT result representing a lower risk for 28-day all-cause mortality of patients diagnosed with severe sepsis or septic shock.                Results may be falsely decreased if patient taking Biotin.     Lipase [[id number]]  (Abnormal) Collected:  04/23/20 2146    Specimen:  Blood Updated:  04/23/20 2217     Lipase 148 U/L     Blood Culture - Blood, Arm, Right [[id number]] Collected:  04/23/20 2147    Specimen:  Blood from Arm, Right Updated:  04/23/20 2205    Blood Culture - Blood, Arm, Left [[id number]] Collected:  04/23/20 2152    Specimen:  Blood from Arm, Left Updated:  04/23/20 2205        Imaging Results (Last 24 Hours)     Procedure Component Value Units Date/Time    XR Chest 1 View [[id number]] Collected:  04/23/20 2341     Updated:  04/23/20 2343    Narrative:       CR Chest 1 Vw    INDICATION:   Sepsis.     COMPARISON:    None available.    FINDINGS:  Single portable AP view(s) of the chest.    Lung volumes are very low. There is infiltrate or atelectasis in the bases, left greater than right. There is a small left pleural effusion. No pneumothorax is seen. Heart size is likely normal given the low volume inspiration.       Impression:       Very low lung volumes with bibasilar infiltrates/atelectasis, left worse than right. There is also a small left pleural " effusion.    Signer Name: Christian Villa MD   Signed: 4/23/2020 11:41 PM   Workstation Name: [facility]    [facility]           PROBLEM LIST  Patient Active Problem List   Diagnosis   • Acute alcoholic hepatitis   • Acute GI bleeding   • BORIS (acute kidney injury) (CMS/HCC)   • Bandemia   • Acute upper GI bleeding       Assessment/Plan      Acute upper GI bleed with hematemesis.  Hemoglobin is 8.9.  With his alcoholism, differential is varices, alcoholic gastritis, peptic ulcer disease.  AVM would be less likely.  He is tachycardic but blood pressure is adequate at 120/69.     Acute alcoholic hepatitis transaminases are only mildly elevated with an ALT of 90, AST of 229.  Alk phos is 173 and bilirubin is 8.3.  This is actually a bad prognostic sign.  His ammonia level is 101.  Pro time is 21 with an INR of 1.88 and PTT is 44.  His alcohol level was actually less than 10.  Amylase is only 69 so there is no pancreatitis.  Clinically on exam he has significant ascites.  CT scan of the abdomen is ordered but not completed    Sepsis, etiology uncertain.  He denies a productive cough.  He does not have evidence of cellulitis.  He could have spontaneous bacterial peritonitis.  He does have bibasilar atelectasis on his chest x-ray so I cannot exclude a basilar pneumonia.  White blood cell count is markedly elevated at 41.5 with a left shift.  Procalcitonin is elevated at 2.6.  Lactate is elevated at 6.6 but this is most likely from his liver dysfunction and acute renal failure.    Hypoxic respiratory failure, blood gas reveals a pH of 7.54, CO2 28, O2 54 on 4 L nasal cannula.  Chest x-ray shows poor inspiration and bibasilar infiltrates.  If indeed he has acute liver dysfunction he could have intrapulmonary shunting.  He also may have compression atelectasis from elevated hemidiaphragm given his significant ascites.  Cannot exclude an aspiration pneumonia given his history of hematemesis.  Low risk  factors for COVID-19.     Acute renal failure, hopefully he is intravascularly depleted.  Cannot exclude hepatorenal syndrome    - ICU  - Serial H&H  -Transfused 2 units of packed red blood cells  - GI consult in am  - BC x 2, UA w/ Cx if warranted  - empiric ABX; vancomycin and aztreonam  - vitamin therapy, high dose thiamine  - Protonix/Octreotide gtt  - urine studies  - Albumin  -Vitamin K  -Monitor for signs of alcohol withdrawal/ CIWA    I discussed the patients findings and my recommendations with patient    Puma Huaker, APRN  20  23:44    I reviewed his records, interviewed the patient, performed the physical examination, reviewed his lab and x-ray data and dictated the assessment, formulated the plan with the APRN and extensively modified the note to reflect my additions and findings.  This is potentially very life-threatening with acute alcoholic hepatitis complicated by a mild coagulopathy, ascites with GI bleeding, sepsis with acute renal failure.    Dory Wells MD    Time: 65min    This note was produced with a voice recognition program and may have uncorrected errors.           Electronically signed by Dory Wells MD at 20 0024          Physical Therapy Notes (most recent note)      Savanah Matthews, PT at 20 1059  Version 1 of 1         Patient Name: John Varma  : 1995    MRN: [id number]                              Today's Date: 2020       Admit Date: 2020    Visit Dx:     ICD-10-CM ICD-9-CM   1. Sepsis with acute liver failure without hepatic coma or septic shock, due to unspecified organism (CMS/HCC) A41.9 038.9    R65.20 995.92    K72.00 570   2. Hypoxia R09.02 799.02   3. Acute renal failure, unspecified acute renal failure type (CMS/HCC) N17.9 584.9   4. Acute hepatitis B17.9 570   5. Alcohol abuse F10.10 305.00   6. Acute upper GI bleed K92.2 578.9   7. Elevated lactic acid level R79.89 276.2   8. Ascites due to alcoholic  hepatitis K70.11 789.59   9. Leukocytosis, unspecified type D72.829 288.60   10. Sepsis with acute renal failure without septic shock, due to unspecified organism, unspecified acute renal failure type (CMS/HCC) A41.9 038.9    R65.20 995.92    N17.9 584.9   11. 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     Past Medical History:   Diagnosis Date   • Alcohol abuse    • GERD (gastroesophageal reflux disease)    • GIB (gastrointestinal bleeding)    • Tobacco abuse      Past Surgical History:   Procedure Laterality Date   • ARM WOUND REPAIR / CLOSURE     • ENDOSCOPY N/A 4/24/2020    Procedure: ESOPHAGOGASTRODUODENOSCOPY;  Surgeon: Brunner, Mark I, MD;  Location: [facility] ENDOSCOPY;  Service: Gastroenterology;  Laterality: N/A;     General Information     Row Name 05/04/20 1559          PT Evaluation Time/Intention    Document Type  therapy note (daily note)  -LS     Mode of Treatment  physical therapy  -     Row Name 05/04/20 1559          General Information    Existing Precautions/Restrictions  fall;other (see comments) NG; scrotal edema  -     Row Name 05/04/20 1559          Cognitive Assessment/Intervention- PT/OT    Orientation Status (Cognition)  oriented to;person;place;verbal cues/prompts needed for orientation  -       User Key  (r) = Recorded By, (t) = Taken By, (c) = Cosigned By    Initials Name Provider Type    LS Savanah Matthews, PT Physical Therapist        Mobility     Row Name 05/04/20  1559          Bed Mobility Assessment/Treatment    Supine-Sit [place] (Bed Mobility)  maximum assist (25% patient effort);2 person assist;verbal cues  -LS     Sit-Supine [place] (Bed Mobility)  maximum assist (25% patient effort);2 person assist;verbal cues  -LS     Assistive Device (Bed Mobility)  bed rails;draw sheet;head of bed elevated  -LS     Row Name 05/04/20 1559          Transfer Assessment/Treatment    Comment (Transfers)  BUE support and blocking knees/feet; cues for upright posture.  -LS     Row Name 05/04/20 1559          Sit-Stand Transfer    Sit-Stand Mel[place] (Transfers)  moderate assist (50% patient effort);2 person assist;verbal cues  -LS     Row Name 05/04/20 1559          Gait/Stairs Assessment/Training    Mellette Level (Gait)  maximum assist (25% patient effort);2 person assist  -LS     Distance in Feet (Gait)  2  -LS     Bilateral Gait Deviations  weight shift ability decreased  -LS     Comment (Gait/Stairs)  Able to take 4 small sidesteps at EOB towards HOB with max x2 A for weightshifting and advancing LEs.  -       User Key  (r) = Recorded By, (t) = Taken By, (c) = Cosigned By    Initials Name Provider Type     Savanah Matthews, PT Physical Therapist        Obj/Interventions     Row Name 05/04/20 1601          Therapeutic Exercise    Lower Extremity Range of Motion (Therapeutic Exercise)  hip flexion/extension, bilateral;knee flexion/extension, bilateral;ankle dorsiflexion/plantar flexion, bilateral  -     Exercise Type (Therapeutic Exercise)  AAROM (active assistive range of motion)  -LS     Position (Therapeutic Exercise)  supine  -LS     Sets/Reps (Therapeutic Exercise)  1/10  -     Row Name 05/04/20 1601          Static Sitting Balance    Level of Mellette (Unsupported Sitting, Static Balance)  moderate assist, 50 to 74% patient effort  -LS     Sitting Position (Unsupported Sitting, Static Balance)  sitting on edge of bed  -LS     Comment (Unsupported  Sitting, Static Balance)  progressed to CGA with cues for waking up and attending to task; very lethargic  -     Row Name 05/04/20 1601          Static Standing Balance    Level of [place] (Supported Standing, Static Balance)  moderate assist, 50 to 74% patient effort;2 person assist  -     Time Able to Maintain Position (Supported Standing, Static Balance)  15 to 30 seconds  -       User Key  (r) = Recorded By, (t) = Taken By, (c) = Cosigned By    Initials Name Provider Type    Savanah Neri, PT Physical Therapist        Goals/Plan    No documentation.       Clinical Impression     Row Name 05/04/20 1602          Pain Assessment    Additional Documentation  Pain Scale: FACES Pre/Post-Treatment (Group)  -Blue [facility] Name 05/04/20 1602          Pain Scale: Numbers Pre/Post-Treatment    Pain Location  groin  -LS     Pain Intervention(s)  Repositioned;Ambulation/increased activity  -[facility] Name 05/04/20 1602          Pain Scale: FACES Pre/Post-Treatment    Pain: FACES Scale, Pretreatment  2-->hurts little bit  -LS     Pain: FACES Scale, Post-Treatment  2-->hurts little bit  -     Row Name 05/04/20 1602          Plan of Care Review    Plan of Care Reviewed With  patient  -     Progress  improving  -     Outcome Summary  Pt demonstrated ability to progress to STS at and small sidesteps at EOB with max x2 A; cont to be limited by weakness/lethargy, requiring constant cues for attending to task. Will cont PT POC.   -     Row Name 05/04/20 1602          Vital Signs    Pre Systolic BP Rehab  102  -LS     Pre Treatment Diastolic BP  55  -LS     Pretreatment Heart Rate (beats/min)  87  -LS     Posttreatment Heart Rate (beats/min)  89  -LS     Pre SpO2 (%)  94  -LS     O2 Delivery Intra Treatment  nasal cannula  -LS     Post SpO2 (%)  96  -LS     O2 Delivery Post Treatment  nasal cannula  -LS     Pre Patient Position  Supine  -LS     Intra Patient Position  Standing  -LS     Post Patient Position   Supine  -LS     Row Name 05/04/20 1602          Positioning and Restraints    Pre-Treatment Position  in bed  -LS     Post Treatment Position  bed  -LS     In Bed  notified nsg;fowlers;call light within reach;encouraged to call for assist;with OT;RUE elevated;LUE elevated;legs elevated;heels elevated  -LS     Restraints  released:;notified nsg:;soft limb with OT who verbalized intention to replace   -LS       User Key  (r) = Recorded By, (t) = Taken By, (c) = Cosigned By    Initials Name Provider Type    Savanah Neri, PT Physical Therapist        Outcome Measures     Row Name 05/04/20 1604          How much help from another person do you currently need...    Turning from your back to your side while in flat bed without using bedrails?  2  -LS     Moving from lying on back to sitting on the side of a flat bed without bedrails?  2  -LS     Moving to and from a bed to a chair (including a wheelchair)?  2  -LS     Standing up from a chair using your arms (e.g., wheelchair, bedside chair)?  2  -LS     Climbing 3-5 steps with a railing?  1  -LS     To walk in hospital room?  2  -LS     AM-PAC 6 Clicks Score (PT)  11  -LS       User Key  (r) = Recorded By, (t) = Taken By, (c) = Cosigned By    Initials Name Provider Type    Savanah Neri, PT Physical Therapist          PT Recommendation and Plan  Planned Therapy Interventions (PT Eval): balance training, bed mobility training, gait training, home exercise program, patient/family education, strengthening, transfer training, stair training  Outcome Summary/Treatment Plan (PT)  Anticipated Discharge Disposition (PT): inpatient rehabilitation facility  Plan of Care Reviewed With: patient  Progress: improving  Outcome Summary: Pt demonstrated ability to progress to STS at and small sidesteps at EOB with max x2 A; cont to be limited by weakness/lethargy, requiring constant cues for attending to task. Will cont PT POC.      Time Calculation:   PT Charges     Row Name  20 1605             Time Calculation    Start Time  1059  -LS      PT Received On  20  -         Time Calculation- PT    Total Timed Code Minutes- PT  17 minute(s)  -LS         Timed Charges    04727 - PT Therapeutic Exercise Minutes  5  -LS      40758 - PT Therapeutic Activity Minutes  12  -LS        User Key  (r) = Recorded By, (t) = Taken By, (c) = Cosigned By    Initials Name Provider Type     Savanah Matthews, PT Physical Therapist        Therapy Charges for Today     Code Description Service Date Service Provider Modifiers Qty    90913730666 HC PT THERAPEUTIC ACT EA 15 MIN 2020 Savanah Matthews, PT GP 1          PT G-Codes  Outcome Measure Options: AM-PAC 6 Clicks Daily Activity (OT)  AM-PAC 6 Clicks Score (PT): 11  AM-PAC 6 Clicks Score (OT): 7    Savanah LINTON. RODOLFO Matthews  2020         Electronically signed by Savanah Matthews, PT at 20 1606          Occupational Therapy Notes (most recent note)      Selena Terrell, OT at 20 1110          Acute Care - Occupational Therapy Treatment Note   [facility]     Patient Name: John Varma  : 1995  MRN: [id number]  Today's Date: 2020             Admit Date: 2020       ICD-10-CM ICD-9-CM   1. Sepsis with acute liver failure without hepatic coma or septic shock, due to unspecified organism (CMS/HCC) A41.9 038.9    R65.20 995.92    K72.00 570   2. Hypoxia R09.02 799.02   3. Acute renal failure, unspecified acute renal failure type (CMS/HCC) N17.9 584.9   4. Acute hepatitis B17.9 570   5. Alcohol abuse F10.10 305.00   6. Acute upper GI bleed K92.2 578.9   7. Elevated lactic acid level R79.89 276.2   8. Ascites due to alcoholic hepatitis K70.11 789.59   9. Leukocytosis, unspecified type D72.829 288.60   10. Sepsis with acute renal failure without septic shock, due to unspecified organism, unspecified acute renal failure type (CMS/HCC) A41.9 038.9    R65.20 995.92    N17.9 584.9   11. 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     Past Medical History:   Diagnosis Date   • Alcohol abuse    • GERD (gastroesophageal reflux disease)    • GIB (gastrointestinal bleeding)    • Tobacco abuse      Past Surgical History:   Procedure Laterality Date   • ARM WOUND REPAIR / CLOSURE     • ENDOSCOPY N/A 4/24/2020    Procedure: ESOPHAGOGASTRODUODENOSCOPY;  Surgeon: Brunner, Mark I, MD;  Location: [facility] ENDOSCOPY;  Service: Gastroenterology;  Laterality: N/A;       Therapy Treatment    Rehabilitation Treatment Summary     Row Name 05/04/20 1110             Treatment Time/Intention    Discipline  occupational therapist  -CL      Document Type  therapy note (daily note)  -CL      Subjective Information  complains of;weakness  -CL      Patient Effort  good  -CL      Existing Precautions/Restrictions  fall;oxygen therapy device and L/min;other (see comments) NG, scrotal swelling  -CL      Treatment Considerations/Comments  Pt w/ improved alertness upon sitting EOB  -CL      Recorded by [CL] Selena Terrell OT 05/04/20 1325      Row Name 05/04/20 1110             Vital Signs    Pre Systolic BP Rehab  -- VSS  -CL      Recorded by [CL] Selena Terrell OT 05/04/20 1325      Row Name 05/04/20 1110             Cognitive Assessment/Intervention- PT/OT    Affect/Mental Status (Cognitive)  confused;low arousal/lethargic  -CL      Orientation Status (Cognition)  oriented to;person  -CL      Follows Commands (Cognition)  follows one step commands;over 90%  accuracy;repetition of directions required;verbal cues/prompting required;increased processing time needed  -CL      Cognitive Function (Cognitive)  safety deficit  -CL      Safety Deficit (Cognitive)  moderate deficit;awareness of need for assistance;safety precautions awareness;insight into deficits/self awareness  -CL      Recorded by [CL] Selena Terrell, OT 05/04/20 1325      Row Name 05/04/20 1110             Bed Mobility Assessment/Treatment    Bed Mobility Assessment/Treatment  supine-sit  -CL      Sit-Supine [place] (Bed Mobility)  maximum assist (25% patient effort);2 person assist;verbal cues  -CL      Assistive Device (Bed Mobility)  bed rails;draw sheet;head of bed elevated  -CL      Recorded by [CL] Selena Terrell OT 05/04/20 1325      Row Name 05/04/20 1110             Transfer Assessment/Treatment    Transfer Assessment/Treatment  sit-stand transfer;stand-sit transfer  -CL      Comment (Transfers)  Pt stood from EOB w/ BUE support and B knees blocked.   -CL      Recorded by [CL] Selena Terrell OT 05/04/20 1325      Row Name 05/04/20 1110             Sit-Stand Transfer    Sit-Stand [place] (Transfers)  moderate assist (50% patient effort);2 person assist;verbal cues  -CL      Recorded by [CL] Selena Terrell, OT 05/04/20 1325      Row Name 05/04/20 1110             Stand-Sit Transfer    Stand-Sit [place] (Transfers)  moderate assist (50% patient effort);2 person assist;verbal cues  -CL      Recorded by [CL] Selena Terrell, OT 05/04/20 1325      Row Name 05/04/20 1110             Motor Skills Assessment/Interventions    Additional Documentation  Therapeutic Exercise (Group);Balance (Group)  -CL      Recorded by [CL] Selena Terrell OT 05/04/20 1325      Row Name 05/04/20 1110             Therapeutic Exercise    Therapeutic Exercise  seated, upper extremities  -CL      Additional Documentation  Therapeutic Exercise (Row)  -CL      57167 - OT Therapeutic Exercise Minutes  4  -CL      52437 - OT  Therapeutic Activity Minutes  6  -CL      Recorded by [CL] Selena Terrell, STACY 05/04/20 1325      Row Name 05/04/20 1110             Upper Extremity Seated Therapeutic Exercise    Performed, Seated Upper Extremity (Therapeutic Exercise)  shoulder flexion/extension;shoulder external/internal rotation;elbow flexion/extension;digit flexion/extension  -CL      Exercise Type, Seated Upper Extremity (Therapeutic Exercise)  AAROM (active assistive range of motion)  -CL      Sets/Reps Detail, Seated Upper Extremity (Therapeutic Exercise)  1/6  -CL      Comment, Seated Upper Extremity (Therapeutic Exercise)  BUE  -CL      Recorded by [CL] Selena Terrell, OT 05/04/20 1325      Row Name 05/04/20 1110             Balance    Balance  static sitting balance;static standing balance  -CL      Recorded by [CL] Selena Terrell, STACY 05/04/20 1325      Row Name 05/04/20 1110             Static Sitting Balance    Level of [place] (Unsupported Sitting, Static Balance)  moderate assist, 50 to 74% patient effort  -CL      Sitting Position (Unsupported Sitting, Static Balance)  sitting on edge of bed  -CL      Time Able to Maintain Position (Unsupported Sitting, Static Balance)  3 to 4 minutes  -CL      Comment (Unsupported Sitting, Static Balance)  Posterior lean, progressed to moments of CGA  -CL      Recorded by [CL] Selena Terrell OT 05/04/20 1325      Row Name 05/04/20 1110             Static Standing Balance    Level of [place] (Supported Standing, Static Balance)  moderate assist, 50 to 74% patient effort;2 person assist  -CL      Recorded by [CL] Selena Terrell OT 05/04/20 1325      Row Name 05/04/20 1110             Positioning and Restraints    Pre-Treatment Position  in bed  -CL      Post Treatment Position  bed  -CL      In Bed  notified nsg;fowlers;call light within reach;exit alarm on;encouraged to call for assist;with nsg;RUE elevated;LUE elevated;legs elevated;heels elevated  -CL      Restraints  released:;reapplied:;notified  nsg:;soft limb  -CL      Recorded by [CL] Selena Terrell, OT 05/04/20 1325      Row Name 05/04/20 1110             Pain Scale: FACES Pre/Post-Treatment    Pain: FACES Scale, Pretreatment  2-->hurts little bit  -CL      Pain: FACES Scale, Post-Treatment  2-->hurts little bit  -CL      Pre/Post Treatment Pain Comment  tolerated  -CL      Recorded by [CL] Selena Terrell, OT 05/04/20 1325        User Key  (r) = Recorded By, (t) = Taken By, (c) = Cosigned By    Initials Name Effective Dates Discipline    CL Selena Terrell, OT 04/03/18 -  OT                 OT Recommendation and Plan  Outcome Summary/Treatment Plan (OT)  Anticipated Equipment Needs at Discharge (OT): (TBA further)  Anticipated Discharge Disposition (OT): inpatient rehabilitation facility  Therapy Frequency (OT Eval): daily  Plan of Care Review  Plan of Care Reviewed With: patient  Plan of Care Reviewed With: patient  Outcome Summary: Pt demo improved activity tolerance and alertness this date. Pt Max Ax2 for bed mobility and Mod Ax2 for STS t/f, limited d/t generalized weakness and confusion. Recommend cont skilled IPOT POC. Recommend pt DC to IP rehab.  Outcome Measures     Row Name 05/04/20 1110 05/02/20 1059          How much help from another is currently needed...    Putting on and taking off regular lower body clothing?  1  -CL  1  -CL     Bathing (including washing, rinsing, and drying)  1  -CL  1  -CL     Toileting (which includes using toilet bed pan or urinal)  1  -CL  1  -CL     Putting on and taking off regular upper body clothing  1  -CL  1  -CL     Taking care of personal grooming (such as brushing teeth)  2  -CL  2  -CL     Eating meals  1  -CL  1  -CL     AM-PAC 6 Clicks Score (OT)  7  -CL  7  -CL        Functional Assessment    Outcome Measure Options  AM-PAC 6 Clicks Daily Activity (OT)  -CL  AM-PAC 6 Clicks Daily Activity (OT)  -CL       User Key  (r) = Recorded By, (t) = Taken By, (c) = Cosigned By    Initials Name Provider Type    CL Xander  STACY Walters Occupational Therapist           Time Calculation:   Time Calculation- OT     Row Name 05/04/20 1110             Time Calculation- OT    OT Start Time  1110  -CL      OT Received On  05/04/20  -CL      OT Goal Re-Cert Due Date  05/12/20  -CL         Timed Charges    24577 - OT Therapeutic Exercise Minutes  4  -CL      95029 - OT Therapeutic Activity Minutes  6  -CL        User Key  (r) = Recorded By, (t) = Taken By, (c) = Cosigned By    Initials Name Provider Type    CL Selena Terrell OT Occupational Therapist        Therapy Charges for Today     Code Description Service Date Service Provider Modifiers Qty    46975354029  OT THERAPEUTIC ACT EA 15 MIN 5/4/2020 Selena Terrell OT GO 1               Selena Terrell OT  5/4/2020    Electronically signed by Selena Terrell OT at 05/04/20 1328

## 2020-05-05 NOTE — THERAPY TREATMENT NOTE
Patient Name: John Varma  : 1995    MRN: [id number]                              Today's Date: 2020       Admit Date: 2020    Visit Dx:     ICD-10-CM ICD-9-CM   1. Sepsis with acute liver failure without hepatic coma or septic shock, due to unspecified organism (CMS/HCC) A41.9 038.9    R65.20 995.92    K72.00 570   2. Hypoxia R09.02 799.02   3. Acute renal failure, unspecified acute renal failure type (CMS/HCC) N17.9 584.9   4. Acute hepatitis B17.9 570   5. Alcohol abuse F10.10 305.00   6. Acute upper GI bleed K92.2 578.9   7. Elevated lactic acid level R79.89 276.2   8. Ascites due to alcoholic hepatitis K70.11 789.59   9. Leukocytosis, unspecified type D72.829 288.60   10. Sepsis with acute renal failure without septic shock, due to unspecified organism, unspecified acute renal failure type (CMS/HCC) A41.9 038.9    R65.20 995.92    N17.9 584.9   11. Dysphagia, unspecified type R13.10 787.20     Patient Active Problem List   Diagnosis   • Acute alcoholic hepatitis   • Acute variceal GI bleeding   • BORIS (acute kidney injury) (CMS/HCC)   • Bandemia   • Acute upper GI bleeding   • Sepsis with acute renal failure without septic shock (CMS/HCC)   • Acute respiratory failure with hypoxia (CMS/HCC)   • Alcohol withdrawal delirium (CMS/HCC)   • Hepatic encephalopathy (CMS/HCC)   • Ascites due to alcoholic hepatitis   • Pleural effusion on left   • S/P TIPS (transjugular intrahepatic portosystemic shunt) 2020     Past Medical History:   Diagnosis Date   • Alcohol abuse    • GERD (gastroesophageal reflux disease)    • GIB (gastrointestinal bleeding)    • Tobacco abuse      Past Surgical History:   Procedure Laterality Date   • ARM WOUND REPAIR / CLOSURE     • ENDOSCOPY N/A 2020    Procedure: ESOPHAGOGASTRODUODENOSCOPY;  Surgeon: Brunner, Mark I, MD;  Location: [facility] ENDOSCOPY;  Service: Gastroenterology;  Laterality: N/A;     General Information     Row Name 20 1548          PT  Evaluation Time/Intention    Document Type  therapy note (daily note)  -KR     Mode of Treatment  physical therapy  -KR     Row Name 05/05/20 1543          General Information    Existing Precautions/Restrictions  fall;oxygen therapy device and L/min;other (see comments) NG   -KR     Row Name 05/05/20 1549          Cognitive Assessment/Intervention- PT/OT    Orientation Status (Cognition)  oriented to;person  -KR     Row Name 05/05/20 1544          Safety Issues, Functional Mobility    Safety Issues Affecting Function (Mobility)  ability to follow commands;awareness of need for assistance;insight into deficits/self awareness;safety precaution awareness;safety precautions follow-through/compliance  -KR     Impairments Affecting Function (Mobility)  balance;cognition;endurance/activity tolerance;postural/trunk control;shortness of breath;strength  -KR       User Key  (r) = Recorded By, (t) = Taken By, (c) = Cosigned By    Initials Name Provider Type    KR Nataliya Patel, PT Physical Therapist        Mobility     Row Name 05/05/20 5634          Bed Mobility Assessment/Treatment    Bed Mobility Assessment/Treatment  supine-sit  -KR     Supine-Sit [place] (Bed Mobility)  maximum assist (25% patient effort);2 person assist;verbal cues  -KR     Assistive Device (Bed Mobility)  draw sheet;head of bed elevated  -KR     Comment (Bed Mobility)  VC's for sequencing. Pt required assistance at trunk and BLEs. Upon sitting EOB pt demonstrated significant posterior lean; unable to correct despite cueing.   -     Row Name 05/05/20 3838          Transfer Assessment/Treatment    Comment (Transfers)  STS from EOB with PT/tech in front on either side to block alicia knees and provide B UE support. SPT from bed to chair with B UE support.   -KR     Row Name 05/05/20 7604          Bed-Chair Transfer    Bed-Chair [place] (Transfers)  maximum assist (25% patient effort);2 person assist;verbal cues  -KR     Assistive Device  (Bed-Chair Transfers)  other (see comments) B UE support  -KR     Row Name 05/05/20 1550          Sit-Stand Transfer    Sit-Stand [place] (Transfers)  maximum assist (25% patient effort);2 person assist;verbal cues  -KR     Assistive Device (Sit-Stand Transfers)  other (see comments) B UE support  -KR     Row Name 05/05/20 1550          Gait/Stairs Assessment/Training    [place] Level (Gait)  unable to assess  -KR     Comment (Gait/Stairs)  Ambulation deferred. Not appropriate to assess. Pt with poor standing balance and impaired coordination during transfers.   -KR       User Key  (r) = Recorded By, (t) = Taken By, (c) = Cosigned By    Initials Name Provider Type    Nataliya Bourgeois, PT Physical Therapist        Obj/Interventions     Row Name 05/05/20 1552          Static Sitting Balance    Level of [place] (Unsupported Sitting, Static Balance)  moderate assist, 50 to 74% patient effort  -KR     Sitting Position (Unsupported Sitting, Static Balance)  sitting on edge of bed  -KR     Row Name 05/05/20 1552          Static Standing Balance    Level of [place] (Supported Standing, Static Balance)  moderate assist, 50 to 74% patient effort;2 person assist  -KR     Assistive Device Utilized (Supported Standing, Static Balance)  other (see comments) B UE support  -KR     Row Name 05/05/20 1552          Dynamic Standing Balance    Level of [place], Reaches Outside Midline (Standing, Dynamic Balance)  maximal assist, 25 to 49% patient effort;2 person assist  -KR     Assistive Device Utilized (Supported Standing, Dynamic Balance)  other (see comments) B UE support  -KR       User Key  (r) = Recorded By, (t) = Taken By, (c) = Cosigned By    Initials Name Provider Type    Nataliya Bourgeois, PT Physical Therapist        Goals/Plan    No documentation.       Clinical Impression     Row Name 05/05/20 1552          Pain Assessment    Additional Documentation  Pain Scale: Numbers Pre/Post-Treatment  (Group)  -KR     Row Name 05/05/20 1552          Pain Scale: Numbers Pre/Post-Treatment    Pain Scale: Numbers, Pretreatment  0/10 - no pain  -KR     Pain Scale: Numbers, Post-Treatment  0/10 - no pain  -KR     Row Name 05/05/20 1552          Vital Signs    Pre Systolic BP Rehab  99  -KR     Pre Treatment Diastolic BP  51  -KR     Post Systolic BP Rehab  111  -KR     Post Treatment Diastolic BP  62  -KR     Pretreatment Heart Rate (beats/min)  90  -KR     Posttreatment Heart Rate (beats/min)  109  -KR     Pre SpO2 (%)  96  -KR     O2 Delivery Pre Treatment  supplemental O2  -KR     Post SpO2 (%)  97  -KR     O2 Delivery Post Treatment  supplemental O2  -KR     Pre Patient Position  Supine  -KR     Intra Patient Position  Standing  -KR     Post Patient Position  Sitting  -KR     Row Name 05/05/20 1552          Positioning and Restraints    Pre-Treatment Position  in bed  -KR     Post Treatment Position  chair  -KR     In Chair  notified nsg;reclined;call light within reach;encouraged to call for assist;exit alarm on;RUE elevated;LUE elevated;waffle cushion;on mechanical lift sling;legs elevated  -KR       User Key  (r) = Recorded By, (t) = Taken By, (c) = Cosigned By    Initials Name Provider Type    Nataliya Bourgeois, PT Physical Therapist        Outcome Measures     Row Name 05/05/20 4038          How much help from another person do you currently need...    Turning from your back to your side while in flat bed without using bedrails?  2  -KR     Moving from lying on back to sitting on the side of a flat bed without bedrails?  2  -KR     Moving to and from a bed to a chair (including a wheelchair)?  2  -KR     Standing up from a chair using your arms (e.g., wheelchair, bedside chair)?  2  -KR     Climbing 3-5 steps with a railing?  1  -KR     To walk in hospital room?  1  -KR     AM-PAC 6 Clicks Score (PT)  10  -KR     Row Name 05/05/20 1552          Functional Assessment    Outcome Measure Options  AM-PAC 6  Clicks Basic Mobility (PT)  -KR       User Key  (r) = Recorded By, (t) = Taken By, (c) = Cosigned By    Initials Name Provider Type    Nataliya Bourgeois PT Physical Therapist          PT Recommendation and Plan     Plan of Care Reviewed With: patient  Progress: improving  Outcome Summary: Pt performed STS transfer and SPT from bed to chair with maxA x2 and B UE support. Pt required modA for static sitting balance at EOB. Pt's mobility limited by significant weakness and impaired coordination; pt confused with periods of difficulty following commands. Continue to progress as appropriate.     Time Calculation:   PT Charges     Row Name 05/05/20 1426             Time Calculation    Start Time  1426  -KR      PT Received On  05/05/20  -KR      PT Goal Re-Cert Due Date  05/12/20  -KR         Time Calculation- PT    Total Timed Code Minutes- PT  23 minute(s)  -KR         Timed Charges    26490 - PT Therapeutic Activity Minutes  23  -KR        User Key  (r) = Recorded By, (t) = Taken By, (c) = Cosigned By    Initials Name Provider Type    Nataliya Bourgeois PT Physical Therapist        Therapy Charges for Today     Code Description Service Date Service Provider Modifiers Qty    97249308113  PT THERAPEUTIC ACT EA 15 MIN 5/5/2020 Nataliya Patel, PT GP 2    16522018241 HC PT THER SUPP EA 15 MIN 5/5/2020 Nataliya Patel PT GP 2          PT G-Codes  Outcome Measure Options: AM-PAC 6 Clicks Basic Mobility (PT)  AM-PAC 6 Clicks Score (PT): 10  AM-PAC 6 Clicks Score (OT): 7    Yessica Patel PT  5/5/2020

## 2020-05-05 NOTE — PLAN OF CARE
Problem: Patient Care Overview  Goal: Plan of Care Review  Outcome: Ongoing (interventions implemented as appropriate)  Flowsheets (Taken 5/5/2020 1430)  Plan of Care Reviewed With: patient  Note:   SLP re-evaluation completed. Will f/u for clinical swallow re-eval. Please see note for further details and recommendations.

## 2020-05-06 ENCOUNTER — APPOINTMENT (OUTPATIENT)
Dept: GENERAL RADIOLOGY | Facility: HOSPITAL | Age: 25
End: 2020-05-06

## 2020-05-06 LAB
ALBUMIN SERPL-MCNC: 1.9 G/DL (ref 3.5–5.2)
ALBUMIN/GLOB SERPL: 0.6 G/DL
ALP SERPL-CCNC: 108 U/L (ref 39–117)
ALT SERPL W P-5'-P-CCNC: 69 U/L (ref 1–41)
AMMONIA BLD-SCNC: 72 UMOL/L (ref 16–60)
ANION GAP SERPL CALCULATED.3IONS-SCNC: 12 MMOL/L (ref 5–15)
AST SERPL-CCNC: 93 U/L (ref 1–40)
BASOPHILS # BLD MANUAL: 0.29 10*3/MM3 (ref 0–0.2)
BASOPHILS NFR BLD AUTO: 1 % (ref 0–1.5)
BILIRUB SERPL-MCNC: 5.5 MG/DL (ref 0.2–1.2)
BUN BLD-MCNC: 34 MG/DL (ref 6–20)
BUN/CREAT SERPL: 59.6 (ref 7–25)
CALCIUM SPEC-SCNC: 7.5 MG/DL (ref 8.6–10.5)
CHLORIDE SERPL-SCNC: 108 MMOL/L (ref 98–107)
CO2 SERPL-SCNC: 19 MMOL/L (ref 22–29)
CREAT BLD-MCNC: 0.57 MG/DL (ref 0.76–1.27)
DEPRECATED RDW RBC AUTO: 88.1 FL (ref 37–54)
EOSINOPHIL # BLD MANUAL: 0.29 10*3/MM3 (ref 0–0.4)
EOSINOPHIL NFR BLD MANUAL: 1 % (ref 0.3–6.2)
ERYTHROCYTE [DISTWIDTH] IN BLOOD BY AUTOMATED COUNT: 22.6 % (ref 12.3–15.4)
GFR SERPL CREATININE-BSD FRML MDRD: >150 ML/MIN/1.73
GLOBULIN UR ELPH-MCNC: 3.1 GM/DL
GLUCOSE BLD-MCNC: 171 MG/DL (ref 65–99)
GLUCOSE BLDC GLUCOMTR-MCNC: 118 MG/DL (ref 70–130)
GLUCOSE BLDC GLUCOMTR-MCNC: 133 MG/DL (ref 70–130)
GLUCOSE BLDC GLUCOMTR-MCNC: 165 MG/DL (ref 70–130)
GLUCOSE BLDC GLUCOMTR-MCNC: 94 MG/DL (ref 70–130)
HCT VFR BLD AUTO: 27.1 % (ref 37.5–51)
HGB BLD-MCNC: 8.7 G/DL (ref 13–17.7)
INR PPP: 2.01 (ref 0.85–1.16)
LYMPHOCYTES # BLD MANUAL: 2 10*3/MM3 (ref 0.7–3.1)
LYMPHOCYTES NFR BLD MANUAL: 5 % (ref 5–12)
LYMPHOCYTES NFR BLD MANUAL: 7 % (ref 19.6–45.3)
MACROCYTES BLD QL SMEAR: ABNORMAL
MAGNESIUM SERPL-MCNC: 2.3 MG/DL (ref 1.6–2.6)
MCH RBC QN AUTO: 35.4 PG (ref 26.6–33)
MCHC RBC AUTO-ENTMCNC: 32.1 G/DL (ref 31.5–35.7)
MCV RBC AUTO: 110.2 FL (ref 79–97)
MONOCYTES # BLD AUTO: 1.43 10*3/MM3 (ref 0.1–0.9)
NEUTROPHILS # BLD AUTO: 24.56 10*3/MM3 (ref 1.7–7)
NEUTROPHILS NFR BLD MANUAL: 76 % (ref 42.7–76)
NEUTS BAND NFR BLD MANUAL: 10 % (ref 0–5)
PHOSPHATE SERPL-MCNC: 4.5 MG/DL (ref 2.5–4.5)
PLAT MORPH BLD: NORMAL
PLATELET # BLD AUTO: 238 10*3/MM3 (ref 140–450)
PMV BLD AUTO: 11.7 FL (ref 6–12)
POTASSIUM BLD-SCNC: 4.4 MMOL/L (ref 3.5–5.2)
PROCALCITONIN SERPL-MCNC: 7.71 NG/ML (ref 0.1–0.25)
PROT SERPL-MCNC: 5 G/DL (ref 6–8.5)
PROTHROMBIN TIME: 22.5 SECONDS (ref 11.5–14)
RBC # BLD AUTO: 2.46 10*6/MM3 (ref 4.14–5.8)
SODIUM BLD-SCNC: 139 MMOL/L (ref 136–145)
TOXIC GRANULATION: ABNORMAL
WBC NRBC COR # BLD: 28.56 10*3/MM3 (ref 3.4–10.8)

## 2020-05-06 PROCEDURE — 97530 THERAPEUTIC ACTIVITIES: CPT

## 2020-05-06 PROCEDURE — 71045 X-RAY EXAM CHEST 1 VIEW: CPT

## 2020-05-06 PROCEDURE — 97116 GAIT TRAINING THERAPY: CPT

## 2020-05-06 PROCEDURE — 92610 EVALUATE SWALLOWING FUNCTION: CPT

## 2020-05-06 PROCEDURE — 25010000002 LORAZEPAM PER 2 MG: Performed by: INTERNAL MEDICINE

## 2020-05-06 PROCEDURE — 84145 PROCALCITONIN (PCT): CPT | Performed by: INTERNAL MEDICINE

## 2020-05-06 PROCEDURE — 25010000002 PROMETHAZINE PER 50 MG: Performed by: NURSE PRACTITIONER

## 2020-05-06 PROCEDURE — 99232 SBSQ HOSP IP/OBS MODERATE 35: CPT | Performed by: INTERNAL MEDICINE

## 2020-05-06 PROCEDURE — 87635 SARS-COV-2 COVID-19 AMP PRB: CPT | Performed by: INTERNAL MEDICINE

## 2020-05-06 PROCEDURE — 74018 RADEX ABDOMEN 1 VIEW: CPT

## 2020-05-06 PROCEDURE — 25010000003 PHYTONADIONE 10 MG/ML SOLUTION: Performed by: INTERNAL MEDICINE

## 2020-05-06 PROCEDURE — 85007 BL SMEAR W/DIFF WBC COUNT: CPT | Performed by: INTERNAL MEDICINE

## 2020-05-06 PROCEDURE — 97535 SELF CARE MNGMENT TRAINING: CPT

## 2020-05-06 PROCEDURE — 99291 CRITICAL CARE FIRST HOUR: CPT | Performed by: INTERNAL MEDICINE

## 2020-05-06 PROCEDURE — 25010000002 ONDANSETRON PER 1 MG: Performed by: INTERNAL MEDICINE

## 2020-05-06 PROCEDURE — P9047 ALBUMIN (HUMAN), 25%, 50ML: HCPCS | Performed by: INTERNAL MEDICINE

## 2020-05-06 PROCEDURE — 83735 ASSAY OF MAGNESIUM: CPT | Performed by: INTERNAL MEDICINE

## 2020-05-06 PROCEDURE — 25010000002 FUROSEMIDE PER 20 MG: Performed by: INTERNAL MEDICINE

## 2020-05-06 PROCEDURE — 25010000002 ENOXAPARIN PER 10 MG: Performed by: INTERNAL MEDICINE

## 2020-05-06 PROCEDURE — 25010000002 LEVOFLOXACIN PER 250 MG: Performed by: INTERNAL MEDICINE

## 2020-05-06 PROCEDURE — 25010000002 CHLOROTHIAZIDE PER 500 MG: Performed by: INTERNAL MEDICINE

## 2020-05-06 PROCEDURE — 85025 COMPLETE CBC W/AUTO DIFF WBC: CPT | Performed by: INTERNAL MEDICINE

## 2020-05-06 PROCEDURE — 82962 GLUCOSE BLOOD TEST: CPT

## 2020-05-06 PROCEDURE — 80053 COMPREHEN METABOLIC PANEL: CPT | Performed by: INTERNAL MEDICINE

## 2020-05-06 PROCEDURE — 25010000002 ALBUMIN HUMAN 25% PER 50 ML: Performed by: INTERNAL MEDICINE

## 2020-05-06 PROCEDURE — 84100 ASSAY OF PHOSPHORUS: CPT | Performed by: INTERNAL MEDICINE

## 2020-05-06 PROCEDURE — 82140 ASSAY OF AMMONIA: CPT | Performed by: INTERNAL MEDICINE

## 2020-05-06 PROCEDURE — 85610 PROTHROMBIN TIME: CPT | Performed by: INTERNAL MEDICINE

## 2020-05-06 PROCEDURE — 97110 THERAPEUTIC EXERCISES: CPT

## 2020-05-06 RX ORDER — PROMETHAZINE HYDROCHLORIDE 25 MG/ML
12.5 INJECTION, SOLUTION INTRAMUSCULAR; INTRAVENOUS EVERY 6 HOURS PRN
Status: DISCONTINUED | OUTPATIENT
Start: 2020-05-06 | End: 2020-05-07

## 2020-05-06 RX ORDER — WATER 1000 ML/1000ML
INJECTION, SOLUTION INTRAVENOUS
Status: COMPLETED
Start: 2020-05-06 | End: 2020-05-06

## 2020-05-06 RX ORDER — PROPRANOLOL HYDROCHLORIDE 10 MG/1
10 TABLET ORAL EVERY 8 HOURS SCHEDULED
Status: DISCONTINUED | OUTPATIENT
Start: 2020-05-06 | End: 2020-05-13 | Stop reason: HOSPADM

## 2020-05-06 RX ORDER — ALBUMIN (HUMAN) 12.5 G/50ML
50 SOLUTION INTRAVENOUS ONCE
Status: COMPLETED | OUTPATIENT
Start: 2020-05-06 | End: 2020-05-06

## 2020-05-06 RX ORDER — FUROSEMIDE 10 MG/ML
20 INJECTION INTRAMUSCULAR; INTRAVENOUS 2 TIMES DAILY
Status: COMPLETED | OUTPATIENT
Start: 2020-05-06 | End: 2020-05-06

## 2020-05-06 RX ADMIN — INSULIN HUMAN 2 UNITS: 100 INJECTION, SOLUTION PARENTERAL at 05:58

## 2020-05-06 RX ADMIN — ACETAMINOPHEN 650 MG: 325 TABLET, FILM COATED ORAL at 21:06

## 2020-05-06 RX ADMIN — CHLOROTHIAZIDE SODIUM 500 MG: 500 INJECTION, POWDER, LYOPHILIZED, FOR SOLUTION INTRAVENOUS at 02:10

## 2020-05-06 RX ADMIN — THIAMINE HCL TAB 100 MG 100 MG: 100 TAB at 08:01

## 2020-05-06 RX ADMIN — WATER 20 ML: 1 INJECTION INTRAMUSCULAR; INTRAVENOUS; SUBCUTANEOUS at 02:10

## 2020-05-06 RX ADMIN — LORAZEPAM 1 MG: 2 INJECTION INTRAMUSCULAR; INTRAVENOUS at 20:26

## 2020-05-06 RX ADMIN — PROMETHAZINE HYDROCHLORIDE 12.5 MG: 25 INJECTION INTRAMUSCULAR; INTRAVENOUS at 21:16

## 2020-05-06 RX ADMIN — LACTULOSE 20 G: 20 SOLUTION ORAL at 20:03

## 2020-05-06 RX ADMIN — Medication 30 ML: at 08:02

## 2020-05-06 RX ADMIN — DEXMEDETOMIDINE HYDROCHLORIDE 0.8 MCG/KG/HR: 4 INJECTION, SOLUTION INTRAVENOUS at 06:46

## 2020-05-06 RX ADMIN — ENOXAPARIN SODIUM 40 MG: 40 INJECTION SUBCUTANEOUS at 08:03

## 2020-05-06 RX ADMIN — PANTOPRAZOLE SODIUM 40 MG: 40 INJECTION, POWDER, FOR SOLUTION INTRAVENOUS at 05:58

## 2020-05-06 RX ADMIN — PHYTONADIONE 10 MG: 1 INJECTION, EMULSION INTRAMUSCULAR; INTRAVENOUS; SUBCUTANEOUS at 08:01

## 2020-05-06 RX ADMIN — Medication 30 ML: at 23:23

## 2020-05-06 RX ADMIN — DEXMEDETOMIDINE HYDROCHLORIDE 1.4 MCG/KG/HR: 4 INJECTION, SOLUTION INTRAVENOUS at 03:15

## 2020-05-06 RX ADMIN — LEVOFLOXACIN 500 MG: 5 INJECTION, SOLUTION INTRAVENOUS at 13:00

## 2020-05-06 RX ADMIN — FUROSEMIDE 20 MG: 10 INJECTION, SOLUTION INTRAMUSCULAR; INTRAVENOUS at 20:03

## 2020-05-06 RX ADMIN — SPIRONOLACTONE 25 MG: 25 TABLET ORAL at 08:01

## 2020-05-06 RX ADMIN — POTASSIUM CHLORIDE 20 MEQ: 1.5 POWDER, FOR SOLUTION ORAL at 05:59

## 2020-05-06 RX ADMIN — RIFAXIMIN 550 MG: 550 TABLET ORAL at 08:01

## 2020-05-06 RX ADMIN — RIFAXIMIN 550 MG: 550 TABLET ORAL at 20:03

## 2020-05-06 RX ADMIN — Medication 50 MG: at 08:01

## 2020-05-06 RX ADMIN — FUROSEMIDE 20 MG: 10 INJECTION, SOLUTION INTRAMUSCULAR; INTRAVENOUS at 11:33

## 2020-05-06 RX ADMIN — LACTULOSE 20 G: 20 SOLUTION ORAL at 08:02

## 2020-05-06 RX ADMIN — PROPRANOLOL HYDROCHLORIDE 10 MG: 20 TABLET ORAL at 21:16

## 2020-05-06 RX ADMIN — FUROSEMIDE 40 MG: 10 INJECTION, SOLUTION INTRAMUSCULAR; INTRAVENOUS at 04:17

## 2020-05-06 RX ADMIN — ALBUMIN HUMAN 50 G: 0.25 SOLUTION INTRAVENOUS at 11:33

## 2020-05-06 RX ADMIN — MULTIVITAMIN 15 ML: LIQUID ORAL at 08:01

## 2020-05-06 RX ADMIN — FOLIC ACID 1 MG: 1 TABLET ORAL at 08:01

## 2020-05-06 RX ADMIN — LORAZEPAM 1 MG: 2 INJECTION INTRAMUSCULAR; INTRAVENOUS at 16:38

## 2020-05-06 NOTE — PLAN OF CARE
Problem: Patient Care Overview  Goal: Plan of Care Review  Outcome: Ongoing (interventions implemented as appropriate)  Flowsheets (Taken 5/6/2020 4680)  Progress: improving  Plan of Care Reviewed With: patient  Outcome Summary: Pt demonstrated ability to progress to 4 ft gait with mod x2 A, RW; cont to be limited by fatigue and confusion, but motivated to progress ambulation distance. Will cont PT POC.

## 2020-05-06 NOTE — PROGRESS NOTES
INTENSIVIST / PULMONARY FOLLOW UP NOTE     Hospital:  LOS: 13 days   Mr. John Varma, 24 y.o. 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          SUBJECTIVE   Much more alert today    The patient's relevant past medical, surgical, family, and social history were reviewed    Allergies and medications were reviewed    ROS:  Per subjective, all other systems were reviewed and were negative        OBJECTIVE     Vital Sign Min/Max for last 24 hours:  Temp  Min: 98.5 °F (36.9 °C)  Max: 101.4 °F (38.6 °C)   BP  Min: 86/44  Max: 128/73   Pulse  Min: 74  Max: 111   Resp  Min: 28  Max: 34   SpO2  Min: 86 %  Max: 100 %   No data recorded     Physical Exam:  General Appearance:  alert, in no acute distress  Eyes:  No scleral icterus or pallor, pupils normal  Ears, Nose, Mouth, Throat:  Atraumatic, oropharynx clear  Neck:  Trachea midline, thyroid normal  Respiratory:  Clear to auscultation bilaterally, normal effort, no tenderness to palpation  Cardiovascular:  Regular rate and rhythm, no murmurs, 1-2+ peripheral edema, no thrill  Gastrointestinal:  Distended, non-tender  Skin:  Normal temperature, no rash  Psychiatric:  More alert and appropriate, no agitation  Neuro:  No new focal neurologic deficits observed    Telemetry:              Hemodynamics:   CVP:     PAP:     PAOP:     CO:     CI:     SVI:     SVR:       SpO2: 99 % SpO2  Min: 86 %  Max: 100 %   Device:      Flow Rate:   No data recorded       Intake/Ouptut 24 hrs (7:00AM - 6:59 AM)  Intake & Output (last 3 days)        05/03 0701 - 05/04 0700 05/04 0701 - 05/05 0700 05/05 0701 - 05/06 0700 05/06 0701 - 05/07 0700    I.V. (mL/kg) 524.6 (7) 2166.9 (28.3) 561 (7.3)     Blood 331       Other 685 827 401     NG/GT 1680 1591 981     IV Piggyback 100 100      Total Intake(mL/kg) 3320.6 (44.2) 4684.9 (61.1) 1943 (25.3)     Urine (mL/kg/hr) 1550 (0.9) 2975 (1.6) 3120 (1.7)     Emesis/NG output 0       Stool   0     Total Output 1550 2975 3120     Net +1770.6 +1709.9 -1177             Stool Unmeasured Occurrence   2 x     Emesis Unmeasured Occurrence 1 x             Lines, Drains & Airways    Active LDAs     Name:   Placement date:   Placement time:   Site:   Days:    PICC Triple Lumen 04/28/20 Right Basilic   04/28/20    1116    Basilic   5    NG/OG Tube Nasogastric 16 Fr Right nostril   04/28/20    0430    Right nostril   6    Urethral Catheter Temperature probe   04/25/20    1200     8                Hematology:  Results from last 7 days   Lab Units 05/06/20  0541 05/05/20 0318 05/04/20 0414 05/03/20  1048 05/03/20  0216 05/01/20  0421 04/30/20  0517   WBC 10*3/mm3 28.56* 31.36* 32.33*  --   --  29.16* 29.97*   HEMOGLOBIN g/dL 8.7* 9.1* 9.0* 9.2* 6.9* 7.3* 7.5*   HEMATOCRIT % 27.1* 28.5* 28.0* 27.3* 22.1* 23.1* 22.9*   PLATELETS 10*3/mm3 238 253 252  --   --  256 275   MONOCYTES % % 5.0  --   --   --   --   --   --      Electrolytes, Magnesium and Phosphorus:  Results from last 7 days   Lab Units 05/06/20 0418 05/05/20 0318 05/04/20  0414 05/03/20  0216 05/02/20  0442 05/01/20  0421 04/30/20  0517   SODIUM mmol/L 139 143 147* 149* 149* 147* 144   CHLORIDE mmol/L 108* 109* 115* 119* 119* 118* 115*   POTASSIUM mmol/L 4.4 4.1 4.9 4.4 4.6 4.3 4.7   CO2 mmol/L 19.0* 20.0* 22.0 21.0* 21.0* 17.0* 19.0*   MAGNESIUM mg/dL 2.3 1.8 2.1 2.0 1.9 2.1 2.3   PHOSPHORUS mg/dL 4.5 2.7 2.8 2.3* 2.8 3.2 4.5     Renal:  Results from last 7 days   Lab Units 05/06/20  0418 05/05/20  0318 05/04/20  0414 05/03/20  0216 05/02/20 0442 05/01/20  0421  04/30/20  0517   CREATININE mg/dL 0.57* 0.58* 0.61* 0.57* 0.53* 0.43* 0.61*   BUN mg/dL 34* 33* 32* 28* 28* 36* 49*     Estimated Creatinine Clearance: 214.9 mL/min (A) (by C-G formula based on SCr of 0.57 mg/dL (L)).  Hepatic:  Results from last 7 days   Lab Units 05/06/20  0418 05/05/20  0318 05/04/20  0414 05/02/20  0442   ALK PHOS U/L 108 153* 199* 180*   BILIRUBIN mg/dL 5.5* 5.2* 5.8* 4.8*   BILIRUBIN DIRECT mg/dL  --   --  3.3*  --    ALT (SGPT) U/L 69* 77* 89* 126*   AST (SGOT) U/L 93* 91* 93* 148*     Arterial Blood Gases:  Results from last 7 days   Lab Units 05/03/20  0115 05/01/20  1016 04/30/20  0347 04/29/20  2006 04/29/20  1648 04/29/20  1424   PH, ARTERIAL pH units 7.561* 7.499* 7.484* 7.449 7.458* 7.426   PCO2, ARTERIAL mm Hg 23.4* 27.1* 27.1* 30.1* 28.9* 31.5*   PO2 ART mm Hg 188.0* 84.8 64.4* 67.2* 59.1* 78.9*   FIO2 % 60 40 50 50 50 100       Results from last 7 days   Lab Units 05/01/20  0421   HEMOGLOBIN A1C % 4.30*       Lab Results   Component Value Date    LACTATE 2.0 05/03/2020       Relevant imaging studies and labs from 05/06/20 were reviewed and interpreted by me    Medications (drips):    dexmedetomidine Last Rate: 0.8 mcg/kg/hr (05/06/20 0646)         albumin human 50 g Intravenous Once   enoxaparin 40 mg Subcutaneous Q24H   folic acid 1 mg Oral Daily   furosemide 20 mg Intravenous BID   insulin detemir 15 Units Subcutaneous Daily   insulin regular 0-7 Units Subcutaneous Q6H   insulin regular 5 Units Subcutaneous Q6H   lactulose 20 g Nasogastric BID   levoFLOXacin 500 mg Intravenous Q24H   multivitamin and minerals 15 mL Oral Daily   pantoprazole 40 mg Intravenous Q AM   PRO-STAT 30 mL Nasogastric BID   rifAXIMin 550 mg Nasogastric Q12H   spironolactone 25 mg Nasogastric Daily   thiamine 100 mg Oral Daily   zinc gluconate 50 mg Oral Daily       Assessment/Plan   IMPRESSION / PLAN     Inpatient Problem List:  24 y.o.male:  Active Hospital Problems    Diagnosis   • **Acute variceal GI  bleeding   • 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   • Sepsis with acute renal failure without septic shock (CMS/HCC)   • Acute respiratory failure with hypoxia (CMS/HCC)   • Alcohol withdrawal delirium (CMS/HCC)   • Hepatic encephalopathy (CMS/HCC)   • Ascites due to alcoholic hepatitis   • Pleural effusion on left   • Acute alcoholic hepatitis   • BORIS (acute kidney injury) (CMS/HCC)   • Bandemia   • Acute upper GI bleeding        Impression:  24 y.o.male with relevant PMH of alcoholism / alcoholic cirrhosis, MJ abuse, Tobacco abuse admitted 4/23/2020 with alcoholic hepatitis, decompensated cirrhosis, BORIS, Hepatic Encephalopathy, and hematemesis secondary to variceal bleed s/p EGD 4/24/20.  Patient was intubated for EGD and extubated on 4/27.  Underwent TIPS 4/29 (intubated, extubated again 5/1).  Has had paracentesis x 2 (4/24 - 5 liters, 4/30 2.5 liters).  Being treated for SBP w/ Levaquin (previoulsy on Azactam x 7 days / PCN allergy).    Additional ongoing issues include refractory ascites, volume overload, and hepatic encephalopathy.      Encephalopathy improved today.    Plan:  PSE - on rifaxamin, lactulose.  Wean dex off.  Prn ativan - try to avoid if possible.  Thiamine, folate, MVI, Zinc.      Refractory Ascites / Volume Overload - continue diuresis as tolerated.  Decrease dose today.  Approaching euvolemia from LE standpoint.  I anticipate tomorrow we can probably but him on maintenance po lasix / aldactone for ascites / cirrhosis.    Stress Hyperglycemia - titrate SQ insulin    Coagulopathy - po vitamin k 10 mg x 3 doses    Variceal Bleed / TIPS / Cirrhosis / Alcoholic Hepatitis / SBP - per GI    DVT / PUD prophylaxis    Nutrition - Tube Feeds    ST - close to passing swallow evaluation.  New hospital policy requires SARS-COV 2 PCR prior to performing modified barium swallow.  Will order.  I have no suspicion for the disease and will not change his isolation status for now.       Plan of care and goals reviewed with mulitdisciplinary team at daily rounds    D/w family on phone (father).  I told him we need to consider an inpatient psych stay after he is medically cleared to address underlying depression / anxiety / alcoholism.  Father is in agreement.  Will plan on ridge evaluation once medically cleared.    Critical Care time spent in direct patient care: 30 minutes (excluding procedure time, if applicable) including high complexity decision making to assess, manipulate, and support vital organ system failure in this individual who has impairment of one or more vital organ systems such that there is a high probability of imminent or life threatening deterioration in the patient’s condition.       Valerio Emmanuel MD  Intensive Care Medicine  05/06/20 10:55

## 2020-05-06 NOTE — PROGRESS NOTES
"GI Daily Progress Note  Subjective     Bob Guevara is a 24 y.o. male who was admitted with Acute GI bleeding.   Looks more alert today.  No abd pain.  ? Aspiration last night    Chief Complaint:  ETOH hepaitits    Objective     BP 92/78   Pulse 90   Temp 98.8 °F (37.1 °C) (Oral)   Resp 28   Ht 172.7 cm (68\")   Wt 76.7 kg (169 lb 1.5 oz)   SpO2 99%   BMI 25.71 kg/m²     Intake/Output last 3 shifts:  I/O last 3 completed shifts:  In: 5000.9 [I.V.:1954.9; Other:962; NG/GT:2084]  Out: 4920 [Urine:4920]  Intake/Output this shift:  I/O this shift:  In: -   Out: 800 [Urine:800]      Physical Exam  Wt Readings from Last 3 Encounters:   05/05/20 76.7 kg (169 lb 1.5 oz)   ,body mass index is 25.71 kg/m².,@FLOWAMB(6)@,@FLOWAMB(5)@,@FLOWAMB(8)@   CONSTITUTIONAL:sitting in chair  Resp CTA; no rhonchi, rales, or wheezes.  Respiration effort normal  CV RRR; no M/R/G. 1-2 + lower extremity edema  GI Abd soft, NT, ND, normal active bowel sounds.    Psych: Awake and alert    DATA:Results for BOB GUEVARA (MRN [id number]) as of 5/6/2020 13:03   Ref. Range 5/6/2020 05:41   WBC Latest Ref Range: 3.40 - 10.80 10*3/mm3 28.56 (H)   RBC Latest Ref Range: 4.14 - 5.80 10*6/mm3 2.46 (L)   Hemoglobin Latest Ref Range: 13.0 - 17.7 g/dL 8.7 (L)   Hematocrit Latest Ref Range: 37.5 - 51.0 % 27.1 (L)   Results for BOB GUEVARA (MRN [id number]) as of 5/6/2020 13:03   Ref. Range 5/6/2020 04:18   Glucose Latest Ref Range: 65 - 99 mg/dL 171 (H)   Sodium Latest Ref Range: 136 - 145 mmol/L 139   Potassium Latest Ref Range: 3.5 - 5.2 mmol/L 4.4   CO2 Latest Ref Range: 22.0 - 29.0 mmol/L 19.0 (L)   Chloride Latest Ref Range: 98 - 107 mmol/L 108 (H)   Anion Gap Latest Ref Range: 5.0 - 15.0 mmol/L 12.0   Creatinine Latest Ref Range: 0.76 - 1.27 mg/dL 0.57 (L)   BUN Latest Ref Range: 6 - 20 mg/dL 34 (H)   BUN/Creatinine Ratio Latest Ref Range: 7.0 - 25.0  59.6 (H)   Calcium Latest Ref Range: 8.6 - 10.5 mg/dL 7.5 (L)   eGFR Non African Am " Latest Ref Range: >60 mL/min/1.73 >150   Alkaline Phosphatase Latest Ref Range: 39 - 117 U/L 108   Total Protein Latest Ref Range: 6.0 - 8.5 g/dL 5.0 (L)   ALT (SGPT) Latest Ref Range: 1 - 41 U/L 69 (H)   AST (SGOT) Latest Ref Range: 1 - 40 U/L 93 (H)   Total Bilirubin Latest Ref Range: 0.2 - 1.2 mg/dL 5.5 (H)   Results for BOB GUEVARA (MRN [id number]) as of 5/6/2020 13:03   Ref. Range 5/6/2020 04:18   Ammonia Latest Ref Range: 16 - 60 umol/L 72 (H)     Results for BOB GUEVARA (MRN [id number]) as of 5/6/2020 13:03   Ref. Range 5/6/2020 04:18   Procalcitonin Latest Ref Range: 0.10 - 0.25 ng/mL 7.71 (H)   Results for BOB GUEVARA (MRN [id number]) as of 5/6/2020 13:03   Ref. Range 5/6/2020 04:18   INR Latest Ref Range: 0.85 - 1.16  2.01 (H)     CXR per radiologist:  IMPRESSION:  Bilateral pleural effusions with patchy airspace disease  seen within the right mid and left lower lung field. Lines and tubes in  satisfactory position.     KUB per radiologist:  IMPRESSION:  NG tube in the distal stomach. Borderline dilatation of the  transverse colon with air but no evidence of dilated bowel elsewhere to  suggest significant ileus or obstruction    Assessment/Plan   1.  Acute alcoholic hepatitis  2.  Acute variceal bleed, status post TIPS  3.  BORIS  4.  Hepatic encephalopathy  5.  Alcohol withdrawal  6.  Nutrition  7. SBP  8.  Abnormal CT liver  9  Leukocytosis  10 pleural effusions  11,  SBP  On Levaquin      Slowly improving.  More alert today than since admission.  QUestion aspiration but CXR clear except effusions.  As he is improving hopefully can pass SLP soon and can DC NG  WBC improving but procalcitonin us up today        Acute variceal GI bleeding    Acute alcoholic hepatitis    BORIS (acute kidney injury) (CMS/HCC)    Bandemia    Acute upper GI bleeding    Sepsis with acute renal failure without septic shock (CMS/HCC)    Acute respiratory failure with hypoxia (CMS/HCC)    Alcohol withdrawal delirium  (CMS/HCC)    Hepatic encephalopathy (CMS/HCC)    Ascites due to alcoholic hepatitis    Pleural effusion on left    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       LOS: 13 days     Kaz Arellano MD  05/06/20  13:01

## 2020-05-06 NOTE — PLAN OF CARE
Problem: Patient Care Overview  Goal: Plan of Care Review  Flowsheets (Taken 5/6/2020 0338)  Progress: no change  Plan of Care Reviewed With: patient  Outcome Summary: Patient was somewhat calm and cooperative at beginning of shift, but later, due to hallucinations, patient became increasingly agitated thinking people he was seeing were trying to kill him. Precedex gtt increased to maintain patient safety and subdue him. Patient did have 1 large, liquid BM at beginning of shift after lactulose enema given on dayshift. Patient has had less c/o adb pain, only with turning. RR remains tachypneic in 30's. No c/o SOB, remains on 2L NC. Plan to sling patient to chair this am. Patient had remain afebrile so far this shift. VSS. JOMARM.

## 2020-05-06 NOTE — PLAN OF CARE
Problem: Patient Care Overview  Goal: Plan of Care Review  Outcome: Ongoing (interventions implemented as appropriate)   Pt presented w/ need to finish toileting upon OT arrival. Pt req'd max A for toileting tasks w/ min A for repositioning for task. Pt req'd mod A x 2 for sit < > Stand at BSC and t/f to bed w/max cues on tech, seq, hand plmt. Pt required mod A x 2 for sit > supine. Pt completed ther ex from semi reclined position in bed including multi planar away from DAVID x 10 reps. Intermittent AAROM, grossly AROM. Pt very emotional at end of session. Nursing notified. Cont to recommend IPOT POC and progress as pt able.

## 2020-05-06 NOTE — PLAN OF CARE
Problem: Patient Care Overview  Goal: Plan of Care Review  Outcome: Ongoing (interventions implemented as appropriate)  Flowsheets (Taken 5/6/2020 1220)  Plan of Care Reviewed With: patient  Note:   SLP re-evaluation completed. Will plan for MBS to further assess swallow function . Please see note for further details and recommendations.

## 2020-05-06 NOTE — THERAPY TREATMENT NOTE
Patient Name: John Varma  : 1995    MRN: [id number]                              Today's Date: 2020       Admit Date: 2020    Visit Dx:     ICD-10-CM ICD-9-CM   1. Sepsis with acute liver failure without hepatic coma or septic shock, due to unspecified organism (CMS/HCC) A41.9 038.9    R65.20 995.92    K72.00 570   2. Hypoxia R09.02 799.02   3. Acute renal failure, unspecified acute renal failure type (CMS/HCC) N17.9 584.9   4. Acute hepatitis B17.9 570   5. Alcohol abuse F10.10 305.00   6. Acute upper GI bleed K92.2 578.9   7. Elevated lactic acid level R79.89 276.2   8. Ascites due to alcoholic hepatitis K70.11 789.59   9. Leukocytosis, unspecified type D72.829 288.60   10. Sepsis with acute renal failure without septic shock, due to unspecified organism, unspecified acute renal failure type (CMS/HCC) A41.9 038.9    R65.20 995.92    N17.9 584.9   11. Dysphagia, unspecified type R13.10 787.20     Patient Active Problem List   Diagnosis   • Acute alcoholic hepatitis   • Acute variceal GI bleeding   • BORIS (acute kidney injury) (CMS/HCC)   • Bandemia   • Acute upper GI bleeding   • Sepsis with acute renal failure without septic shock (CMS/HCC)   • Acute respiratory failure with hypoxia (CMS/HCC)   • Alcohol withdrawal delirium (CMS/HCC)   • Hepatic encephalopathy (CMS/HCC)   • Ascites due to alcoholic hepatitis   • Pleural effusion on left   • S/P TIPS (transjugular intrahepatic portosystemic shunt) 2020     Past Medical History:   Diagnosis Date   • Alcohol abuse    • GERD (gastroesophageal reflux disease)    • GIB (gastrointestinal bleeding)    • Tobacco abuse      Past Surgical History:   Procedure Laterality Date   • ARM WOUND REPAIR / CLOSURE     • ENDOSCOPY N/A 2020    Procedure: ESOPHAGOGASTRODUODENOSCOPY;  Surgeon: Brunner, Mark I, MD;  Location: [facility] ENDOSCOPY;  Service: Gastroenterology;  Laterality: N/A;     General Information     Row Name 20 1546          PT  Evaluation Time/Intention    Document Type  therapy note (daily note)  -     Mode of Treatment  physical therapy  -     Row Name 05/06/20 1546          General Information    Existing Precautions/Restrictions  fall;oxygen therapy device and L/min;other (see comments) NG  -     Row Name 05/06/20 1546          Cognitive Assessment/Intervention- PT/OT    Orientation Status (Cognition)  oriented x 3;verbal cues/prompts needed for orientation  -LS       User Key  (r) = Recorded By, (t) = Taken By, (c) = Cosigned By    Initials Name Provider Type    LS Savanah Matthews, PT Physical Therapist        Mobility     Row Name 05/06/20 1547          Bed Mobility Assessment/Treatment    Comment (Bed Mobility)  UIC  -     Row Name 05/06/20 1547          Transfer Assessment/Treatment    Comment (Transfers)  STS x2: x1 with BUE support of PT/tech; x1 with RW.  -     Row Name 05/06/20 1547          Sit-Stand Transfer    Sit-Stand [place] (Transfers)  moderate assist (50% patient effort);2 person assist;verbal cues  -     Assistive Device (Sit-Stand Transfers)  walker, front-wheeled  -     Row Name 05/06/20 1547          Gait/Stairs Assessment/Training    Gait/Stairs Assessment/Training  gait/ambulation independence  -     [place] Level (Gait)  moderate assist (50% patient effort)  -LS     Assistive Device (Gait)  walker, front-wheeled  -     Distance in Feet (Gait)  4  -LS     Deviations/Abnormal Patterns (Gait)  bilateral deviations;sherwin decreased  -LS     Bilateral Gait Deviations  weight shift ability decreased;forward flexed posture;heel strike decreased  -LS     Comment (Gait/Stairs)  VC for and assist for weightshifting and appropriate advancement of LEs. Distance limited by fatigue but motivated to ambulate.   -LS       User Key  (r) = Recorded By, (t) = Taken By, (c) = Cosigned By    Initials Name Provider Type    LS Savanah Matthews PT Physical Therapist        Obj/Interventions     Row Name  05/06/20 1548          Therapeutic Exercise    Lower Extremity (Therapeutic Exercise)  LAQ (long arc quad), bilateral;marching while seated  -LS     Lower Extremity Range of Motion (Therapeutic Exercise)  ankle dorsiflexion/plantar flexion, bilateral  -LS     Exercise Type (Therapeutic Exercise)  AAROM (active assistive range of motion)  -LS     Position (Therapeutic Exercise)  seated  -LS     Sets/Reps (Therapeutic Exercise)  1/10  -LS     Row Name 05/06/20 1548          Static Sitting Balance    Level of [place] (Unsupported Sitting, Static Balance)  minimal assist, 75% patient effort  -LS     Sitting Position (Unsupported Sitting, Static Balance)  sitting on edge of bed  -LS     Row Name 05/06/20 1548          Static Standing Balance    Level of [place] (Supported Standing, Static Balance)  moderate assist, 50 to 74% patient effort;2 person assist  -LS     Assistive Device Utilized (Supported Standing, Static Balance)  walker, rolling  -LS     Comment (Supported Standing, Static Balance)  R/L pregait weightshifting  -LS       User Key  (r) = Recorded By, (t) = Taken By, (c) = Cosigned By    Initials Name Provider Type    LS Savanah Matthews, PT Physical Therapist        Goals/Plan    No documentation.       Clinical Impression     Row Name 05/06/20 6970          Pain Scale: Numbers Pre/Post-Treatment    Pain Scale: Numbers, Pretreatment  0/10 - no pain  -     Pain Scale: Numbers, Post-Treatment  0/10 - no pain  -LS     Row Name 05/06/20 4769          Plan of Care Review    Plan of Care Reviewed With  patient  -LS     Progress  improving  -LS     Outcome Summary  Pt demonstrated ability to progress to 4 ft gait with mod x2 A, RW; cont to be limited by fatigue and confusion, but motivated to progress ambulation distance. Will cont PT POC.   -LS     Row Name 05/06/20 4220          Vital Signs    Pre Systolic BP Rehab  91  -LS     Pre Treatment Diastolic BP  48  -LS     Post Systolic BP Rehab  99  -LS      Post Treatment Diastolic BP  58  -LS     Posttreatment Heart Rate (beats/min)  90  -LS     O2 Delivery Pre Treatment  supplemental O2  -LS     O2 Delivery Intra Treatment  supplemental O2  -LS     Post SpO2 (%)  99  -LS     O2 Delivery Post Treatment  supplemental O2  -LS     Pre Patient Position  Sitting  -LS     Intra Patient Position  Standing  -LS     Post Patient Position  Sitting  -LS     Row Name 05/06/20 0730          Positioning and Restraints    Pre-Treatment Position  sitting in chair/recliner  -LS     Post Treatment Position  chair  -LS     In Chair  notified nsg;reclined;call light within reach;encouraged to call for assist;exit alarm on;waffle cushion;legs elevated;RUE elevated;LUE elevated;heels elevated  -LS     Restraints  released:;reapplied:;notified nsg:;soft limb  -LS       User Key  (r) = Recorded By, (t) = Taken By, (c) = Cosigned By    Initials Name Provider Type    Savanah Neri, PT Physical Therapist        Outcome Measures     Row Name 05/06/20 5502          How much help from another person do you currently need...    Turning from your back to your side while in flat bed without using bedrails?  2  -LS     Moving from lying on back to sitting on the side of a flat bed without bedrails?  2  -LS     Moving to and from a bed to a chair (including a wheelchair)?  2  -LS     Standing up from a chair using your arms (e.g., wheelchair, bedside chair)?  2  -LS     Climbing 3-5 steps with a railing?  1  -LS     To walk in hospital room?  2  -LS     AM-PAC 6 Clicks Score (PT)  11  -LS       User Key  (r) = Recorded By, (t) = Taken By, (c) = Cosigned By    Initials Name Provider Type    Savanah Neri, PT Physical Therapist          PT Recommendation and Plan  Planned Therapy Interventions (PT Eval): balance training, bed mobility training, gait training, home exercise program, patient/family education, strengthening, transfer training, stair training  Outcome Summary/Treatment Plan  (PT)  Anticipated Discharge Disposition (PT): inpatient rehabilitation facility  Plan of Care Reviewed With: patient  Progress: improving  Outcome Summary: Pt demonstrated ability to progress to 4 ft gait with mod x2 A, RW; cont to be limited by fatigue and confusion, but motivated to progress ambulation distance. Will cont PT POC.      Time Calculation:   PT Charges     Row Name 05/06/20 1554             Time Calculation    Start Time  1137  -LS      PT Received On  05/06/20  -         Time Calculation- PT    Total Timed Code Minutes- PT  23 minute(s)  -LS         Timed Charges    86060 - PT Therapeutic Exercise Minutes  6  -LS      17166 - Gait Training Minutes   10  -LS      66628 - PT Therapeutic Activity Minutes  7  -LS        User Key  (r) = Recorded By, (t) = Taken By, (c) = Cosigned By    Initials Name Provider Type    LS Savanah Matthews, PT Physical Therapist        Therapy Charges for Today     Code Description Service Date Service Provider Modifiers Qty    62388837157 HC GAIT TRAINING EA 15 MIN 5/6/2020 Savanah Matthews, PT GP 1    [id number]  PT THERAPEUTIC ACT EA 15 MIN 5/6/2020 Savanah Matthews, PT GP 1    [id number]  PT THER SUPP EA 15 MIN 5/6/2020 Savanah Matthews, PT GP 2          PT G-Codes  Outcome Measure Options: AM-PAC 6 Clicks Basic Mobility (PT)  AM-PAC 6 Clicks Score (PT): 11  AM-PAC 6 Clicks Score (OT): 7    Savanah Matthews, PT  5/6/2020

## 2020-05-06 NOTE — THERAPY RE-EVALUATION
Acute Care - Speech Language Pathology   Swallow Re-Evaluation [facility]   Clinical Swallow Evaluation     Patient Name: John Varma  : 1995  MRN: [id number]  Today's Date: 2020               Admit Date: 2020    Visit Dx:     ICD-10-CM ICD-9-CM   1. Sepsis with acute liver failure without hepatic coma or septic shock, due to unspecified organism (CMS/HCC) A41.9 038.9    R65.20 995.92    K72.00 570   2. Hypoxia R09.02 799.02   3. Acute renal failure, unspecified acute renal failure type (CMS/HCC) N17.9 584.9   4. Acute hepatitis B17.9 570   5. Alcohol abuse F10.10 305.00   6. Acute upper GI bleed K92.2 578.9   7. Elevated lactic acid level R79.89 276.2   8. Ascites due to alcoholic hepatitis K70.11 789.59   9. Leukocytosis, unspecified type D72.829 288.60   10. Sepsis with acute renal failure without septic shock, due to unspecified organism, unspecified acute renal failure type (CMS/HCC) A41.9 038.9    R65.20 995.92    N17.9 584.9   11. Dysphagia, unspecified type R13.10 787.20     Patient Active Problem List   Diagnosis   • Acute alcoholic hepatitis   • Acute variceal GI bleeding   • BORIS (acute kidney injury) (CMS/HCC)   • Bandemia   • Acute upper GI bleeding   • Sepsis with acute renal failure without septic shock (CMS/HCC)   • Acute respiratory failure with hypoxia (CMS/HCC)   • Alcohol withdrawal delirium (CMS/HCC)   • Hepatic encephalopathy (CMS/HCC)   • Ascites due to alcoholic hepatitis   • Pleural effusion on left   • S/P TIPS (transjugular intrahepatic portosystemic shunt) 2020     Past Medical History:   Diagnosis Date   • Alcohol abuse    • GERD (gastroesophageal reflux disease)    • GIB (gastrointestinal bleeding)    • Tobacco abuse      Past Surgical History:   Procedure Laterality Date   • ARM WOUND REPAIR / CLOSURE     • ENDOSCOPY N/A 2020    Procedure: ESOPHAGOGASTRODUODENOSCOPY;  Surgeon: Brunner, Mark I, MD;  Location: [facility] ENDOSCOPY;  Service: Gastroenterology;   Laterality: N/A;        SWALLOW EVALUATION (last 72 hours)      SLP Adult Swallow Evaluation     Row Name 05/06/20 1000 05/05/20 1200 05/04/20 1015             Rehab Evaluation    Document Type  re-evaluation  -MP  re-evaluation  -AC  re-evaluation  -MP      Subjective Information  no complaints  -MP  no complaints  -AC  no complaints  -MP      Patient Observations  alert;cooperative  -MP  alert;cooperative  -AC  alert;cooperative initially lethargic, but more alert t/o eval  -MP      Patient/Family Observations  No family present  -MP  Pt intermittently confused. No family present.  -AC  Pt in soft wrist restraints. No family present  -MP      Patient Effort  good  -MP  good  -AC  good  -MP         General Information    Patient Profile Reviewed  yes  -MP  yes  -AC  yes  -MP      Pertinent History Of Current Problem  See previous eval.  -MP  See initial eval.  -AC  Adm 4/23 w/ acute variceal GI bleeding. Alcohol hepatitis, alcohol w/d. Intubated 4/24-4/27 & 4/29-5/1. Off of BiPAP this AM.  -MP      Current Method of Nutrition  NPO;nasogastric feedings  -MP  NPO;nasogastric feedings  -AC  NPO;nasogastric feedings  -MP      Precautions/Limitations, Vision  WFL;for purposes of eval  -MP  --  WFL;other (see comments)  -MP      Precautions/Limitations, Hearing  WFL;for purposes of eval  -MP  --  WFL;for purposes of eval  -MP      Prior Level of Function-Communication  WFL  -MP  --  WFL  -MP      Prior Level of Function-Swallowing  no diet consistency restrictions  -MP  --  no diet consistency restrictions  -MP      Plans/Goals Discussed with  patient;agreed upon  -MP  patient;agreed upon  -AC  patient;agreed upon  -MP      Barriers to Rehab  none identified  -MP  medically complex  -AC  none identified  -MP      Patient's Goals for Discharge  return to PO diet  -MP  return to PO diet  -AC  return to PO diet  -MP         Pain Assessment    Additional Documentation  Pain Scale: FACES Pre/Post-Treatment (Group)  -MP  --   Pain Scale: FACES Pre/Post-Treatment (Group)  -MP         Pain Scale: FACES Pre/Post-Treatment    Pain: FACES Scale, Pretreatment  0-->no hurt  -MP  0-->no hurt  -AC  0-->no hurt  -MP      Pain: FACES Scale, Post-Treatment  0-->no hurt  -MP  0-->no hurt  -AC  0-->no hurt  -MP         Oral Motor and Function    Dentition Assessment  natural, present and adequate  -MP  natural, present and adequate  -AC  natural, present and adequate  -MP      Secretion Management  WNL/WFL  -MP  WNL/WFL  -AC  WNL/WFL  -MP      Mucosal Quality  dry  -MP  dry  -AC  moist, healthy  -MP         Oral Musculature and Cranial Nerve Assessment    Oral Motor General Assessment  --  generalized oral motor weakness  -AC  generalized oral motor weakness  -MP         General Eating/Swallowing Observations    Respiratory Support Currently in Use  nasal cannula  -MP  nasal cannula  -AC  --      Eating/Swallowing Skills  fed by SLP  -MP  fed by SLP  -AC  fed by SLP  -MP      Positioning During Eating  upright in bed  -MP  upright in bed  -AC  upright in bed  -MP      Utensils Used  spoon;cup  -MP  spoon;cup  -AC  spoon  -MP      Consistencies Trialed  thin liquids;nectar/syrup-thick liquids;pureed  -MP  thin liquids;nectar/syrup-thick liquids;pureed  -AC  thin liquids;pureed  -MP         Respiratory    Respiratory Status  --  increase in respiratory rate;during swallowing/eating;other (see comments) and @ baseline  -AC  --         Clinical Swallow Eval    Oral Prep Phase  --  WFL  -AC  --  -MP      Oral Transit  --  WFL  -AC  impaired  -MP      Oral Residue  --  WFL  -AC  --      Pharyngeal Phase  suspected pharyngeal impairment  -MP  suspected pharyngeal impairment  -AC  suspected pharyngeal impairment  -MP      Clinical Swallow Evaluation Summary  Clinical swallow re-evaluation completed. Pt given trials of ice chipx1, thin via tsp, nectar-thick via tsp/cup, & puree. Cough following initial trial of thin liquid. No further s/sxs aspiration observed  during eval. Discussed w/ RN & MD. Will plan for MBS when COVID-19 r/o testing completed, per new hospital policy.  -MP  --  Clinical swallow re-evaluation completed. Pt given trials of ice chipx1, thin via tsp, and puree. Wet vocal quality & multiple swallows w/ small volumes of thin. Inconsistently clearing oral cavity w/ puree - required verbal cueing to swallow. Pt not yet appropriate for PO diet or instrumental eval. Rec continue NPO w/ NG. 3-4 ice chips/hr following oral care. SLP will f/u for re-eval.  -MP         Oral Transit Concerns    Oral Transit Concerns  --  --  delayed initiation of bolus transit;increased oral transit time  -MP      Delayed Intiation of Bolus Transit  --  --  pudding  -MP      Increased Oral Transit Time  --  --  pudding  -MP         Pharyngeal Phase Concerns    Pharyngeal Phase Concerns  cough  -MP  cough;multiple swallows  -AC  wet vocal quality;multiple swallows  -MP      Wet Vocal Quality  --  --  thin  -MP      Multiple Swallows  --  thin;nectar;pudding  -AC  thin  -MP      Cough  thin  -MP  nectar  -AC  --      Pharyngeal Phase Concerns, Comment  --  Oral phase seemingly WFL for consistencies trialed. DNT solid 2' aspiration risk.  Slow improvements, cont'd s/sxs aspiration.  -AC  --         Clinical Impression    SLP Swallowing Diagnosis  suspected pharyngeal dysfunction  -MP  suspected pharyngeal dysfunction  -AC  oral dysfunction;suspected pharyngeal dysfunction  -MP      Functional Impact  risk of aspiration/pneumonia  -MP  risk of aspiration/pneumonia  -AC  risk of aspiration/pneumonia  -MP      Rehab Potential/Prognosis, Swallowing  good, to achieve stated therapy goals  -MP  good, to achieve stated therapy goals  -AC  good, to achieve stated therapy goals  -MP      Swallow Criteria for Skilled Therapeutic Interventions Met  demonstrates skilled criteria  -MP  demonstrates skilled criteria  -AC  demonstrates skilled criteria  -MP         Recommendations    Predicted  Duration Therapy Intervention (Days)  --  until discharge  -AC  --      SLP Diet Recommendation  NPO;temporary alternate methods of nutrition/hydration  -MP  NPO;temporary alternate methods of nutrition/hydration;other (see comments) 4 icechips/hr after oral care w/ supervision-d/c if s/s asp  -AC  NPO;temporary alternate methods of nutrition/hydration;other (see comments) 3-4 ice chips/hr following oral care  -MP      Recommended Diagnostics  VFSS (MBS)  -MP  reassess via clinical swallow evaluation  -AC  reassess via clinical swallow evaluation  -MP      Recommended Precautions and Strategies  other (see comments) oral care BID/PRN  -MP  --  other (see comments) oral care BID/PRN  -MP      SLP Rec. for Method of Medication Administration  meds via alternate route  -MP  meds via alternate route  -AC  meds via alternate route  -MP      Anticipated Dischage Disposition  unknown;anticipate therapy at next level of care  -MP  anticipate therapy at next level of care  -AC  unknown;anticipate therapy at next level of care  -MP        User Key  (r) = Recorded By, (t) = Taken By, (c) = Cosigned By    Initials Name Effective Dates    AC Rebekah Tsai, MS CCC-SLP 07/27/17 -     Michael Stokes MS CCC-SLP 06/19/19 -           EDUCATION  The patient has been educated in the following areas:   Dysphagia (Swallowing Impairment) NPO rationale.    SLP Recommendation and Plan  SLP Swallowing Diagnosis: suspected pharyngeal dysfunction  SLP Diet Recommendation: NPO, temporary alternate methods of nutrition/hydration  Recommended Precautions and Strategies: other (see comments)(oral care BID/PRN)  SLP Rec. for Method of Medication Administration: meds via alternate route        Recommended Diagnostics: VFSS (MBS)  Swallow Criteria for Skilled Therapeutic Interventions Met: demonstrates skilled criteria  Anticipated Dischage Disposition: unknown, anticipate therapy at next level of care  Rehab Potential/Prognosis, Swallowing:  good, to achieve stated therapy goals             Plan of Care Reviewed With: patient           Time Calculation:   Time Calculation- SLP     Row Name 05/06/20 1221             Time Calculation- SLP    SLP Start Time  1000  -MP      SLP Received On  05/06/20  -MP        User Key  (r) = Recorded By, (t) = Taken By, (c) = Cosigned By    Initials Name Provider Type    Michael Stokes MS CCC-SLP Speech and Language Pathologist          Therapy Charges for Today     Code Description Service Date Service Provider Modifiers Qty    [id number]  ST EVAL ORAL PHARYNG SWALLOW 4 5/6/2020 Michael Brown MS CCC-SLP GN 1               MS LIBERTY [place]  5/6/2020

## 2020-05-06 NOTE — THERAPY TREATMENT NOTE
Acute Care - Occupational Therapy Treatment Note  [facility]     Patient Name: John Varma  : 1995  MRN: [id number]  Today's Date: 2020             Admit Date: 2020       ICD-10-CM ICD-9-CM   1. Sepsis with acute liver failure without hepatic coma or septic shock, due to unspecified organism (CMS/HCC) A41.9 038.9    R65.20 995.92    K72.00 570   2. Hypoxia R09.02 799.02   3. Acute renal failure, unspecified acute renal failure type (CMS/HCC) N17.9 584.9   4. Acute hepatitis B17.9 570   5. Alcohol abuse F10.10 305.00   6. Acute upper GI bleed K92.2 578.9   7. Elevated lactic acid level R79.89 276.2   8. Ascites due to alcoholic hepatitis K70.11 789.59   9. Leukocytosis, unspecified type D72.829 288.60   10. Sepsis with acute renal failure without septic shock, due to unspecified organism, unspecified acute renal failure type (CMS/HCC) A41.9 038.9    R65.20 995.92    N17.9 584.9   11. Dysphagia, unspecified type R13.10 787.20     Patient Active Problem List   Diagnosis   • Acute alcoholic hepatitis   • Acute variceal GI bleeding   • BORIS (acute kidney injury) (CMS/HCC)   • Bandemia   • Acute upper GI bleeding   • Sepsis with acute renal failure without septic shock (CMS/HCC)   • Acute respiratory failure with hypoxia (CMS/HCC)   • Alcohol withdrawal delirium (CMS/HCC)   • Hepatic encephalopathy (CMS/HCC)   • Ascites due to alcoholic hepatitis   • Pleural effusion on left   • S/P TIPS (transjugular intrahepatic portosystemic shunt) 2020     Past Medical History:   Diagnosis Date   • Alcohol abuse    • GERD (gastroesophageal reflux disease)    • GIB (gastrointestinal bleeding)    • Tobacco abuse      Past Surgical History:   Procedure Laterality Date   • ARM WOUND REPAIR / CLOSURE     • ENDOSCOPY N/A 2020    Procedure: ESOPHAGOGASTRODUODENOSCOPY;  Surgeon: Brunner, Mark I, MD;  Location: [facility] ENDOSCOPY;  Service: Gastroenterology;  Laterality: N/A;       Therapy  Treatment    Rehabilitation Treatment Summary     Row Name 05/06/20 1521             Treatment Time/Intention    Discipline  occupational therapist  -JY      Document Type  therapy note (daily note)  -JY      Subjective Information  no complaints  -JY      Mode of Treatment  occupational therapy  -JY      Therapy Frequency (OT Eval)  daily  -JY      Patient Effort  good  -JY      Existing Precautions/Restrictions  fall;oxygen therapy device and L/min;other (see comments) NG, soft limb restraints B UES   -JY      Patient Response to Treatment  pt emotional at end of session re: matters unrelated to therapy   -JY      Recorded by [JY] Mary Quevedo, OT 05/06/20 1602      Row Name 05/06/20 1521             Vital Signs    Pre Systolic BP Rehab  109  -JY      Pre Treatment Diastolic BP  56  -JY      Pretreatment Heart Rate (beats/min)  116  -JY      Posttreatment Heart Rate (beats/min)  109  -JY      Pre SpO2 (%)  96  -JY      O2 Delivery Pre Treatment  supplemental O2  -JY      Post SpO2 (%)  96  -JY      O2 Delivery Post Treatment  supplemental O2  -JY      Pre Patient Position  Sitting  -JY      Intra Patient Position  Standing  -JY      Post Patient Position  Supine  -JY      Recorded by [JY] Mary Quevedo, OT 05/06/20 1602      Row Name 05/06/20 1521             Cognitive Assessment/Intervention    Additional Documentation  Cognitive Assessment/Intervention (Group)  -JY      Recorded by [NANCY] Mary Quevedo OT 05/06/20 1602      Row Name 05/06/20 1521             Cognitive Assessment/Intervention- PT/OT    Affect/Mental Status (Cognitive)  confused;emotionally labile  -JY      Orientation Status (Cognition)  oriented x 3;verbal cues/prompts needed for orientation  -JY      Follows Commands (Cognition)  follows one step commands;50-74% accuracy  -JY      Cognitive Function (Cognitive)  safety deficit  -JY      Safety Deficit (Cognitive)  moderate deficit;awareness of need for assistance;safety precautions  awareness;safety precautions follow-through/compliance  -JY      Recorded by [JY] Mary Quevedo, OT 05/06/20 1602      Row Name 05/06/20 1521             Safety Issues, Functional Mobility    Comment, Safety Issues/Impairments (Mobility)  defer to PT   -JY      Recorded by [SHAILAY] Mary Quevedo, OT 05/06/20 1602      Row Name 05/06/20 1521             Bed Mobility Assessment/Treatment    Bed Mobility Assessment/Treatment  sit-supine  -JY      Sit-Supine [place] (Bed Mobility)  moderate assist (50% patient effort);2 person assist;verbal cues  -JY      Bed Mobility, Safety Issues  decreased use of arms for pushing/pulling;decreased use of legs for bridging/pushing  -JY      Assistive Device (Bed Mobility)  bed rails;head of bed elevated;draw sheet  -JY      Comment (Bed Mobility)  v/c for hand plmt, HOB left elevated   -JY      Recorded by [JY] Mary Quevedo, OT 05/06/20 1602      Row Name 05/06/20 1521             Functional Mobility    Functional Mobility- Comment  defer to PT   -JY      Recorded by [SHAILAY] Mary Quevedo, OT 05/06/20 1602      Row Name 05/06/20 1521             Transfer Assessment/Treatment    Transfer Assessment/Treatment  sit-stand transfer;stand-sit transfer;toilet transfer  -JY      Comment (Transfers)  BUE support, max cues for seq, hand plmt, pivoting of feet in BSC > bed   -JY      Recorded by [JY] Mary Quevedo, OT 05/06/20 1602      Row Name 05/06/20 1521             Sit-Stand Transfer    Sit-Stand [place] (Transfers)  moderate assist (50% patient effort);2 person assist;verbal cues  -JY      Assistive Device (Sit-Stand Transfers)  other (see comments) BUE support   -JY      Recorded by [JY] Mary Quevedo, OT 05/06/20 1602      Row Name 05/06/20 1521             Stand-Sit Transfer    Stand-Sit [place] (Transfers)  moderate assist (50% patient effort);2 person assist;verbal cues  -JY      Assistive Device (Stand-Sit Transfers)  other (see comments) BUE support   -JY       Recorded by [JY] Mary Quevedo OT 05/06/20 1602      Row Name 05/06/20 1521             Toilet Transfer    Type (Toilet Transfer)  stand pivot/stand step  -JY      [place] Level (Toilet Transfer)  moderate assist (50% patient effort);2 person assist;verbal cues  -JY      Assistive Device (Toilet Transfer)  commode, bedside without drop arms;other (see comments) BUE support   -JY      Recorded by [JY] Mary Quevedo OT 05/06/20 1602      Row Name 05/06/20 1521             ADL Assessment/Intervention    31718 - OT Self Care/Mgmt Minutes  12  -JY      BADL Assessment/Intervention  toileting  -JY      Recorded by [JY] Mary Quevedo OT 05/06/20 1602      Row Name 05/06/20 1521             Toileting Assessment/Training    [place] Level (Toileting)  adjust/manage clothing;perform perineal hygiene;maximum assist (25% patient effort)  -JY      Assistive Devices (Toileting)  commode, bedside without drop arms  -JY      Toileting Position  unsupported sitting  -JY      Comment (Toileting)  pt assisted some in positioning for toileting A   -JY      Recorded by [JY] Mary Quevedo OT 05/06/20 1602      Row Name 05/06/20 1521             BADL Safety/Performance    Impairments, BADL Safety/Performance  balance;endurance/activity tolerance;strength;trunk/postural control  -JY      Skilled BADL Treatment/Intervention  BADL process/adaptation training  -JY      Recorded by [JY] Mary Quevedo OT 05/06/20 1602      Row Name 05/06/20 1521             Motor Skills Assessment/Interventions    Additional Documentation  Balance (Group);Therapeutic Exercise (Group)  -JY      Recorded by [JGREER] Mary Quevedo OT 05/06/20 1602      Row Name 05/06/20 1521             Therapeutic Exercise    38751 - OT Therapeutic Exercise Minutes  9  -JY      34255 - OT Therapeutic Activity Minutes  4  -JY      Recorded by [JY] Mary Quevedo OT 05/06/20 1602      Row Name 05/06/20 1521             Therapeutic Exercise    Upper Extremity Range  of Motion (Therapeutic Exercise)  shoulder flexion/extension, bilateral;shoulder internal/external rotation, bilateral;elbow flexion/extension, bilateral;forearm supination/pronation, bilateral;wrist flexion/extension, bilateral  -JY      Hand (Therapeutic Exercise)  finger flexion/extension, bilateral  -JY      Exercise Type (Therapeutic Exercise)  AROM (active range of motion);AAROM (active assistive range of motion)  -JY      Position (Therapeutic Exercise)  other (see comments) semi reclined in bed   -JY      Sets/Reps (Therapeutic Exercise)  1/10  -JY      Expected Outcome (Therapeutic Exercise)  facilitate normal movement patterns;improve functional tolerance, self-care activity;improve performance, BADLs  -JY      Recorded by [JY] Mary Quevedo, OT 05/06/20 1602      Row Name 05/06/20 1521             Balance    Balance  static sitting balance;static standing balance;dynamic sitting balance;dynamic standing balance  -JY      Recorded by [JY] Mary Quevedo, OT 05/06/20 1602      Row Name 05/06/20 1521             Static Sitting Balance    Level of [place] (Unsupported Sitting, Static Balance)  contact guard assist  -JY      Sitting Position (Unsupported Sitting, Static Balance)  other (see comments) sitting on BSC   -JY      Time Able to Maintain Position (Unsupported Sitting, Static Balance)  3 to 4 minutes  -JY      Recorded by [JY] Mary Quevedo, OT 05/06/20 1602      Row Name 05/06/20 1521             Dynamic Sitting Balance    Level of [place], Reaches Outside Midline (Sitting, Dynamic Balance)  contact guard assist  -JY      Sitting Position, Reaches Outside Midline (Sitting, Dynamic Balance)  other (see comments) sitting on BSC   -JY      Comment, Reaches Outside Midline (Sitting, Dynamic Balance)  ADL realted task   -JY      Recorded by [JY] Mary Quevedo, OT 05/06/20 1602      Row Name 05/06/20 1521             Static Standing Balance    Level of [place] (Supported Standing, Static  Balance)  moderate assist, 50 to 74% patient effort;2 person assist  -JY      Time Able to Maintain Position (Supported Standing, Static Balance)  30 to 45 seconds  -JY      Assistive Device Utilized (Supported Standing, Static Balance)  other (see comments) BUE support   -JY      Recorded by [NANCY] Mary Quevedo, STACY 05/06/20 1602      Row Name 05/06/20 1521             Dynamic Standing Balance    Level of [place], Reaches Outside Midline (Standing, Dynamic Balance)  moderate assist, 50 to 74% patient effort;2 person assist  -JY      Time Able to Maintain Position, Reaches Outside Midline (Standing, Dynamic Balance)  1 to 2 minutes  -JY      Assistive Device Utilized (Supported Standing, Dynamic Balance)  other (see comments) BUE support   -JY      Recorded by [NANCY] Mary Quevedo, STACY 05/06/20 1602      Row Name 05/06/20 1521             Positioning and Restraints    Pre-Treatment Position  other (comment) BSC   -JY      Post Treatment Position  bed BSC   -JY      In Bed  notified nsg;fowlers;call light within reach;encouraged to call for assist;exit alarm on;side rails up x3;legs elevated soft limb restraints   -JY      Restraints  reapplied:;soft limb;notified nsg:  -JY      Recorded by [Mary Dc, STACY 05/06/20 1602      Row Name 05/06/20 1521             Pain Assessment    Additional Documentation  Pain Scale: Numbers Pre/Post-Treatment (Group)  -JY      Recorded by [Mary Dc OT 05/06/20 1602      Row Name 05/06/20 1521             Pain Scale: Numbers Pre/Post-Treatment    Pain Scale: Numbers, Pretreatment  0/10 - no pain  -JY      Pain Scale: Numbers, Post-Treatment  0/10 - no pain  -JY      Pre/Post Treatment Pain Comment  tolerated   -JY      Pain Intervention(s)  Repositioned;Ambulation/increased activity  -JY      Recorded by [Mary Dc, OT 05/06/20 1602      Row Name 05/06/20 1521             Sensory Assessment/Intervention    Sensory General Assessment  no sensation deficits  identified  -JY      Recorded by [NANCY] Mary Quevedo, OT 05/06/20 1602      Row Name 05/06/20 1521             Plan of Care Review    Plan of Care Reviewed With  patient  -SHAILAY      Progress  no change  -JY      Outcome Summary  Pt presented w/ need to finish toileting upon OT arrival. Pt req'd max A for toileting tasks w/ min A for repositioning for task. Pt req'd mod A x 2 for sit < > Stand at BSC and t/f to bed w/max cues on tech, seq, hand plmt. Pt required mod A x 2 for sit > supine. Pt completed ther ex from semi reclined position in bed including multi planar away from DAVID x 10 reps. Intermittent AAROM, grossly AROM. Pt very emotional at end of session. Nursing notified. Cont to recommend IPOT POC and progress as pt able.   -JY      Recorded by [NANCY] Mary Quevedo, OT 05/06/20 1602        User Key  (r) = Recorded By, (t) = Taken By, (c) = Cosigned By    Initials Name Effective Dates Discipline    NANCY Mary Quevedo, OT 01/29/20 -  OT                 OT Recommendation and Plan  Therapy Frequency (OT Eval): daily  Plan of Care Review  Plan of Care Reviewed With: patient  Plan of Care Reviewed With: patient  Outcome Summary: Pt presented w/ need to finish toileting upon OT arrival. Pt req'd max A for toileting tasks w/ min A for repositioning for task. Pt req'd mod A x 2 for sit < > Stand at BSC and t/f to bed w/max cues on tech, seq, hand plmt. Pt required mod A x 2 for sit > supine. Pt completed ther ex from semi reclined position in bed including multi planar away from DAVID x 10 reps. Intermittent AAROM, grossly AROM. Pt very emotional at end of session. Nursing notified. Cont to recommend IPOT POC and progress as pt able.   Outcome Measures     Row Name 05/06/20 1523 05/04/20 1110          How much help from another is currently needed...    Putting on and taking off regular lower body clothing?  1  -JY  1  -CL     Bathing (including washing, rinsing, and drying)  1  -JY  1  -CL     Toileting (which includes  using toilet bed pan or urinal)  2  -JY  1  -CL     Putting on and taking off regular upper body clothing  2  -JY  1  -CL     Taking care of personal grooming (such as brushing teeth)  2  -JY  2  -CL     Eating meals  1  -JY  1  -CL     AM-PAC 6 Clicks Score (OT)  9  -JY  7  -CL        Functional Assessment    Outcome Measure Options  AM-PAC 6 Clicks Daily Activity (OT)  -JY  AM-PAC 6 Clicks Daily Activity (OT)  -CL       User Key  (r) = Recorded By, (t) = Taken By, (c) = Cosigned By    Initials Name Provider Type    CL Selena Terrell, STACY Occupational Therapist    Mary Lizarraga OT Occupational Therapist           Time Calculation:   Time Calculation- OT     Row Name 05/06/20 1554 05/06/20 1523 05/06/20 1521       Time Calculation- OT    OT Start Time  --  1523 -JY  --    OT Received On  --  05/06/20 -JY  --    OT Goal Re-Cert Due Date  --  05/12/20 -JY  --       Timed Charges    80442 - OT Therapeutic Exercise Minutes  --  --  9  -JY    39159 - Gait Training Minutes   10  -LS  --  --    64127 - OT Therapeutic Activity Minutes  --  --  4  -JY    51734 - OT Self Care/Mgmt Minutes  --  --  12  -JY      User Key  (r) = Recorded By, (t) = Taken By, (c) = Cosigned By    Initials Name Provider Type    Savanah Neri, PT Physical Therapist    Mary Lizarraga OT Occupational Therapist        Therapy Charges for Today     Code Description Service Date Service Provider Modifiers Qty    33835692888 HC OT THER PROC EA 15 MIN 5/6/2020 Mary Quevedo OT GO 1    63523581164 HC OT SELF CARE/MGMT/TRAIN EA 15 MIN 5/6/2020 Mary Quevedo OT GO 1               Mary Quevedo OT  5/6/2020

## 2020-05-06 NOTE — PROGRESS NOTES
Clinical Nutrition Note      Patient Name: John Varma  MRN: [id number]  Admission date: 4/23/2020      Multidisciplinary Rounds    Additional information obtained during MDR:  RN reports pt more alert today , extubated, goal is to wean off precedex and get SLP swallow to start diet. Pt wants to eat there was question aspiration, CXR shows alicia effusions. MD plans to diurese. Needs [facility] consult at discharge.    Current diet: NPO Diet    Oral Nutrition Supplement: Orders Placed This Encounter      Diet, Tube Feeding Tube Feeding Formula: Peptamen 1.5 (Peptide Based, Critical Care, ALI)      Diet, Tube Feeding Tube Feeding Formula: Peptamen 1.5 (Peptide Based, Critical Care, ALI)      PRO-STAT oral liquid 30 mL    Pertinent medical data reviewed  Pt has rec'd 1256 ml of EN support over the past 2 days = 89 % of daily goal volume. 2084 kcal -90% of est need and 115 gm Pro - 100% os est need.    Intervention:  Plan of care and goals reviewed  Await SLP evaluation and initiation of oral diet.    Monitor:  RD to follow per protocol      Elissa Garay MS,RD,LD  05/06/20 5:04 PM  Time: 30  mins

## 2020-05-06 NOTE — PROGRESS NOTES
Continued Stay Note  [facility]     Patient Name: John Varma  MRN: [id number]  Today's Date: 5/6/2020    Admit Date: 4/23/2020    Discharge Plan     Row Name 05/06/20 1019       Plan    Plan  Update    Plan Comments  Sandra at [facility] only does outpt rehab. Per discussion in rounds patient may need ref to [facility] due to ETOH abuse we will need to make referral on day of discharge.. CM will follow.        Discharge Codes    No documentation.       Expected Discharge Date and Time     Expected Discharge Date Expected Discharge Time    May 4, 2020             Dory Jane RN

## 2020-05-07 ENCOUNTER — APPOINTMENT (OUTPATIENT)
Dept: GENERAL RADIOLOGY | Facility: HOSPITAL | Age: 25
End: 2020-05-07

## 2020-05-07 ENCOUNTER — APPOINTMENT (OUTPATIENT)
Dept: CT IMAGING | Facility: HOSPITAL | Age: 25
End: 2020-05-07

## 2020-05-07 LAB
ALBUMIN SERPL-MCNC: 2.3 G/DL (ref 3.5–5.2)
ALBUMIN SERPL-MCNC: 3 G/DL (ref 3.5–5.2)
ALBUMIN/GLOB SERPL: 0.7 G/DL
ALBUMIN/GLOB SERPL: 1 G/DL
ALP SERPL-CCNC: 165 U/L (ref 39–117)
ALP SERPL-CCNC: 170 U/L (ref 39–117)
ALT SERPL W P-5'-P-CCNC: 66 U/L (ref 1–41)
ALT SERPL W P-5'-P-CCNC: 71 U/L (ref 1–41)
AMYLASE SERPL-CCNC: 49 U/L (ref 28–100)
ANION GAP SERPL CALCULATED.3IONS-SCNC: 13 MMOL/L (ref 5–15)
ANION GAP SERPL CALCULATED.3IONS-SCNC: 15 MMOL/L (ref 5–15)
AST SERPL-CCNC: 100 U/L (ref 1–40)
AST SERPL-CCNC: 108 U/L (ref 1–40)
BACTERIA UR QL AUTO: ABNORMAL /HPF
BASOPHILS # BLD AUTO: 0.17 10*3/MM3 (ref 0–0.2)
BASOPHILS NFR BLD AUTO: 0.4 % (ref 0–1.5)
BILIRUB SERPL-MCNC: 6.8 MG/DL (ref 0.2–1.2)
BILIRUB SERPL-MCNC: 7.2 MG/DL (ref 0.2–1.2)
BILIRUB UR QL STRIP: ABNORMAL
BUN BLD-MCNC: 34 MG/DL (ref 6–20)
BUN BLD-MCNC: 37 MG/DL (ref 6–20)
BUN/CREAT SERPL: 58.7 (ref 7–25)
BUN/CREAT SERPL: 59.6 (ref 7–25)
BURR CELLS BLD QL SMEAR: NORMAL
C DIFF TOX GENS STL QL NAA+PROBE: NOT DETECTED
CA-I SERPL ISE-MCNC: 1.16 MMOL/L (ref 1.12–1.32)
CALCIUM SPEC-SCNC: 8.1 MG/DL (ref 8.6–10.5)
CALCIUM SPEC-SCNC: 8.2 MG/DL (ref 8.6–10.5)
CHLORIDE SERPL-SCNC: 107 MMOL/L (ref 98–107)
CHLORIDE SERPL-SCNC: 108 MMOL/L (ref 98–107)
CHOLEST SERPL-MCNC: 77 MG/DL (ref 0–200)
CLARITY UR: ABNORMAL
CO2 SERPL-SCNC: 21 MMOL/L (ref 22–29)
CO2 SERPL-SCNC: 22 MMOL/L (ref 22–29)
COLOR UR: ABNORMAL
CREAT BLD-MCNC: 0.57 MG/DL (ref 0.76–1.27)
CREAT BLD-MCNC: 0.63 MG/DL (ref 0.76–1.27)
CRP SERPL-MCNC: 4.88 MG/DL (ref 0–0.5)
D-LACTATE SERPL-SCNC: 2.4 MMOL/L (ref 0.5–2)
DACRYOCYTES BLD QL SMEAR: NORMAL
DEPRECATED RDW RBC AUTO: 93.6 FL (ref 37–54)
EOSINOPHIL # BLD AUTO: 0.13 10*3/MM3 (ref 0–0.4)
EOSINOPHIL NFR BLD AUTO: 0.3 % (ref 0.3–6.2)
ERYTHROCYTE [DISTWIDTH] IN BLOOD BY AUTOMATED COUNT: 23 % (ref 12.3–15.4)
GFR SERPL CREATININE-BSD FRML MDRD: >150 ML/MIN/1.73
GFR SERPL CREATININE-BSD FRML MDRD: >150 ML/MIN/1.73
GLOBULIN UR ELPH-MCNC: 3 GM/DL
GLOBULIN UR ELPH-MCNC: 3.4 GM/DL
GLUCOSE BLD-MCNC: 96 MG/DL (ref 65–99)
GLUCOSE BLD-MCNC: 97 MG/DL (ref 65–99)
GLUCOSE BLDC GLUCOMTR-MCNC: 124 MG/DL (ref 70–130)
GLUCOSE BLDC GLUCOMTR-MCNC: 87 MG/DL (ref 70–130)
GLUCOSE BLDC GLUCOMTR-MCNC: 91 MG/DL (ref 70–130)
GLUCOSE BLDC GLUCOMTR-MCNC: 91 MG/DL (ref 70–130)
GLUCOSE UR STRIP-MCNC: NEGATIVE MG/DL
HCT VFR BLD AUTO: 34 % (ref 37.5–51)
HGB BLD-MCNC: 10.5 G/DL (ref 13–17.7)
HGB UR QL STRIP.AUTO: NEGATIVE
HYALINE CASTS UR QL AUTO: ABNORMAL /LPF
IMM GRANULOCYTES # BLD AUTO: 0.76 10*3/MM3 (ref 0–0.05)
IMM GRANULOCYTES NFR BLD AUTO: 1.7 % (ref 0–0.5)
INR PPP: 1.79 (ref 0.85–1.16)
KETONES UR QL STRIP: NEGATIVE
LEUKOCYTE ESTERASE UR QL STRIP.AUTO: NEGATIVE
LIPASE SERPL-CCNC: 90 U/L (ref 13–60)
LYMPHOCYTES # BLD AUTO: 3.13 10*3/MM3 (ref 0.7–3.1)
LYMPHOCYTES NFR BLD AUTO: 6.8 % (ref 19.6–45.3)
MACROCYTES BLD QL SMEAR: NORMAL
MAGNESIUM SERPL-MCNC: 2.3 MG/DL (ref 1.6–2.6)
MCH RBC QN AUTO: 35 PG (ref 26.6–33)
MCHC RBC AUTO-ENTMCNC: 30.9 G/DL (ref 31.5–35.7)
MCV RBC AUTO: 113.3 FL (ref 79–97)
MONOCYTES # BLD AUTO: 4.22 10*3/MM3 (ref 0.1–0.9)
MONOCYTES NFR BLD AUTO: 9.2 % (ref 5–12)
NEUTROPHILS # BLD AUTO: 37.37 10*3/MM3 (ref 1.7–7)
NEUTROPHILS NFR BLD AUTO: 81.6 % (ref 42.7–76)
NITRITE UR QL STRIP: NEGATIVE
NRBC BLD AUTO-RTO: 0 /100 WBC (ref 0–0.2)
PH UR STRIP.AUTO: 5.5 [PH] (ref 5–8)
PHOSPHATE SERPL-MCNC: 5.2 MG/DL (ref 2.5–4.5)
PLAT MORPH BLD: NORMAL
PLATELET # BLD AUTO: 316 10*3/MM3 (ref 140–450)
PMV BLD AUTO: 11.8 FL (ref 6–12)
POLYCHROMASIA BLD QL SMEAR: NORMAL
POTASSIUM BLD-SCNC: 4.3 MMOL/L (ref 3.5–5.2)
POTASSIUM BLD-SCNC: 4.7 MMOL/L (ref 3.5–5.2)
PREALB SERPL-MCNC: 5.1 MG/DL (ref 20–40)
PROCALCITONIN SERPL-MCNC: 6.85 NG/ML (ref 0.1–0.25)
PROT SERPL-MCNC: 5.7 G/DL (ref 6–8.5)
PROT SERPL-MCNC: 6 G/DL (ref 6–8.5)
PROT UR QL STRIP: ABNORMAL
PROTHROMBIN TIME: 20.5 SECONDS (ref 11.5–14)
RBC # BLD AUTO: 3 10*6/MM3 (ref 4.14–5.8)
RBC # UR: ABNORMAL /HPF
REF LAB TEST METHOD: ABNORMAL
SARS-COV-2 RNA RESP QL NAA+PROBE: NOT DETECTED
SCHISTOCYTES BLD QL SMEAR: NORMAL
SMUDGE CELLS BLD QL SMEAR: NORMAL
SODIUM BLD-SCNC: 143 MMOL/L (ref 136–145)
SODIUM BLD-SCNC: 143 MMOL/L (ref 136–145)
SP GR UR STRIP: 1.02 (ref 1–1.03)
SQUAMOUS #/AREA URNS HPF: ABNORMAL /HPF
UROBILINOGEN UR QL STRIP: ABNORMAL
WBC NRBC COR # BLD: 45.78 10*3/MM3 (ref 3.4–10.8)
WBC UR QL AUTO: ABNORMAL /HPF
YEAST URNS QL MICRO: ABNORMAL /HPF

## 2020-05-07 PROCEDURE — 25010000002 ONDANSETRON PER 1 MG: Performed by: INTERNAL MEDICINE

## 2020-05-07 PROCEDURE — 99232 SBSQ HOSP IP/OBS MODERATE 35: CPT | Performed by: INTERNAL MEDICINE

## 2020-05-07 PROCEDURE — 81001 URINALYSIS AUTO W/SCOPE: CPT | Performed by: INTERNAL MEDICINE

## 2020-05-07 PROCEDURE — 25010000002 VANCOMYCIN 10 G RECONSTITUTED SOLUTION: Performed by: INTERNAL MEDICINE

## 2020-05-07 PROCEDURE — 86140 C-REACTIVE PROTEIN: CPT

## 2020-05-07 PROCEDURE — 87040 BLOOD CULTURE FOR BACTERIA: CPT | Performed by: INTERNAL MEDICINE

## 2020-05-07 PROCEDURE — 25010000002 METOCLOPRAMIDE PER 10 MG: Performed by: INTERNAL MEDICINE

## 2020-05-07 PROCEDURE — 87493 C DIFF AMPLIFIED PROBE: CPT | Performed by: INTERNAL MEDICINE

## 2020-05-07 PROCEDURE — 82465 ASSAY BLD/SERUM CHOLESTEROL: CPT

## 2020-05-07 PROCEDURE — 74176 CT ABD & PELVIS W/O CONTRAST: CPT

## 2020-05-07 PROCEDURE — 97116 GAIT TRAINING THERAPY: CPT

## 2020-05-07 PROCEDURE — 99291 CRITICAL CARE FIRST HOUR: CPT | Performed by: INTERNAL MEDICINE

## 2020-05-07 PROCEDURE — 92611 MOTION FLUOROSCOPY/SWALLOW: CPT

## 2020-05-07 PROCEDURE — 82962 GLUCOSE BLOOD TEST: CPT

## 2020-05-07 PROCEDURE — 80053 COMPREHEN METABOLIC PANEL: CPT

## 2020-05-07 PROCEDURE — 97535 SELF CARE MNGMENT TRAINING: CPT

## 2020-05-07 PROCEDURE — 87205 SMEAR GRAM STAIN: CPT | Performed by: INTERNAL MEDICINE

## 2020-05-07 PROCEDURE — 25010000002 ENOXAPARIN PER 10 MG: Performed by: INTERNAL MEDICINE

## 2020-05-07 PROCEDURE — 74018 RADEX ABDOMEN 1 VIEW: CPT

## 2020-05-07 PROCEDURE — 25010000002 MICAFUNGIN SODIUM 100 MG RECONSTITUTED SOLUTION: Performed by: INTERNAL MEDICINE

## 2020-05-07 PROCEDURE — 87070 CULTURE OTHR SPECIMN AEROBIC: CPT | Performed by: INTERNAL MEDICINE

## 2020-05-07 PROCEDURE — 83605 ASSAY OF LACTIC ACID: CPT | Performed by: INTERNAL MEDICINE

## 2020-05-07 PROCEDURE — 97530 THERAPEUTIC ACTIVITIES: CPT

## 2020-05-07 PROCEDURE — 83735 ASSAY OF MAGNESIUM: CPT | Performed by: INTERNAL MEDICINE

## 2020-05-07 PROCEDURE — 85610 PROTHROMBIN TIME: CPT | Performed by: INTERNAL MEDICINE

## 2020-05-07 PROCEDURE — 25010000002 ALBUMIN HUMAN 25% PER 50 ML: Performed by: INTERNAL MEDICINE

## 2020-05-07 PROCEDURE — 25010000002 POTASSIUM CHLORIDE PER 2 MEQ OF POTASSIUM

## 2020-05-07 PROCEDURE — 74230 X-RAY XM SWLNG FUNCJ C+: CPT

## 2020-05-07 PROCEDURE — 82330 ASSAY OF CALCIUM: CPT

## 2020-05-07 PROCEDURE — 84100 ASSAY OF PHOSPHORUS: CPT | Performed by: INTERNAL MEDICINE

## 2020-05-07 PROCEDURE — 71045 X-RAY EXAM CHEST 1 VIEW: CPT

## 2020-05-07 PROCEDURE — 25010000002 MEROPENEM PER 100 MG: Performed by: INTERNAL MEDICINE

## 2020-05-07 PROCEDURE — 71250 CT THORAX DX C-: CPT

## 2020-05-07 PROCEDURE — 85025 COMPLETE CBC W/AUTO DIFF WBC: CPT | Performed by: INTERNAL MEDICINE

## 2020-05-07 PROCEDURE — P9047 ALBUMIN (HUMAN), 25%, 50ML: HCPCS | Performed by: INTERNAL MEDICINE

## 2020-05-07 PROCEDURE — 84145 PROCALCITONIN (PCT): CPT | Performed by: INTERNAL MEDICINE

## 2020-05-07 PROCEDURE — 25010000002 CALCIUM GLUCONATE PER 10 ML

## 2020-05-07 PROCEDURE — 80053 COMPREHEN METABOLIC PANEL: CPT | Performed by: INTERNAL MEDICINE

## 2020-05-07 PROCEDURE — 82150 ASSAY OF AMYLASE: CPT | Performed by: INTERNAL MEDICINE

## 2020-05-07 PROCEDURE — 25010000002 LORAZEPAM PER 2 MG: Performed by: INTERNAL MEDICINE

## 2020-05-07 PROCEDURE — 84134 ASSAY OF PREALBUMIN: CPT

## 2020-05-07 PROCEDURE — 83690 ASSAY OF LIPASE: CPT | Performed by: INTERNAL MEDICINE

## 2020-05-07 PROCEDURE — 85007 BL SMEAR W/DIFF WBC COUNT: CPT | Performed by: INTERNAL MEDICINE

## 2020-05-07 PROCEDURE — 25010000002 MAGNESIUM SULFATE PER 500 MG OF MAGNESIUM

## 2020-05-07 PROCEDURE — 63710000001 INSULIN REGULAR HUMAN PER 5 UNITS: Performed by: INTERNAL MEDICINE

## 2020-05-07 RX ORDER — FUROSEMIDE 40 MG/1
40 TABLET ORAL DAILY
Status: DISCONTINUED | OUTPATIENT
Start: 2020-05-07 | End: 2020-05-07

## 2020-05-07 RX ORDER — POTASSIUM CHLORIDE 29.8 MG/ML
20 INJECTION INTRAVENOUS
Status: DISCONTINUED | OUTPATIENT
Start: 2020-05-07 | End: 2020-05-13 | Stop reason: HOSPADM

## 2020-05-07 RX ORDER — SPIRONOLACTONE 25 MG/1
100 TABLET ORAL DAILY
Status: DISCONTINUED | OUTPATIENT
Start: 2020-05-08 | End: 2020-05-07

## 2020-05-07 RX ORDER — CHOLECALCIFEROL (VITAMIN D3) 125 MCG
5 CAPSULE ORAL NIGHTLY
Status: DISCONTINUED | OUTPATIENT
Start: 2020-05-07 | End: 2020-05-13 | Stop reason: HOSPADM

## 2020-05-07 RX ORDER — ALBUMIN (HUMAN) 12.5 G/50ML
50 SOLUTION INTRAVENOUS ONCE
Status: COMPLETED | OUTPATIENT
Start: 2020-05-07 | End: 2020-05-07

## 2020-05-07 RX ORDER — QUETIAPINE FUMARATE 100 MG/1
100 TABLET, FILM COATED ORAL NIGHTLY
Status: DISCONTINUED | OUTPATIENT
Start: 2020-05-07 | End: 2020-05-13 | Stop reason: HOSPADM

## 2020-05-07 RX ORDER — QUETIAPINE FUMARATE 25 MG/1
50 TABLET, FILM COATED ORAL DAILY
Status: DISCONTINUED | OUTPATIENT
Start: 2020-05-07 | End: 2020-05-13 | Stop reason: HOSPADM

## 2020-05-07 RX ORDER — METOCLOPRAMIDE HYDROCHLORIDE 5 MG/ML
5 INJECTION INTRAMUSCULAR; INTRAVENOUS EVERY 6 HOURS SCHEDULED
Status: DISCONTINUED | OUTPATIENT
Start: 2020-05-07 | End: 2020-05-12

## 2020-05-07 RX ADMIN — VANCOMYCIN HYDROCHLORIDE 250 MG: KIT at 12:21

## 2020-05-07 RX ADMIN — METOCLOPRAMIDE 5 MG: 5 INJECTION, SOLUTION INTRAMUSCULAR; INTRAVENOUS at 08:18

## 2020-05-07 RX ADMIN — RIFAXIMIN 550 MG: 550 TABLET ORAL at 08:07

## 2020-05-07 RX ADMIN — LACTULOSE 20 G: 20 SOLUTION ORAL at 20:42

## 2020-05-07 RX ADMIN — PROPRANOLOL HYDROCHLORIDE 10 MG: 20 TABLET ORAL at 12:24

## 2020-05-07 RX ADMIN — METOCLOPRAMIDE 5 MG: 5 INJECTION, SOLUTION INTRAMUSCULAR; INTRAVENOUS at 11:09

## 2020-05-07 RX ADMIN — INSULIN HUMAN 5 UNITS: 100 INJECTION, SOLUTION PARENTERAL at 06:03

## 2020-05-07 RX ADMIN — ALBUMIN HUMAN 50 G: 0.25 SOLUTION INTRAVENOUS at 10:57

## 2020-05-07 RX ADMIN — MEROPENEM 1 G: 1 INJECTION, POWDER, FOR SOLUTION INTRAVENOUS at 15:19

## 2020-05-07 RX ADMIN — METRONIDAZOLE 500 MG: 500 INJECTION, SOLUTION INTRAVENOUS at 12:21

## 2020-05-07 RX ADMIN — QUETIAPINE FUMARATE 50 MG: 100 TABLET ORAL at 10:57

## 2020-05-07 RX ADMIN — INSULIN HUMAN 5 UNITS: 100 INJECTION, SOLUTION PARENTERAL at 23:53

## 2020-05-07 RX ADMIN — ONDANSETRON 4 MG: 2 INJECTION INTRAMUSCULAR; INTRAVENOUS at 00:25

## 2020-05-07 RX ADMIN — INSULIN HUMAN 5 UNITS: 100 INJECTION, SOLUTION PARENTERAL at 17:26

## 2020-05-07 RX ADMIN — Medication 50 MG: at 08:07

## 2020-05-07 RX ADMIN — LORAZEPAM 1 MG: 2 INJECTION INTRAMUSCULAR; INTRAVENOUS at 00:25

## 2020-05-07 RX ADMIN — BARIUM SULFATE 100 ML: 0.81 POWDER, FOR SUSPENSION ORAL at 13:46

## 2020-05-07 RX ADMIN — ENOXAPARIN SODIUM 40 MG: 40 INJECTION SUBCUTANEOUS at 08:07

## 2020-05-07 RX ADMIN — MICAFUNGIN SODIUM 100 MG: 20 INJECTION, POWDER, LYOPHILIZED, FOR SOLUTION INTRAVENOUS at 21:01

## 2020-05-07 RX ADMIN — LACTULOSE 20 G: 20 SOLUTION ORAL at 08:18

## 2020-05-07 RX ADMIN — PROPRANOLOL HYDROCHLORIDE 10 MG: 20 TABLET ORAL at 22:00

## 2020-05-07 RX ADMIN — MELATONIN TAB 5 MG 5 MG: 5 TAB at 20:42

## 2020-05-07 RX ADMIN — MEROPENEM 1 G: 1 INJECTION, POWDER, FOR SOLUTION INTRAVENOUS at 10:56

## 2020-05-07 RX ADMIN — VANCOMYCIN HYDROCHLORIDE 250 MG: KIT at 17:26

## 2020-05-07 RX ADMIN — PROPRANOLOL HYDROCHLORIDE 10 MG: 20 TABLET ORAL at 06:03

## 2020-05-07 RX ADMIN — CALCIUM GLUCONATE: 94 INJECTION, SOLUTION INTRAVENOUS at 17:27

## 2020-05-07 RX ADMIN — SPIRONOLACTONE 25 MG: 25 TABLET ORAL at 08:07

## 2020-05-07 RX ADMIN — FUROSEMIDE 40 MG: 40 TABLET ORAL at 10:57

## 2020-05-07 RX ADMIN — RIFAXIMIN 550 MG: 550 TABLET ORAL at 20:42

## 2020-05-07 RX ADMIN — MULTIVITAMIN 15 ML: LIQUID ORAL at 08:06

## 2020-05-07 RX ADMIN — MEROPENEM 1 G: 1 INJECTION, POWDER, FOR SOLUTION INTRAVENOUS at 23:53

## 2020-05-07 RX ADMIN — FOLIC ACID 1 MG: 1 TABLET ORAL at 08:07

## 2020-05-07 RX ADMIN — SMOFLIPID 250 ML: 6; 6; 5; 3 INJECTION, EMULSION INTRAVENOUS at 17:28

## 2020-05-07 RX ADMIN — BARIUM SULFATE 20 ML: 400 PASTE ORAL at 13:46

## 2020-05-07 RX ADMIN — INSULIN HUMAN 5 UNITS: 100 INJECTION, SOLUTION PARENTERAL at 00:25

## 2020-05-07 RX ADMIN — METOCLOPRAMIDE 5 MG: 5 INJECTION, SOLUTION INTRAMUSCULAR; INTRAVENOUS at 17:27

## 2020-05-07 RX ADMIN — THIAMINE HCL TAB 100 MG 100 MG: 100 TAB at 08:07

## 2020-05-07 RX ADMIN — QUETIAPINE FUMARATE 100 MG: 100 TABLET ORAL at 20:42

## 2020-05-07 RX ADMIN — METOCLOPRAMIDE 5 MG: 5 INJECTION, SOLUTION INTRAMUSCULAR; INTRAVENOUS at 23:52

## 2020-05-07 RX ADMIN — PANTOPRAZOLE SODIUM 40 MG: 40 INJECTION, POWDER, FOR SOLUTION INTRAVENOUS at 06:03

## 2020-05-07 RX ADMIN — Medication 30 ML: at 20:42

## 2020-05-07 RX ADMIN — VANCOMYCIN HYDROCHLORIDE 1500 MG: 10 INJECTION, POWDER, LYOPHILIZED, FOR SOLUTION INTRAVENOUS at 10:57

## 2020-05-07 NOTE — PROGRESS NOTES
Pharmacy Parenteral Nutrition Evaluation    John Varma is a  24 y.o. male receiving TPN.     Consulting Physician: pulm    Labs  Results from last 7 days   Lab Units 05/07/20  0639 05/06/20 0418 05/05/20  0318   SODIUM mmol/L 143 139 143   POTASSIUM mmol/L 4.3 4.4 4.1   CHLORIDE mmol/L 108* 108* 109*   CO2 mmol/L 22.0 19.0* 20.0*   BUN mg/dL 37* 34* 33*   CREATININE mg/dL 0.63* 0.57* 0.58*   CALCIUM mg/dL 8.2* 7.5* 7.5*   BILIRUBIN mg/dL 6.8* 5.5* 5.2*   ALK PHOS U/L 170* 108 153*   ALT (SGPT) U/L 71* 69* 77*   AST (SGOT) U/L 108* 93* 91*   GLUCOSE mg/dL 97 171* 150*       Results from last 7 days   Lab Units 05/07/20  0639 05/06/20 0418 05/05/20 0318 05/04/20  0420   MAGNESIUM mg/dL 2.3 2.3 1.8  --    PHOSPHORUS mg/dL 5.2* 4.5 2.7  --    PREALBUMIN mg/dL  --   --   --  4.6*       Results from last 7 days   Lab Units 05/07/20 0520 05/06/20  0541 05/05/20 0318   WBC 10*3/mm3 45.78* 28.56* 31.36*   HEMOGLOBIN g/dL 10.5* 8.7* 9.1*   HEMATOCRIT % 34.0* 27.1* 28.5*   PLATELETS 10*3/mm3 316 238 253       No results found for: TRIG    estimated creatinine clearance is 195.2 mL/min (A) (by C-G formula based on SCr of 0.63 mg/dL (L)).    Intake & Output (last 3 days)         05/04 0701 - 05/05 0700 05/05 0701 - 05/06 0700 05/06 0701 - 05/07 0700 05/07 0701 - 05/08 0700    I.V. (mL/kg) 2166.9 (28.3) 561 (7.3) 50 (0.6)     Blood        Other 827 401 611     NG/GT 1591 981 441     IV Piggyback 100       Total Intake(mL/kg) 4684.9 (61.1) 1943 (25.3) 1102 (14.3)     Urine (mL/kg/hr) 2975 (1.6) 3120 (1.7) 2250 (1.2) 450 (1.3)    Emesis/NG output    150    Stool  0      Total Output 2975 3120 2250 600    Net +1709.9 -1177 -1148 -600            Stool Unmeasured Occurrence  2 x              Dietitian Recommendations  Current TPN Regimen Recommendation per RD:  Dextrose 20% / Amino Acid 6.5% at goal rate of 75 mL/hr.  20% (SMOF) Lipid Emulsion 250mL every 24 hours.    Assessment/Plan:  Start TPN with standard electrolytes  for central line except half concentration of phosphorus. Insulin and electrolyte replacement protocols ordered. Amount of sodium in TPN not enough to greatly affect serum sodium. Max chloride in light of acetate shortage.    Pharmacy will continue to monitor and make adjustments to dose as necessary based on renal function, labs, and clinical status.     Thank you for this consult.  Miesha Santiago, PharmD, BCPS  5/7/2020  11:39

## 2020-05-07 NOTE — PLAN OF CARE
Problem: Patient Care Overview  Goal: Plan of Care Review  Outcome: Ongoing (interventions implemented as appropriate)  Flowsheets (Taken 5/7/2020 0216)  Plan of Care Reviewed With: patient  Note:   VSS. Pt is alert but very confused. He has been seeing and hearing various things throughout the night. He has had 2 episodes of vomiting and PRN medications were given and tube feed is being held. Will continue to monitor.   Goal: Individualization and Mutuality  Outcome: Ongoing (interventions implemented as appropriate)  Goal: Discharge Needs Assessment  Outcome: Ongoing (interventions implemented as appropriate)  Goal: Interprofessional Rounds/Family Conf  Outcome: Ongoing (interventions implemented as appropriate)     Problem: Skin Injury Risk (Adult)  Goal: Skin Health and Integrity  Outcome: Ongoing (interventions implemented as appropriate)     Problem: Fall Risk (Adult)  Goal: Absence of Fall  Outcome: Ongoing (interventions implemented as appropriate)     Problem: Restraint, Nonbehavioral (Nonviolent)  Goal: Rationale and Justification  Outcome: Ongoing (interventions implemented as appropriate)  Goal: Nonbehavioral (Nonviolent) Restraint: Absence of Injury/Harm  Outcome: Ongoing (interventions implemented as appropriate)  Goal: Nonbehavioral (Nonviolent) Restraint: Achievement of Discontinuation Criteria  Outcome: Ongoing (interventions implemented as appropriate)  Goal: Nonbehavioral (Nonviolent) Restraint: Preservation of Dignity and Wellbeing  Outcome: Ongoing (interventions implemented as appropriate)

## 2020-05-07 NOTE — PROGRESS NOTES
INTENSIVIST / PULMONARY FOLLOW UP NOTE     Hospital:  LOS: 14 days   Mr. John Varma, 24 y.o. 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  Having fevers and markedly elevated WBC count.  Not tolerating tube feeds consistently.    The patient's relevant past medical, surgical, family, and social history were reviewed    Allergies and medications were reviewed    ROS:  Per subjective, all other systems were reviewed and were negative        OBJECTIVE     Vital Sign Min/Max for last 24 hours:  Temp  Min: 99 °F (37.2 °C)  Max: 100.4 °F (38 °C)   BP  Min: 92/78  Max: 151/91   Pulse  Min: 90  Max: 152   Resp  Min: 22  Max: 28   SpO2  Min: 88 %  Max: 99 %   No data recorded     Physical Exam:  General Appearance:  alert, in no acute distress  Eyes:  No scleral icterus or pallor, pupils normal  Ears, Nose, Mouth, Throat:  Atraumatic, oropharynx clear  Neck:  Trachea midline, thyroid normal  Respiratory:  Clear to auscultation bilaterally, normal effort, no tenderness to palpation  Cardiovascular:  Regular rate and rhythm, no murmurs, 1-2+ peripheral edema, no thrill  Gastrointestinal:  Distended, non-tender  Skin:  Normal temperature, no rash  Psychiatric:  More alert, confused  Neuro:  No new focal neurologic deficits observed    Telemetry:              Hemodynamics:   CVP:     PAP:     PAOP:     CO:     CI:     SVI:     SVR:       SpO2: 92 % SpO2  Min: 88 %  Max:  99 %   Device:      Flow Rate:   No data recorded       Intake/Ouptut 24 hrs (7:00AM - 6:59 AM)  Intake & Output (last 3 days)       05/04 0701 - 05/05 0700 05/05 0701 - 05/06 0700 05/06 0701 - 05/07 0700 05/07 0701 - 05/08 0700    I.V. (mL/kg) 2166.9 (28.3) 561 (7.3) 50 (0.6)     Blood        Other 827 401 611     NG/GT 1591 981 441     IV Piggyback 100       Total Intake(mL/kg) 4684.9 (61.1) 1943 (25.3) 1102 (14.3)     Urine (mL/kg/hr) 2975 (1.6) 3120 (1.7) 2250 (1.2) 450 (1.2)    Emesis/NG output    150    Stool  0      Total Output 2975 3120 2250 600    Net +1709.9 -1177 -1148 -600            Stool Unmeasured Occurrence  2 x            Lines, Drains & Airways    Active LDAs     Name:   Placement date:   Placement time:   Site:   Days:    PICC Triple Lumen 04/28/20 Right Basilic   04/28/20    1116    Basilic   5    NG/OG Tube Nasogastric 16 Fr Right nostril   04/28/20    0430    Right nostril   6    Urethral Catheter Temperature probe   04/25/20    1200     8                Hematology:  Results from last 7 days   Lab Units 05/07/20  0520 05/06/20  0541 05/05/20  0318 05/04/20  0414 05/03/20  1048 05/03/20  0216 05/01/20  0421   WBC 10*3/mm3 45.78* 28.56* 31.36* 32.33*  --   --  29.16*   HEMOGLOBIN g/dL 10.5* 8.7* 9.1* 9.0* 9.2* 6.9* 7.3*   HEMATOCRIT % 34.0* 27.1* 28.5* 28.0* 27.3* 22.1* 23.1*   PLATELETS 10*3/mm3 316 238 253 252  --   --  256   MONOCYTES % %  --  5.0  --   --   --   --   --      Electrolytes, Magnesium and Phosphorus:  Results from last 7 days   Lab Units 05/07/20  0639 05/06/20  0418 05/05/20  0318 05/04/20  0414 05/03/20  0216 05/02/20  0442 05/01/20  0421   SODIUM mmol/L 143 139 143 147* 149* 149* 147*   CHLORIDE mmol/L 108* 108* 109* 115* 119* 119* 118*   POTASSIUM mmol/L 4.3 4.4 4.1 4.9 4.4 4.6 4.3   CO2 mmol/L 22.0 19.0* 20.0* 22.0 21.0* 21.0* 17.0*   MAGNESIUM mg/dL 2.3 2.3 1.8 2.1 2.0 1.9 2.1   PHOSPHORUS mg/dL 5.2* 4.5 2.7 2.8 2.3* 2.8 3.2     Renal:  Results from last 7 days   Lab  Units 05/07/20  0639 05/06/20  0418 05/05/20  0318 05/04/20  0414 05/03/20  0216 05/02/20  0442 05/01/20  0421   CREATININE mg/dL 0.63* 0.57* 0.58* 0.61* 0.57* 0.53* 0.43*   BUN mg/dL 37* 34* 33* 32* 28* 28* 36*     Estimated Creatinine Clearance: 195.2 mL/min (A) (by C-G formula based on SCr of 0.63 mg/dL (L)).  Hepatic:  Results from last 7 days   Lab Units 05/07/20  0639 05/06/20  0418 05/05/20  0318 05/04/20 0414 05/02/20  0442   ALK PHOS U/L 170* 108 153* 199* 180*   BILIRUBIN mg/dL 6.8* 5.5* 5.2* 5.8* 4.8*   BILIRUBIN DIRECT mg/dL  --   --   --  3.3*  --    ALT (SGPT) U/L 71* 69* 77* 89* 126*   AST (SGOT) U/L 108* 93* 91* 93* 148*     Arterial Blood Gases:  Results from last 7 days   Lab Units 05/03/20  0115 05/01/20  1016   PH, ARTERIAL pH units 7.561* 7.499*   PCO2, ARTERIAL mm Hg 23.4* 27.1*   PO2 ART mm Hg 188.0* 84.8   FIO2 % 60 40       Results from last 7 days   Lab Units 05/01/20  0421   HEMOGLOBIN A1C % 4.30*       Lab Results   Component Value Date    LACTATE 2.0 05/03/2020       Relevant imaging studies and labs from 05/07/20 were reviewed and interpreted by me    Medications (drips):    Adult Cyclic 2-in-1 TPN   Pharmacy to Dose TPN   Pharmacy to dose vancomycin         [START ON 5/8/2020] Pharmacy Consult  Does not apply Once   enoxaparin 40 mg Subcutaneous Q24H   Fat Emul Fish Oil/Plant Based 250 mL Intravenous Q24H (TPN)   folic acid 1 mg Oral Daily   furosemide 40 mg Oral Daily   insulin detemir 15 Units Subcutaneous Daily   insulin regular 0-7 Units Subcutaneous Q6H   insulin regular 5 Units Subcutaneous Q6H   lactulose 20 g Nasogastric BID   melatonin 5 mg Oral Nightly   meropenem 1 g Intravenous Q8H   metoclopramide 5 mg Intravenous Q6H   metroNIDAZOLE 500 mg Intravenous Q8H   multivitamin and minerals 15 mL Oral Daily   pantoprazole 40 mg Intravenous Q AM   PRO-STAT 30 mL Nasogastric BID   propranolol 10 mg Oral Q8H   QUEtiapine 100 mg Oral Nightly   QUEtiapine 50 mg Oral Daily   rifAXIMin  550 mg Nasogastric Q12H   [START ON 5/8/2020] spironolactone 100 mg Nasogastric Daily   thiamine 100 mg Oral Daily   [START ON 5/8/2020] vancomycin 1,250 mg Intravenous Q12H   vancomycin 1,500 mg Intravenous Once   vancomycin 250 mg Nasogastric Q6H   zinc gluconate 50 mg Oral Daily       Assessment/Plan   IMPRESSION / PLAN     Inpatient Problem List:  24 y.o.male:  Active Hospital Problems    Diagnosis   • **Acute variceal GI bleeding   • 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   • Sepsis with acute renal failure without septic shock (CMS/HCC)   • Acute respiratory failure with hypoxia (CMS/HCC)   • Alcohol withdrawal delirium (CMS/HCC)   • Hepatic encephalopathy (CMS/HCC)   • Ascites due to alcoholic hepatitis   • Pleural effusion on left   • Acute alcoholic hepatitis   • BORIS (acute kidney injury) (CMS/HCC)   • Bandemia   • Acute upper GI bleeding        Impression:  24 y.o.male with relevant PMH of alcoholism / alcoholic cirrhosis, MJ abuse, Tobacco abuse admitted 4/23/2020 with alcoholic hepatitis, decompensated cirrhosis, BORIS, Hepatic Encephalopathy, and hematemesis secondary to variceal bleed s/p EGD 4/24/20.  Patient was intubated for EGD and extubated on 4/27.  Underwent TIPS 4/29 (intubated, extubated again 5/1).  Has had paracentesis x 2 (4/24 - 5 liters, 4/30 2.5 liters).  Being treated for SBP w/ Levaquin - now completed (previoulsy on Azactam x 7 days / PCN allergy).    Additional ongoing issues include refractory ascites, volume overload, and hepatic encephalopathy.      Encephalopathy improved from prior.  Fever and Leukocytosis very concerning.  CT Chest / Abdomen / Pelvis with bilateral effusions / compressive atelectasis, ascites, no obvious source of worsening sepsis.  D/w GI, we do have some concerns for C. Diff.     Plan:  PSE - on rifaxamin, lactulose.  Weaned dex off.  Thiamine, folate, MVI, Zinc.  Seroquel for psychotic symptoms, larger dose at night w/ melatonin,  smaller dose during day.  I don't think withdrawal is playing any role at this point.    Refractory Ascites / Volume Overload - stop diuresis today given concerns for worsening sepsis.    Fever / Leukocytosis - broaden coverage to w/ IV Vanco / Birdie for potential HCAP / Intra-abdominal sepsis.  Also concerned about C. Diff - empiric coverage - check toxin.  Will consult ID.  I doubt PICC infection.  Candidemia also possible will hold on micafungin for now - pan-culture.  Check lactic acid.    Stress Hyperglycemia - titrate SQ insulin    Coagulopathy - po vitamin k 10 mg x 3 doses    Variceal Bleed / TIPS / Cirrhosis / Alcoholic Hepatitis / SBP - per GI    DVT / PUD prophylaxis    Nutrition - not consistently tolerating tube feeds.  Start TPN.  Will leave up to GI if we need post-pyloric tube or NJ tube.    Dysphagia - speech following, Sars-COV2 negative    Plan of care and goals reviewed with mulitdisciplinary team at daily rounds    Plan for ridge evaluation prior to discharge.  This was discussed with patients father.    Critical Care time spent in direct patient care: 30 minutes (excluding procedure time, if applicable) including high complexity decision making to assess, manipulate, and support vital organ system failure in this individual who has impairment of one or more vital organ systems such that there is a high probability of imminent or life threatening deterioration in the patient’s condition.       Valerio Emmanuel MD  Intensive Care Medicine  05/07/20 11:45

## 2020-05-07 NOTE — THERAPY TREATMENT NOTE
Patient Name: John Varma  : 1995    MRN: [id number]                              Today's Date: 2020       Admit Date: 2020    Visit Dx:     ICD-10-CM ICD-9-CM   1. Sepsis with acute liver failure without hepatic coma or septic shock, due to unspecified organism (CMS/HCC) A41.9 038.9    R65.20 995.92    K72.00 570   2. Hypoxia R09.02 799.02   3. Acute renal failure, unspecified acute renal failure type (CMS/HCC) N17.9 584.9   4. Acute hepatitis B17.9 570   5. Alcohol abuse F10.10 305.00   6. Acute upper GI bleed K92.2 578.9   7. Elevated lactic acid level R79.89 276.2   8. Ascites due to alcoholic hepatitis K70.11 789.59   9. Leukocytosis, unspecified type D72.829 288.60   10. Sepsis with acute renal failure without septic shock, due to unspecified organism, unspecified acute renal failure type (CMS/HCC) A41.9 038.9    R65.20 995.92    N17.9 584.9   11. Dysphagia, unspecified type R13.10 787.20     Patient Active Problem List   Diagnosis   • Acute alcoholic hepatitis   • Acute variceal GI bleeding   • BORIS (acute kidney injury) (CMS/HCC)   • Bandemia   • Acute upper GI bleeding   • Sepsis with acute renal failure without septic shock (CMS/HCC)   • Acute respiratory failure with hypoxia (CMS/HCC)   • Alcohol withdrawal delirium (CMS/HCC)   • Hepatic encephalopathy (CMS/HCC)   • Ascites due to alcoholic hepatitis   • Pleural effusion on left   • S/P TIPS (transjugular intrahepatic portosystemic shunt) 2020     Past Medical History:   Diagnosis Date   • Alcohol abuse    • GERD (gastroesophageal reflux disease)    • GIB (gastrointestinal bleeding)    • Tobacco abuse      Past Surgical History:   Procedure Laterality Date   • ARM WOUND REPAIR / CLOSURE     • ENDOSCOPY N/A 2020    Procedure: ESOPHAGOGASTRODUODENOSCOPY;  Surgeon: Brunner, Mark I, MD;  Location: [facility] ENDOSCOPY;  Service: Gastroenterology;  Laterality: N/A;     General Information     Row Name 20 1114          PT  Evaluation Time/Intention    Document Type  therapy note (daily note)  -     Mode of Treatment  physical therapy  -     Row Name 05/07/20 1114          General Information    Existing Precautions/Restrictions  fall;oxygen therapy device and L/min;other (see comments) NG  -     Row Name 05/07/20 1114          Cognitive Assessment/Intervention- PT/OT    Orientation Status (Cognition)  oriented x 3;verbal cues/prompts needed for orientation  -     Row Name 05/07/20 1114          Safety Issues, Functional Mobility    Impairments Affecting Function (Mobility)  balance;cognition;endurance/activity tolerance;postural/trunk control;shortness of breath;strength  -MP       User Key  (r) = Recorded By, (t) = Taken By, (c) = Cosigned By    Initials Name Provider Type    MP Michael Allen PT Physical Therapist        Mobility     Row Name 05/07/20 1115          Bed Mobility Assessment/Treatment    Bed Mobility Assessment/Treatment  sit-supine  -MP     Supine-Sit [place] (Bed Mobility)  moderate assist (50% patient effort);2 person assist;verbal cues  -MP     Assistive Device (Bed Mobility)  bed rails;head of bed elevated;draw sheet  -     Comment (Bed Mobility)  VC's needed for sequencing and hand placement. Patient required encouragement to promote active participation.  -     Row Name 05/07/20 1115          Transfer Assessment/Treatment    Comment (Transfers)  VC's needed to promote anterior weight shift for STS transfer.  -     Row Name 05/07/20 1115          Bed-Chair Transfer    Bed-Chair [place] (Transfers)  moderate assist (50% patient effort);2 person assist;verbal cues  -MP     Assistive Device (Bed-Chair Transfers)  other (see comments) B UE support  -     Row Name 05/07/20 1115          Sit-Stand Transfer    Sit-Stand [place] (Transfers)  moderate assist (50% patient effort);2 person assist;verbal cues  -MP     Assistive Device (Sit-Stand Transfers)  other (see comments) B UE support   -MP     Row Name 05/07/20 1115          Gait/Stairs Assessment/Training    Gait/Stairs Assessment/Training  gait/ambulation independence  -MP     [place] Level (Gait)  moderate assist (50% patient effort);2 person assist  -MP     Assistive Device (Gait)  walker, front-wheeled  -MP     Distance in Feet (Gait)  4'  -MP     Deviations/Abnormal Patterns (Gait)  bilateral deviations;sherwin decreased;base of support, narrow;stride length decreased  -MP     Bilateral Gait Deviations  weight shift ability decreased;forward flexed posture;heel strike decreased  -MP     Comment (Gait/Stairs)  Distance limited by fatigue. VC's needed to promote LE advancement.  -MP       User Key  (r) = Recorded By, (t) = Taken By, (c) = Cosigned By    Initials Name Provider Type    MP Michael Allen PT Physical Therapist        Obj/Interventions     Row Name 05/07/20 1118          Static Sitting Balance    Level of [place] (Unsupported Sitting, Static Balance)  minimal assist, 75% patient effort  -MP     Sitting Position (Unsupported Sitting, Static Balance)  sitting on edge of bed  -MP     Time Able to Maintain Position (Unsupported Sitting, Static Balance)  more than 5 minutes  -MP     Comment (Unsupported Sitting, Static Balance)  Progressed to CGA with VC's for hand positioning.  -MP     Row Name 05/07/20 1118          Static Standing Balance    Level of [place] (Supported Standing, Static Balance)  minimal assist, 75% patient effort;2 person assist  -MP     Assistive Device Utilized (Supported Standing, Static Balance)  other (see comments) B UE support  -MP     Row Name 05/07/20 1118          Dynamic Standing Balance    Level of [place], Reaches Outside Midline (Standing, Dynamic Balance)  moderate assist, 50 to 74% patient effort;2 person assist  -MP     Assistive Device Utilized (Supported Standing, Dynamic Balance)  other (see comments) B UE support  -MP       User Key  (r) = Recorded By, (t) = Taken By, (c) =  Cosigned By    Initials Name Provider Type    Michael Ramirez PT Physical Therapist        Goals/Plan    No documentation.       Clinical Impression     [facility] Name 05/07/20 1119          Pain Assessment    Additional Documentation  Pain Scale: FACES Pre/Post-Treatment (Group)  -[facility] Name 05/07/20 1119          Pain Scale: Numbers Pre/Post-Treatment    Pain Intervention(s)  Repositioned;Ambulation/increased activity  -[facility] Name 05/07/20 1119          Pain Scale: FACES Pre/Post-Treatment    Pain: FACES Scale, Pretreatment  2-->hurts little bit  -MP     Pain: FACES Scale, Post-Treatment  2-->hurts little bit  -MP     Pre/Post Treatment Pain Comment  Generalized pain; patient tolerated.  -[facility] Name 05/07/20 1119          Vital Signs    Pre Systolic BP Rehab  137  -MP     Pre Treatment Diastolic BP  78  -MP     Post Systolic BP Rehab  131  -MP     Post Treatment Diastolic BP  77  -MP     Pretreatment Heart Rate (beats/min)  107  -MP     Posttreatment Heart Rate (beats/min)  108  -MP     Pre SpO2 (%)  92  -MP     O2 Delivery Pre Treatment  supplemental O2  -MP     O2 Delivery Intra Treatment  supplemental O2  -MP     Post SpO2 (%)  95  -MP     O2 Delivery Post Treatment  supplemental O2  -MP     Pre Patient Position  Supine  -MP     Intra Patient Position  Standing  -MP     Post Patient Position  Sitting  -[facility] Name 05/07/20 1119          Positioning and Restraints    Pre-Treatment Position  in bed  -MP     Post Treatment Position  chair  -MP     In Chair  notified nsg;reclined;call light within reach;encouraged to call for assist;exit alarm on;RUE elevated;LUE elevated;waffle cushion;legs elevated;with nsg;heels elevated  -MP       User Key  (r) = Recorded By, (t) = Taken By, (c) = Cosigned By    Initials Name Provider Type    Michael Ramirez PT Physical Therapist        Outcome Measures     Row Name 05/07/20 1120          How much help from another person do you currently need...    Turning  from your back to your side while in flat bed without using bedrails?  3  -MP     Moving from lying on back to sitting on the side of a flat bed without bedrails?  2  -MP     Moving to and from a bed to a chair (including a wheelchair)?  2  -MP     Standing up from a chair using your arms (e.g., wheelchair, bedside chair)?  2  -MP     Climbing 3-5 steps with a railing?  1  -MP     To walk in hospital room?  2  -MP     AM-PAC 6 Clicks Score (PT)  12  -MP     Row Name 05/07/20 1120          Functional Assessment    Outcome Measure Options  AM-PAC 6 Clicks Basic Mobility (PT)  -MP       User Key  (r) = Recorded By, (t) = Taken By, (c) = Cosigned By    Initials Name Provider Type    Michael Ramirez PT Physical Therapist        Physical Therapy Education                 Title: PT OT SLP Therapies (In Progress)     Topic: Physical Therapy (Done)     Point: Mobility training (Done)     Description:   Instruct learner(s) on safety and technique for assisting patient out of bed, chair or wheelchair.  Instruct in the proper use of assistive devices, such as walker, crutches, cane or brace.              Patient Friendly Description:   It's important to get you on your feet again, but we need to do so in a way that is safe for you. Falling has serious consequences, and your personal safety is the most important thing of all.        When it's time to get out of bed, one of us or a family member will sit next to you on the bed to give you support.     If your doctor or nurse tells you to use a walker, crutches, a cane, or a brace, be sure you use it every time you get out of bed, even if you think you don't need it.    Learning Progress Summary           Patient Acceptance, E,D, VU,NR by MP at 5/7/2020 1120    Acceptance, E,D, VU,NR by LS at 5/6/2020 1552    Acceptance, E, NR by CIERA at 5/5/2020 1426    Acceptance, E,D, NR by LS at 5/4/2020 1605    Acceptance, E,D, NR by LS at 5/2/2020 1507    Acceptance, E,D, NR by LS at  4/29/2020 1009                   Point: Home exercise program (Done)     Description:   Instruct learner(s) on appropriate technique for monitoring, assisting and/or progressing patient with therapeutic exercises and activities.              Learning Progress Summary           Patient Acceptance, E,D, VU,NR by LS at 5/6/2020 1552    Acceptance, E, NR by KR at 5/5/2020 1426    Acceptance, E,D, NR by LS at 5/4/2020 1605    Acceptance, E,D, NR by LS at 5/2/2020 1507    Acceptance, E,D, NR by LS at 4/29/2020 1009                   Point: Body mechanics (Done)     Description:   Instruct learner(s) on proper positioning and spine alignment for patient and/or caregiver during mobility tasks and/or exercises.              Learning Progress Summary           Patient Acceptance, E,D, VU,NR by MP at 5/7/2020 1120    Acceptance, E,D, VU,NR by LS at 5/6/2020 1552    Acceptance, E, NR by KR at 5/5/2020 1426    Acceptance, E,D, NR by LS at 5/4/2020 1605    Acceptance, E,D, NR by LS at 5/2/2020 1507    Acceptance, E,D, NR by LS at 4/29/2020 1009                   Point: Precautions (Done)     Description:   Instruct learner(s) on prescribed precautions during mobility and gait tasks              Learning Progress Summary           Patient Acceptance, E,D, VU,NR by MP at 5/7/2020 1120    Acceptance, E,D, VU,NR by LS at 5/6/2020 1552    Acceptance, E, NR by KR at 5/5/2020 1426    Acceptance, E,D, NR by LS at 5/4/2020 1605    Acceptance, E,D, NR by LS at 5/2/2020 1507    Acceptance, E,D, NR by LS at 4/29/2020 1009                               User Key     Initials Effective Dates Name Provider Type Discipline     06/19/15 -  Savanah Matthews, PT Physical Therapist PT    KR 04/03/18 -  Nataliya Patle, PT Physical Therapist PT    MP 11/06/19 -  Michael Allen, PT Physical Therapist PT              PT Recommendation and Plan     Plan of Care Reviewed With: patient  Progress: improving  Outcome Summary: Patient performed bed mobility  with mod.Ax2. Required increased time for recovery once EOB. Patient was able to ambulate 4' from bed to chair with B UE support with PT/PT tech and mod.Ax2; fwd ambulation distance limited by fatigue. Patient gave good effort during session. Cont PT POC.     Time Calculation:   PT Charges     Row Name 05/07/20 0905             Time Calculation    Start Time  0905  -MP      PT Received On  05/07/20  -MP         Time Calculation- PT    Total Timed Code Minutes- PT  24 minute(s)  -MP         Timed Charges    88505 - Gait Training Minutes   10  -MP      78205 - PT Therapeutic Activity Minutes  14  -MP        User Key  (r) = Recorded By, (t) = Taken By, (c) = Cosigned By    Initials Name Provider Type    Michael Ramirez PT Physical Therapist        Therapy Charges for Today     Code Description Service Date Service Provider Modifiers Qty    27744518442 HC GAIT TRAINING EA 15 MIN 5/7/2020 Michael Allen, PT GP 1    [id number]  PT THERAPEUTIC ACT EA 15 MIN 5/7/2020 Michael Allen, PT GP 1    [id number]  PT THER SUPP EA 15 MIN 5/7/2020 Michael Allen, PT GP 2          PT G-Codes  Outcome Measure Options: AM-PAC 6 Clicks Basic Mobility (PT)  AM-PAC 6 Clicks Score (PT): 12  AM-PAC 6 Clicks Score (OT): 9    Michael Allen PT  5/7/2020

## 2020-05-07 NOTE — PLAN OF CARE
Problem: Patient Care Overview  Goal: Plan of Care Review  Outcome: Ongoing (interventions implemented as appropriate)  Flowsheets (Taken 5/7/2020 1121)  Progress: improving  Plan of Care Reviewed With: patient  Outcome Summary: Patient performed bed mobility with mod.Ax2. Required increased time for recovery once EOB. Patient was able to ambulate 4' from bed to chair with B UE support with PT/PT tech and mod.Ax2; fwd ambulation distance limited by fatigue. Patient gave good effort during session. Cont PT POC.

## 2020-05-07 NOTE — PLAN OF CARE
Problem: Patient Care Overview  Goal: Plan of Care Review  Outcome: Ongoing (interventions implemented as appropriate)  Flowsheets (Taken 5/7/2020 1417)  Progress: improving  Plan of Care Reviewed With: patient  Outcome Summary: Pt administered AE and educated on compensatory strategies to complete self-feeding tasks. Pt Min A to bring food to mouth w/ slight [place] assist for task initiation. Recommend cont skilled IPOT POC. Recommend pt DC to IP rehab.

## 2020-05-07 NOTE — PLAN OF CARE
Problem: Patient Care Overview  Goal: Plan of Care Review  Outcome: Ongoing (interventions implemented as appropriate)  Note:   VSS. NVI. Pt went for barium swallow study. Pt now placed on dysphagia III with thin liquids. Pt started on new medication to address psych needs. Will cont to monitor.

## 2020-05-07 NOTE — PROGRESS NOTES
"GI Daily Progress Note  Subjective     Bob Guevara is a 24 y.o. male who was admitted with Acute GI bleeding.   Does not feel as well today.  Has had N/V.  No diarrhea    Chief Complaint:  GIB    Objective     /81   Pulse 110   Temp 99 °F (37.2 °C) (Axillary)   Resp 28   Ht 172.7 cm (68\")   Wt 77 kg (169 lb 12.1 oz)   SpO2 91%   BMI 25.81 kg/m²     Intake/Output last 3 shifts:  I/O last 3 completed shifts:  In: 1917 [I.V.:420; Other:731; NG/GT:766]  Out: 4020 [Urine:4020]  Intake/Output this shift:  No intake/output data recorded.      Physical Exam  Wt Readings from Last 3 Encounters:   05/07/20 77 kg (169 lb 12.1 oz)   ,body mass index is 25.81 kg/m².,@FLOWAMB(6)@,@FLOWAMB(5)@,@FLOWAMB(8)@   CONSTITUTIONAL:lying in beed  Resp Ronchi .  Respiration effort normal  CV RRR; no M/R/G. less lower extremity edema  GI Abd soft, mild tender, ND, normal active bowel sounds.    Psych: Awake and alert,  Has been hallucinating    DATA:Results for BOB GUEVARA (MRN [id number]) as of 5/7/2020 07:47   Ref. Range 5/7/2020 05:20   WBC Latest Ref Range: 3.40 - 10.80 10*3/mm3 45.78 (C)   RBC Latest Ref Range: 4.14 - 5.80 10*6/mm3 3.00 (L)   Hemoglobin Latest Ref Range: 13.0 - 17.7 g/dL 10.5 (L)   Hematocrit Latest Ref Range: 37.5 - 51.0 % 34.0 (L)   RDW Latest Ref Range: 12.3 - 15.4 % 23.0 (H)     Results for BOB GUEVARA (MRN [id number]) as of 5/7/2020 07:47   Ref. Range 5/7/2020 06:39   Glucose Latest Ref Range: 65 - 99 mg/dL 97   Sodium Latest Ref Range: 136 - 145 mmol/L 143   Potassium Latest Ref Range: 3.5 - 5.2 mmol/L 4.3   CO2 Latest Ref Range: 22.0 - 29.0 mmol/L 22.0   Chloride Latest Ref Range: 98 - 107 mmol/L 108 (H)   Anion Gap Latest Ref Range: 5.0 - 15.0 mmol/L 13.0   Creatinine Latest Ref Range: 0.76 - 1.27 mg/dL 0.63 (L)   BUN Latest Ref Range: 6 - 20 mg/dL 37 (H)   BUN/Creatinine Ratio Latest Ref Range: 7.0 - 25.0  58.7 (H)   Calcium Latest Ref Range: 8.6 - 10.5 mg/dL 8.2 (L)   eGFR Non "  Am Latest Ref Range: >60 mL/min/1.73 >150   Alkaline Phosphatase Latest Ref Range: 39 - 117 U/L 170 (H)   Total Protein Latest Ref Range: 6.0 - 8.5 g/dL 5.7 (L)   ALT (SGPT) Latest Ref Range: 1 - 41 U/L 71 (H)   AST (SGOT) Latest Ref Range: 1 - 40 U/L 108 (H)   Total Bilirubin Latest Ref Range: 0.2 - 1.2 mg/dL 6.8 (H)   Albumin Latest Ref Range: 3.50 - 5.20 g/dL 2.30 (L)     KUB- no obstruction.  Not official    CXR no infiltrates  Not official      Assessment/Plan     1.  Acute alcoholic hepatitis  2.  Acute variceal bleed, status post TIPS  3.  BORIS  4.  Hepatic encephalopathy  5.  Alcohol withdrawal  6.  Nutrition  7. SBP  8.  Abnormal CT liver  9  Leukocytosis- worsening  10 pleural effusions  11,  SBP  On Levaquin    Pt now with N/V   Leukocytosis is worrisome  If CXR and KUB are read as normal will get repeat CT abd  Add IV reglan        Acute variceal GI bleeding    Acute alcoholic hepatitis    BORIS (acute kidney injury) (CMS/HCC)    Bandemia    Acute upper GI bleeding    Sepsis with acute renal failure without septic shock (CMS/HCC)    Acute respiratory failure with hypoxia (CMS/HCC)    Alcohol withdrawal delirium (CMS/HCC)    Hepatic encephalopathy (CMS/HCC)    Ascites due to alcoholic hepatitis    Pleural effusion on left    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

## 2020-05-07 NOTE — PLAN OF CARE
Problem: Patient Care Overview  Goal: Plan of Care Review  Flowsheets (Taken 5/7/2020 3502)  Plan of Care Reviewed With: patient  Note:   SLP evaluation completed. Will continue to address dysphagia in tx. Please see note for further details and recommendations.

## 2020-05-07 NOTE — CONSULTS
"Adult Nutrition  Assessment/PES    Patient Name:  John Varma  YOB: 1995  MRN: [id number]  Admit Date:  4/23/2020    Assessment Date:  5/7/2020    Comments:  Pt not tolerated EN support; vomiting w/ inadequate intake >48 hrs. Pt to have triple lumen PICC placed. CXR, KUB, CT abd pending. PN support to begin today at 25 ml/hr w/ goal infusion rate of 75 ml/hr w/ 6.5%AA and D20w (GIR= 3.2 mg/kg/min). 250 ml SMOF IVLE daily ordered. Pt to have MBS today, will await studies to determine status of gut and possible use of EN/oral intake.    Reason for Assessment     Row Name 05/07/20 1320          Reason for Assessment    Reason For Assessment  per organizational policy;identified at risk by screening criteria;TF/PN;follow-up protocol;physician consult MDR, PN  support assessment/EN  monitor 45 mins     Diagnosis  liver disease;gastrointestinal disease acute GIB, esophageal varices, alcoholic hepatita, cirrhosis, ascites, hepatic encephalopathy, EtOH w/d,BORIS, sepsis HX GERD, substance abuse     Identified At Risk by Screening Criteria  tube feeding or parenteral nutrition         Nutrition/Diet History     Row Name 05/07/20 1321          Nutrition/Diet History    Typical Food/Fluid Intake  RN reports pt not tolerating TF, vomitting this morning; concern pt may have aspirated; NGT to LWS.  RN also reports some hypotension, pt w/ hallucinations and tremors. MD concern w/ increased WBCs and possible sepsis . KUB, reglan ordered, CT abd  ordered. RN reports pt is COVID negative so he will have MBS today.  MD gave vo to start PN, pt may also need paracentesis today.     Factors Affecting Nutritional Intake  altered gastrointestinal function         Anthropometrics     Row Name 05/07/20 1334       Anthropometrics    Height  172.7 cm (68\")    Weight  77 kg (169 lb 12.1 oz)       Admit Weight    Admit Weight Method  stated    Admit Weight  65.8 kg (145 lb)       Ideal Body Weight (IBW)    Ideal Body Weight " "(IBW) (kg)  70.89    % Ideal Body Weight  108.62       Body Mass Index (BMI)    BMI (kg/m2)  25.87    BMI Assessment  BMI 25-29.9: overweight        Labs/Tests/Procedures/Meds     Row Name 05/07/20 1331          Labs/Procedures/Meds    Lab Results Reviewed  reviewed, pertinent     Lab Results Comments  Na+, 143        Diagnostic Tests/Procedures    Diagnostic Test/Procedures Comments  CXR, KUB, possible CT abd pending        Medications    Pertinent Medications Reviewed  reviewed, pertinent     Pertinent Medications Comments  IVF off, IV reglan ordered         Physical Findings     Row Name 05/07/20 1340          Physical Findings    Overall Physical Appearance  -- extubated, sitting in chair     Gastrointestinal  ascites     Tubes  nasogastric tube to LWS     Skin  jaundice improving         Estimated/Assessed Needs     Row Name 05/07/20 1340          Weight Used For Calculations  77 kg (169 lb 12.1 oz)    Height  172.7 cm (68\")          Additional Documentation  KCAL/KG (Group);Buxton-St. Jeor Equation (Group);Fluid Requirements (Group);Protein Requirements (Group)          Weight Used For Calorie Calculations  77 kg (169 lb 12.1 oz)    Estimated Calorie Requirement (kcal/day)  2300          KCAL/KG  30 Kcal/Kg (kcal);35 Kcal/Kg (kcal) w/ cirrhosis    30 Kcal/Kg (kcal)  2310    35 Kcal/Kg (kcal)  2695          RMR (Buxton-St. Jeor Equation)  1729.5    Buxton-St. Jeor Activity Factors  1.2    Activity Factors (Buxton-St. Jeor)  2075.4          Weight Used For Protein Calculations  77 kg (169 lb 12.1 oz)    Est Protein Requirement Amount (gms/kg)  1.5 gm protein    Estimated Protein Requirements (gms/day)  115.5          Estimated Fluid Requirements (mL/day)  2300    RDA Method (mL)  2300        Nutrition Prescription Ordered     Row Name 05/07/20 1346          Nutrition Prescription PO    Current PO Diet  NPO        Nutrition Prescription EN    Enteral Route  NG     Product  Peptamen 1.5 (Vital 1.5)     " Modulars  Liquid Protein (15 gm/30 mL)     Liquid Protein (15 gm/30 mL)  30 mL/1 packet     Protein Liquid Frequency  2 times a day     TF Delivery Method  Continuous     Continuous TF Goal Rate (mL/hr)  75 mL/hr     Continuous TF Goal Volume (mL)  1800 mL     Water flush (mL)   20 mL     Water Flush Frequency  Per hour         Evaluation of Received Nutrient/Fluid Intake     Row Name 05/07/20 1340          Nutrient/Fluid Evaluation    Number of Days Evaluated  1 day        Intake Assessment    Tolerance  not tolerating        Recommended Daily Intake Evaluation    RDI  Not Met        EN Evaluation    Number of Days EN Intake Evaluated  1 day     EN Average Volume Delivered (mL/day)  441 mL/day     % Goal Volume   32 %     TF Changes  Held     TF Tolerance  Vomiting ascites; KUB and CT abd ordered               Problem/Interventions:  Problem 1     Row Name 05/07/20 1350          Nutrition Diagnoses Problem 1    Problem 1  Inadequate Intake/Infusion     Inadequate Intake Type  Enteral     Macronutrient  Kcal;Protein     Micronutrient  Vitamin;Mineral     Etiology (related to)  Medical Diagnosis     Gastrointestinal  Gastrointestinal bleed;Esophageal varices decompensated cirrhosis     Signs/Symptoms (evidenced by)  EN Intake Delivery;TF Not Tolerated;Report/Observation     Percent (%) of EN goal  32 %     Reported/Observed By  RN     Reported GI Symptoms  Nausea and vomiting     Resolved?  -- EN held / PN support initiated         Problem 2     Row Name 05/07/20 1352          Nutrition Diagnoses Problem 2    Problem 2  Increased Nutrient Needs     Micronutrient  Vitamin;Mineral;Other (comment) thiamine, folic acid, zinc     Etiology (related to)  Medical Diagnosis     Hepatic  Cirrhosis;Hepatitis     Substance Use  ETOH;Drug/illicit/recreational     Signs/Symptoms (evidenced by)  Other (comment);Biochemical substance w/d, jaundice, ascites     Labs Reviewed  Done     Resolved?  Yes             Intervention Goal      Row Name 05/07/20 1352          Intervention Goal    General  Nutrition support treatment;Meet nutritional needs for age/condition     TF/PN  Other (comment);Inititiate TF/PN EN held; PN intiated         Nutrition Intervention     Row Name 05/07/20 1352          Nutrition Intervention    RD/Tech Action  Follow Tx progress;Care plan reviewd;Recommend/ordered     Recommended/Ordered  PN         Nutrition Prescription     Row Name 05/07/20 1352          Nutrition Prescription EN    Enteral Prescription  Discontinue enteral feeding        Nutrition Prescription PN    Parenteral Prescription  PN begin/change     PN Route  PICC     Dextrose Concentration (%)  20 %     Amino Acid Concentration (%)  6.5%     PN Goal Rate (mL/hr)  75 mL/hr     PN Starting Rate (mL/hr)  25 mL/hr     PN Goal Volume (mL)  1800 mL     PN Starting Volume (mL)  600 mL     Lipid Concentration (%)  20%     Lipid Volume (mL)  250 mL SMOF IVLE     Lipid Frequency  Daily     New PN Prescription Ordered?  Yes        Other Orders    Other  per Pharm will check Trig         Education/Evaluation     Row Name 05/07/20 1354          Monitor/Evaluation    Monitor  Per protocol;I&O;Pertinent labs;TF delivery/tolerance;Weight;Symptoms           Electronically signed by:  Elissa Garay MS,RD,LD  05/07/20 14:04

## 2020-05-07 NOTE — THERAPY TREATMENT NOTE
Acute Care - Occupational Therapy Treatment Note  [facility]     Patient Name: John Varma  : 1995  MRN: [id number]  Today's Date: 2020             Admit Date: 2020       ICD-10-CM ICD-9-CM   1. Sepsis with acute liver failure without hepatic coma or septic shock, due to unspecified organism (CMS/HCC) A41.9 038.9    R65.20 995.92    K72.00 570   2. Hypoxia R09.02 799.02   3. Acute renal failure, unspecified acute renal failure type (CMS/HCC) N17.9 584.9   4. Acute hepatitis B17.9 570   5. Alcohol abuse F10.10 305.00   6. Acute upper GI bleed K92.2 578.9   7. Elevated lactic acid level R79.89 276.2   8. Ascites due to alcoholic hepatitis K70.11 789.59   9. Leukocytosis, unspecified type D72.829 288.60   10. Sepsis with acute renal failure without septic shock, due to unspecified organism, unspecified acute renal failure type (CMS/HCC) A41.9 038.9    R65.20 995.92    N17.9 584.9   11. Oropharyngeal dysphagia R13.12 787.22     Patient Active Problem List   Diagnosis   • Acute alcoholic hepatitis   • Acute variceal GI bleeding   • BORIS (acute kidney injury) (CMS/HCC)   • Bandemia   • Acute upper GI bleeding   • Sepsis with acute renal failure without septic shock (CMS/HCC)   • Acute respiratory failure with hypoxia (CMS/HCC)   • Alcohol withdrawal delirium (CMS/HCC)   • Hepatic encephalopathy (CMS/HCC)   • Ascites due to alcoholic hepatitis   • Pleural effusion on left   • S/P TIPS (transjugular intrahepatic portosystemic shunt) 2020     Past Medical History:   Diagnosis Date   • Alcohol abuse    • GERD (gastroesophageal reflux disease)    • GIB (gastrointestinal bleeding)    • Tobacco abuse      Past Surgical History:   Procedure Laterality Date   • ARM WOUND REPAIR / CLOSURE     • ENDOSCOPY N/A 2020    Procedure: ESOPHAGOGASTRODUODENOSCOPY;  Surgeon: Brunner, Mark I, MD;  Location: [facility];  Service: Gastroenterology;  Laterality: N/A;       Therapy Treatment    Rehabilitation  Treatment Summary     Row Name 05/07/20 1417             Treatment Time/Intention    Discipline  occupational therapist  -CL      Document Type  therapy note (daily note)  -CL      Subjective Information  complains of;fatigue  -CL      Patient Effort  good  -CL      Existing Precautions/Restrictions  fall;oxygen therapy device and L/min NG, scrotal swelling, emotionally labile  -CL      Equipment Issued to Patient  feeding equipment  -CL      Recorded by [CL] Selena Terrell OT 05/07/20 1535      Row Name 05/07/20 1417             Vital Signs    Pre Systolic BP Rehab  -- VSS  -CL      Recorded by [CL] Selena Terrell OT 05/07/20 1535      Row Name 05/07/20 1417             Cognitive Assessment/Intervention- PT/OT    Affect/Mental Status (Cognitive)  confused;flat/blunted affect  -CL      Orientation Status (Cognition)  oriented to;person  -CL      Follows Commands (Cognition)  follows one step commands;75-90% accuracy;repetition of directions required;verbal cues/prompting required  -CL      Cognitive Function (Cognitive)  safety deficit  -CL      Safety Deficit (Cognitive)  moderate deficit;awareness of need for assistance;safety precautions awareness  -CL      Recorded by [CL] Selena Terrell OT 05/07/20 1535      Row Name 05/07/20 1504             ADL Assessment/Intervention    90645 - OT Self Care/Mgmt Minutes  14  -CL      BADL Assessment/Intervention  feeding;grooming  -CL      Recorded by [CL] Selena Terrell OT 05/07/20 1535      [facility] Name 05/07/20 1504             Grooming Assessment/Training    [place] Level (Grooming)  oral care regimen;wash face, hands;verbal cues;moderate assist (50% patient effort)  -CL      Grooming Position  supported sitting  -CL      Comment (Grooming)  [place] assist  -CL      Recorded by [CL] Selena Terrell OT 05/07/20 1535      Row Name 05/07/20 1504             Self-Feeding Assessment/Training    [place] Level (Feeding)  finger foods;minimum assist (75% patient effort);verbal cues   -CL      Assistive Devices (Feeding)  hand over hand  -CL      Position (Self-Feeding)  sitting up in bed  -CL      Comment (Feeding)  Pt required built up handle and initial [place] assist for task intiation, however then able to complete. Pt w/ BUE support to facilitate movement.   -CL      Recorded by [CL] Selena Terrell, OT 05/07/20 1535      Row Name 05/07/20 1504             Positioning and Restraints    Pre-Treatment Position  in bed  -CL      Post Treatment Position  bed  -CL      In Bed  notified nsg;fowlers;call light within reach;encouraged to call for assist;exit alarm on;side rails up x3;LUE elevated;RUE elevated;legs elevated  -CL      Recorded by [CL] Selena Terrell, OT 05/07/20 1535      Row Name 05/07/20 1504             Pain Scale: Numbers Pre/Post-Treatment    Pain Scale: Numbers, Pretreatment  0/10 - no pain  -CL      Pain Scale: Numbers, Post-Treatment  0/10 - no pain  -CL      Recorded by [CL] Selena Terrell, STACY 05/07/20 1530        User Key  (r) = Recorded By, (t) = Taken By, (c) = Cosigned By    Initials Name Effective Dates Discipline    CL Selena Terrell, OT 04/03/18 -  OT           Rehab Goal Summary     Row Name 05/07/20 1315             Swallow Goals (SLP)    Oral Nutrition/Hydration Goal Selection (SLP)  oral nutrition/hydration, SLP goal 1;oral nutrition/hydration, SLP goal 2  -AC      Lingual Strengthening Goal Selection (SLP)  lingual strengthening, SLP goal 1  -AC      Pharyngeal Strengthening Exercise Goal Selection (SLP)  pharyngeal strengthening exercise, SLP goal 1  -AC      Additional Documentation  lingual strengthening goal selection (SLP);pharyngeal strengthening exercise goal selection (SLP)  -AC         Oral Nutrition/Hydration Goal 1 (SLP)    Oral Nutrition/Hydration Goal 1, SLP  LTG: Pt will return to regular diet and thin liquids and tolerate w/o s/sxs aspiration w/ 100% acc w/o cues.  -AC      Time Frame (Oral Nutrition/Hydration Goal 1, SLP)  by discharge  -AC         Oral  Nutrition/Hydration Goal 2 (SLP)    Oral Nutrition/Hydration Goal 2, SLP  Pt will tolerate trials of puree/mashed foods and thin liquids w/o s/sxs aspiration w/ 100% acc w/o cues.  -AC      Time Frame (Oral Nutrition/Hydration Goal 2, SLP)  short term goal (STG)  -AC         Lingual Strengthening Goal 1 (SLP)    Activity (Lingual Strengthening Goal 1, SLP)  increase tongue back strength;increase lingual tone/sensation/control/coordination/movement  -AC      Increase Lingual Tone/Sensation/Control/Coordination/Movement  swallow trials;lingual resistance exercises  -AC      Increase Tongue Back Strength  lingual resistance exercises  -AC      Boron/Accuracy (Lingual Strengthening Goal 1, SLP)  with minimal cues (75-90% accuracy)  -AC      Time Frame (Lingual Strengthening Goal 1, SLP)  short term goal (STG)  -AC         Pharyngeal Strengthening Exercise Goal 1 (SLP)    Activity (Pharyngeal Strengthening Goal 1, SLP)  increase timing;increase superior movement of the hyolaryngeal complex;increase anterior movement of the hyolaryngeal complex;increase closure at entrance to airway/closure of airway at glottis;increase tongue base retraction  -AC      Increase Timing  prepping - 3 second prep or suck swallow or 3-step swallow  -AC      Increase Superior Movement of the Hyolaryngeal Complex  effortful pitch glide (falsetto + pharyngeal squeeze)  -AC      Increase Anterior Movement of the Hyolaryngeal Complex  chin tuck against resistance (CTAR)  -AC      Increase Closure at Entrance to Airway/Closure of Airway at Glottis  super-supraglottic swallow  -AC      Increase Tongue Base Retraction  hard effortful swallow;patrick  -AC      Boron/Accuracy (Pharyngeal Strengthening Goal 1, SLP)  with minimal cues (75-90% accuracy)  -AC      Time Frame (Pharyngeal Strengthening Goal 1, SLP)  short term goal (STG)  -AC        User Key  (r) = Recorded By, (t) = Taken By, (c) = Cosigned By    Initials Name Provider Type  Discipline    AC Rebekah Tsai MS CCC-SLP Speech and Language Pathologist SLP        Occupational Therapy Education                 Title: PT OT SLP Therapies (In Progress)     Topic: Occupational Therapy (In Progress)     Point: ADL training (In Progress)     Description:   Instruct learner(s) on proper safety adaptation and remediation techniques during self care or transfers.   Instruct in proper use of assistive devices.              Learning Progress Summary           Patient Acceptance, E, NR by CL at 5/7/2020 1417    Acceptance, E,D, NR by JY at 5/6/2020 1523    Acceptance, E, NR by CL at 5/4/2020 1110    Acceptance, E, NR by CL at 5/2/2020 1059                   Point: Home exercise program (In Progress)     Description:   Instruct learner(s) on appropriate technique for monitoring, assisting and/or progressing therapeutic exercises/activities.              Learning Progress Summary           Patient Acceptance, E,D, NR by JY at 5/6/2020 1523    Acceptance, E, NR by CL at 5/4/2020 1110                   Point: Precautions (In Progress)     Description:   Instruct learner(s) on prescribed precautions during self-care and functional transfers.              Learning Progress Summary           Patient Acceptance, E, NR by CL at 5/7/2020 1417    Acceptance, E,D, NR by JY at 5/6/2020 1523    Acceptance, E, NR by CL at 5/4/2020 1110    Acceptance, E, NR by CL at 5/2/2020 1059                   Point: Body mechanics (In Progress)     Description:   Instruct learner(s) on proper positioning and spine alignment during self-care, functional mobility activities and/or exercises.              Learning Progress Summary           Patient Acceptance, E,D, NR by JY at 5/6/2020 1523    Acceptance, E, NR by CL at 5/4/2020 1110    Acceptance, E, NR by CL at 5/2/2020 1059                               User Key     Initials Effective Dates Name Provider Type Discipline    CL 04/03/18 -  Selena Terrell, OT Occupational Therapist  OT    NANCY 01/29/20 -  Mary Quevedo OT Occupational Therapist OT                OT Recommendation and Plan  Outcome Summary/Treatment Plan (OT)  Anticipated Equipment Needs at Discharge (OT): (TBA further)  Anticipated Discharge Disposition (OT): inpatient rehabilitation facility  Therapy Frequency (OT Eval): daily  Plan of Care Review  Plan of Care Reviewed With: patient  Plan of Care Reviewed With: patient  Outcome Summary: Pt administered AE and educated on compensatory strategies to complete self-feeding tasks. Pt Min A to bring food to mouth w/ slight [place] assist for task initiation. Recommend cont skilled IPOT POC. Recommend pt DC to IP rehab.  Outcome Measures     Row Name 05/07/20 1417 05/06/20 1523          How much help from another is currently needed...    Putting on and taking off regular lower body clothing?  1  -CL  1  -JY     Bathing (including washing, rinsing, and drying)  1  -CL  1  -JY     Toileting (which includes using toilet bed pan or urinal)  2  -CL  2  -JY     Putting on and taking off regular upper body clothing  2  -CL  2  -JY     Taking care of personal grooming (such as brushing teeth)  2  -CL  2  -JY     Eating meals  3  -CL  1  -JY     AM-PAC 6 Clicks Score (OT)  11  -CL  9  -JY        Functional Assessment    Outcome Measure Options  AM-PAC 6 Clicks Daily Activity (OT)  -CL  AM-PAC 6 Clicks Daily Activity (OT)  -JY       User Key  (r) = Recorded By, (t) = Taken By, (c) = Cosigned By    Initials Name Provider Type    CL Selena Terrell OT Occupational Therapist    Mary Lizarraga OT Occupational Therapist           Time Calculation:   Time Calculation- OT     Row Name 05/07/20 1504 05/07/20 1417 05/07/20 0905       Time Calculation- OT    OT Start Time  --  1417  -CL  --    OT Received On  --  05/07/20  -CL  --    OT Goal Re-Cert Due Date  --  05/12/20  -CL  --       Timed Charges    27060 - Gait Training Minutes   --  --  10  -MP    33756 - OT Self Care/Mgmt Minutes  14  -CL  --  --       User Key  (r) = Recorded By, (t) = Taken By, (c) = Cosigned By    Initials Name Provider Type    CL Selena Terrell OT Occupational Therapist    MP Michael Allen PT Physical Therapist        Therapy Charges for Today     Code Description Service Date Service Provider Modifiers Qty    [id number] HC OT SELF CARE/MGMT/TRAIN EA 15 MIN 5/7/2020 Selena Terrell OT GO 1               Selena Terrell OT  5/7/2020

## 2020-05-07 NOTE — MBS/VFSS/FEES
Acute Care - Speech Language Pathology   Swallow Initial Evaluation  [place]   Modified Barium Swallow Study (MBS)     Patient Name: John Varma  : 1995  MRN: [id number]  Today's Date: 2020               Admit Date: 2020    Visit Dx:     ICD-10-CM ICD-9-CM   1. Sepsis with acute liver failure without hepatic coma or septic shock, due to unspecified organism (CMS/HCC) A41.9 038.9    R65.20 995.92    K72.00 570   2. Hypoxia R09.02 799.02   3. Acute renal failure, unspecified acute renal failure type (CMS/HCC) N17.9 584.9   4. Acute hepatitis B17.9 570   5. Alcohol abuse F10.10 305.00   6. Acute upper GI bleed K92.2 578.9   7. Elevated lactic acid level R79.89 276.2   8. Ascites due to alcoholic hepatitis K70.11 789.59   9. Leukocytosis, unspecified type D72.829 288.60   10. Sepsis with acute renal failure without septic shock, due to unspecified organism, unspecified acute renal failure type (CMS/HCC) A41.9 038.9    R65.20 995.92    N17.9 584.9   11. Oropharyngeal dysphagia R13.12 787.22     Patient Active Problem List   Diagnosis   • Acute alcoholic hepatitis   • Acute variceal GI bleeding   • BORIS (acute kidney injury) (CMS/HCC)   • Bandemia   • Acute upper GI bleeding   • Sepsis with acute renal failure without septic shock (CMS/HCC)   • Acute respiratory failure with hypoxia (CMS/HCC)   • Alcohol withdrawal delirium (CMS/HCC)   • Hepatic encephalopathy (CMS/HCC)   • Ascites due to alcoholic hepatitis   • Pleural effusion on left   • S/P TIPS (transjugular intrahepatic portosystemic shunt) 2020     Past Medical History:   Diagnosis Date   • Alcohol abuse    • GERD (gastroesophageal reflux disease)    • GIB (gastrointestinal bleeding)    • Tobacco abuse      Past Surgical History:   Procedure Laterality Date   • ARM WOUND REPAIR / CLOSURE     • ENDOSCOPY N/A 2020    Procedure: ESOPHAGOGASTRODUODENOSCOPY;  Surgeon: Brunner, Mark I, MD;  Location: [facility] ENDOSCOPY;  Service:  Gastroenterology;  Laterality: N/A;        SWALLOW EVALUATION (last 72 hours)      SLP Adult Swallow Evaluation     Row Name 05/07/20 1315 05/06/20 1000 05/05/20 1200             Rehab Evaluation    Document Type  evaluation  -AC  re-evaluation  -MP  re-evaluation  -AC      Subjective Information  complains of;fatigue  -AC  no complaints  -MP  no complaints  -AC      Patient Observations  alert;cooperative  -AC  alert;cooperative  -MP  alert;cooperative  -AC      Patient/Family Observations  No family present.  -AC  No family present  -MP  Pt intermittently confused. No family present.  -AC      Patient Effort  good  -AC  good  -MP  good  -AC         General Information    Patient Profile Reviewed  yes  -AC  yes  -MP  yes  -AC      Pertinent History Of Current Problem  See previous eval.  -AC  See previous eval.  -MP  See initial eval.  -AC      Current Method of Nutrition  NPO TPN - poor tolerance TFs  -AC  NPO;nasogastric feedings  -MP  NPO;nasogastric feedings  -AC      Precautions/Limitations, Vision  --  WFL;for purposes of eval  -MP  --      Precautions/Limitations, Hearing  --  WFL;for purposes of eval  -MP  --      Prior Level of Function-Communication  --  WFL  -MP  --      Prior Level of Function-Swallowing  --  no diet consistency restrictions  -MP  --      Plans/Goals Discussed with  patient;agreed upon  -AC  patient;agreed upon  -MP  patient;agreed upon  -AC      Barriers to Rehab  cognitive status  -AC  none identified  -MP  medically complex  -AC      Patient's Goals for Discharge  patient did not state  -AC  return to PO diet  -MP  return to PO diet  -AC         Pain Assessment    Additional Documentation  --  Pain Scale: FACES Pre/Post-Treatment (Group)  -MP  --         Pain Scale: FACES Pre/Post-Treatment    Pain: FACES Scale, Pretreatment  0-->no hurt  -AC  0-->no hurt  -MP  0-->no hurt  -AC      Pain: FACES Scale, Post-Treatment  0-->no hurt  -AC  0-->no hurt  -MP  0-->no hurt  -AC         Oral  Motor and Function    Dentition Assessment  natural, present and adequate  -AC  natural, present and adequate  -MP  natural, present and adequate  -AC      Secretion Management  --  WNL/WFL  -MP  WNL/WFL  -AC      Mucosal Quality  --  dry  -MP  dry  -AC         Oral Musculature and Cranial Nerve Assessment    Oral Motor General Assessment  --  --  generalized oral motor weakness  -AC         General Eating/Swallowing Observations    Respiratory Support Currently in Use  --  nasal cannula  -MP  nasal cannula  -AC      Eating/Swallowing Skills  fed by SLP;self-fed;other (see comments) needed assist to hold cup  -AC  fed by SLP  -MP  fed by SLP  -AC      Positioning During Eating  upright 90 degree;upright in chair  -AC  upright in bed  -MP  upright in bed  -AC      Utensils Used  --  spoon;cup  -MP  spoon;cup  -AC      Consistencies Trialed  --  thin liquids;nectar/syrup-thick liquids;pureed  -MP  thin liquids;nectar/syrup-thick liquids;pureed  -AC         Respiratory    Respiratory Status  --  --  increase in respiratory rate;during swallowing/eating;other (see comments) and @ baseline  -AC         Clinical Swallow Eval    Oral Prep Phase  --  --  WFL  -AC      Oral Transit  --  --  WFL  -AC      Oral Residue  --  --  WFL  -AC      Pharyngeal Phase  --  suspected pharyngeal impairment  -MP  suspected pharyngeal impairment  -AC      Clinical Swallow Evaluation Summary  --  Clinical swallow re-evaluation completed. Pt given trials of ice chipx1, thin via tsp, nectar-thick via tsp/cup, & puree. Cough following initial trial of thin liquid. No further s/sxs aspiration observed during eval. Discussed w/ RN & MD. Will plan for MBS when COVID-19 r/o testing completed, per new hospital policy.  -MP  --         Pharyngeal Phase Concerns    Pharyngeal Phase Concerns  --  cough  -MP  cough;multiple swallows  -AC      Multiple Swallows  --  --  thin;nectar;pudding  -AC      Cough  --  thin  -MP  nectar  -AC      Pharyngeal Phase  Concerns, Comment  --  --  Oral phase seemingly WFL for consistencies trialed. DNT solid 2' aspiration risk.  Slow improvements, cont'd s/sxs aspiration.  -AC         MBS/VFSS    Utensils Used  spoon;cup;straw  -AC  --  --      Consistencies Trialed  thin liquids;pudding thick;soft textures;chopped  -AC  --  --         MBS/VFSS Interpretation    Oral Prep Phase  impaired oral phase of swallowing  -AC  --  --      Oral Transit Phase  impaired  -AC  --  --      Oral Residue  impaired  -AC  --  --         Oral Preparatory Phase    Oral Preparatory Phase  prolonged manipulation  -AC  --  --      Prolonged Manipulation  mechanical soft;secondary to reduced lingual strength;secondary to impaired cognitive status  -AC  --  --         Oral Transit Phase    Impaired Oral Transit Phase  increased A-P transit time;piecemeal oral transit;premature spillage of liquids into pharynx;delayed initiation of bolus transit  -AC  --  --      Delayed Initiation of bolus transit  mechanical soft;discoordination of lingual movement;secondary to impaired cognitive status  -AC  --  --      Increased A-P Transit Time  all consistencies tested;discoordination of lingual movement;secondary to impaired cognitive status  -AC  --  --      Piecemeal Oral Transit  all consistencies tested;secondary to impaired cognitive status;discoordination of lingual movement  -AC  --  --      Premature Spillage of Liquids into Pharynx  thin liquids;mechanical soft;secondary to reduced lingual control  -AC  --  --         Oral Residue    Impaired Oral Residue  lingual residue  -AC  --  --      Lingual Residue  all consistencies tested;secondary to reduced lingual strength;secondary to reduced lingual range of motion  -AC  --  --      Response to Oral Residue  cleared residue;with spontaneous subsequent swallow  -AC  --  --      Oral Residue, Comment  Mild amount  -AC  --  --         Initiation of Pharyngeal Swallow    Initiation of Pharyngeal Swallow  bolus in  pyriform sinuses  -AC  --  --      Pharyngeal Phase  impaired pharyngeal phase of swallowing  -AC  --  --      Penetration During the Swallow  thin liquids;secondary to delayed swallow initiation or mistiming;secondary to reduced laryngeal elevation;secondary to reduced vestibular closure  -AC  --  --      Response to Penetration  shallow;transient  -AC  --  --      Rosenbek's Scale  thin:;2--->level 2;pudding/puree:;regular textures:;1--->level 1  -AC  --  --      Pharyngeal Residue  all consistencies tested;diffuse within pharynx;secondary to reduced base of tongue retraction;secondary to reduced laryngeal elevation;secondary to reduced hyolaryngeal excursion;other (see comments) mild amount, greatest in valleculae  -AC  --  --      Response to Residue  cleared residue with spontaneous subsequent swallow  -AC  --  --      Pharyngeal Phase, Comment  Pt noted to spontaneously take large drinks during study. Penetration of thin liquid - majority expelled w/ completion of the swallow or w/ subsequent swallows w/o evidence of aspiration during study, even when pushed. No penetration/aspiration appreciated w/ pudding or solid.   -AC  --  --         Clinical Impression    SLP Swallowing Diagnosis  mild-moderate;oral dysfunction;pharyngeal dysfunction  -AC  suspected pharyngeal dysfunction  -MP  suspected pharyngeal dysfunction  -AC      Functional Impact  risk of aspiration/pneumonia  -AC  risk of aspiration/pneumonia  -MP  risk of aspiration/pneumonia  -AC      Rehab Potential/Prognosis, Swallowing  good, to achieve stated therapy goals  -AC  good, to achieve stated therapy goals  -MP  good, to achieve stated therapy goals  -AC      Swallow Criteria for Skilled Therapeutic Interventions Met  demonstrates skilled criteria  -AC  demonstrates skilled criteria  -MP  demonstrates skilled criteria  -AC         Recommendations    Therapy Frequency (Swallow)  5 days per week  -AC  --  --      Predicted Duration Therapy  Intervention (Days)  until discharge  -AC  --  until discharge  -      SLP Diet Recommendation  puree with some mashed;thin liquids  -AC  NPO;temporary alternate methods of nutrition/hydration  -MP  NPO;temporary alternate methods of nutrition/hydration;other (see comments) 4 icechips/hr after oral care w/ supervision-d/c if s/s asp  -      Recommended Diagnostics  --  VFSS (MBS)  -MP  reassess via clinical swallow evaluation  -      Recommended Precautions and Strategies  upright posture during/after eating;small bites of food and sips of liquid;volitional throat clear;other (see comments) strict aspiration precautions, needs assist/supervision  -AC  other (see comments) oral care BID/PRN  -MP  --      SLP Rec. for Method of Medication Administration  meds whole;meds crushed;with pudding or applesauce;as tolerated  -AC  meds via alternate route  -MP  meds via alternate route  -      Monitor for Signs of Aspiration  notify SLP if any concerns  -AC  --  --      Anticipated Dischage Disposition  inpatient rehabilitation facility;anticipate therapy at next level of care  -AC  unknown;anticipate therapy at next level of care  -  anticipate therapy at next level of care  -AC      Demonstrates Need for Referral to Another Service  speech therapy;other (see comments) for cognitive-communication eval, as indicated  -  --  --        User Key  (r) = Recorded By, (t) = Taken By, (c) = Cosigned By    Initials Name Effective Dates     Rebekah Tsai MS CCC-SLP 07/27/17 -     Michael Stokes MS CCC-SLP 06/19/19 -           EDUCATION  The patient has been educated in the following areas:   Dysphagia (Swallowing Impairment) Oral Care/Hydration Modified Diet Instruction.    SLP Recommendation and Plan  SLP Swallowing Diagnosis: mild-moderate, oral dysfunction, pharyngeal dysfunction  SLP Diet Recommendation: puree with some mashed, thin liquids  Recommended Precautions and Strategies: upright posture during/after  eating, small bites of food and sips of liquid, volitional throat clear, other (see comments)(strict aspiration precautions, needs assist/supervision)  SLP Rec. for Method of Medication Administration: meds whole, meds crushed, with pudding or applesauce, as tolerated     Monitor for Signs of Aspiration: notify SLP if any concerns     Swallow Criteria for Skilled Therapeutic Interventions Met: demonstrates skilled criteria  Anticipated Dischage Disposition: inpatient rehabilitation facility, anticipate therapy at next level of care  Rehab Potential/Prognosis, Swallowing: good, to achieve stated therapy goals  Therapy Frequency (Swallow): 5 days per week  Predicted Duration Therapy Intervention (Days): until discharge  Demonstrates Need for Referral to Another Service: speech therapy, other (see comments)(for cognitive-communication eval, as indicated)    Plan of Care Reviewed With: patient    SLP GOALS     Row Name 05/07/20 1312             Oral Nutrition/Hydration Goal 1 (SLP)    Oral Nutrition/Hydration Goal 1, SLP  LTG: Pt will return to regular diet and thin liquids and tolerate w/o s/sxs aspiration w/ 100% acc w/o cues.  -AC      Time Frame (Oral Nutrition/Hydration Goal 1, SLP)  by discharge  -AC         Oral Nutrition/Hydration Goal 2 (SLP)    Oral Nutrition/Hydration Goal 2, SLP  Pt will tolerate trials of puree/mashed foods and thin liquids w/o s/sxs aspiration w/ 100% acc w/o cues.  -AC      Time Frame (Oral Nutrition/Hydration Goal 2, SLP)  short term goal (STG)  -AC         Lingual Strengthening Goal 1 (SLP)    Activity (Lingual Strengthening Goal 1, SLP)  increase tongue back strength;increase lingual tone/sensation/control/coordination/movement  -AC      Increase Lingual Tone/Sensation/Control/Coordination/Movement  swallow trials;lingual resistance exercises  -AC      Increase Tongue Back Strength  lingual resistance exercises  -AC      Lynn/Accuracy (Lingual Strengthening Goal 1, SLP)  with  minimal cues (75-90% accuracy)  -AC      Time Frame (Lingual Strengthening Goal 1, SLP)  short term goal (STG)  -AC         Pharyngeal Strengthening Exercise Goal 1 (SLP)    Activity (Pharyngeal Strengthening Goal 1, SLP)  increase timing;increase superior movement of the hyolaryngeal complex;increase anterior movement of the hyolaryngeal complex;increase closure at entrance to airway/closure of airway at glottis;increase tongue base retraction  -AC      Increase Timing  prepping - 3 second prep or suck swallow or 3-step swallow  -AC      Increase Superior Movement of the Hyolaryngeal Complex  effortful pitch glide (falsetto + pharyngeal squeeze)  -AC      Increase Anterior Movement of the Hyolaryngeal Complex  chin tuck against resistance (CTAR)  -AC      Increase Closure at Entrance to Airway/Closure of Airway at Glottis  super-supraglottic swallow  -AC      Increase Tongue Base Retraction  hard effortful swallow;patrick  -AC      Winder/Accuracy (Pharyngeal Strengthening Goal 1, SLP)  with minimal cues (75-90% accuracy)  -AC      Time Frame (Pharyngeal Strengthening Goal 1, SLP)  short term goal (STG)  -        User Key  (r) = Recorded By, (t) = Taken By, (c) = Cosigned By    Initials Name Provider Type    Rebekah Smith MS CCC-SLP Speech and Language Pathologist             Time Calculation:   Time Calculation- SLP     Row Name 05/07/20 1504             Time Calculation- SLP    SLP Start Time  1315  -      SLP Received On  05/07/20  -        User Key  (r) = Recorded By, (t) = Taken By, (c) = Cosigned By    Initials Name Provider Type    Rebekah Smith MS CCC-SLP Speech and Language Pathologist          Therapy Charges for Today     Code Description Service Date Service Provider Modifiers Qty    [id number] HC ST MOTION FLUORO EVAL SWALLOW 6 5/7/2020 Rebekah Tsai MS CCC-SLP GN 1               Rebekah Tsai MS CCC-AIDA  5/7/2020

## 2020-05-07 NOTE — CONSULTS
John Varma  1995  [id number]    Date of Consult: 5/7/2020      Requesting Provider: Dory Wells MD  Evaluating Physician: Hipolito Barkley MD    Chief Complaint:     Reason for Consultation:    History of present illness:   Patient is a 24 y.o.  Yr old male with history of alcoholism and alcohol cirrhosis who was admitted 4/23 with ETOH hepatitis and decompensated cirrhosis, BORIS, hepatic encephalopathy, and hematemesis secondary to esophageal variceal bleed s/p EGD on 4/24. He was intubated for the EGD and then subsequently extubated on 4/27. He then underwent a TIPS procedure on 4/29 and was intubated for that until subsequent extubation on 5/1/20. He has additionally undergone paracentesis x2 with 5 liters removed during first paracentesis and 2.5L removed on second paracentesis.  He has had a leukocytosis since arrival, initially up to 41.55. Peritoneal fluid with 46 WBC on 4/24 with 6% neutrophils. BF culture was no growth at 5 days. Subsequent paracentesis on 4/30 with 1,0018 WBC with 77% neutrophils but I do not see a culture was sent from this fluid sample. multiple blood culture sets have been negative. He was initially on Aztreonam for SBP (has PCN allergy) but was more recently on levaquin. Given recent worsening of his leukocytosis again to 45.8, the patient was switched to meropenem today. Cr has improved. LFTs still somewhat elevated. CT Chest/A/P today showed mod-large bilateral pleural effusions with complete left lower lobe atelectasis and moderate right lower lobe atelectasis and moderate ascites. Sputum culture done with few GPCs in pairs and clusters. C diff screen was negative. COVID-19 PCR was negative on 5/6. Blood cultures were repeat today and are pending. Of noted, MRSA PCR nares from 4/25 was positive.The patient has experienced new fevers up to 101.5F since 5/3.  ID consulted for antibiotic recs.     Past Medical History:   Diagnosis Date   • Alcohol abuse    • GERD  (gastroesophageal reflux disease)    • GIB (gastrointestinal bleeding)    • Tobacco abuse        Past Surgical History:   Procedure Laterality Date   • ARM WOUND REPAIR / CLOSURE     • ENDOSCOPY N/A 4/24/2020    Procedure: ESOPHAGOGASTRODUODENOSCOPY;  Surgeon: Brunner, Mark I, MD;  [facility];  Service: Gastroenterology;  Laterality: N/A;       Pediatric History   Patient Guardian Status   • Father:  ROBERTA GUEVARA     Other Topics Concern   • Not on file   Social History Narrative   • Not on file       family history is not on file.    Allergies   Allergen Reactions   • Penicillins Hives       Medication:  Current Facility-Administered Medications   Medication Dose Route Frequency Provider Last Rate Last Dose   • [START ON 5/8/2020] ! vanc trough due Friday 5/8 @ 1130   Does not apply Once Miseha Santiago, PharmD       • acetaminophen (TYLENOL) tablet 650 mg  650 mg Oral Q8H PRN Jacky Riojas MD   650 mg at 05/06/20 2106   • Adult Cyclic 2-in-1 TPN   Intravenous Cyclic TPN - See Admin Instructions Miesha Santiago, PharmD       • albuterol (PROVENTIL) nebulizer solution 0.083% 2.5 mg/3mL  2.5 mg Nebulization Q4H PRN Amarilis Quevedo, APRN       • bisacodyl (DULCOLAX) suppository 10 mg  10 mg Rectal Daily PRN Brunner, Mark I, MD   10 mg at 04/27/20 1839   • enoxaparin (LOVENOX) syringe 40 mg  40 mg Subcutaneous Q24H Valerio Emmanuel MD   40 mg at 05/07/20 0807   • Fat Emul Fish Oil/Plant Based (SMOFLIPID) 20 % emulsion 250 mL  250 mL Intravenous Q24H (TPN) Miesha Santiago, PharmD       • folic acid (FOLVITE) tablet 1 mg  1 mg Oral Daily Valerio Emmanuel MD   1 mg at 05/07/20 0807   • insulin detemir (LEVEMIR) injection 15 Units  15 Units Subcutaneous Daily Valerio Emmanuel MD   Stopped at 05/06/20 0803   • insulin regular (humuLIN R,novoLIN R) injection 0-7 Units  0-7 Units Subcutaneous Q6H Pavel Tan DO   Stopped at 05/06/20 1140   •  insulin regular (humuLIN R,novoLIN R) injection 5 Units  5 Units Subcutaneous Q6H Jacky Riojas MD   Stopped at 05/07/20 1154   • lactulose (CHRONULAC) 10 GM/15ML solution 20 g  20 g Nasogastric BID Valerio Emmanuel MD   20 g at 05/07/20 0818   • LORazepam (ATIVAN) injection 1 mg  1 mg Intravenous Q1H PRN Valerio Emmanuel MD   1 mg at 05/07/20 0025   • Magnesium Sulfate 2 gram Bolus, followed by 8 gram infusion (total Mg dose 10 grams)- Mg less than or equal to 1mg/dL  2 g Intravenous PRN Brunner, Mark I, MD        Or   • Magnesium Sulfate 2 gram / 50mL Infusion (GIVE X 3 BAGS TO EQUAL 6GM TOTAL DOSE) - Mg 1.1 - 1.5 mg/dl  2 g Intravenous PRN Brunner, Mark I, MD        Or   • Magnesium Sulfate 4 gram infusion- Mg 1.6-1.9 mg/dL  4 g Intravenous PRN Brunner, Mark I, MD 25 mL/hr at 05/05/20 0639 4 g at 05/05/20 0639   • melatonin tablet 5 mg  5 mg Oral Nightly Valerio Emmanuel MD       • meropenem (MERREM) 1 g/50 mL 0.9% NS IVPB (mbp)  1 g Intravenous Q8H Valerio Emmanuel MD   1 g at 05/07/20 1519   • metoclopramide (REGLAN) injection 5 mg  5 mg Intravenous Q6H Kaz Arellano MD   5 mg at 05/07/20 1109   • metroNIDAZOLE (FLAGYL) 500 mg/100mL IVPB  500 mg Intravenous Q8H Valerio Emmanuel MD   500 mg at 05/07/20 1221   • multivitamin and minerals liquid 15 mL  15 mL Oral Daily Valerio Emmanuel MD   15 mL at 05/07/20 0806   • ondansetron (ZOFRAN) injection 4 mg  4 mg Intravenous Q6H PRN Brunner, Mark I, MD   4 mg at 05/07/20 0025   • pantoprazole (PROTONIX) injection 40 mg  40 mg Intravenous Q AM Brunner, Mark I, MD   40 mg at 05/07/20 0603   • Pharmacy to Dose TPN   Does not apply Continuous PRN Valerio Emmanuel MD       • Pharmacy to dose vancomycin   Does not apply Continuous PRN Valerio Emmanuel MD       • potassium chloride 20 mEq in 50 mL IVPB  20 mEq Intravenous Q1H PRN Miesha Santiago, PharmD       • PRO-STAT  oral liquid 30 mL  30 mL Nasogastric BID Elissa Garay, MS,RD,LD   Stopped at 05/07/20 0808   • propranolol (INDERAL) tablet 10 mg  10 mg Oral Q8H Puma Willard APRN   10 mg at 05/07/20 1224   • QUEtiapine (SEROquel) tablet 100 mg  100 mg Oral Nightly Valerio Emmanuel MD       • QUEtiapine (SEROquel) tablet 50 mg  50 mg Oral Daily Valerio Emmanuel MD   50 mg at 05/07/20 1057   • riFAXIMin (XIFAXAN) tablet 550 mg  550 mg Nasogastric Q12H Moises Estrella APRN   550 mg at 05/07/20 0807   • sodium chloride 0.9 % flush 10 mL  10 mL Intravenous PRN Brunner, Mark I, MD   10 mL at 05/03/20 0807   • sodium chloride 0.9 % flush 10 mL  10 mL Intravenous PRN Pavel Tan,        • sodium chloride 0.9 % flush 20 mL  20 mL Intravenous PRN Pavel Tan DO       • thiamine (VITAMIN B-1) tablet 100 mg  100 mg Oral Daily Valerio Emmanuel MD   100 mg at 05/07/20 0807   • [START ON 5/8/2020] vancomycin 1250 mg/250 mL 0.9% NS IVPB ([facility])  1,250 mg Intravenous Q12H Miesha Santiago PharmD       • vancomycin oral solution reconstituted 250 mg  250 mg Nasogastric Q6H Valerio Emmanuel MD   250 mg at 05/07/20 1221   • zinc gluconate tablet 50 mg  50 mg Oral Daily Valerio Emmanuel MD   50 mg at 05/07/20 0807         Infectious History:  C.difficile (rule out), MRSA    Antibiotics:  Anti-Infectives (From admission, onward)    Ordered     Dose/Rate Route Frequency Start Stop    05/07/20 1121  vancomycin 1250 mg/250 mL 0.9% NS IVPB ([facility])     Ordering Provider:  Miesha Santiago PharmD    1,250 mg  over 90 Minutes Intravenous Every 12 Hours 05/08/20 0000 05/13/20 2359    05/07/20 0944  meropenem (MERREM) 1 g/50 mL 0.9% NS IVPB (mbp)     Ordering Provider:  Valerio Emmanuel MD    1 g  over 3 Hours Intravenous Every 8 Hours 05/07/20 1630 05/14/20 1629    05/07/20 1115  vancomycin oral solution reconstituted 250 mg     Jack  Miesha KERN, PharmD reviewed the order on 05/07/20 1436.   Ordering Provider:  Valerio Emmanuel MD    250 mg Nasogastric Every 6 Hours Scheduled 05/07/20 1215 05/14/20 1159    05/07/20 1115  metroNIDAZOLE (FLAGYL) 500 mg/100mL IVPB     Miesha Santiago, PharmD reviewed the order on 05/07/20 1436.   Ordering Provider:  Valerio Emmanuel MD    500 mg  over 60 Minutes Intravenous Every 8 Hours Scheduled 05/07/20 1200 05/14/20 1159    05/07/20 0944  meropenem (MERREM) 1 g/50 mL 0.9% NS IVPB (mbp)     Ordering Provider:  Valerio Emmanuel MD    1 g  over 30 Minutes Intravenous Once 05/07/20 1030 05/07/20 1126    05/07/20 0944  vancomycin 1500 mg/500 mL 0.9% NS IVPB ([facility])     Ordering Provider:  Valerio Emmanuel MD    1,500 mg  over 90 Minutes Intravenous Once 05/07/20 1030 05/07/20 1227    05/07/20 0944  Pharmacy to dose vancomycin     Miesha Santiago, PharmD reviewed the order on 05/07/20 1121.   Ordering Provider:  Valerio Emmanuel MD     Does not apply Continuous PRN 05/07/20 0942 05/14/20 0941    05/02/20 1316  levoFLOXacin (LEVAQUIN) 500 mg/100 mL D5W (premix) (LEVAQUIN) 500 mg     Miesha Santiago, PharmD reviewed the order on 05/06/20 0903.   Ordering Provider:  Kaz Arellano MD    500 mg Intravenous Every 24 Hours Scheduled 05/02/20 1430 05/06/20 1300    04/28/20 1501  levoFLOXacin (LEVAQUIN) 500 mg/100 mL D5W (premix) (LEVAQUIN) 500 mg     Note to Pharmacy:  For on call to Radiology for TIPS, estimated 10am   Ordering Provider:  Cullen Walker MD    500 mg Intravenous Once When Specified 04/29/20 0900 04/29/20 1118    04/28/20 1906  aztreonam (AZACTAM) 1 g in 50 mL NS IVPB     Ordering Provider:  Len Baires [facility]    1 g  over 30 Minutes Intravenous Every 8 Hours 04/29/20 0000 04/30/20 1615    04/24/20 1411  riFAXIMin (XIFAXAN) tablet 550 mg     Kya Christianson RP reviewed the order on 04/25/20 0716.   Ordering Provider:  Moises Estrella  "Jairo SMOOTH    550 mg Nasogastric Every 12 Hours Scheduled 04/24/20 2100      04/24/20 0630  vancomycin 500 mg/100 mL 0.9% NS IVPB ([facility])     Ordering Provider:  Ashwin Siegel RPH    9 mg/kg × 65.8 kg Intravenous Once 04/24/20 0730 04/24/20 1013    04/23/20 2314  vancomycin 1000 mg/250 mL 0.9% NS IVPB ([facility])     Ordering Provider:  Dory Wells MD    15 mg/kg × 65.8 kg  over 60 Minutes Intravenous Once 04/23/20 2316 04/24/20 0223    04/23/20 2314  aztreonam (AZACTAM) 2 g/100 mL 0.9% NS (mbp)     Ordering Provider:  Dory Wells MD    2 g  over 30 Minutes Intravenous Once 04/23/20 2316 04/24/20 0100            Review of Systems    Unable to obtain a reliable ROS from the patient given his encephalopathy    Physical Exam:   Vital Signs   /87 (BP Location: Left arm, Patient Position: Lying)   Pulse 101   Temp 99.4 °F (37.4 °C) (Oral)   Resp 22   Ht 172.7 cm (68\")   Wt 77 kg (169 lb 12.1 oz)   SpO2 95%   BMI 25.81 kg/m²     GENERAL: Awake and alert, in no acute distress. Ill appearing.  HEENT: Normocephalic, atraumatic. Oropharynx clear without evidence of thrush or exudate. No evidence of peridontal disease. Feeding tube in place  Eyes: +scleral icterus. No conjunctival hemorrhages.   NECK: Supple without nuchal rigidity. No mass.  HEART: RRR; No murmur, rubs, gallops. anasarca  LUNGS: decreased breath sounds over lung bases but otherwise CTA bilaterally. Normal respiratory effort. Non-labored breathing  ABDOMEN: distended abdomen. Hypoactive bowel sounds. Mild tenderness to palpation over right side.  EXT:  No cyanosis. 3+ pitting edema over BLE  : Genitalia generally unremarkable aside from some mild scrotal edema.   MSK: FROM without joint effusions noted arms/legs.    SKIN: Jaundiced. No rash noted. No peripheral stigmata of infective endocarditis noted  NEURO: awake. Oriented to person but otherwise not oriented. Only answering a few very simple questions appropriately but " not always appropriate.  PSYCHIATRIC: poor insight. Having visual hallucinations and thinks that he sees his father in the room  LUE PICC line site is without erythema or drainage     Laboratory Data    Results from last 7 days   Lab Units 05/07/20  0520 05/06/20  0541 05/05/20  0318   WBC 10*3/mm3 45.78* 28.56* 31.36*   HEMOGLOBIN g/dL 10.5* 8.7* 9.1*   HEMATOCRIT % 34.0* 27.1* 28.5*   PLATELETS 10*3/mm3 316 238 253     Results from last 7 days   Lab Units 05/07/20  1202   SODIUM mmol/L 143   POTASSIUM mmol/L 4.7   CHLORIDE mmol/L 107   CO2 mmol/L 21.0*   BUN mg/dL 34*   CREATININE mg/dL 0.57*   GLUCOSE mg/dL 96   CALCIUM mg/dL 8.1*     Results from last 7 days   Lab Units 05/07/20  1202  05/04/20  0414   ALK PHOS U/L 165*   < > 199*   BILIRUBIN mg/dL 7.2*   < > 5.8*   BILIRUBIN DIRECT mg/dL  --   --  3.3*   ALT (SGPT) U/L 66*   < > 89*   AST (SGOT) U/L 100*   < > 93*    < > = values in this interval not displayed.         Results from last 7 days   Lab Units 05/07/20  1202   CRP mg/dL 4.88*       Estimated Creatinine Clearance: 215.8 mL/min (A) (by C-G formula based on SCr of 0.57 mg/dL (L)).       Microbiology:    Blood Culture   Date Value Ref Range Status   05/03/2020 No growth at 4 days  Preliminary   05/03/2020 No growth at 4 days  Preliminary     COVID-19 PCR: negative     4/24 peritoneal fluid cx: NG    Radiology:  Ct Abdomen Pelvis Without Contrast    Result Date: 5/7/2020  Symmetric, moderate or moderate to large bilateral pleural effusions, with complete left lower lobe atelectasis, and moderate right lower lobe atelectasis. Minimal pulmonary interstitial disease elsewhere.  CT SCAN OF THE ABDOMEN AND PELVIS WITHOUT CONTRAST: Note is made of patient's tips shunt. Liver parenchyma appears uniform on today's unenhanced exam. There is vicarious excretion of contrast into the gallbladder. Spleen is not enlarged. No significant abnormalities are seen of the pancreas, adrenal glands, or kidneys. There is mild  ascites, stable in extent from the prior exam. NG tube is seen in the distal stomach. No free or discrete inflammatory focus is seen. Coronal images show a few shotty left mid abdominal small bowel mesenteric lymph nodes, but these are markedly improved from the prior study. Bowel loops are normal in caliber.  Regarding the lower abdomen and pelvis, there is mild to moderate ascites. No discrete inflammatory focus is appreciated. No abnormally dilated bowel loops or grossly abnormal appearing bowel loops are seen. Bony structures appear intact.  IMPRESSION:  1. Moderately extensive ascites, but not increased from prior study. 2. Interval tips shunt placement. 3. Near resolution of small bowel mesenteric lymphadenopathy since prior scan. No evidence of new intra-abdominal or intrapelvic pathology is identified elsewhere.  D:  05/07/2020 E:  05/07/2020      Ct Chest Without Contrast    Result Date: 5/7/2020  Symmetric, moderate or moderate to large bilateral pleural effusions, with complete left lower lobe atelectasis, and moderate right lower lobe atelectasis. Minimal pulmonary interstitial disease elsewhere.  CT SCAN OF THE ABDOMEN AND PELVIS WITHOUT CONTRAST: Note is made of patient's tips shunt. Liver parenchyma appears uniform on today's unenhanced exam. There is vicarious excretion of contrast into the gallbladder. Spleen is not enlarged. No significant abnormalities are seen of the pancreas, adrenal glands, or kidneys. There is mild ascites, stable in extent from the prior exam. NG tube is seen in the distal stomach. No free or discrete inflammatory focus is seen. Coronal images show a few shotty left mid abdominal small bowel mesenteric lymph nodes, but these are markedly improved from the prior study. Bowel loops are normal in caliber.  Regarding the lower abdomen and pelvis, there is mild to moderate ascites. No discrete inflammatory focus is appreciated. No abnormally dilated bowel loops or grossly abnormal  appearing bowel loops are seen. Bony structures appear intact.  IMPRESSION:  1. Moderately extensive ascites, but not increased from prior study. 2. Interval tips shunt placement. 3. Near resolution of small bowel mesenteric lymphadenopathy since prior scan. No evidence of new intra-abdominal or intrapelvic pathology is identified elsewhere.  D:  05/07/2020 E:  05/07/2020      Ct Abdomen With & Without Contrast    Result Date: 5/5/2020  1. Diffusely abnormal appearance of the hepatic parenchyma status post TIPS likely perfusional changes along with low signal surrounding the portal veins consistent with periportal edema. 2. 3.2 cm focus in segment IVB has at least partial imaging characteristics of involvement for concern for HCC although recommend followup given limited evaluation of perfusional changes and therefore attenuation changes on the current exam. 3. Perihepatic and perisplenic ascites with top normal size for spleen at 13 cm. 4. Small-to-moderate bilateral pleural effusions.  D:  05/05/2020 E:  05/05/2020    This report was finalized on 5/5/2020 2:27 PM by Dr. Luis Eduardo Mosley.      Fl Video Swallow With Speech Single Contrast    Result Date: 5/7/2020  Fluoroscopy provided for a modified barium swallow. Please see speech therapy report for full details and recommendations.        Xr Chest 1 View    Result Date: 5/7/2020  1. Increased perihilar disease that may represent interstitial edema or ARDS. 2. Left lower lung atelectasis, probably unchanged.  D:  05/07/2020 E:  05/07/2020        Xr Chest 1 View    Result Date: 5/6/2020  Bilateral pleural effusions with patchy airspace disease seen within the right mid and left lower lung field. Lines and tubes in satisfactory position.  D:  05/06/2020 E:  05/06/2020  This report was finalized on 5/6/2020 4:02 PM by Dr. Carmen Morales MD.      Xr Chest 1 View    Result Date: 5/5/2020  Support hardware unchanged and in grossly satisfactory positioning. Cardiac  silhouette remains enlarged with increased central pulmonary vascularity and trace bilateral pleural effusions, left greater than right, with improved aeration at the lung bases from likely decreased atelectasis versus decreased effusions or layering component. No new consolidation or pulmonary parenchymal involvement.     D:  05/05/2020 E:  05/05/2020  This report was finalized on 5/5/2020 2:28 PM by Dr. Luis Eduardo Mosley.      Xr Chest 1 View    Result Date: 5/3/2020  1. Interval extubation. 2. Persistent bilateral infiltrates with slight worsening of bilateral pleural effusions. 3. Stable cardiac enlargement. Signer Name: Christian Villa MD  Signed: 5/3/2020 2:30 AM  Workstation Name: [facility]    Xr Chest 1 View    Result Date: 4/30/2020  1.  Mildly improved perihilar interstitial disease, whether interstitial edema or mild changes of ARDS. 2.  Very small left pleural effusion, and focal left basilar atelectasis unchanged. 3.  No evidence of pneumothorax or other new chest pathology.  D:  04/30/2020 E:  04/30/2020     This report was finalized on 4/30/2020 9:19 PM by Dr. Moi Rich MD.      Ct Guided Paracentesis    Result Date: 5/4/2020   CT-guided paracentesis (aborted)  Plan:  Consider reattempt diagnostic paracentesis once there is increased amount of fluid _______________________________________________________________  PROCEDURE SUMMARY: - Limited CT - CT-guided paracentesis - Additional procedure(s): None  PROCEDURE DETAILS:  Pre-procedure Consent: Informed consent for the procedure including risks, benefits and alternatives was obtained and time-out was performed prior to the procedure. Preparation: The site was prepared and draped using maximal sterile barrier technique including cutaneous antisepsis.  Anesthesia/sedation Level of anesthesia/sedation: None  Initial abdominal CT Initial abdominal CT was performed. Findings: Tiny volume ascites. A safe window for  paracentesis was identified.  Paracentesis Local anesthesia was administered. Giving constraints of no CT fluoroscopy and patient inability to move arms from side -no safe way to access minimal perihepatic fluid. Procedure aborted.  Radiation Dose CT dose length product (mGy-cm): 1534  Additional Details Additional description of procedure: None Equipment details: None Specimens removed: None. Procedure aborted. Estimated blood loss (mL): Less than 10 Standardized report: Paracentesis  Attestation I was present and scrubbed for the entire procedure. Imaging reviewed. Agree with final report as written.  This report was finalized on 5/4/2020 3:53 PM by Cullen Walker.      Xr Abdomen Kub    Result Date: 5/7/2020  Nonobstructive bowel gas pattern with paucity of air throughout the visualized bowel loops.  This report was finalized on 5/7/2020 11:37 AM by Dr. Luis Eduardo Mosley.      Xr Abdomen Kub    Result Date: 5/6/2020  NG tube in the distal stomach. Borderline dilatation of the transverse colon with air but no evidence of dilated bowel elsewhere to suggest significant ileus or obstruction.  D:  05/06/2020 E:  05/06/2020    This report was finalized on 5/6/2020 9:24 PM by Dr. Moi Rich MD.       I independently read the patient's CT Chest from 5/7 and I agree with the above report    Impression:   Patient is a 24 y.o.  Yr old male with history of alcoholism and alcohol cirrhosis who was admitted 4/23 with ETOH hepatitis and decompensated cirrhosis, OBRIS, hepatic encephalopathy, and hematemesis secondary to esophageal variceal bleed s/p EGD on 4/24. He unfortuantely has multiple medical problems and remains very encephalopathic. He has had SBP by cell count on last paracentesis but I do not see that a culture was sent on that peritoneal fluid. He has increasing WBC count and ongoing low grade fevers. Possible sources for his sepsis include SBP vs. Bacteremia with possible line infection. C diff has been ruled out.  Pulmonary source though less likely as lung look mostly clear although he does have large bilateral pleural effusions. Urinalysis was negative for signs of UTI.    Problems:  -Sepsis with fevers, tachycardia, lactic acidosis, and Leukocytosis with neutrophilia  -BP  -Decompensated alcohol cirrhosis with ascites, hepatic -Encephalopathy, and esophageal varices  -Alcohol hepatitis/elevated LFTs  -Hematemesis due to bleeding esophageal varices s/p EGD and TIPS -procedure  -BORIS- likely ATN due to bleeding. Improved  -Acute blood loss anemia  -MRSA colonization  -Alcoholism  -diarrhea- likely due to lactulose. C diff negative  -Penicillin allergy (Hives)    PLAN: Thank you for asking us to see John Varma, I recommend the following:     -follow blood cultures  -would obtain another paracentesis with cell counts and cultures    -stop flagyl and oral vancomycin  -I agree with Meropenem and Vancomycin for now. PK service to assist with vancomycin dosing. We need to closely monitor renal function while on vancomycin  -Add micafungin 100 mg IV q24h in case line infection/fungemia while we are awaiting repeat blood cultures    I discussed with nursing    Complex set of medical issues requiring a high level of medical decision making    45 minutes of CC time spent on the patient's case today ([id number])    Hipolito Barkley MD  5/7/2020

## 2020-05-07 NOTE — PROGRESS NOTES
"Pharmacy Consult-Vancomycin Dosing  John Varma is a  24 y.o. male receiving vancomycin therapy.     Indication: empiric (r/o sepsis)  Consulting Provider: pulm  ID Consult: no    Goal Trough: 15-20    Current Antimicrobial Therapy  IV Vanco (5/7-5/13) 7, D1  Merrem (5/7-5/13) 7, D1  PO Vanco (5/7-5/13) 7, D1  IV Flagyl (5/7-513) 7, D1    Allergies  Allergies as of 04/23/2020 - Reviewed 04/23/2020   Allergen Reaction Noted    Penicillins Hives 04/23/2020       Labs  Results from last 7 days   Lab Units 05/07/20  0639 05/06/20  0418 05/05/20  0318   BUN mg/dL 37* 34* 33*   CREATININE mg/dL 0.63* 0.57* 0.58*       Results from last 7 days   Lab Units 05/07/20  0520 05/06/20  0541 05/05/20  0318   WBC 10*3/mm3 45.78* 28.56* 31.36*       Evaluation of Dosing  Ht - 172.7 cm (68\")  Wt - 77 kg (169 lb 12.1 oz)    Estimated Creatinine Clearance: 195.2 mL/min (A) (by C-G formula based on SCr of 0.63 mg/dL (L)).    Intake & Output (last 3 days)         05/04 0701 - 05/05 0700 05/05 0701 - 05/06 0700 05/06 0701 - 05/07 0700 05/07 0701 - 05/08 0700    I.V. (mL/kg) 2166.9 (28.3) 561 (7.3) 50 (0.6)     Blood        Other 827 401 611     NG/GT 1591 981 441     IV Piggyback 100       Total Intake(mL/kg) 4684.9 (61.1) 1943 (25.3) 1102 (14.3)     Urine (mL/kg/hr) 2975 (1.6) 3120 (1.7) 2250 (1.2) 450 (1.5)    Emesis/NG output    150    Stool  0      Total Output 2975 3120 2250 600    Net +1709.9 -1177 -1148 -600            Stool Unmeasured Occurrence  2 x              Microbiology and Radiology  Microbiology Results (last 10 days)       Procedure Component Value - Date/Time    SARS-CoV-2 PCR ([place] [facility]-HOU[facility] PERFORMED), NP SWAB IN TRANSPORT MEDIA - Swab, Nasopharynx [[id number]]  (Normal) Collected:  05/06/20 1840    Lab Status:  Final result Specimen:  Swab from Nasopharynx Updated:  05/07/20 0033     COVID19 Not Detected    Blood Culture - Blood, Arm, Left [[id number]] Collected:  05/03/20 1421    Lab Status:  Preliminary " result Specimen:  Blood from Arm, Left Updated:  05/06/20 1515     Blood Culture No growth at 3 days    Blood Culture - Blood, Hand, Left [[id number]] Collected:  05/03/20 1421    Lab Status:  Preliminary result Specimen:  Blood from Hand, Left Updated:  05/06/20 1515     Blood Culture No growth at 3 days            Evaluation of Level                           Assessment/Plan:  Patient started on broad spectrum abx for leukocytosis, etc. Plan to give 20 mg/kg LD IV x1 of vancomycin (1500 mg) and then start 1250 mg IV q12 as maintenance therapy. Renal function is stable. Will check trough slightly early prior to 3rd dose.    Pharmacy will continue to monitor and make adjustments to dose as necessary based on renal function, cultures, labs, and clinical status.     Thank you for this consult.  Miesha Santiago, PharmD, BCPS  5/7/2020  11:26

## 2020-05-08 ENCOUNTER — APPOINTMENT (OUTPATIENT)
Dept: ULTRASOUND IMAGING | Facility: HOSPITAL | Age: 25
End: 2020-05-08

## 2020-05-08 ENCOUNTER — APPOINTMENT (OUTPATIENT)
Dept: GENERAL RADIOLOGY | Facility: HOSPITAL | Age: 25
End: 2020-05-08

## 2020-05-08 LAB
ALBUMIN FLD-MCNC: 0.6 G/DL
ALBUMIN SERPL-MCNC: 2.6 G/DL (ref 3.5–5.2)
ALBUMIN/GLOB SERPL: 1.1 G/DL
ALP SERPL-CCNC: 107 U/L (ref 39–117)
ALT SERPL W P-5'-P-CCNC: 60 U/L (ref 1–41)
ANION GAP SERPL CALCULATED.3IONS-SCNC: 12 MMOL/L (ref 5–15)
APPEARANCE FLD: ABNORMAL
AST SERPL-CCNC: 78 U/L (ref 1–40)
BACTERIA SPEC AEROBE CULT: NORMAL
BACTERIA SPEC AEROBE CULT: NORMAL
BASOPHILS # BLD AUTO: 0.14 10*3/MM3 (ref 0–0.2)
BASOPHILS NFR BLD AUTO: 0.4 % (ref 0–1.5)
BILIRUB SERPL-MCNC: 5.9 MG/DL (ref 0.2–1.2)
BUN BLD-MCNC: 25 MG/DL (ref 6–20)
BUN/CREAT SERPL: 62.5 (ref 7–25)
CA-I SERPL ISE-MCNC: 1.19 MMOL/L (ref 1.12–1.32)
CALCIUM SPEC-SCNC: 7.8 MG/DL (ref 8.6–10.5)
CHLORIDE SERPL-SCNC: 108 MMOL/L (ref 98–107)
CO2 SERPL-SCNC: 21 MMOL/L (ref 22–29)
COLOR FLD: YELLOW
CREAT BLD-MCNC: 0.4 MG/DL (ref 0.76–1.27)
DEPRECATED RDW RBC AUTO: 88.4 FL (ref 37–54)
EOSINOPHIL # BLD AUTO: 0.6 10*3/MM3 (ref 0–0.4)
EOSINOPHIL NFR BLD AUTO: 1.8 % (ref 0.3–6.2)
EOSINOPHIL NFR FLD MANUAL: 1 %
ERYTHROCYTE [DISTWIDTH] IN BLOOD BY AUTOMATED COUNT: 22.6 % (ref 12.3–15.4)
GFR SERPL CREATININE-BSD FRML MDRD: >150 ML/MIN/1.73
GLOBULIN UR ELPH-MCNC: 2.4 GM/DL
GLUCOSE BLD-MCNC: 145 MG/DL (ref 65–99)
GLUCOSE BLDC GLUCOMTR-MCNC: 102 MG/DL (ref 70–130)
GLUCOSE BLDC GLUCOMTR-MCNC: 124 MG/DL (ref 70–130)
GLUCOSE BLDC GLUCOMTR-MCNC: 134 MG/DL (ref 70–130)
GLUCOSE BLDC GLUCOMTR-MCNC: 140 MG/DL (ref 70–130)
GLUCOSE FLD-MCNC: 134 MG/DL
HCT VFR BLD AUTO: 29.5 % (ref 37.5–51)
HGB BLD-MCNC: 9.3 G/DL (ref 13–17.7)
IMM GRANULOCYTES # BLD AUTO: 0.37 10*3/MM3 (ref 0–0.05)
IMM GRANULOCYTES NFR BLD AUTO: 1.1 % (ref 0–0.5)
INR PPP: 1.96 (ref 0.85–1.16)
LDH FLD-CCNC: 111 U/L
LYMPHOCYTES # BLD AUTO: 2.36 10*3/MM3 (ref 0.7–3.1)
LYMPHOCYTES NFR BLD AUTO: 7.1 % (ref 19.6–45.3)
LYMPHOCYTES NFR FLD MANUAL: 20 %
MACROPHAGE FLUID: 4 %
MAGNESIUM SERPL-MCNC: 2 MG/DL (ref 1.6–2.6)
MCH RBC QN AUTO: 34.8 PG (ref 26.6–33)
MCHC RBC AUTO-ENTMCNC: 31.5 G/DL (ref 31.5–35.7)
MCV RBC AUTO: 110.5 FL (ref 79–97)
MESOTHL CELL NFR FLD MANUAL: 17 %
MONOCYTES # BLD AUTO: 2.81 10*3/MM3 (ref 0.1–0.9)
MONOCYTES NFR BLD AUTO: 8.4 % (ref 5–12)
MONOCYTES NFR FLD: 21 %
NEUTROPHILS # BLD AUTO: 27.01 10*3/MM3 (ref 1.7–7)
NEUTROPHILS NFR BLD AUTO: 81.2 % (ref 42.7–76)
NEUTROPHILS NFR FLD MANUAL: 37 %
NRBC BLD AUTO-RTO: 0 /100 WBC (ref 0–0.2)
PHOSPHATE SERPL-MCNC: 3.9 MG/DL (ref 2.5–4.5)
PLATELET # BLD AUTO: 308 10*3/MM3 (ref 140–450)
PMV BLD AUTO: 11.3 FL (ref 6–12)
POTASSIUM BLD-SCNC: 3.9 MMOL/L (ref 3.5–5.2)
PROCALCITONIN SERPL-MCNC: 4.61 NG/ML (ref 0.1–0.25)
PROT FLD-MCNC: 1.3 G/DL
PROT SERPL-MCNC: 5 G/DL (ref 6–8.5)
PROTHROMBIN TIME: 22 SECONDS (ref 11.5–14)
RBC # BLD AUTO: 2.67 10*6/MM3 (ref 4.14–5.8)
RBC # FLD AUTO: 6000 /MM3
SODIUM BLD-SCNC: 141 MMOL/L (ref 136–145)
TRIGL SERPL-MCNC: 117 MG/DL (ref 0–150)
VANCOMYCIN TROUGH SERPL-MCNC: 4.6 MCG/ML (ref 5–20)
WBC # FLD AUTO: 282 /MM3
WBC NRBC COR # BLD: 33.29 10*3/MM3 (ref 3.4–10.8)

## 2020-05-08 PROCEDURE — 87206 SMEAR FLUORESCENT/ACID STAI: CPT | Performed by: INTERNAL MEDICINE

## 2020-05-08 PROCEDURE — 63710000001 INSULIN REGULAR HUMAN PER 5 UNITS: Performed by: INTERNAL MEDICINE

## 2020-05-08 PROCEDURE — 25010000002 VANCOMYCIN 10 G RECONSTITUTED SOLUTION

## 2020-05-08 PROCEDURE — 74018 RADEX ABDOMEN 1 VIEW: CPT

## 2020-05-08 PROCEDURE — 25010000002 MEROPENEM PER 100 MG: Performed by: INTERNAL MEDICINE

## 2020-05-08 PROCEDURE — 83735 ASSAY OF MAGNESIUM: CPT | Performed by: INTERNAL MEDICINE

## 2020-05-08 PROCEDURE — 25010000002 METOCLOPRAMIDE PER 10 MG: Performed by: INTERNAL MEDICINE

## 2020-05-08 PROCEDURE — 82945 GLUCOSE OTHER FLUID: CPT | Performed by: INTERNAL MEDICINE

## 2020-05-08 PROCEDURE — 80053 COMPREHEN METABOLIC PANEL: CPT

## 2020-05-08 PROCEDURE — 87075 CULTR BACTERIA EXCEPT BLOOD: CPT | Performed by: INTERNAL MEDICINE

## 2020-05-08 PROCEDURE — 97530 THERAPEUTIC ACTIVITIES: CPT

## 2020-05-08 PROCEDURE — 25010000002 VANCOMYCIN PER 500 MG

## 2020-05-08 PROCEDURE — 25010000002 POTASSIUM CHLORIDE PER 2 MEQ OF POTASSIUM

## 2020-05-08 PROCEDURE — 87070 CULTURE OTHR SPECIMN AEROBIC: CPT | Performed by: INTERNAL MEDICINE

## 2020-05-08 PROCEDURE — 25010000002 MAGNESIUM SULFATE PER 500 MG OF MAGNESIUM

## 2020-05-08 PROCEDURE — P9047 ALBUMIN (HUMAN), 25%, 50ML: HCPCS | Performed by: INTERNAL MEDICINE

## 2020-05-08 PROCEDURE — 87102 FUNGUS ISOLATION CULTURE: CPT | Performed by: INTERNAL MEDICINE

## 2020-05-08 PROCEDURE — 82042 OTHER SOURCE ALBUMIN QUAN EA: CPT | Performed by: INTERNAL MEDICINE

## 2020-05-08 PROCEDURE — 97116 GAIT TRAINING THERAPY: CPT

## 2020-05-08 PROCEDURE — 0DH67UZ INSERTION OF FEEDING DEVICE INTO STOMACH, VIA NATURAL OR ARTIFICIAL OPENING: ICD-10-PCS | Performed by: INTERNAL MEDICINE

## 2020-05-08 PROCEDURE — 87205 SMEAR GRAM STAIN: CPT | Performed by: INTERNAL MEDICINE

## 2020-05-08 PROCEDURE — 25010000002 ALBUMIN HUMAN 25% PER 50 ML: Performed by: INTERNAL MEDICINE

## 2020-05-08 PROCEDURE — 84157 ASSAY OF PROTEIN OTHER: CPT | Performed by: INTERNAL MEDICINE

## 2020-05-08 PROCEDURE — 3E0G76Z INTRODUCTION OF NUTRITIONAL SUBSTANCE INTO UPPER GI, VIA NATURAL OR ARTIFICIAL OPENING: ICD-10-PCS | Performed by: INTERNAL MEDICINE

## 2020-05-08 PROCEDURE — 84145 PROCALCITONIN (PCT): CPT | Performed by: INTERNAL MEDICINE

## 2020-05-08 PROCEDURE — 63710000001 INSULIN DETEMIR PER 5 UNITS: Performed by: INTERNAL MEDICINE

## 2020-05-08 PROCEDURE — 25010000002 MICAFUNGIN SODIUM 100 MG RECONSTITUTED SOLUTION: Performed by: INTERNAL MEDICINE

## 2020-05-08 PROCEDURE — 84100 ASSAY OF PHOSPHORUS: CPT | Performed by: INTERNAL MEDICINE

## 2020-05-08 PROCEDURE — 25010000002 ENOXAPARIN PER 10 MG: Performed by: INTERNAL MEDICINE

## 2020-05-08 PROCEDURE — 25010000002 VITAMIN K1 PER 1 MG: Performed by: INTERNAL MEDICINE

## 2020-05-08 PROCEDURE — 84478 ASSAY OF TRIGLYCERIDES: CPT

## 2020-05-08 PROCEDURE — 82962 GLUCOSE BLOOD TEST: CPT

## 2020-05-08 PROCEDURE — 82330 ASSAY OF CALCIUM: CPT

## 2020-05-08 PROCEDURE — 88112 CYTOPATH CELL ENHANCE TECH: CPT | Performed by: INTERNAL MEDICINE

## 2020-05-08 PROCEDURE — 0W9G3ZX DRAINAGE OF PERITONEAL CAVITY, PERCUTANEOUS APPROACH, DIAGNOSTIC: ICD-10-PCS | Performed by: RADIOLOGY

## 2020-05-08 PROCEDURE — 99232 SBSQ HOSP IP/OBS MODERATE 35: CPT | Performed by: INTERNAL MEDICINE

## 2020-05-08 PROCEDURE — 99291 CRITICAL CARE FIRST HOUR: CPT | Performed by: INTERNAL MEDICINE

## 2020-05-08 PROCEDURE — 83615 LACTATE (LD) (LDH) ENZYME: CPT | Performed by: INTERNAL MEDICINE

## 2020-05-08 PROCEDURE — 87116 MYCOBACTERIA CULTURE: CPT | Performed by: INTERNAL MEDICINE

## 2020-05-08 PROCEDURE — 85025 COMPLETE CBC W/AUTO DIFF WBC: CPT | Performed by: INTERNAL MEDICINE

## 2020-05-08 PROCEDURE — 76942 ECHO GUIDE FOR BIOPSY: CPT

## 2020-05-08 PROCEDURE — 71045 X-RAY EXAM CHEST 1 VIEW: CPT

## 2020-05-08 PROCEDURE — 25010000002 CALCIUM GLUCONATE PER 10 ML

## 2020-05-08 PROCEDURE — 85610 PROTHROMBIN TIME: CPT | Performed by: INTERNAL MEDICINE

## 2020-05-08 PROCEDURE — 80202 ASSAY OF VANCOMYCIN: CPT

## 2020-05-08 PROCEDURE — 89051 BODY FLUID CELL COUNT: CPT | Performed by: INTERNAL MEDICINE

## 2020-05-08 RX ORDER — PANTOPRAZOLE SODIUM 40 MG/1
40 TABLET, DELAYED RELEASE ORAL
Status: DISCONTINUED | OUTPATIENT
Start: 2020-05-09 | End: 2020-05-13 | Stop reason: HOSPADM

## 2020-05-08 RX ORDER — ALBUMIN (HUMAN) 12.5 G/50ML
50 SOLUTION INTRAVENOUS ONCE
Status: COMPLETED | OUTPATIENT
Start: 2020-05-08 | End: 2020-05-08

## 2020-05-08 RX ORDER — OXYMETAZOLINE HYDROCHLORIDE 0.05 G/100ML
2 SPRAY NASAL ONCE
Status: COMPLETED | OUTPATIENT
Start: 2020-05-08 | End: 2020-05-08

## 2020-05-08 RX ORDER — LIDOCAINE HYDROCHLORIDE 10 MG/ML
5 INJECTION, SOLUTION EPIDURAL; INFILTRATION; INTRACAUDAL; PERINEURAL ONCE
Status: COMPLETED | OUTPATIENT
Start: 2020-05-08 | End: 2020-05-08

## 2020-05-08 RX ORDER — VANCOMYCIN HYDROCHLORIDE 1 G/200ML
1000 INJECTION, SOLUTION INTRAVENOUS EVERY 8 HOURS
Status: DISCONTINUED | OUTPATIENT
Start: 2020-05-08 | End: 2020-05-09

## 2020-05-08 RX ADMIN — THIAMINE HCL TAB 100 MG 100 MG: 100 TAB at 08:39

## 2020-05-08 RX ADMIN — PHYTONADIONE 10 MG: 10 INJECTION, EMULSION INTRAMUSCULAR; INTRAVENOUS; SUBCUTANEOUS at 12:18

## 2020-05-08 RX ADMIN — VANCOMYCIN HYDROCHLORIDE 1500 MG: 10 INJECTION, POWDER, LYOPHILIZED, FOR SOLUTION INTRAVENOUS at 14:14

## 2020-05-08 RX ADMIN — SMOFLIPID 250 ML: 6; 6; 5; 3 INJECTION, EMULSION INTRAVENOUS at 18:14

## 2020-05-08 RX ADMIN — Medication 30 ML: at 08:47

## 2020-05-08 RX ADMIN — CALCIUM GLUCONATE: 94 INJECTION, SOLUTION INTRAVENOUS at 18:15

## 2020-05-08 RX ADMIN — PANTOPRAZOLE SODIUM 40 MG: 40 INJECTION, POWDER, FOR SOLUTION INTRAVENOUS at 05:44

## 2020-05-08 RX ADMIN — METOCLOPRAMIDE 5 MG: 5 INJECTION, SOLUTION INTRAMUSCULAR; INTRAVENOUS at 05:45

## 2020-05-08 RX ADMIN — INSULIN DETEMIR 15 UNITS: 100 INJECTION, SOLUTION SUBCUTANEOUS at 08:38

## 2020-05-08 RX ADMIN — VANCOMYCIN HYDROCHLORIDE 1250 MG: 10 INJECTION, POWDER, LYOPHILIZED, FOR SOLUTION INTRAVENOUS at 00:14

## 2020-05-08 RX ADMIN — MICAFUNGIN SODIUM 100 MG: 20 INJECTION, POWDER, LYOPHILIZED, FOR SOLUTION INTRAVENOUS at 20:11

## 2020-05-08 RX ADMIN — MULTIVITAMIN 15 ML: LIQUID ORAL at 08:39

## 2020-05-08 RX ADMIN — Medication 50 MG: at 08:39

## 2020-05-08 RX ADMIN — QUETIAPINE FUMARATE 100 MG: 100 TABLET ORAL at 20:12

## 2020-05-08 RX ADMIN — ENOXAPARIN SODIUM 40 MG: 40 INJECTION SUBCUTANEOUS at 08:39

## 2020-05-08 RX ADMIN — QUETIAPINE FUMARATE 50 MG: 100 TABLET ORAL at 08:39

## 2020-05-08 RX ADMIN — PROPRANOLOL HYDROCHLORIDE 10 MG: 20 TABLET ORAL at 14:13

## 2020-05-08 RX ADMIN — VANCOMYCIN HYDROCHLORIDE 1000 MG: 1 INJECTION, SOLUTION INTRAVENOUS at 22:13

## 2020-05-08 RX ADMIN — MEROPENEM 1 G: 1 INJECTION, POWDER, FOR SOLUTION INTRAVENOUS at 15:55

## 2020-05-08 RX ADMIN — METOCLOPRAMIDE 5 MG: 5 INJECTION, SOLUTION INTRAMUSCULAR; INTRAVENOUS at 12:18

## 2020-05-08 RX ADMIN — MEROPENEM 1 G: 1 INJECTION, POWDER, FOR SOLUTION INTRAVENOUS at 08:39

## 2020-05-08 RX ADMIN — LIDOCAINE HYDROCHLORIDE 3 ML: 10 INJECTION, SOLUTION EPIDURAL; INFILTRATION; INTRACAUDAL; PERINEURAL at 16:01

## 2020-05-08 RX ADMIN — INSULIN HUMAN 5 UNITS: 100 INJECTION, SOLUTION PARENTERAL at 05:44

## 2020-05-08 RX ADMIN — INSULIN HUMAN 5 UNITS: 100 INJECTION, SOLUTION PARENTERAL at 12:18

## 2020-05-08 RX ADMIN — Medication 2 SPRAY: at 11:07

## 2020-05-08 RX ADMIN — PROPRANOLOL HYDROCHLORIDE 10 MG: 20 TABLET ORAL at 05:44

## 2020-05-08 RX ADMIN — LACTULOSE 20 G: 20 SOLUTION ORAL at 08:39

## 2020-05-08 RX ADMIN — FOLIC ACID 1 MG: 1 TABLET ORAL at 08:39

## 2020-05-08 RX ADMIN — PROPRANOLOL HYDROCHLORIDE 10 MG: 20 TABLET ORAL at 22:13

## 2020-05-08 RX ADMIN — METOCLOPRAMIDE 5 MG: 5 INJECTION, SOLUTION INTRAMUSCULAR; INTRAVENOUS at 18:34

## 2020-05-08 RX ADMIN — LACTULOSE 20 G: 20 SOLUTION ORAL at 22:20

## 2020-05-08 RX ADMIN — Medication 30 ML: at 20:11

## 2020-05-08 RX ADMIN — MELATONIN TAB 5 MG 5 MG: 5 TAB at 20:11

## 2020-05-08 RX ADMIN — RIFAXIMIN 550 MG: 550 TABLET ORAL at 20:11

## 2020-05-08 RX ADMIN — RIFAXIMIN 550 MG: 550 TABLET ORAL at 08:39

## 2020-05-08 RX ADMIN — ALBUMIN HUMAN 50 G: 0.25 SOLUTION INTRAVENOUS at 15:55

## 2020-05-08 NOTE — NURSING NOTE
Pt was found kneeling on the floor beside his bed. No injuries were noted. Full head to toe assessment was completed after incident.

## 2020-05-08 NOTE — THERAPY TREATMENT NOTE
Patient Name: John Varma  : 1995    MRN: [id number]                              Today's Date: 2020       Admit Date: 2020    Visit Dx:     ICD-10-CM ICD-9-CM   1. Sepsis with acute liver failure without hepatic coma or septic shock, due to unspecified organism (CMS/HCC) A41.9 038.9    R65.20 995.92    K72.00 570   2. Hypoxia R09.02 799.02   3. Acute renal failure, unspecified acute renal failure type (CMS/HCC) N17.9 584.9   4. Acute hepatitis B17.9 570   5. Alcohol abuse F10.10 305.00   6. Acute upper GI bleed K92.2 578.9   7. Elevated lactic acid level R79.89 276.2   8. Ascites due to alcoholic hepatitis K70.11 789.59   9. Leukocytosis, unspecified type D72.829 288.60   10. Sepsis with acute renal failure without septic shock, due to unspecified organism, unspecified acute renal failure type (CMS/HCC) A41.9 038.9    R65.20 995.92    N17.9 584.9   11. Oropharyngeal dysphagia R13.12 787.22     Patient Active Problem List   Diagnosis   • Acute alcoholic hepatitis   • Acute variceal GI bleeding   • BORIS (acute kidney injury) (CMS/HCC)   • Bandemia   • Acute upper GI bleeding   • Sepsis with acute renal failure without septic shock (CMS/HCC)   • Acute respiratory failure with hypoxia (CMS/HCC)   • Alcohol withdrawal delirium (CMS/HCC)   • Hepatic encephalopathy (CMS/HCC)   • Ascites due to alcoholic hepatitis   • Pleural effusion on left   • S/P TIPS (transjugular intrahepatic portosystemic shunt) 2020     Past Medical History:   Diagnosis Date   • Alcohol abuse    • GERD (gastroesophageal reflux disease)    • GIB (gastrointestinal bleeding)    • Tobacco abuse      Past Surgical History:   Procedure Laterality Date   • ARM WOUND REPAIR / CLOSURE     • ENDOSCOPY N/A 2020    Procedure: ESOPHAGOGASTRODUODENOSCOPY;  Surgeon: Brunner, Mark I, MD;  Location: [facility] ENDOSCOPY;  Service: Gastroenterology;  Laterality: N/A;     General Information     Row Name 20 1606          PT Evaluation  Time/Intention    Document Type  therapy note (daily note)  -     Mode of Treatment  physical therapy  -     Row Name 05/08/20 1606          General Information    Existing Precautions/Restrictions  fall;other (see comments);oxygen therapy device and L/min NG  -     Row Name 05/08/20 1606          Cognitive Assessment/Intervention- PT/OT    Orientation Status (Cognition)  oriented to;person;place  -       User Key  (r) = Recorded By, (t) = Taken By, (c) = Cosigned By    Initials Name Provider Type     Svaanah Matthews, PT Physical Therapist        Mobility     Row Name 05/08/20 1607          Bed Mobility Assessment/Treatment    Supine-Sit [place] (Bed Mobility)  moderate assist (50% patient effort);2 person assist;verbal cues  -     Assistive Device (Bed Mobility)  bed rails;draw sheet;head of bed elevated  -     Row Name 05/08/20 1607          Sit-Stand Transfer    Sit-Stand [place] (Transfers)  minimum assist (75% patient effort);2 person assist;verbal cues  -     Assistive Device (Sit-Stand Transfers)  walker, front-wheeled  -     Row Name 05/08/20 1607          Gait/Stairs Assessment/Training    Gait/Stairs Assessment/Training  gait/ambulation independence  -     [place] Level (Gait)  minimum assist (75% patient effort);2 person assist;verbal cues  -LS     Assistive Device (Gait)  walker, front-wheeled  -     Distance in Feet (Gait)  30  -LS     Deviations/Abnormal Patterns (Gait)  bilateral deviations;sherwin decreased;stride length decreased  -LS     Bilateral Gait Deviations  heel strike decreased;forward flexed posture;weight shift ability decreased  -LS     Comment (Gait/Stairs)  VC for upright posture and increasing step length/ heel strike bilat; able to ambulate from EOB, around foot of bed, to opposite EOB. Noted ddecreased L step length with fatigue.  -LS       User Key  (r) = Recorded By, (t) = Taken By, (c) = Cosigned By    Initials Name Provider Type    JAYLA Matthews  Savanah LINTON, PT Physical Therapist        Obj/Interventions     Row Name 05/08/20 1608          Therapeutic Exercise    Lower Extremity (Therapeutic Exercise)  LAQ (long arc quad), bilateral;marching while seated  -     Exercise Type (Therapeutic Exercise)  AAROM (active assistive range of motion)  -LS     Position (Therapeutic Exercise)  seated  -LS     Sets/Reps (Therapeutic Exercise)  1/5  -     Row Name 05/08/20 1608          Static Sitting Balance    Level of Isabella (Unsupported Sitting, Static Balance)  minimal assist, 75% patient effort  -LS     Sitting Position (Unsupported Sitting, Static Balance)  sitting on edge of bed  -     Row Name 05/08/20 1608          Static Standing Balance    Level of Isabella (Supported Standing, Static Balance)  minimal assist, 75% patient effort;2 person assist  -     Assistive Device Utilized (Supported Standing, Static Balance)  walker, rolling  -     Comment (Supported Standing, Static Balance)  extended standing EOB prior to gait for hygiene and changing of linens  -       User Key  (r) = Recorded By, (t) = Taken By, (c) = Cosigned By    Initials Name Provider Type     Savanah Matthews, PT Physical Therapist        Goals/Plan    No documentation.       Clinical Impression     Row Name 05/08/20 1609          Pain Scale: Numbers Pre/Post-Treatment    Pain Scale: Numbers, Pretreatment  0/10 - no pain  -     Pain Scale: Numbers, Post-Treatment  0/10 - no pain  -     Row Name 05/08/20 1609          Plan of Care Review    Plan of Care Reviewed With  patient  -     Progress  improving  -     Outcome Summary  Pt demonstrated ability to progress to 30 ft gait with min x2, RW. Required vc's for upright posture and increasing step length/heel strike; motivated to participate. Will cont PT POC.   -     Row Name 05/08/20 1609          Vital Signs    Pre Systolic BP Rehab  126  -LS     Pre Treatment Diastolic BP  88  -LS     Pretreatment Heart Rate (beats/min)   113  -LS     Pre SpO2 (%)  93  -LS     O2 Delivery Pre Treatment  room air  -LS     O2 Delivery Intra Treatment  room air  -LS     O2 Delivery Post Treatment  room air  -LS     Pre Patient Position  Supine  -LS     Intra Patient Position  Standing  -LS     Post Patient Position  Supine  -LS     Row Name 05/08/20 1609          Positioning and Restraints    Pre-Treatment Position  in bed  -LS     Post Treatment Position  bed  -LS     In Bed  notified nsg;supine;encouraged to call for assist;call light within reach;with nsg;with other staff  -LS     Restraints  released:;reapplied:;notified nsg:;kori RN donning restraints as PT leaving  -LS       User Key  (r) = Recorded By, (t) = Taken By, (c) = Cosigned By    Initials Name Provider Type    Savanah Neri, PT Physical Therapist        Outcome Measures     Row Name 05/08/20 1612          How much help from another person do you currently need...    Turning from your back to your side while in flat bed without using bedrails?  3  -LS     Moving from lying on back to sitting on the side of a flat bed without bedrails?  2  -LS     Moving to and from a bed to a chair (including a wheelchair)?  3  -LS     Standing up from a chair using your arms (e.g., wheelchair, bedside chair)?  3  -LS     Climbing 3-5 steps with a railing?  1  -LS     To walk in hospital room?  3  -LS     AM-PAC 6 Clicks Score (PT)  15  -LS       User Key  (r) = Recorded By, (t) = Taken By, (c) = Cosigned By    Initials Name Provider Type    Savnaah Neri, PT Physical Therapist        Physical Therapy Education                 Title: PT OT SLP Therapies (In Progress)     Topic: Physical Therapy (Done)     Point: Mobility training (Done)     Description:   Instruct learner(s) on safety and technique for assisting patient out of bed, chair or wheelchair.  Instruct in the proper use of assistive devices, such as walker, crutches, cane or brace.              Patient Friendly Description:   It's  important to get you on your feet again, but we need to do so in a way that is safe for you. Falling has serious consequences, and your personal safety is the most important thing of all.        When it's time to get out of bed, one of us or a family member will sit next to you on the bed to give you support.     If your doctor or nurse tells you to use a walker, crutches, a cane, or a brace, be sure you use it every time you get out of bed, even if you think you don't need it.    Learning Progress Summary           Patient Acceptance, E,D, VU,NR by LS at 5/8/2020 1612    Nonacceptance, E, NR,NL by SINDY at 5/7/2020 1508    Acceptance, E,D, VU,NR by MP at 5/7/2020 1120    Acceptance, E,D, VU,NR by LS at 5/6/2020 1552    Acceptance, E, NR by KR at 5/5/2020 1426    Acceptance, E,D, NR by LS at 5/4/2020 1605    Acceptance, E,D, NR by LS at 5/2/2020 1507    Acceptance, E,D, NR by LS at 4/29/2020 1009                   Point: Home exercise program (Done)     Description:   Instruct learner(s) on appropriate technique for monitoring, assisting and/or progressing patient with therapeutic exercises and activities.              Learning Progress Summary           Patient Acceptance, E,D, VU,NR by LS at 5/8/2020 1612    Nonacceptance, E, NR,NL by SINDY at 5/7/2020 1508    Acceptance, E,D, VU,NR by LS at 5/6/2020 1552    Acceptance, E, NR by KR at 5/5/2020 1426    Acceptance, E,D, NR by LS at 5/4/2020 1605    Acceptance, E,D, NR by LS at 5/2/2020 1507    Acceptance, E,D, NR by LS at 4/29/2020 1009                   Point: Body mechanics (Done)     Description:   Instruct learner(s) on proper positioning and spine alignment for patient and/or caregiver during mobility tasks and/or exercises.              Learning Progress Summary           Patient Acceptance, E,D, VU,NR by LS at 5/8/2020 1612    Nonacceptance, E, NR,NL by SINDY at 5/7/2020 1508    Acceptance, E,D, VU,NR by MP at 5/7/2020 1120    Acceptance, E,D, VU,NR by JAYLA at 5/6/2020  1552    Acceptance, E, NR by KR at 5/5/2020 1426    Acceptance, E,D, NR by LS at 5/4/2020 1605    Acceptance, E,D, NR by LS at 5/2/2020 1507    Acceptance, E,D, NR by LS at 4/29/2020 1009                   Point: Precautions (Done)     Description:   Instruct learner(s) on prescribed precautions during mobility and gait tasks              Learning Progress Summary           Patient Acceptance, E,D, VU,NR by LS at 5/8/2020 1612    Nonacceptance, E, NR,NL by SINDY at 5/7/2020 1508    Acceptance, E,D, VU,NR by MP at 5/7/2020 1120    Acceptance, E,D, VU,NR by LS at 5/6/2020 1552    Acceptance, E, NR by KR at 5/5/2020 1426    Acceptance, E,D, NR by LS at 5/4/2020 1605    Acceptance, E,D, NR by LS at 5/2/2020 1507    Acceptance, E,D, NR by LS at 4/29/2020 1009                               User Key     Initials Effective Dates Name Provider Type Discipline     06/19/15 -  Savanah Matthews, PT Physical Therapist PT    KR 04/03/18 -  Nataliya Patel, PT Physical Therapist PT    SINDY 11/19/18 -  Piedad Lao, RN Registered Nurse Nurse     11/06/19 -  Michael Allen, PT Physical Therapist PT              PT Recommendation and Plan  Planned Therapy Interventions (PT Eval): balance training, bed mobility training, gait training, home exercise program, patient/family education, strengthening, transfer training, stair training  Outcome Summary/Treatment Plan (PT)  Anticipated Discharge Disposition (PT): inpatient rehabilitation facility  Plan of Care Reviewed With: patient  Progress: improving  Outcome Summary: Pt demonstrated ability to progress to 30 ft gait with min x2, RW. Required vc's for upright posture and increasing step length/heel strike; motivated to participate. Will cont PT POC.      Time Calculation:   PT Charges     Row Name 05/08/20 1613             Time Calculation    Start Time  1433  -      PT Received On  05/08/20  -         Time Calculation- PT    Total Timed Code Minutes- PT  23 minute(s)  -          Timed Charges    63890 - PT Therapeutic Exercise Minutes  2  -LS      96072 - Gait Training Minutes   13  -LS      07618 - PT Therapeutic Activity Minutes  8  -LS        User Key  (r) = Recorded By, (t) = Taken By, (c) = Cosigned By    Initials Name Provider Type    Savanah Neri, PT Physical Therapist        Therapy Charges for Today     Code Description Service Date Service Provider Modifiers Qty    58628019229 HC GAIT TRAINING EA 15 MIN 5/8/2020 Savanah Matthews, PT GP 1    [id number]  PT THERAPEUTIC ACT EA 15 MIN 5/8/2020 Savanah Matthews, PT GP 1    [id number]  PT THER SUPP EA 15 MIN 5/8/2020 Savanah Matthews, PT GP 2          PT G-Codes  Outcome Measure Options: AM-PAC 6 Clicks Daily Activity (OT)  AM-PAC 6 Clicks Score (PT): 15  AM-PAC 6 Clicks Score (OT): 11    Savanah Matthews, PT  5/8/2020

## 2020-05-08 NOTE — PROGRESS NOTES
John Varma  1995  [id number]      Requesting Provider: Dory Wells MD  Evaluating Physician: SMOOTH Bro    Chief Complaint: GI bleed    History of present illness:   5/7: Patient is a 24 y.o.  Yr old male with history of alcoholism and alcohol cirrhosis who was admitted 4/23 with ETOH hepatitis and decompensated cirrhosis, BORIS, hepatic encephalopathy, and hematemesis secondary to esophageal variceal bleed s/p EGD on 4/24. He was intubated for the EGD and then subsequently extubated on 4/27. He then underwent a TIPS procedure on 4/29 and was intubated for that until subsequent extubation on 5/1/20. He has additionally undergone paracentesis x2 with 5 liters removed during first paracentesis and 2.5L removed on second paracentesis.  He has had a leukocytosis since arrival, initially up to 41.55. Peritoneal fluid with 46 WBC on 4/24 with 6% neutrophils. BF culture was no growth at 5 days. Subsequent paracentesis on 4/30 with 1,0018 WBC with 77% neutrophils but I do not see a culture was sent from this fluid sample. multiple blood culture sets have been negative. He was initially on Aztreonam for SBP (has PCN allergy) but was more recently on levaquin. Given recent worsening of his leukocytosis again to 45.8, the patient was switched to meropenem today. Cr has improved. LFTs still somewhat elevated. CT Chest/A/P today showed mod-large bilateral pleural effusions with complete left lower lobe atelectasis and moderate right lower lobe atelectasis and moderate ascites. Sputum culture done with few GPCs in pairs and clusters. C diff screen was negative. COVID-19 PCR was negative on 5/6. Blood cultures were repeat today and are pending. Of noted, MRSA PCR nares from 4/25 was positive.The patient has experienced new fevers up to 101.5F since 5/3.  ID consulted for antibiotic recs.     5/8: Patient sitting up in bed awake upon arrival.  Patient answering some questions with some confusion  noted.  Patient said T-max of 100.4 and has been tachycardic overnight.  Labs show patient remains with significant leukocytosis at 33.2 with a neutrophilia of 81.2%.  Procalcitonin is elevated 4.61.  BUN/creatinine is 25 0.4.  5/8 chest x-ray shows improving perihilar interstitial disease.  Cultures remain no growth to date.  Patient was placed and mitt restraints as he continued to try to pull out Flores catheter in [place] overnight.  He was subsequently moved from room 222 to room 234 to be closer to the nurses station.    Past Medical History:   Diagnosis Date   • Alcohol abuse    • GERD (gastroesophageal reflux disease)    • GIB (gastrointestinal bleeding)    • Tobacco abuse        Past Surgical History:   Procedure Laterality Date   • ARM WOUND REPAIR / CLOSURE     • ENDOSCOPY N/A 4/24/2020    Procedure: ESOPHAGOGASTRODUODENOSCOPY;  Surgeon: Brunner, Mark I, MD;  Location: [facility] ENDOSCOPY;  Service: Gastroenterology;  Laterality: N/A;       Pediatric History   Patient Guardian Status   • Father:  ROBERTA GUEVARA     Other Topics Concern   • Not on file   Social History Narrative   • Not on file       family history is not on file.    Allergies   Allergen Reactions   • Penicillins Hives       Medication:  Current Facility-Administered Medications   Medication Dose Route Frequency Provider Last Rate Last Dose   • ! vanc trough due Friday 5/8 @ 1130   Does not apply Once Miesha Santiago, PharmD       • acetaminophen (TYLENOL) tablet 650 mg  650 mg Oral Q8H PRN Jacky Riojas MD   650 mg at 05/06/20 2106   • Adult Central 2-in-1 TPN   Intravenous Continuous Miesha Santiago, PharmD       • Adult Cyclic 2-in-1 TPN   Intravenous Cyclic TPN - See Admin Instructions Miesha Santiago, PharmD 75 mL/hr at 05/08/20 0851     • albuterol (PROVENTIL) nebulizer solution 0.083% 2.5 mg/3mL  2.5 mg Nebulization Q4H PRN Amarilis Quevedo, SMOOTH       • bisacodyl (DULCOLAX) suppository 10 mg  10 mg Rectal Daily PRN Brunner,  Kaz SANTO MD   10 mg at 04/27/20 1839   • enoxaparin (LOVENOX) syringe 40 mg  40 mg Subcutaneous Q24H Valerio Emmanuel MD   40 mg at 05/08/20 0839   • Fat Emul Fish Oil/Plant Based (SMOFLIPID) 20 % emulsion 250 mL  250 mL Intravenous Q24H (TPN) Miesha Santiago, PharmPAULETTE 20.8 mL/hr at 05/07/20 1728 250 mL at 05/07/20 1728   • folic acid (FOLVITE) tablet 1 mg  1 mg Oral Daily Valerio Emmanuel MD   1 mg at 05/08/20 0839   • insulin detemir (LEVEMIR) injection 15 Units  15 Units Subcutaneous Daily Valerio Emmanuel MD   15 Units at 05/08/20 0838   • insulin regular (humuLIN R,novoLIN R) injection 0-7 Units  0-7 Units Subcutaneous Q6H Pavel Tan,    Stopped at 05/06/20 1140   • insulin regular (humuLIN R,novoLIN R) injection 5 Units  5 Units Subcutaneous Q6H Jacky Riojas MD   5 Units at 05/08/20 0544   • lactulose (CHRONULAC) 10 GM/15ML solution 20 g  20 g Nasogastric BID Valerio Emmanuel MD   20 g at 05/08/20 0839   • LORazepam (ATIVAN) injection 1 mg  1 mg Intravenous Q1H PRN Valerio Emmanuel MD   1 mg at 05/07/20 0025   • Magnesium Sulfate 2 gram Bolus, followed by 8 gram infusion (total Mg dose 10 grams)- Mg less than or equal to 1mg/dL  2 g Intravenous PRN Brunner, Mark I, MD        Or   • Magnesium Sulfate 2 gram / 50mL Infusion (GIVE X 3 BAGS TO EQUAL 6GM TOTAL DOSE) - Mg 1.1 - 1.5 mg/dl  2 g Intravenous PRN Brunner, Mark I, MD        Or   • Magnesium Sulfate 4 gram infusion- Mg 1.6-1.9 mg/dL  4 g Intravenous PRN Brunner, Mark I, MD 25 mL/hr at 05/05/20 0639 4 g at 05/05/20 0639   • melatonin tablet 5 mg  5 mg Oral Nightly Valerio Emmanuel MD   5 mg at 05/07/20 2042   • meropenem (MERREM) 1 g/50 mL 0.9% NS IVPB (mbp)  1 g Intravenous Q8H Valerio Emmanuel MD   1 g at 05/08/20 0839   • metoclopramide (REGLAN) injection 5 mg  5 mg Intravenous Q6H Kaz Arellano MD   5 mg at 05/08/20 0545   • micafungin 100  mg/100 mL 0.9% NS IVPB (mbp)  100 mg Intravenous Q24H Hipolito Barkley MD   100 mg at 05/07/20 2101   • multivitamin and minerals liquid 15 mL  15 mL Oral Daily Valerio Emmanuel MD   15 mL at 05/08/20 0839   • ondansetron (ZOFRAN) injection 4 mg  4 mg Intravenous Q6H PRN Brunner, Mark I, MD   4 mg at 05/07/20 0025   • [START ON 5/9/2020] pantoprazole (PROTONIX) EC tablet 40 mg  40 mg Oral Q AM Brunner, Mark I, MD       • Pharmacy to Dose TPN   Does not apply Continuous PRN Valerio Emmanuel MD       • Pharmacy to dose vancomycin   Does not apply Continuous PRN Valerio Emmanuel MD       • phytonadione (AQUA-MEPHYTON, VITAMIN K) 10 mg in sodium chloride 0.9 % 50 mL IVPB  10 mg Intravenous Once Brunner, Mark I, MD       • potassium chloride 20 mEq in 50 mL IVPB  20 mEq Intravenous Q1H PRN Miesha Santiago, PharmD       • PRO-STAT oral liquid 30 mL  30 mL Nasogastric BID Elissa Garay, MS,RD,LD   30 mL at 05/08/20 0847   • propranolol (INDERAL) tablet 10 mg  10 mg Oral Q8H Puma Willard APRN   10 mg at 05/08/20 0544   • QUEtiapine (SEROquel) tablet 100 mg  100 mg Oral Nightly Valerio Emmanuel MD   100 mg at 05/07/20 2042   • QUEtiapine (SEROquel) tablet 50 mg  50 mg Oral Daily Valerio Emmanuel MD   50 mg at 05/08/20 0839   • riFAXIMin (XIFAXAN) tablet 550 mg  550 mg Nasogastric Q12H Moises Estrella APRN   550 mg at 05/08/20 0839   • sodium chloride 0.9 % flush 10 mL  10 mL Intravenous PRN Brunner, Mark I, MD   10 mL at 05/03/20 0807   • sodium chloride 0.9 % flush 10 mL  10 mL Intravenous PRN Pavel Tan, DO       • sodium chloride 0.9 % flush 20 mL  20 mL Intravenous PRN Pavel Tan, DO       • thiamine (VITAMIN B-1) tablet 100 mg  100 mg Oral Daily Valerio Emmanuel MD   100 mg at 05/08/20 0839   • vancomycin 1250 mg/250 mL 0.9% NS IVPB ([facility])  1,250 mg Intravenous Q12H Miesha Santiago, PharmD   1,250  mg at 05/08/20 0014   • zinc gluconate tablet 50 mg  50 mg Oral Daily Valerio Emmanuel MD   50 mg at 05/08/20 0839         Infectious History:  C.difficile (rule out), MRSA    Antibiotics:  Anti-Infectives (From admission, onward)    Ordered     Dose/Rate Route Frequency Start Stop    05/07/20 1121  vancomycin 1250 mg/250 mL 0.9% NS IVPB ([facility])     Miesha Santiago, PharmD reviewed the order on 05/08/20 0703.   Ordering Provider:  Miesha Santiago, PharmD    1,250 mg  over 90 Minutes Intravenous Every 12 Hours 05/08/20 0000 05/13/20 2359    05/07/20 1845  micafungin 100 mg/100 mL 0.9% NS IVPB (mbp)     Miesha Santiago, PharmD reviewed the order on 05/08/20 0703.   Ordering Provider:  Hipolito Barlkey MD    100 mg  over 60 Minutes Intravenous Every 24 Hours 05/07/20 2000 05/14/20 1959    05/07/20 0944  meropenem (MERREM) 1 g/50 mL 0.9% NS IVPB (mbp)     Hipolito Barkley MD reviewed the order on 05/07/20 1845.   Ordering Provider:  Valerio Emmanuel MD    1 g  over 3 Hours Intravenous Every 8 Hours 05/07/20 1630 05/14/20 1629    05/07/20 0944  meropenem (MERREM) 1 g/50 mL 0.9% NS IVPB (mbp)     Ordering Provider:  Valerio Emmanuel MD    1 g  over 30 Minutes Intravenous Once 05/07/20 1030 05/07/20 1126    05/07/20 0944  vancomycin 1500 mg/500 mL 0.9% NS IVPB ([facility])     Ordering Provider:  Valerio Emmanuel MD    1,500 mg  over 90 Minutes Intravenous Once 05/07/20 1030 05/07/20 1227    05/07/20 0944  Pharmacy to dose vancomycin     Miesha Santiago, PharmD reviewed the order on 05/07/20 1121.   Ordering Provider:  Valerio Emmanuel MD     Does not apply Continuous PRN 05/07/20 0942 05/14/20 0941    05/02/20 1316  levoFLOXacin (LEVAQUIN) 500 mg/100 mL D5W (premix) (LEVAQUIN) 500 mg     Miesha Satniago, PharmD reviewed the order on 05/06/20 0903.   Ordering Provider:  Kaz Arellano MD    500 mg Intravenous Every 24 Hours Scheduled 05/02/20 1430  "05/06/20 1300    04/28/20 1501  levoFLOXacin (LEVAQUIN) 500 mg/100 mL D5W (premix) (LEVAQUIN) 500 mg     Note to Pharmacy:  For on call to Radiology for TIPS, estimated 10am   Ordering Provider:  Cullen Walker MD    500 mg Intravenous Once When Specified 04/29/20 0900 04/29/20 1118    04/28/20 1906  aztreonam (AZACTAM) 1 g in 50 mL NS IVPB     Ordering Provider:  Len Baires [facility]    1 g  over 30 Minutes Intravenous Every 8 Hours 04/29/20 0000 04/30/20 1615    04/24/20 1411  riFAXIMin (XIFAXAN) tablet 550 mg     Kya Christianson [facility] reviewed the order on 04/25/20 0716.   Ordering Provider:  Moises Estrella APRN    550 mg Nasogastric Every 12 Hours Scheduled 04/24/20 2100      04/24/20 0630  vancomycin 500 mg/100 mL 0.9% NS IVPB (mbp)     Ordering Provider:  Ashwin Siegel RPH    9 mg/kg × 65.8 kg Intravenous Once 04/24/20 0730 04/24/20 1013    04/23/20 2314  vancomycin 1000 mg/250 mL 0.9% NS IVPB ([facility])     Ordering Provider:  Dory Wells MD    15 mg/kg × 65.8 kg  over 60 Minutes Intravenous Once 04/23/20 2316 04/24/20 0223    04/23/20 2314  aztreonam (AZACTAM) 2 g/100 mL 0.9% NS (mbp)     Ordering Provider:  Dory Wells MD    2 g  over 30 Minutes Intravenous Once 04/23/20 2316 04/24/20 0100            Review of Systems    Unable to obtain a reliable ROS from the patient given his encephalopathy    Physical Exam:   Vital Signs   /85   Pulse 118   Temp 100.4 °F (38 °C) (Axillary)   Resp 28   Ht 172.7 cm (68\")   Wt 77.2 kg (170 lb 3.1 oz)   SpO2 95%   BMI 25.88 kg/m²     GENERAL: Awake and alert, in no acute distress. Ill appearing.  HEENT: Normocephalic, atraumatic. Oropharynx clear without evidence of thrush or exudate.  Feeding tube in place  Eyes: +scleral icterus. No conjunctival hemorrhages.   NECK: Supple without nuchal rigidity. No mass.  HEART: RRR; No murmurs. anasarca  LUNGS: decreased breath sounds over lung bases but otherwise CTA bilaterally. Normal " respiratory effort. Non-labored breathing  ABDOMEN: distended abdomen. Hypoactive bowel sounds. Mild tenderness to palpation over right side.  EXT:  No cyanosis. 3+ pitting edema over BLE  : Genitalia generally unremarkable aside from some mild scrotal edema.   MSK: FROM without joint effusions noted arms/legs.  Bilateral mitt and wrist restraints in place.  SKIN: Jaundiced. No rash noted. No peripheral stigmata of infective endocarditis noted  NEURO: awake. Oriented to person but otherwise not oriented. Only answering a few very simple questions appropriately but not always appropriate.  PSYCHIATRIC: poor insight. Having visual hallucinations and thinks that he sees his father in the room  LUE PICC line site is without erythema or drainage     Laboratory Data    Results from last 7 days   Lab Units 05/08/20  0429 05/07/20  0520 05/06/20  0541   WBC 10*3/mm3 33.29* 45.78* 28.56*   HEMOGLOBIN g/dL 9.3* 10.5* 8.7*   HEMATOCRIT % 29.5* 34.0* 27.1*   PLATELETS 10*3/mm3 308 316 238     Results from last 7 days   Lab Units 05/08/20  0429   SODIUM mmol/L 141   POTASSIUM mmol/L 3.9   CHLORIDE mmol/L 108*   CO2 mmol/L 21.0*   BUN mg/dL 25*   CREATININE mg/dL 0.40*   GLUCOSE mg/dL 145*   CALCIUM mg/dL 7.8*     Results from last 7 days   Lab Units 05/08/20  0429  05/04/20  0414   ALK PHOS U/L 107   < > 199*   BILIRUBIN mg/dL 5.9*   < > 5.8*   BILIRUBIN DIRECT mg/dL  --   --  3.3*   ALT (SGPT) U/L 60*   < > 89*   AST (SGOT) U/L 78*   < > 93*    < > = values in this interval not displayed.         Results from last 7 days   Lab Units 05/07/20  1202   CRP mg/dL 4.88*       Estimated Creatinine Clearance: 308.3 mL/min (A) (by C-G formula based on SCr of 0.4 mg/dL (L)).       Microbiology:    Blood Culture   Date Value Ref Range Status   05/03/2020 No growth at 4 days  Preliminary   05/03/2020 No growth at 4 days  Preliminary     COVID-19 PCR: negative     4/24 peritoneal fluid cx: NG    5/8 peritoneal fluid cx: in  process    Radiology:  Ct Abdomen Pelvis Without Contrast    Result Date: 5/7/2020  Symmetric, moderate or moderate-to-large bilateral pleural effusions, with complete left lower lobe atelectasis, and moderate right lower lobe atelectasis. Minimal pulmonary interstitial disease elsewhere.    CT SCAN OF THE ABDOMEN AND PELVIS WITHOUT CONTRAST: Note is made of patient's tips shunt. Liver parenchyma appears uniform on today's unenhanced exam. There is vicarious excretion of contrast into the gallbladder. Spleen is not enlarged. No significant abnormalities are seen of the pancreas, adrenal glands, or kidneys. There is mild ascites, stable in extent from the prior exam. NG tube is seen in the distal stomach. No free or discrete inflammatory focus is seen. Coronal images show a few shotty left mid abdominal small bowel mesenteric lymph nodes, but these are markedly improved from the prior study. Bowel loops are normal in caliber.  Regarding the lower abdomen and pelvis, there is mild to moderate ascites. No discrete inflammatory focus is appreciated. No abnormally dilated bowel loops or grossly abnormal appearing bowel loops are seen. Bony structures appear intact.  IMPRESSION:  1. Moderately extensive ascites, but not increased from prior study. 2. Interval tips shunt placement. 3. Near resolution of small bowel mesenteric lymphadenopathy since prior scan. No evidence of new intra-abdominal or intrapelvic pathology is identified elsewhere.  D:  05/07/2020 E:  05/07/2020  This report was finalized on 5/7/2020 10:28 PM by Dr. Moi Rich MD.      Ct Chest Without Contrast    Result Date: 5/7/2020  Symmetric, moderate or moderate-to-large bilateral pleural effusions, with complete left lower lobe atelectasis, and moderate right lower lobe atelectasis. Minimal pulmonary interstitial disease elsewhere.    CT SCAN OF THE ABDOMEN AND PELVIS WITHOUT CONTRAST: Note is made of patient's tips shunt. Liver parenchyma appears uniform  on today's unenhanced exam. There is vicarious excretion of contrast into the gallbladder. Spleen is not enlarged. No significant abnormalities are seen of the pancreas, adrenal glands, or kidneys. There is mild ascites, stable in extent from the prior exam. NG tube is seen in the distal stomach. No free or discrete inflammatory focus is seen. Coronal images show a few shotty left mid abdominal small bowel mesenteric lymph nodes, but these are markedly improved from the prior study. Bowel loops are normal in caliber.  Regarding the lower abdomen and pelvis, there is mild to moderate ascites. No discrete inflammatory focus is appreciated. No abnormally dilated bowel loops or grossly abnormal appearing bowel loops are seen. Bony structures appear intact.  IMPRESSION:  1. Moderately extensive ascites, but not increased from prior study. 2. Interval tips shunt placement. 3. Near resolution of small bowel mesenteric lymphadenopathy since prior scan. No evidence of new intra-abdominal or intrapelvic pathology is identified elsewhere.  D:  05/07/2020 E:  05/07/2020  This report was finalized on 5/7/2020 10:28 PM by Dr. Moi Rich MD.      Ct Abdomen With & Without Contrast    Result Date: 5/5/2020  1. Diffusely abnormal appearance of the hepatic parenchyma status post TIPS likely perfusional changes along with low signal surrounding the portal veins consistent with periportal edema. 2. 3.2 cm focus in segment IVB has at least partial imaging characteristics of involvement for concern for HCC although recommend followup given limited evaluation of perfusional changes and therefore attenuation changes on the current exam. 3. Perihepatic and perisplenic ascites with top normal size for spleen at 13 cm. 4. Small-to-moderate bilateral pleural effusions.  D:  05/05/2020 E:  05/05/2020    This report was finalized on 5/5/2020 2:27 PM by Dr. Luis Eduardo Mosley.      Fl Video Swallow With Speech Single Contrast    Result Date:  5/7/2020  Fluoroscopy provided for a modified barium swallow. Please see speech therapy report for full details and recommendations.    This report was finalized on 5/7/2020 5:13 PM by Dr. Luis Eduardo Mosley.      Xr Chest 1 View    Result Date: 5/8/2020  Improving perihilar interstitial disease, perhaps resolving edema, decreasing left basilar atelectasis and minimal remaining effusions. No new chest pathology is seen.   D:  05/08/2020 E:  05/08/2020      Xr Chest 1 View    Result Date: 5/7/2020  1. Increased perihilar disease that may represent interstitial edema or ARDS. 2. Left lower lung atelectasis, probably unchanged.  D:  05/07/2020 E:  05/07/2020    This report was finalized on 5/7/2020 10:14 PM by Dr. Moi Rich MD.      Xr Chest 1 View    Result Date: 5/6/2020  Bilateral pleural effusions with patchy airspace disease seen within the right mid and left lower lung field. Lines and tubes in satisfactory position.  D:  05/06/2020 E:  05/06/2020  This report was finalized on 5/6/2020 4:02 PM by Dr. Carmen Morales MD.      Xr Chest 1 View    Result Date: 5/5/2020  Support hardware unchanged and in grossly satisfactory positioning. Cardiac silhouette remains enlarged with increased central pulmonary vascularity and trace bilateral pleural effusions, left greater than right, with improved aeration at the lung bases from likely decreased atelectasis versus decreased effusions or layering component. No new consolidation or pulmonary parenchymal involvement.     D:  05/05/2020 E:  05/05/2020  This report was finalized on 5/5/2020 2:28 PM by Dr. Luis Eduardo Mosley.      Xr Chest 1 View    Result Date: 5/3/2020  1. Interval extubation. 2. Persistent bilateral infiltrates with slight worsening of bilateral pleural effusions. 3. Stable cardiac enlargement. Signer Name: Christian Villa MD  Signed: 5/3/2020 2:30 AM  Workstation Name: [email or web address]  [facility]    Ct Guided Paracentesis    Result Date:  5/4/2020   CT-guided paracentesis (aborted)  Plan:  Consider reattempt diagnostic paracentesis once there is increased amount of fluid _______________________________________________________________  PROCEDURE SUMMARY: - Limited CT - CT-guided paracentesis - Additional procedure(s): None  PROCEDURE DETAILS:  Pre-procedure Consent: Informed consent for the procedure including risks, benefits and alternatives was obtained and time-out was performed prior to the procedure. Preparation: The site was prepared and draped using maximal sterile barrier technique including cutaneous antisepsis.  Anesthesia/sedation Level of anesthesia/sedation: None  Initial abdominal CT Initial abdominal CT was performed. Findings: Tiny volume ascites. A safe window for paracentesis was identified.  Paracentesis Local anesthesia was administered. Giving constraints of no CT fluoroscopy and patient inability to move arms from side -no safe way to access minimal perihepatic fluid. Procedure aborted.  Radiation Dose CT dose length product (mGy-cm): 1534  Additional Details Additional description of procedure: None Equipment details: None Specimens removed: None. Procedure aborted. Estimated blood loss (mL): Less than 10 Standardized report: Paracentesis  Attestation I was present and scrubbed for the entire procedure. Imaging reviewed. Agree with final report as written.  This report was finalized on 5/4/2020 3:53 PM by Cullen Walker.      Xr Abdomen Kub    Result Date: 5/7/2020  Nonobstructive bowel gas pattern with paucity of air throughout the visualized bowel loops.  This report was finalized on 5/7/2020 11:37 AM by Dr. Luis Eduardo Mosley.      Xr Abdomen Kub    Result Date: 5/6/2020  NG tube in the distal stomach. Borderline dilatation of the transverse colon with air but no evidence of dilated bowel elsewhere to suggest significant ileus or obstruction.  D:  05/06/2020 E:  05/06/2020    This report was finalized on 5/6/2020 9:24 PM by   Moi Rich MD.       I independently read the patient's CT Chest from 5/7 and I agree with the above report    Impression:   Patient is a 24 y.o.  Yr old male with history of alcoholism and alcohol cirrhosis who was admitted 4/23 with ETOH hepatitis and decompensated cirrhosis, BORIS, hepatic encephalopathy, and hematemesis secondary to esophageal variceal bleed s/p EGD on 4/24. He unfortuantely has multiple medical problems and remains very encephalopathic. He has had SBP by cell count on last paracentesis but I do not see that a culture was sent on that peritoneal fluid. He has elevated WBC count and ongoing low grade fevers. Possible sources for his sepsis include SBP vs. Bacteremia with possible line infection. C diff has been ruled out. Pulmonary source thought less likely as lungs look mostly clear although he does have large bilateral pleural effusions. Urinalysis was negative for signs of UTI.    Problems:  -Sepsis with fevers, tachycardia, lactic acidosis, and Leukocytosis with neutrophilia  -SBP  -Decompensated alcohol cirrhosis with ascites, hepatic -Encephalopathy, and esophageal varices  -Alcohol hepatitis/elevated LFTs  -Hematemesis due to bleeding esophageal varices s/p EGD and TIPS -procedure  -BORIS- likely ATN due to bleeding. Improved  -Acute blood loss anemia  -MRSA colonization  -Alcoholism  -diarrhea- likely due to lactulose. C diff negative  -Penicillin allergy (Hives)    PLAN: Thank you for asking us to see John Varma, I recommend the following:     -follow blood cultures-currently NGTD  -s/p paracentesis today with repeat peritoneal fluid cx  -Continue meropenem and Vancomycin for now. PK service to assist with vancomycin dosing. We need to closely monitor renal function while on vancomycin  -Continue micafungin 100 mg IV q24h in case line infection/fungemia while we are awaiting repeat blood cultures    Complex set of medical issues requiring a high level of medical decision making      Hipolito Barkley has obtained the history, performed the physical exam and formulated the above treatment plan.     I discussed with nursing    Kevin Miller, APRN  5/8/2020

## 2020-05-08 NOTE — PROGRESS NOTES
Continued Stay Note  [facility]     Patient Name: John Varma  MRN: [id number]  Today's Date: 5/8/2020    Admit Date: 4/23/2020    Discharge Plan     Row Name 05/08/20 7348       Plan    Plan  Ongoing    Plan Comments  Made aware of patient by CM.  I have reviewed the chart- will peripherally follow at this time due to complex medical issues.    Row Name 05/08/20 6123       Plan    Plan  Ongoing    Plan Comments  Referral made to chemical dependency staff.  Patient still has NG and is on TPN and in restraints.  CM will continue to follow for discharge needs.        Discharge Codes    No documentation.       Expected Discharge Date and Time     Expected Discharge Date Expected Discharge Time    May 4, 2020             Alisha St RN ,BSN   Addiction Coordinator

## 2020-05-08 NOTE — PROGRESS NOTES
"Pharmacy Consult-Vancomycin Dosing  John Varma is a  24 y.o. male receiving vancomycin therapy.     Indication: empiric (r/o sepsis)  Consulting Provider: pulm  ID Consult: no    Goal Trough: 15-20    Current Antimicrobial Therapy  IV Vanco (5/7-5/13) 7, D1  Merrem (5/7-5/13) 7, D1  PO Vanco (5/7-5/13) 7, D1  IV Flagyl (5/7-513) 7, D1    Allergies  Allergies as of 04/23/2020 - Reviewed 04/23/2020   Allergen Reaction Noted    Penicillins Hives 04/23/2020       Labs  Results from last 7 days   Lab Units 05/08/20  0429 05/07/20  1202 05/07/20  0639   BUN mg/dL 25* 34* 37*   CREATININE mg/dL 0.40* 0.57* 0.63*       Results from last 7 days   Lab Units 05/08/20  0429 05/07/20  0520 05/06/20  0541   WBC 10*3/mm3 33.29* 45.78* 28.56*       Evaluation of Dosing  Ht - 172.7 cm (68\")  Wt - 77.2 kg (170 lb 3.1 oz)    Estimated Creatinine Clearance: 308.3 mL/min (A) (by C-G formula based on SCr of 0.4 mg/dL (L)).    Intake & Output (last 3 days)         05/05 0701 - 05/06 0700 05/06 0701 - 05/07 0700 05/07 0701 - 05/08 0700 05/08 0701 - 05/09 0700    P.O.    236    I.V. (mL/kg) 561 (7.3) 50 (0.6) 118 (1.5) 47.4 (0.6)    Other 401 611 440     NG/ 441  260    IV Piggyback   1001.4     TPN   688 301.5    Total Intake(mL/kg) 1943 (25.3) 1102 (14.3) 2247.4 (29.1) 844.9 (10.9)    Urine (mL/kg/hr) 3120 (1.7) 2250 (1.2) 1860 (1) 350 (0.8)    Emesis/NG output   750     Stool 0       Total Output 3120 2250 2610 350    Net -1177 -1148 -362.6 +494.9            Stool Unmeasured Occurrence 2 x               Microbiology and Radiology  Microbiology Results (last 10 days)       Procedure Component Value - Date/Time    Clostridium Difficile Toxin - Stool, Per Rectum [[id number]] Collected:  05/07/20 1410    Lab Status:  Final result Specimen:  Stool from Per Rectum Updated:  05/07/20 1541    Narrative:       The following orders were created for panel order Clostridium Difficile Toxin - Stool, Per Rectum.  Procedure                   "             Abnormality         Status                     ---------                               -----------         ------                     Clostridium Difficile To...[[id number]]  Normal              Final result                 Please view results for these tests on the individual orders.    Clostridium Difficile Toxin, PCR - Stool, Per Rectum [[id number]]  (Normal) Collected:  05/07/20 1410    Lab Status:  Final result Specimen:  Stool from Per Rectum Updated:  05/07/20 1541     C. Difficile Toxins by PCR Not Detected    Narrative:       Performance characteristics of test not established for patients <2 years of age.  Negative for Toxigenic C. Difficile    Respiratory Culture - Sputum, Cough [[id number]] Collected:  05/07/20 1213    Lab Status:  Preliminary result Specimen:  Sputum from Cough Updated:  05/08/20 1044     Respiratory Culture Scant growth (1+) Normal Respiratory Jordyn     Gram Stain Many (4+) WBCs per low power field      Moderate (3+) Epithelial cells per low power field      Few (2+) Gram positive cocci in pairs and clusters    Blood Culture - Blood, Arm, Left [[id number]] Collected:  05/07/20 1202    Lab Status:  Preliminary result Specimen:  Blood from Arm, Left Updated:  05/08/20 1230     Blood Culture No growth at 24 hours    Narrative:       Aerobic bottle only      Blood Culture - Blood, Wrist, Left [[id number]] Collected:  05/07/20 1202    Lab Status:  Preliminary result Specimen:  Blood from Wrist, Left Updated:  05/07/20 1251    Narrative:       Aerobic bottle only      SARS-CoV-2 PCR ([place] IN-HOUSE PERFORMED), NP SWAB IN TRANSPORT MEDIA - Swab, Nasopharynx [[id number]]  (Normal) Collected:  05/06/20 1840    Lab Status:  Final result Specimen:  Swab from Nasopharynx Updated:  05/07/20 0033     COVID19 Not Detected    Blood Culture - Blood, Arm, Left [[id number]] Collected:  05/03/20 1421    Lab Status:  Preliminary result Specimen:  Blood from Arm, Left Updated:  05/07/20 1515      Blood Culture No growth at 4 days    Blood Culture - Blood, Hand, Left [[id number]] Collected:  05/03/20 1421    Lab Status:  Preliminary result Specimen:  Blood from Hand, Left Updated:  05/07/20 1515     Blood Culture No growth at 4 days            Evaluation of Level                  Results from last 7 days   Lab Units 05/08/20  1129   VANCOMYCIN TR mcg/mL 4.60*     ~12 hr level     Assessment/Plan:  Trough sub-therapeutic prior to 3rd total dose. Will re-load with 20 mg/kg IV x1 and increase regimen to 1 gm IV q8h. Renal function stable. Repeat SS trough 5/9.    Pharmacy will continue to monitor and make adjustments to dose as necessary based on renal function, cultures, labs, and clinical status.     Thank you for this consult.  Miesha Santiago, PharmD, BCPS  5/8/2020  12:42

## 2020-05-08 NOTE — PROGRESS NOTES
"GI Daily Progress Note  Subjective:    Chief Complaint: Follow-up decompensated cirrhosis.    The patient is drinking a small amount of liquids.  He is on TPN.  Tube feedings have been held.  He has had no nausea or vomiting in over 24 hours.  Denies abdominal pain.  No bowel movement overnight, but just received lactulose.    Objective:    /80 (BP Location: Left arm, Patient Position: Lying)   Pulse 106   Temp 98.2 °F (36.8 °C) (Axillary)   Resp 24   Ht 172.7 cm (68\")   Wt 77.2 kg (170 lb 3.1 oz)   SpO2 95%   BMI 25.88 kg/m²     Physical Exam   Constitutional: He is oriented to person, place, and time. He appears well-developed. No distress.   Appears ill.   HENT:   Head: Normocephalic.   Mouth/Throat: No oropharyngeal exudate.   Eyes: Conjunctivae are normal. Scleral icterus is present.   Neck: Normal range of motion.   Cardiovascular: Regular rhythm.   Mild tachycardia   Pulmonary/Chest: Effort normal and breath sounds normal. No stridor. No respiratory distress. He has no wheezes. He has no rales.   Abdominal: Soft. Bowel sounds are normal. He exhibits distension. There is no tenderness. There is no guarding.   Moderate ascites   Genitourinary:   Genitourinary Comments: Deferred   Musculoskeletal: Normal range of motion. He exhibits edema.   Neurological: He is alert and oriented to person, place, and time.   Skin: Skin is warm and dry. Capillary refill takes less than 2 seconds. No rash noted.   Jaundice   Psychiatric: He has a normal mood and affect. His behavior is normal.   Nursing note and vitals reviewed.      Lab  Lab Results   Component Value Date    WBC 33.29 (C) 05/08/2020    HGB 9.3 (L) 05/08/2020    HGB 10.5 (L) 05/07/2020    HGB 8.7 (L) 05/06/2020    .5 (H) 05/08/2020     05/08/2020    INR 1.96 (H) 05/08/2020    INR 1.79 (H) 05/07/2020    INR 2.01 (H) 05/06/2020    INR 1.84 (H) 05/05/2020    INR 1.85 (H) 05/04/2020       Lab Results   Component Value Date    GLUCOSE 145 " (H) 05/08/2020    BUN 25 (H) 05/08/2020    CREATININE 0.40 (L) 05/08/2020    EGFRIFNONA >150 05/08/2020    EGFRIFAFRI 37 (L) 04/23/2020    BCR 62.5 (H) 05/08/2020     05/08/2020    K 3.9 05/08/2020    CO2 21.0 (L) 05/08/2020    CALCIUM 7.8 (L) 05/08/2020    ALBUMIN 2.60 (L) 05/08/2020    ALKPHOS 107 05/08/2020    BILITOT 5.9 (H) 05/08/2020    BILIDIR 3.3 (H) 05/04/2020    ALT 60 (H) 05/08/2020    AST 78 (H) 05/08/2020       Assessment:    1. Acute blood loss anemia, improved.  3 units transfused since admission. No signs of GI bleeding.  2. Gastric varices (GOV type I) with hemorrhage, status post TIPS 4/29/2020 (pressure gradient reduced from 16 to 6)  3. Large esophageal varices with red hanh signs.  4. Acute alcoholic hepatitis, severe.  Bilirubin is stable.  Maddrey discriminant function 56 at time of admission.  Not a candidate for corticosteroids (nil effect with MDF >50).  5. Alcohol cirrhosis with ascites.    6. Sepsis.  C. difficile negative.  Had white cell criteria for SBP on 4/30/2020.    Plan:    >> CT-guided paracentesis.  >> Continue Xifaxan and lactulose.  >> IV vitamin K, and repeat INR tomorrow.  >> Change Protonix to p.o.  >> Does not need Inderal for portal hypertension.  Consider reevaluate indication; in this patient it may be given for alcohol withdrawal symptoms or hypertension.  >> Consider restart enteral feeds.    Mark I. Brunner, MD  05/08/20  11:00

## 2020-05-08 NOTE — PLAN OF CARE
Problem: Patient Care Overview  Goal: Plan of Care Review  Outcome: Ongoing (interventions implemented as appropriate)  Flowsheets (Taken 5/8/2020 0140)  Progress: improving  Plan of Care Reviewed With: patient  Outcome Summary: Pt demonstrated ability to progress to 30 ft gait with min x2, RW. Required vc's for upright posture and increasing step length/heel strike; motivated to participate. Will cont PT POC.

## 2020-05-08 NOTE — PLAN OF CARE
Problem: Patient Care Overview  Goal: Plan of Care Review  Outcome: Ongoing (interventions implemented as appropriate)  Flowsheets (Taken 5/8/2020 3962)  Progress: no change  Plan of Care Reviewed With: patient; father  Outcome Summary: Patient oriented to person and place, sometimes time and situation. GOINS, follows commands. Tachypneic. Room air. Tachycardic. Minimal oral intake. Keofeed placed, tube feedings to be restarted. Paracentesis with Dr. Walker at bedside, 1L taken off. Bm X1. Father updated. Will contine to monitor.

## 2020-05-08 NOTE — PLAN OF CARE
Problem: Patient Care Overview  Goal: Plan of Care Review  Outcome: Ongoing (interventions implemented as appropriate)  Flowsheets (Taken 5/8/2020 0221)  Plan of Care Reviewed With: patient  Note:   VSS, pt is alert but disoriented to place, time, and situation. Pt has been anxious and sad throughout the night. TPN and Lipids are still infusing. Pt has had adequate UOP with dark straw colored urine. BUE restraints. Pt has tried to take out otto on several occasions during night. Will continue to monitor.   Goal: Individualization and Mutuality  Outcome: Ongoing (interventions implemented as appropriate)  Goal: Discharge Needs Assessment  Outcome: Ongoing (interventions implemented as appropriate)  Goal: Interprofessional Rounds/Family Conf  Outcome: Ongoing (interventions implemented as appropriate)     Problem: Skin Injury Risk (Adult)  Goal: Skin Health and Integrity  Outcome: Ongoing (interventions implemented as appropriate)     Problem: Fall Risk (Adult)  Goal: Absence of Fall  Outcome: Ongoing (interventions implemented as appropriate)     Problem: Restraint, Nonbehavioral (Nonviolent)  Goal: Rationale and Justification  Outcome: Ongoing (interventions implemented as appropriate)  Goal: Nonbehavioral (Nonviolent) Restraint: Absence of Injury/Harm  Outcome: Ongoing (interventions implemented as appropriate)  Goal: Nonbehavioral (Nonviolent) Restraint: Achievement of Discontinuation Criteria  Outcome: Ongoing (interventions implemented as appropriate)  Goal: Nonbehavioral (Nonviolent) Restraint: Preservation of Dignity and Wellbeing  Outcome: Ongoing (interventions implemented as appropriate)

## 2020-05-08 NOTE — NURSING NOTE
1500 Dr. Walker at bedside to perform paracentesis.    RN at bedside, keeping patient's arm still. VSS.    1521 1L remove.     VSS. No concerns

## 2020-05-08 NOTE — PROGRESS NOTES
INTENSIVIST / PULMONARY FOLLOW UP NOTE     Hospital:  LOS: 15 days   Mr. John Varma, 24 y.o. 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          SUBJECTIVE   More alert, slept some last night, fever / wbc improved.  C. Diff negative.    The patient's relevant past medical, surgical, family, and social history were reviewed    Allergies and medications were reviewed    ROS:  Per subjective, all other systems were reviewed and were negative        OBJECTIVE     Vital Sign Min/Max for last 24 hours:  Temp  Min: 97.9 °F (36.6 °C)  Max: 100.4 °F (38 °C)   BP  Min: 106/66  Max: 138/85   Pulse  Min: 94  Max: 118   Resp  Min: 22  Max: 28   SpO2  Min: 91 %  Max: 96 %   No data recorded     Physical Exam:  General Appearance:  alert, in no acute distress  Eyes:  No scleral icterus or pallor, pupils normal  Ears, Nose, Mouth, Throat:  Atraumatic, oropharynx clear  Neck:  Trachea midline, thyroid normal  Respiratory:  Clear to auscultation bilaterally, normal effort, no tenderness to palpation  Cardiovascular:  Regular rate and rhythm, no murmurs, 1-2+ peripheral edema, no thrill  Gastrointestinal:  Distended, non-tender  Skin:  Normal temperature, no rash  Psychiatric:  More alert, confused  Neuro:  No new focal neurologic deficits observed    Telemetry:              Hemodynamics:   CVP:     PAP:     PAOP:     CO:     CI:     SVI:     SVR:       SpO2: 95 % SpO2  Min: 91 %  Max: 96 %   Device:      Flow Rate:   No data recorded       Intake/Ouptut 24 hrs (7:00AM - 6:59 AM)  Intake &  Output (last 3 days)       05/05 0701 - 05/06 0700 05/06 0701 - 05/07 0700 05/07 0701 - 05/08 0700 05/08 0701 - 05/09 0700    P.O.    236    I.V. (mL/kg) 561 (7.3) 50 (0.6) 118 (1.5) 47.4 (0.6)    Other 401 611 440     NG/ 441  260    IV Piggyback   1001.4     TPN   688 301.5    Total Intake(mL/kg) 1943 (25.3) 1102 (14.3) 2247.4 (29.1) 844.9 (10.9)    Urine (mL/kg/hr) 3120 (1.7) 2250 (1.2) 1860 (1) 350 (1)    Emesis/NG output   750     Stool 0       Total Output 3120 2250 2610 350    Net -1177 -1148 -362.6 +494.9            Stool Unmeasured Occurrence 2 x             Lines, Drains & Airways    Active LDAs     Name:   Placement date:   Placement time:   Site:   Days:    PICC Triple Lumen 04/28/20 Right Basilic   04/28/20    1116    Basilic   5    NG/OG Tube Nasogastric 16 Fr Right nostril   04/28/20    0430    Right nostril   6    Urethral Catheter Temperature probe   04/25/20    1200     8                Hematology:  Results from last 7 days   Lab Units 05/08/20 0429 05/07/20  0520 05/06/20  0541 05/05/20  0318 05/04/20  0414 05/03/20  1048 05/03/20  0216   WBC 10*3/mm3 33.29* 45.78* 28.56* 31.36* 32.33*  --   --    HEMOGLOBIN g/dL 9.3* 10.5* 8.7* 9.1* 9.0* 9.2* 6.9*   HEMATOCRIT % 29.5* 34.0* 27.1* 28.5* 28.0* 27.3* 22.1*   PLATELETS 10*3/mm3 308 316 238 253 252  --   --    MONOCYTES % %  --   --  5.0  --   --   --   --      Electrolytes, Magnesium and Phosphorus:  Results from last 7 days   Lab Units 05/08/20  0429 05/07/20  1202 05/07/20  0639 05/06/20  0418 05/05/20  0318 05/04/20  0414 05/03/20  0216 05/02/20  0442   SODIUM mmol/L 141 143 143 139 143 147* 149* 149*   CHLORIDE mmol/L 108* 107 108* 108* 109* 115* 119* 119*   POTASSIUM mmol/L 3.9 4.7 4.3 4.4 4.1 4.9 4.4 4.6   CO2 mmol/L 21.0* 21.0* 22.0 19.0* 20.0* 22.0 21.0* 21.0*   MAGNESIUM mg/dL 2.0  --  2.3 2.3 1.8 2.1 2.0 1.9   PHOSPHORUS mg/dL 3.9  --  5.2* 4.5 2.7 2.8 2.3* 2.8     Renal:  Results from last 7 days   Lab Units 05/08/20  0429  05/07/20  1202 05/07/20  0639 05/06/20  0418 05/05/20  0318 05/04/20  0414 05/03/20  0216   CREATININE mg/dL 0.40* 0.57* 0.63* 0.57* 0.58* 0.61* 0.57*   BUN mg/dL 25* 34* 37* 34* 33* 32* 28*     Estimated Creatinine Clearance: 308.3 mL/min (A) (by C-G formula based on SCr of 0.4 mg/dL (L)).  Hepatic:  Results from last 7 days   Lab Units 05/08/20  0429 05/07/20  1202 05/07/20  0639 05/06/20  0418 05/05/20 0318 05/04/20  0414 05/02/20  0442   ALK PHOS U/L 107 165* 170* 108 153* 199* 180*   BILIRUBIN mg/dL 5.9* 7.2* 6.8* 5.5* 5.2* 5.8* 4.8*   BILIRUBIN DIRECT mg/dL  --   --   --   --   --  3.3*  --    ALT (SGPT) U/L 60* 66* 71* 69* 77* 89* 126*   AST (SGOT) U/L 78* 100* 108* 93* 91* 93* 148*     Arterial Blood Gases:  Results from last 7 days   Lab Units 05/03/20  0115   PH, ARTERIAL pH units 7.561*   PCO2, ARTERIAL mm Hg 23.4*   PO2 ART mm Hg 188.0*   FIO2 % 60             Lab Results   Component Value Date    LACTATE 2.4 (C) 05/07/2020       Relevant imaging studies and labs from 05/08/20 were reviewed and interpreted by me    Medications (drips):    Adult Central 2-in-1 TPN    Adult Cyclic 2-in-1 TPN Last Rate: 75 mL/hr at 05/08/20 0851   Pharmacy to Dose TPN    Pharmacy to dose vancomycin          Pharmacy Consult  Does not apply Once   enoxaparin 40 mg Subcutaneous Q24H   Fat Emul Fish Oil/Plant Based 250 mL Intravenous Q24H (TPN)   folic acid 1 mg Oral Daily   insulin detemir 15 Units Subcutaneous Daily   insulin regular 0-7 Units Subcutaneous Q6H   insulin regular 5 Units Subcutaneous Q6H   lactulose 20 g Nasogastric BID   melatonin 5 mg Oral Nightly   meropenem 1 g Intravenous Q8H   metoclopramide 5 mg Intravenous Q6H   micafungin (MYCAMINE)  mg Intravenous Q24H   multivitamin and minerals 15 mL Oral Daily   [START ON 5/9/2020] pantoprazole 40 mg Oral Q AM   phytonadione (VITAMIN K) IVPB 10 mg Intravenous Once   PRO-STAT 30 mL Nasogastric BID   propranolol 10 mg Oral Q8H   QUEtiapine 100 mg Oral Nightly    QUEtiapine 50 mg Oral Daily   rifAXIMin 550 mg Nasogastric Q12H   thiamine 100 mg Oral Daily   vancomycin 1,250 mg Intravenous Q12H   zinc gluconate 50 mg Oral Daily       Assessment/Plan   IMPRESSION / PLAN     Inpatient Problem List:  24 y.o.male:  Active Hospital Problems    Diagnosis   • **Acute variceal GI bleeding   • 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   • Sepsis with acute renal failure without septic shock (CMS/HCC)   • Acute respiratory failure with hypoxia (CMS/HCC)   • Alcohol withdrawal delirium (CMS/HCC)   • Hepatic encephalopathy (CMS/HCC)   • Ascites due to alcoholic hepatitis   • Pleural effusion on left   • Acute alcoholic hepatitis   • BORIS (acute kidney injury) (CMS/HCC)   • Bandemia   • Acute upper GI bleeding        Impression:  24 y.o.male with relevant PMH of alcoholism / alcoholic cirrhosis, MJ abuse, Tobacco abuse admitted 4/23/2020 with alcoholic hepatitis, decompensated cirrhosis, BORIS, Hepatic Encephalopathy, and hematemesis secondary to variceal bleed s/p EGD 4/24/20.  Patient was intubated for EGD and extubated on 4/27.  Underwent TIPS 4/29 (intubated, extubated again 5/1).  Has had paracentesis x 2 (4/24 - 5 liters, 4/30 2.5 liters).  Being treated for SBP w/ Levaquin - now completed (previoulsy on Azactam x 7 days / PCN allergy).    Additional ongoing issues include refractory ascites, volume overload, and hepatic encephalopathy.  Has had ongoing fever and leukocytosis which are improving today.  C. Diff toxin was negative.    Plan:  PSE - on rifaxamin, lactulose.  Weaned dex off.  Thiamine, folate, MVI, Zinc.  Seroquel for psychotic symptoms, larger dose at night w/ melatonin, smaller dose during day.  I don't think withdrawal is playing any role at this point.    Refractory Ascites / Volume Overload - Hold diuresis today.  Repat paracentesis.  If continues to improve from infectious standpoint, cr remains stable, and hemodynamically remains stable we  can consider starting maintenance Lasix + Aldactone tomorrow.     Fever / Leukocytosis - broadened coverage 5/7 for potential HCAP / Intra-abdominal sepsis / C. Diff.  Patient doing better today.  ID now following.  C. Diff negative.  Plans to re-sample ascites fluid today.    Stress Hyperglycemia - titrate SQ insulin    Coagulopathy - po vitamin k 10 mg x 3 doses (given)    Variceal Bleed / TIPS / Cirrhosis / Alcoholic Hepatitis / SBP - per GI    DVT / PUD prophylaxis    Nutrition - not consistently tolerating tube feeds.  Started TPN 5/7.  Now has keofeed.  Resume feeds after paracentesis.  If tolerated we can cross taper TPN and tube feeds.    Dysphagia - speech following, Sars-COV2 negative    DVT / PUD prophylaxis    Plan of care and goals reviewed with mulitdisciplinary team at daily rounds    Plan for ridge evaluation prior to discharge.  This was discussed with patients father.    Critical Care time spent in direct patient care: 30 minutes (excluding procedure time, if applicable) including high complexity decision making to assess, manipulate, and support vital organ system failure in this individual who has impairment of one or more vital organ systems such that there is a high probability of imminent or life threatening deterioration in the patient’s condition.       Valerio Emmanuel MD  Intensive Care Medicine  05/08/20 11:40

## 2020-05-08 NOTE — PROGRESS NOTES
Continued Stay Note  [facility]     Patient Name: John Varma  MRN: [id number]  Today's Date: 5/8/2020    Admit Date: 4/23/2020    Discharge Plan     Row Name 05/08/20 1140       Plan    Plan  Ongoing    Plan Comments  Referral made to chemical dependency staff.  Patient still has NG and is on TPN and in restraints.  CM will continue to follow for discharge needs.        Discharge Codes    No documentation.       Expected Discharge Date and Time     Expected Discharge Date Expected Discharge Time    May 4, 2020             Jodi Viveros RN

## 2020-05-08 NOTE — PROGRESS NOTES
Pharmacy Parenteral Nutrition Evaluation    John Varma is a  24 y.o. male receiving TPN.     Consulting Physician: pulm    Labs  Results from last 7 days   Lab Units 05/08/20  0429 05/07/20  1202 05/07/20  0639   SODIUM mmol/L 141 143 143   POTASSIUM mmol/L 3.9 4.7 4.3   CHLORIDE mmol/L 108* 107 108*   CO2 mmol/L 21.0* 21.0* 22.0   BUN mg/dL 25* 34* 37*   CREATININE mg/dL 0.40* 0.57* 0.63*   CALCIUM mg/dL 7.8* 8.1* 8.2*   BILIRUBIN mg/dL 5.9* 7.2* 6.8*   ALK PHOS U/L 107 165* 170*   ALT (SGPT) U/L 60* 66* 71*   AST (SGOT) U/L 78* 100* 108*   GLUCOSE mg/dL 145* 96 97       Results from last 7 days   Lab Units 05/08/20  0429 05/07/20  1202 05/07/20  0639 05/06/20  0418  05/04/20  0420   MAGNESIUM mg/dL 2.0  --  2.3 2.3   < >  --    PHOSPHORUS mg/dL 3.9  --  5.2* 4.5   < >  --    PREALBUMIN mg/dL  --  5.1*  --   --   --  4.6*    < > = values in this interval not displayed.       Results from last 7 days   Lab Units 05/08/20 0429 05/07/20  0520 05/06/20  0541   WBC 10*3/mm3 33.29* 45.78* 28.56*   HEMOGLOBIN g/dL 9.3* 10.5* 8.7*   HEMATOCRIT % 29.5* 34.0* 27.1*   PLATELETS 10*3/mm3 308 316 238       Triglycerides   Date Value Ref Range Status   05/08/2020 117 0 - 150 mg/dL Final     Comment:     Falsely depressed results may occur on samples drawn from patients receiving N-Acetylcysteine (NAC) or Metamizole.       estimated creatinine clearance is 308.3 mL/min (A) (by C-G formula based on SCr of 0.4 mg/dL (L)).    Intake & Output (last 3 days)         05/05 0701 - 05/06 0700 05/06 0701 - 05/07 0700 05/07 0701 - 05/08 0700 05/08 0701 - 05/09 0700    P.O.    236    I.V. (mL/kg) 561 (7.3) 50 (0.6) 118 (1.5)     Other 401 611 440     NG/ 441      IV Piggyback   1001.4     TPN   688     Total Intake(mL/kg) 1943 (25.3) 1102 (14.3) 2247.4 (29.1) 236 (3.1)    Urine (mL/kg/hr) 3120 (1.7) 2250 (1.2) 1860 (1)     Emesis/NG output   750     Stool 0       Total Output 3120 2250 2610     Net -1177 -1148 -362.6 +236             Stool Unmeasured Occurrence 2 x               Dietitian Recommendations  Current TPN Regimen Recommendation per RD:  Dextrose 20% / Amino Acid 6.5% at goal rate of 75 mL/hr.  20% (SMOF) Lipid Emulsion 250mL every 24 hours.    Assessment/Plan:  Continue TPN but will change to standard central line concentration of electrolytes. Max chloride in light of acetate shortage.    Pharmacy will continue to monitor and make adjustments to dose as necessary based on renal function, labs, and clinical status.     Thank you for this consult.  Miesha Santiago, PharmD, BCPS  5/8/2020  10:10

## 2020-05-09 LAB
ALBUMIN SERPL-MCNC: 2.6 G/DL (ref 3.5–5.2)
ALBUMIN/GLOB SERPL: 1 G/DL
ALP SERPL-CCNC: 93 U/L (ref 39–117)
ALT SERPL W P-5'-P-CCNC: 52 U/L (ref 1–41)
ANION GAP SERPL CALCULATED.3IONS-SCNC: 10 MMOL/L (ref 5–15)
AST SERPL-CCNC: 73 U/L (ref 1–40)
BACTERIA SPEC RESP CULT: NORMAL
BASOPHILS # BLD AUTO: 0.11 10*3/MM3 (ref 0–0.2)
BASOPHILS NFR BLD AUTO: 0.4 % (ref 0–1.5)
BILIRUB SERPL-MCNC: 5 MG/DL (ref 0.2–1.2)
BUN BLD-MCNC: 16 MG/DL (ref 6–20)
BUN/CREAT SERPL: 64 (ref 7–25)
CA-I SERPL ISE-MCNC: 1.2 MMOL/L (ref 1.12–1.32)
CALCIUM SPEC-SCNC: 7.9 MG/DL (ref 8.6–10.5)
CHLORIDE SERPL-SCNC: 108 MMOL/L (ref 98–107)
CO2 SERPL-SCNC: 20 MMOL/L (ref 22–29)
CREAT BLD-MCNC: 0.25 MG/DL (ref 0.76–1.27)
D-LACTATE SERPL-SCNC: 1.5 MMOL/L (ref 0.5–2)
DEPRECATED RDW RBC AUTO: 88.1 FL (ref 37–54)
EOSINOPHIL # BLD AUTO: 0.67 10*3/MM3 (ref 0–0.4)
EOSINOPHIL NFR BLD AUTO: 2.3 % (ref 0.3–6.2)
ERYTHROCYTE [DISTWIDTH] IN BLOOD BY AUTOMATED COUNT: 21.5 % (ref 12.3–15.4)
GFR SERPL CREATININE-BSD FRML MDRD: >150 ML/MIN/1.73
GLOBULIN UR ELPH-MCNC: 2.5 GM/DL
GLUCOSE BLD-MCNC: 145 MG/DL (ref 65–99)
GLUCOSE BLDC GLUCOMTR-MCNC: 109 MG/DL (ref 70–130)
GLUCOSE BLDC GLUCOMTR-MCNC: 114 MG/DL (ref 70–130)
GLUCOSE BLDC GLUCOMTR-MCNC: 135 MG/DL (ref 70–130)
GLUCOSE BLDC GLUCOMTR-MCNC: 140 MG/DL (ref 70–130)
GRAM STN SPEC: NORMAL
HCT VFR BLD AUTO: 27.4 % (ref 37.5–51)
HGB BLD-MCNC: 8.5 G/DL (ref 13–17.7)
IMM GRANULOCYTES # BLD AUTO: 0.4 10*3/MM3 (ref 0–0.05)
IMM GRANULOCYTES NFR BLD AUTO: 1.4 % (ref 0–0.5)
INR PPP: 2.16 (ref 0.85–1.16)
LYMPHOCYTES # BLD AUTO: 2.33 10*3/MM3 (ref 0.7–3.1)
LYMPHOCYTES NFR BLD AUTO: 8 % (ref 19.6–45.3)
MAGNESIUM SERPL-MCNC: 2 MG/DL (ref 1.6–2.6)
MCH RBC QN AUTO: 35 PG (ref 26.6–33)
MCHC RBC AUTO-ENTMCNC: 31 G/DL (ref 31.5–35.7)
MCV RBC AUTO: 112.8 FL (ref 79–97)
MONOCYTES # BLD AUTO: 2.49 10*3/MM3 (ref 0.1–0.9)
MONOCYTES NFR BLD AUTO: 8.6 % (ref 5–12)
NEUTROPHILS # BLD AUTO: 22.99 10*3/MM3 (ref 1.7–7)
NEUTROPHILS NFR BLD AUTO: 79.3 % (ref 42.7–76)
NRBC BLD AUTO-RTO: 0 /100 WBC (ref 0–0.2)
PHOSPHATE SERPL-MCNC: 3.6 MG/DL (ref 2.5–4.5)
PLAT MORPH BLD: NORMAL
PLATELET # BLD AUTO: 257 10*3/MM3 (ref 140–450)
PMV BLD AUTO: 10.7 FL (ref 6–12)
POTASSIUM BLD-SCNC: 4 MMOL/L (ref 3.5–5.2)
PROCALCITONIN SERPL-MCNC: 2.1 NG/ML (ref 0.1–0.25)
PROT SERPL-MCNC: 5.1 G/DL (ref 6–8.5)
PROTHROMBIN TIME: 23.8 SECONDS (ref 11.5–14)
RBC # BLD AUTO: 2.43 10*6/MM3 (ref 4.14–5.8)
RBC MORPH BLD: NORMAL
SODIUM BLD-SCNC: 138 MMOL/L (ref 136–145)
VANCOMYCIN TROUGH SERPL-MCNC: 8 MCG/ML (ref 5–20)
WBC MORPH BLD: NORMAL
WBC NRBC COR # BLD: 28.99 10*3/MM3 (ref 3.4–10.8)

## 2020-05-09 PROCEDURE — 25010000002 VANCOMYCIN 10 G RECONSTITUTED SOLUTION

## 2020-05-09 PROCEDURE — 84100 ASSAY OF PHOSPHORUS: CPT | Performed by: INTERNAL MEDICINE

## 2020-05-09 PROCEDURE — 97110 THERAPEUTIC EXERCISES: CPT

## 2020-05-09 PROCEDURE — 85025 COMPLETE CBC W/AUTO DIFF WBC: CPT | Performed by: INTERNAL MEDICINE

## 2020-05-09 PROCEDURE — 25010000002 FUROSEMIDE PER 20 MG: Performed by: INTERNAL MEDICINE

## 2020-05-09 PROCEDURE — 83735 ASSAY OF MAGNESIUM: CPT | Performed by: INTERNAL MEDICINE

## 2020-05-09 PROCEDURE — 83605 ASSAY OF LACTIC ACID: CPT | Performed by: INTERNAL MEDICINE

## 2020-05-09 PROCEDURE — 82962 GLUCOSE BLOOD TEST: CPT

## 2020-05-09 PROCEDURE — 84145 PROCALCITONIN (PCT): CPT | Performed by: INTERNAL MEDICINE

## 2020-05-09 PROCEDURE — 63710000001 INSULIN DETEMIR PER 5 UNITS: Performed by: INTERNAL MEDICINE

## 2020-05-09 PROCEDURE — 25010000002 VANCOMYCIN PER 500 MG

## 2020-05-09 PROCEDURE — 99291 CRITICAL CARE FIRST HOUR: CPT | Performed by: INTERNAL MEDICINE

## 2020-05-09 PROCEDURE — 85007 BL SMEAR W/DIFF WBC COUNT: CPT | Performed by: INTERNAL MEDICINE

## 2020-05-09 PROCEDURE — 82330 ASSAY OF CALCIUM: CPT

## 2020-05-09 PROCEDURE — 25010000002 METOCLOPRAMIDE PER 10 MG: Performed by: INTERNAL MEDICINE

## 2020-05-09 PROCEDURE — 80053 COMPREHEN METABOLIC PANEL: CPT

## 2020-05-09 PROCEDURE — 25010000002 ENOXAPARIN PER 10 MG: Performed by: INTERNAL MEDICINE

## 2020-05-09 PROCEDURE — 97530 THERAPEUTIC ACTIVITIES: CPT

## 2020-05-09 PROCEDURE — 63710000001 INSULIN REGULAR HUMAN PER 5 UNITS: Performed by: INTERNAL MEDICINE

## 2020-05-09 PROCEDURE — 97116 GAIT TRAINING THERAPY: CPT

## 2020-05-09 PROCEDURE — 25010000002 MEROPENEM PER 100 MG: Performed by: INTERNAL MEDICINE

## 2020-05-09 PROCEDURE — 25010000002 MICAFUNGIN SODIUM 100 MG RECONSTITUTED SOLUTION: Performed by: INTERNAL MEDICINE

## 2020-05-09 PROCEDURE — 85610 PROTHROMBIN TIME: CPT | Performed by: INTERNAL MEDICINE

## 2020-05-09 PROCEDURE — 80202 ASSAY OF VANCOMYCIN: CPT

## 2020-05-09 RX ORDER — SPIRONOLACTONE 25 MG/1
25 TABLET ORAL DAILY
Status: DISCONTINUED | OUTPATIENT
Start: 2020-05-09 | End: 2020-05-13 | Stop reason: HOSPADM

## 2020-05-09 RX ORDER — FUROSEMIDE 10 MG/ML
20 INJECTION INTRAMUSCULAR; INTRAVENOUS EVERY 12 HOURS
Status: DISCONTINUED | OUTPATIENT
Start: 2020-05-09 | End: 2020-05-13 | Stop reason: HOSPADM

## 2020-05-09 RX ADMIN — MEROPENEM 1 G: 1 INJECTION, POWDER, FOR SOLUTION INTRAVENOUS at 08:02

## 2020-05-09 RX ADMIN — METOCLOPRAMIDE 5 MG: 5 INJECTION, SOLUTION INTRAMUSCULAR; INTRAVENOUS at 18:03

## 2020-05-09 RX ADMIN — INSULIN HUMAN 5 UNITS: 100 INJECTION, SOLUTION PARENTERAL at 18:04

## 2020-05-09 RX ADMIN — Medication 30 ML: at 08:02

## 2020-05-09 RX ADMIN — MEROPENEM 1 G: 1 INJECTION, POWDER, FOR SOLUTION INTRAVENOUS at 00:07

## 2020-05-09 RX ADMIN — SPIRONOLACTONE 25 MG: 25 TABLET ORAL at 14:11

## 2020-05-09 RX ADMIN — QUETIAPINE FUMARATE 100 MG: 100 TABLET ORAL at 21:23

## 2020-05-09 RX ADMIN — MICAFUNGIN SODIUM 100 MG: 20 INJECTION, POWDER, LYOPHILIZED, FOR SOLUTION INTRAVENOUS at 20:08

## 2020-05-09 RX ADMIN — MEROPENEM 1 G: 1 INJECTION, POWDER, FOR SOLUTION INTRAVENOUS at 16:45

## 2020-05-09 RX ADMIN — INSULIN HUMAN 5 UNITS: 100 INJECTION, SOLUTION PARENTERAL at 00:07

## 2020-05-09 RX ADMIN — LACTULOSE 20 G: 20 SOLUTION ORAL at 08:02

## 2020-05-09 RX ADMIN — INSULIN DETEMIR 15 UNITS: 100 INJECTION, SOLUTION SUBCUTANEOUS at 08:02

## 2020-05-09 RX ADMIN — METOCLOPRAMIDE 5 MG: 5 INJECTION, SOLUTION INTRAMUSCULAR; INTRAVENOUS at 11:58

## 2020-05-09 RX ADMIN — FOLIC ACID 1 MG: 1 TABLET ORAL at 08:01

## 2020-05-09 RX ADMIN — FUROSEMIDE 20 MG: 10 INJECTION, SOLUTION INTRAMUSCULAR; INTRAVENOUS at 14:11

## 2020-05-09 RX ADMIN — QUETIAPINE FUMARATE 50 MG: 100 TABLET ORAL at 08:02

## 2020-05-09 RX ADMIN — PROPRANOLOL HYDROCHLORIDE 10 MG: 20 TABLET ORAL at 05:50

## 2020-05-09 RX ADMIN — VANCOMYCIN HYDROCHLORIDE 1250 MG: 10 INJECTION, POWDER, LYOPHILIZED, FOR SOLUTION INTRAVENOUS at 16:45

## 2020-05-09 RX ADMIN — Medication 50 MG: at 08:01

## 2020-05-09 RX ADMIN — PANTOPRAZOLE SODIUM 40 MG: 40 TABLET, DELAYED RELEASE ORAL at 05:49

## 2020-05-09 RX ADMIN — THIAMINE HCL TAB 100 MG 100 MG: 100 TAB at 08:01

## 2020-05-09 RX ADMIN — INSULIN HUMAN 5 UNITS: 100 INJECTION, SOLUTION PARENTERAL at 11:59

## 2020-05-09 RX ADMIN — ENOXAPARIN SODIUM 40 MG: 40 INJECTION SUBCUTANEOUS at 08:02

## 2020-05-09 RX ADMIN — Medication 30 ML: at 21:23

## 2020-05-09 RX ADMIN — RIFAXIMIN 550 MG: 550 TABLET ORAL at 21:23

## 2020-05-09 RX ADMIN — LACTULOSE 20 G: 20 SOLUTION ORAL at 21:24

## 2020-05-09 RX ADMIN — METOCLOPRAMIDE 5 MG: 5 INJECTION, SOLUTION INTRAMUSCULAR; INTRAVENOUS at 00:07

## 2020-05-09 RX ADMIN — VANCOMYCIN HYDROCHLORIDE 1000 MG: 1 INJECTION, SOLUTION INTRAVENOUS at 05:50

## 2020-05-09 RX ADMIN — MELATONIN TAB 5 MG 5 MG: 5 TAB at 21:23

## 2020-05-09 RX ADMIN — PROPRANOLOL HYDROCHLORIDE 10 MG: 20 TABLET ORAL at 21:23

## 2020-05-09 RX ADMIN — MULTIVITAMIN 15 ML: LIQUID ORAL at 08:02

## 2020-05-09 RX ADMIN — PROPRANOLOL HYDROCHLORIDE 10 MG: 20 TABLET ORAL at 14:11

## 2020-05-09 RX ADMIN — METOCLOPRAMIDE 5 MG: 5 INJECTION, SOLUTION INTRAMUSCULAR; INTRAVENOUS at 05:49

## 2020-05-09 RX ADMIN — INSULIN HUMAN 5 UNITS: 100 INJECTION, SOLUTION PARENTERAL at 05:49

## 2020-05-09 RX ADMIN — RIFAXIMIN 550 MG: 550 TABLET ORAL at 08:01

## 2020-05-09 NOTE — PROGRESS NOTES
"Pharmacy Consult-Vancomycin Dosing  John Varma is a  24 y.o. male receiving vancomycin therapy.     Indication: empiric (r/o sepsis)  Consulting Provider: pulm  ID Consult: ID    Goal Trough: 15-20 mcg/mL    Current Antimicrobial Therapy  IV Vanco (5/7-5/13)   Merrem (5/7-5/13)   Mycamine (5/7-5/13)      Allergies  Allergies as of 04/23/2020 - Reviewed 04/23/2020   Allergen Reaction Noted    Penicillins Hives 04/23/2020       Labs  Results from last 7 days   Lab Units 05/09/20  0459 05/08/20  0429 05/07/20  1202   BUN mg/dL 16 25* 34*   CREATININE mg/dL 0.25* 0.40* 0.57*       Results from last 7 days   Lab Units 05/09/20 0459 05/08/20  0429 05/07/20  0520   WBC 10*3/mm3 28.99* 33.29* 45.78*       Evaluation of Dosing  Ht - 172.7 cm (68\")  Wt - 78.6 kg (173 lb 4.5 oz)    Estimated Creatinine Clearance: 502.2 mL/min (A) (by C-G formula based on SCr of 0.25 mg/dL (L)).    Intake & Output (last 3 days)         05/06 0701 - 05/07 0700 05/07 0701 - 05/08 0700 05/08 0701 - 05/09 0700 05/09 0701 - 05/10 0700    P.O.   354 240    I.V. (mL/kg) 50 (0.6) 118 (1.5) 502.8 (6.5) 49.2 (0.6)    Other 611 440 571 100    NG/  636 435    IV Piggyback  1001.4 854.6 250    TPN  688 1669.5 308.6    Total Intake(mL/kg) 1102 (14.3) 2247.4 (29.1) 4587.9 (59.6) 1382.8 (17.6)    Urine (mL/kg/hr) 2250 (1.2) 1860 (1) 1695 (0.9) 375 (0.6)    Emesis/NG output  750      Other   1000     Stool   0     Total Output 2250 2610 2695 375    Net -1148 -362.6 +1892.9 +1007.8            Stool Unmeasured Occurrence   2 x             Microbiology and Radiology  Microbiology Results (last 10 days)       Procedure Component Value - Date/Time    Body Fluid Culture - Body Fluid, Peritoneum [[id number]] Collected:  05/08/20 1520    Lab Status:  Preliminary result Specimen:  Body Fluid from Peritoneum Updated:  05/09/20 0707     Body Fluid Culture No growth     Gram Stain Few (2+) WBCs seen      No organisms seen    AFB Culture - Body Fluid, Peritoneum " [[id number]] Collected:  05/08/20 1520    Lab Status:  Preliminary result Specimen:  Body Fluid from Peritoneum Updated:  05/09/20 1108     AFB Stain No acid fast bacilli seen on concentrated smear    Clostridium Difficile Toxin - Stool, Per Rectum [[id number]] Collected:  05/07/20 1410    Lab Status:  Final result Specimen:  Stool from Per Rectum Updated:  05/07/20 1541    Narrative:       The following orders were created for panel order Clostridium Difficile Toxin - Stool, Per Rectum.  Procedure                               Abnormality         Status                     ---------                               -----------         ------                     Clostridium Difficile To...[[id number]]  Normal              Final result                 Please view results for these tests on the individual orders.    Clostridium Difficile Toxin, PCR - Stool, Per Rectum [[id number]]  (Normal) Collected:  05/07/20 1410    Lab Status:  Final result Specimen:  Stool from Per Rectum Updated:  05/07/20 1541     C. Difficile Toxins by PCR Not Detected    Narrative:       Performance characteristics of test not established for patients <2 years of age.  Negative for Toxigenic C. Difficile    Respiratory Culture - Sputum, Cough [[id number]] Collected:  05/07/20 1213    Lab Status:  Final result Specimen:  Sputum from Cough Updated:  05/09/20 1054     Respiratory Culture Light growth (2+) Normal Respiratory Jordyn     Gram Stain Many (4+) WBCs per low power field      Moderate (3+) Epithelial cells per low power field      Few (2+) Gram positive cocci in pairs and clusters    Blood Culture - Blood, Arm, Left [[id number]] Collected:  05/07/20 1202    Lab Status:  Preliminary result Specimen:  Blood from Arm, Left Updated:  05/09/20 1230     Blood Culture No growth at 2 days    Narrative:       Aerobic bottle only      Blood Culture - Blood, Wrist, Left [[id number]] Collected:  05/07/20 1202    Lab Status:  Preliminary result Specimen:   Blood from Wrist, Left Updated:  05/09/20 1300     Blood Culture No growth at 2 days    Narrative:       Aerobic bottle only      SARS-CoV-2 PCR ([place] IN-HOUSE PERFORMED), NP SWAB IN TRANSPORT MEDIA - Swab, Nasopharynx [[id number]]  (Normal) Collected:  05/06/20 1840    Lab Status:  Final result Specimen:  Swab from Nasopharynx Updated:  05/07/20 0033     COVID19 Not Detected    Blood Culture - Blood, Arm, Left [[id number]] Collected:  05/03/20 1421    Lab Status:  Final result Specimen:  Blood from Arm, Left Updated:  05/08/20 1515     Blood Culture No growth at 5 days    Blood Culture - Blood, Hand, Left [[id number]] Collected:  05/03/20 1421    Lab Status:  Final result Specimen:  Blood from Hand, Left Updated:  05/08/20 1515     Blood Culture No growth at 5 days            Evaluation of Level                  Results from last 7 days   Lab Units 05/09/20  1315 05/08/20  1129   VANCOMYCIN TR mcg/mL 8.00 4.60*     ~7 hr level     Assessment/Plan:  Trough SUBtherapeutic at 8mcg/mL on Vancomycin 1000mg IV q8h. Will increase to Vancomycin 1250mg IV q8h and recheck trough prior to 4th total dose tomorrow.    Pharmacy will continue to monitor and make adjustments to dose as necessary based on renal function, cultures, labs, and clinical status.     Basilio Sood PharmD, BCPS  5/9/2020  15:00

## 2020-05-09 NOTE — PLAN OF CARE
Problem: Patient Care Overview  Goal: Plan of Care Review  Outcome: Ongoing (interventions implemented as appropriate)  Flowsheets (Taken 5/9/2020 1420)  Progress: improving  Plan of Care Reviewed With: patient  Outcome Summary: Pt increased ambulation distance to 200ft with RW and Esmer x2+1. VC's for upright posture and proper management of RW. Pt demonstrated improved balance and stability this session. Mobility limited by fatigue and c/o B LE weakness. Continue to progress as appropriate.

## 2020-05-09 NOTE — THERAPY TREATMENT NOTE
Acute Care - Occupational Therapy Treatment Note  Psychiatric     Patient Name: John Varma  : 1995  MRN: [id number]  Today's Date: 2020             Admit Date: 2020       ICD-10-CM ICD-9-CM   1. Sepsis with acute liver failure without hepatic coma or septic shock, due to unspecified organism (CMS/HCC) A41.9 038.9    R65.20 995.92    K72.00 570   2. Hypoxia R09.02 799.02   3. Acute renal failure, unspecified acute renal failure type (CMS/HCC) N17.9 584.9   4. Acute hepatitis B17.9 570   5. Alcohol abuse F10.10 305.00   6. Acute upper GI bleed K92.2 578.9   7. Elevated lactic acid level R79.89 276.2   8. Ascites due to alcoholic hepatitis K70.11 789.59   9. Leukocytosis, unspecified type D72.829 288.60   10. Sepsis with acute renal failure without septic shock, due to unspecified organism, unspecified acute renal failure type (CMS/HCC) A41.9 038.9    R65.20 995.92    N17.9 584.9   11. Oropharyngeal dysphagia R13.12 787.22     Patient Active Problem List   Diagnosis   • Acute alcoholic hepatitis   • Acute variceal GI bleeding   • BORIS (acute kidney injury) (CMS/HCC)   • Bandemia   • Acute upper GI bleeding   • Sepsis with acute renal failure without septic shock (CMS/HCC)   • Acute respiratory failure with hypoxia (CMS/HCC)   • Alcohol withdrawal delirium (CMS/HCC)   • Hepatic encephalopathy (CMS/HCC)   • Ascites due to alcoholic hepatitis   • Pleural effusion on left   • S/P TIPS (transjugular intrahepatic portosystemic shunt) 2020     Past Medical History:   Diagnosis Date   • Alcohol abuse    • GERD (gastroesophageal reflux disease)    • GIB (gastrointestinal bleeding)    • Tobacco abuse      Past Surgical History:   Procedure Laterality Date   • ARM WOUND REPAIR / CLOSURE     • ENDOSCOPY N/A 2020    Procedure: ESOPHAGOGASTRODUODENOSCOPY;  Surgeon: Brunner, Mark I, MD;  Location: [facility];  Service: Gastroenterology;  Laterality: N/A;       Therapy Treatment    Rehabilitation  Treatment Summary     Row Name 05/09/20 1404             Treatment Time/Intention    Discipline  occupational therapist  -PILAR      Document Type  therapy note (daily note)  -PILAR      Subjective Information  no complaints  -PILAR      Mode of Treatment  occupational therapy  -PILAR      Patient/Family Observations  no family present, pt. supine in bed and motivated to participate, restraints, feeding tube, picc  -PILAR      Therapy Frequency (OT Eval)  daily  -PILAR      Patient Effort  good  -PILAR      Existing Precautions/Restrictions  fall 02, feeding tube  -PILAR      Patient Response to Treatment  Pt. with no ill effects.  Asking to play his wooju Switch, which is not present.  -PILRA      Recorded by [PILAR] Lisa Robertson, OT 05/09/20 1453      Row Name 05/09/20 1404             Vital Signs    Pre Systolic BP Rehab  112  -PILAR      Pre Treatment Diastolic BP  65  -PILAR      Post Systolic BP Rehab  105  -PILAR      Post Treatment Diastolic BP  84  -PILAR      Pretreatment Heart Rate (beats/min)  109  -PILAR      Posttreatment Heart Rate (beats/min)  103  -PILAR      Pre SpO2 (%)  93  -PILAR      O2 Delivery Pre Treatment  room air  -PILAR      Post SpO2 (%)  97  -PILAR      O2 Delivery Post Treatment  room air  -PILAR      Pre Patient Position  Supine  -PILAR      Intra Patient Position  Standing  -PILAR      Post Patient Position  Supine  -PILAR      Recorded by [PILAR] Lisa Robertson, OT 05/09/20 1453      Row Name 05/09/20 1404             Cognitive Assessment/Intervention- PT/OT    Affect/Mental Status (Cognitive)  confused  -PILAR      Orientation Status (Cognition)  oriented to;person;place;disoriented to;time knows at hospital, but says [facility], + yr - month  -PILAR      Follows Commands (Cognition)  follows one step commands;75-90% accuracy;physical/tactile prompts required;repetition of directions required  -PILAR      Cognitive Function (Cognitive)  safety deficit;executive function deficit  -PILAR      Executive Function Deficit (Cognition)  judgment;problem  solving/reasoning  -PILAR      Safety Deficit (Cognitive)  moderate deficit;safety precautions awareness;safety precautions follow-through/compliance;insight into deficits/self awareness;awareness of need for assistance;judgment;problem solving  -PILAR      Recorded by [PILAR] Lisa Robertson, OT 05/09/20 1453      Row Name 05/09/20 1404             Bed Mobility Assessment/Treatment    Bed Mobility Assessment/Treatment  supine-sit;sit-supine;scooting/bridging  -PILAR      Scooting/Bridging [place] (Bed Mobility)  maximum assist (25% patient effort);nonverbal cues (demo/gesture);verbal cues  -PILAR      Supine-Sit [place] (Bed Mobility)  moderate assist (50% patient effort);2 person assist;nonverbal cues (demo/gesture);verbal cues  -PILAR      Sit-Supine [place] (Bed Mobility)  moderate assist (50% patient effort);2 person assist;nonverbal cues (demo/gesture);verbal cues  -PILAR      Bed Mobility, Safety Issues  cognitive deficits limit understanding;impaired trunk control for bed mobility;decreased use of legs for bridging/pushing;decreased use of arms for pushing/pulling  -PILAR      Assistive Device (Bed Mobility)  bed rails;head of bed elevated;draw sheet  -PILAR      Comment (Bed Mobility)  step by step cues needed  -PILAR      Recorded by [PILAR] Lisa Robertson, OT 05/09/20 1453      Row Name 05/09/20 1405             Functional Mobility    Functional Mobility- Ind. Level  minimum assist (75% patient effort);2 person assist required;nonverbal cues required (demo/gesture);verbal cues required  -PILAR      Functional Mobility- Device  rolling walker  -PILAR      Functional Mobility-Distance (Feet)  200  -PILAR      Functional Mobility- Safety Issues  sequencing ability decreased;balance decreased during turns  -PILAR      Recorded by [PILAR] Lisa Robertson, OT 05/09/20 1453      Row Name 05/09/20 1404             Transfer Assessment/Treatment    Transfer Assessment/Treatment  sit-stand transfer;stand-sit transfer  -PILAR      Comment (Transfers)   cues for hand placement  -PILAR      Recorded by [PILAR] Lisa Robertson, OT 05/09/20 1453      Row Name 05/09/20 1404             Sit-Stand Transfer    Sit-Stand [place] (Transfers)  minimum assist (75% patient effort);2 person assist;nonverbal cues (demo/gesture);verbal cues  -PILAR      Assistive Device (Sit-Stand Transfers)  walker, front-wheeled  -PILAR      Recorded by [PILAR] Lisa Robertson, OT 05/09/20 1453      Row Name 05/09/20 1404             Stand-Sit Transfer    Stand-Sit [place] (Transfers)  minimum assist (75% patient effort);2 person assist;nonverbal cues (demo/gesture);verbal cues  -PILAR      Assistive Device (Stand-Sit Transfers)  walker, front-wheeled  -PILAR      Recorded by [PILAR] Lisa Robertson, OT 05/09/20 1453      Row Name 05/09/20 1409             ADL Assessment/Intervention    23590 - OT Self Care/Mgmt Minutes  6  -PILAR      BADL Assessment/Intervention  grooming;upper body dressing;lower body dressing  -PILAR      Recorded by [PILAR] Lisa Robertson, OT 05/09/20 1453      Row Name 05/09/20 1404             Upper Body Dressing Assessment/Training    Upper Body Dressing [place] Level  doff;don;pajama/robe;maximum assist (25% patient effort)  -PILAR      Upper Body Dressing Position  edge of bed sitting  -PILAR      Comment (Upper Body Dressing)  able to help some with LUE  -PILAR      Recorded by [PILAR] Lisa Robertson, OT 05/09/20 1453      Row Name 05/09/20 1404             Lower Body Dressing Assessment/Training    Lower Body Dressing [place] Level  don;socks;dependent (less than 25% patient effort)  -PILAR      Lower Body Dressing Position  supine  -PILAR      Recorded by [PILAR] Lisa Robertson, OT 05/09/20 1453      Row Name 05/09/20 1404             Grooming Assessment/Training    [place] Level (Grooming)  hair care, combing/brushing;moderate assist (50% patient effort)  -PILAR      Grooming Position  supported sitting  -PILAR      Comment (Grooming)  EOB  -PILAR      Recorded by [PILAR] Lisa Robertson, OT 05/09/20  1453      Row Name 05/09/20 1404             BADL Safety/Performance    Impairments, BADL Safety/Performance  balance;cognition;endurance/activity tolerance;coordination;strength  -PILAR      Cognitive Impairments, BADL Safety/Performance  judgment;problem solving/reasoning;safety precaution awareness;safety precaution follow-through;sequencing abilities;insight into deficits/self awareness;awareness, need for assistance  -PILAR      Skilled BADL Treatment/Intervention  hand-over-hand training/cues;cognitive/safety deficit modifications  -PILAR      Progress in BADL Status  independence level  -PILAR      Recorded by [PILAR] Lisa Robertson, OT 05/09/20 1453      Row Name 05/09/20 1404             Therapeutic Exercise    56737 - OT Therapeutic Exercise Minutes  11  -PILAR      91313 - OT Therapeutic Activity Minutes  6  -PILAR      Recorded by [PILAR] Lisa Robertson, OT 05/09/20 1453      Row Name 05/09/20 1404             Upper Extremity Seated Therapeutic Exercise    Performed, Seated Upper Extremity (Therapeutic Exercise)  shoulder flexion/extension;shoulder abduction/adduction;shoulder horizontal abduction/adduction;elbow flexion/extension  -PILAR      Exercise Type, Seated Upper Extremity (Therapeutic Exercise)  AROM (active range of motion);AAROM (active assistive range of motion);resistive exercise  -PILAR      Sets/Reps Detail, Seated Upper Extremity (Therapeutic Exercise)  1-2/10 2 sets biceps and triceps  -PILAR      Comment, Seated Upper Extremity (Therapeutic Exercise)  BUE, some assist for fullly range in shoulder, resistance min biceps and triceps  -PILAR      Recorded by [PILAR] Lisa Robertson, OT 05/09/20 1453      Row Name 05/09/20 1404             Static Sitting Balance    Level of [place] (Unsupported Sitting, Static Balance)  standby assist  -PILAR      Sitting Position (Unsupported Sitting, Static Balance)  sitting on edge of bed  -PILAR      Time Able to Maintain Position (Unsupported Sitting, Static Balance)  2 to 3 minutes  -PILAR       Recorded by [PILAR] Lisa Robertson, OT 05/09/20 1453      Row Name 05/09/20 1404             Dynamic Sitting Balance    Level of [place], Reaches Outside Midline (Sitting, Dynamic Balance)  standby assist  -PILAR      Sitting Position, Reaches Outside Midline (Sitting, Dynamic Balance)  sitting on edge of bed  -PILAR      Comment, Reaches Outside Midline (Sitting, Dynamic Balance)  combing hair  -PILAR      Recorded by [PILAR] Lisa Robertson, OT 05/09/20 1453      Row Name 05/09/20 1404             Static Standing Balance    Level of [place] (Supported Standing, Static Balance)  minimal assist, 75% patient effort;2 person assist  -PILAR      Assistive Device Utilized (Supported Standing, Static Balance)  walker, rolling  -PILAR      Recorded by [PILAR] Lisa Robertson, OT 05/09/20 1453      Row Name 05/09/20 1404             Dynamic Standing Balance    Level of [place], Reaches Outside Midline (Standing, Dynamic Balance)  minimal assist, 75% patient effort;2 person assist  -PILAR      Assistive Device Utilized (Supported Standing, Dynamic Balance)  walker, rolling  -PILAR      Recorded by [PILAR] Lisa oRbertson, OT 05/09/20 1453      Row Name 05/09/20 1404             Positioning and Restraints    Pre-Treatment Position  in bed  -PILAR      Post Treatment Position  bed  -PILAR      In Bed  supine;call light within reach;encouraged to call for assist;exit alarm on;RUE elevated;LUE elevated;legs elevated  -PILAR      Restraints  soft limb;reapplied:;released:  -PILAR      Recorded by [PILAR] Lisa Robertson, OT 05/09/20 1453      Row Name 05/09/20 1404             Pain Scale: Numbers Pre/Post-Treatment    Pain Scale: Numbers, Pretreatment  0/10 - no pain  -PILAR      Pain Scale: Numbers, Post-Treatment  0/10 - no pain  -PILAR      Recorded by [PILAR] Lisa Robertson, OT 05/09/20 1453      Row Name                Wound 05/09/20 0800 Right lower flank Puncture    Wound - Properties Group Date first assessed: 05/09/20 [CB] Time first assessed: 0800 [CB]  Present on Hospital Admission: N [CB] Side: Right [CB] Orientation: lower [CB] Location: flank [CB] Primary Wound Type: Puncture [CB], paracentesis  Recorded by:  [CB] Paul Sood RN 05/09/20 1027    Row Name 05/09/20 1404             Outcome Summary/Treatment Plan (OT)    Daily Summary of Progress (OT)  progress toward functional goals is good  -PILAR      Barriers to Overall Progress (OT)  confusion  -PILAR      Plan for Continued Treatment (OT)  cont OT POC  -PILAR      Anticipated Discharge Disposition (OT)  inpatient rehabilitation facility  -PILAR      Recorded by [PILAR] Lisa Robertson OT 05/09/20 1453        User Key  (r) = Recorded By, (t) = Taken By, (c) = Cosigned By    Initials Name Effective Dates Discipline    Lisa Chaudhry OT 06/08/18 -  OT    CB Paul Sood RN 06/19/19 -  Nurse        Wound 05/09/20 0800 Right lower flank Puncture (Active)   Dressing Appearance open to air 5/9/2020 12:00 PM   Closure Liquid skin adhesive 5/9/2020 12:00 PM   Base dry 5/9/2020 12:00 PM   Drainage Amount none 5/9/2020 12:00 PM     Rehab Goal Summary     Row Name 05/09/20 1404             Transfer Goal 1 (OT)    Progress/Outcome (Transfer Goal 1, OT)  goal partially met met from EOB only  -PILAR         Dressing Goal 1 (OT)    Progress/Outcome (Dressing Goal 1, OT)  goal ongoing  -PILAR         Grooming Goal 1 (OT)    Progress/Outcome (Grooming Goal 1, OT)  continuing progress toward goal  -PILAR         Orientation Goal 1 (OT)    Progress/Outcome (Orientation Goal 1, OT)  continuing progress toward goal met person, type place and yr  -PILAR        User Key  (r) = Recorded By, (t) = Taken By, (c) = Cosigned By    Initials Name Provider Type Discipline    Lisa Chaudhry, OT Occupational Therapist OT        Occupational Therapy Education                 Title: PT OT SLP Therapies (In Progress)     Topic: Occupational Therapy (In Progress)     Point: ADL training (In Progress)     Description:   Instruct learner(s) on proper  safety adaptation and remediation techniques during self care or transfers.   Instruct in proper use of assistive devices.              Learning Progress Summary           Patient Acceptance, E,D, NR by PILAR at 5/9/2020 1404    Comment:  re-orientation, UE TE, benefits of activity, bed mobility, walker safety    Acceptance, E, NR by CL at 5/7/2020 1417    Acceptance, E,D, NR by JY at 5/6/2020 1523    Acceptance, E, NR by CL at 5/4/2020 1110    Acceptance, E, NR by CL at 5/2/2020 1059                   Point: Home exercise program (In Progress)     Description:   Instruct learner(s) on appropriate technique for monitoring, assisting and/or progressing therapeutic exercises/activities.              Learning Progress Summary           Patient Acceptance, E,D, NR by PILAR at 5/9/2020 1404    Comment:  re-orientation, UE TE, benefits of activity, bed mobility, walker safety    Acceptance, E,D, NR by JY at 5/6/2020 1523    Acceptance, E, NR by CL at 5/4/2020 1110                   Point: Precautions (In Progress)     Description:   Instruct learner(s) on prescribed precautions during self-care and functional transfers.              Learning Progress Summary           Patient Acceptance, E,D, NR by PILAR at 5/9/2020 1404    Comment:  re-orientation, UE TE, benefits of activity, bed mobility, walker safety    Acceptance, E, NR by CL at 5/7/2020 1417    Acceptance, E,D, NR by JY at 5/6/2020 1523    Acceptance, E, NR by CL at 5/4/2020 1110    Acceptance, E, NR by CL at 5/2/2020 1059                   Point: Body mechanics (In Progress)     Description:   Instruct learner(s) on proper positioning and spine alignment during self-care, functional mobility activities and/or exercises.              Learning Progress Summary           Patient Acceptance, E,D, NR by JY at 5/6/2020 1523    Acceptance, E, NR by CL at 5/4/2020 1110    Acceptance, E, NR by CL at 5/2/2020 1059                               User Key     Initials Effective Dates  Name Provider Type Discipline    PILAR 06/08/18 -  Lisa Robertson, OT Occupational Therapist OT    CL 04/03/18 -  Selena Terrell, OT Occupational Therapist OT    JY 01/29/20 -  Mary Quevedo OT Occupational Therapist OT                OT Recommendation and Plan  Outcome Summary/Treatment Plan (OT)  Daily Summary of Progress (OT): progress toward functional goals is good  Barriers to Overall Progress (OT): confusion  Plan for Continued Treatment (OT): cont OT POC  Anticipated Discharge Disposition (OT): inpatient rehabilitation facility  Therapy Frequency (OT Eval): daily  Daily Summary of Progress (OT): progress toward functional goals is good  Plan of Care Review  Plan of Care Reviewed With: patient  Plan of Care Reviewed With: patient  Outcome Summary: Pt. oriented to person, type place and year.  Pt. still with some confusion and hallucinations thinking his game system was present for him to play and nurse and OT lying about it.  Pt. mod of 2 with bed mobility, but only min A of 2 transfers and mobility with continuous cues needed.  Pt. needed mod hand over hand assist to comb hair with decreased sequencing. Continue OT POC.  Outcome Measures     Row Name 05/09/20 1404 05/07/20 1417 05/06/20 1523       How much help from another is currently needed...    Putting on and taking off regular lower body clothing?  2  -PILAR  1  -CL  1  -JY    Bathing (including washing, rinsing, and drying)  2  -PILAR  1  -CL  1  -JY    Toileting (which includes using toilet bed pan or urinal)  2  -PILAR  2  -CL  2  -JY    Putting on and taking off regular upper body clothing  2  -PILAR  2  -CL  2  -JY    Taking care of personal grooming (such as brushing teeth)  2  -PILAR  2  -CL  2  -JY    Eating meals  3  -PILAR  3  -CL  1  -JY    AM-PAC 6 Clicks Score (OT)  13  -PILAR  11  -CL  9  -JY       Functional Assessment    Outcome Measure Options  AM-PAC 6 Clicks Daily Activity (OT)  -PILAR  AM-PAC 6 Clicks Daily Activity (OT)  -CL  AM-PAC 6 Clicks Daily Activity  (OT)  -NANCY      User Key  (r) = Recorded By, (t) = Taken By, (c) = Cosigned By    Initials Name Provider Type    Lisa Chaudhry, OT Occupational Therapist    Selena Martínez, OT Occupational Therapist    Mary Lizarraga, OT Occupational Therapist           Time Calculation:   Time Calculation- OT     Row Name 05/09/20 1500 05/09/20 1404          Time Calculation- OT    OT Start Time  1404  -PILAR  --     OT Received On  05/09/20  -PILAR  --     OT Goal Re-Cert Due Date  05/12/20  -PILAR  --        Timed Charges    99333 - OT Therapeutic Exercise Minutes  --  11  -PILAR     83386 - OT Therapeutic Activity Minutes  --  6  -PILAR     47954 - OT Self Care/Mgmt Minutes  --  6  -PILAR       User Key  (r) = Recorded By, (t) = Taken By, (c) = Cosigned By    Initials Name Provider Type    Lisa Chaudhry OT Occupational Therapist        Therapy Charges for Today     Code Description Service Date Service Provider Modifiers Qty    [id number]  OT THER PROC EA 15 MIN 5/9/2020 Lisa Robertson OT GO 1    [id number]  OT THERAPEUTIC ACT EA 15 MIN 5/9/2020 Lisa Robertson, OT GO 1               Lisa Robertson OT  5/9/2020

## 2020-05-09 NOTE — PLAN OF CARE
Problem: Patient Care Overview  Goal: Plan of Care Review  Outcome: Ongoing (interventions implemented as appropriate)  Flowsheets (Taken 5/9/2020 8793)  Progress: improving  Plan of Care Reviewed With: patient  Outcome Summary: Pt. oriented to person, type place and year.  Pt. still with some confusion and hallucinations thinking his game system was present for him to play and nurse and OT lying about it.  Pt. mod of 2 with bed mobility, but only min A of 2 transfers and mobility with continuous cues needed.  Pt. needed mod hand over hand assist to comb hair with decreased sequencing. Continue OT POC.

## 2020-05-09 NOTE — PLAN OF CARE
Problem: Patient Care Overview  Goal: Plan of Care Review  Outcome: Ongoing (interventions implemented as appropriate)  Note:   VSS, A/O x4. Alert but disoriented to place and situation. GOINS and follows commands. Pt was drowsy most the night. UOP. No oral intake. Tube fed was increased per order and TPN was decreased. Father was called and updated. Will continue to monitor.   Goal: Individualization and Mutuality  Outcome: Ongoing (interventions implemented as appropriate)  Goal: Discharge Needs Assessment  Outcome: Ongoing (interventions implemented as appropriate)  Goal: Interprofessional Rounds/Family Conf  Outcome: Ongoing (interventions implemented as appropriate)     Problem: Skin Injury Risk (Adult)  Goal: Skin Health and Integrity  Outcome: Ongoing (interventions implemented as appropriate)     Problem: Fall Risk (Adult)  Goal: Absence of Fall  Outcome: Ongoing (interventions implemented as appropriate)     Problem: Restraint, Nonbehavioral (Nonviolent)  Goal: Rationale and Justification  Outcome: Ongoing (interventions implemented as appropriate)  Goal: Nonbehavioral (Nonviolent) Restraint: Absence of Injury/Harm  Outcome: Ongoing (interventions implemented as appropriate)  Goal: Nonbehavioral (Nonviolent) Restraint: Achievement of Discontinuation Criteria  Outcome: Ongoing (interventions implemented as appropriate)  Goal: Nonbehavioral (Nonviolent) Restraint: Preservation of Dignity and Wellbeing  Outcome: Ongoing (interventions implemented as appropriate)

## 2020-05-09 NOTE — THERAPY TREATMENT NOTE
Patient Name: John Varma  : 1995    MRN: [id number]                              Today's Date: 2020       Admit Date: 2020    Visit Dx:     ICD-10-CM ICD-9-CM   1. Sepsis with acute liver failure without hepatic coma or septic shock, due to unspecified organism (CMS/HCC) A41.9 038.9    R65.20 995.92    K72.00 570   2. Hypoxia R09.02 799.02   3. Acute renal failure, unspecified acute renal failure type (CMS/HCC) N17.9 584.9   4. Acute hepatitis B17.9 570   5. Alcohol abuse F10.10 305.00   6. Acute upper GI bleed K92.2 578.9   7. Elevated lactic acid level R79.89 276.2   8. Ascites due to alcoholic hepatitis K70.11 789.59   9. Leukocytosis, unspecified type D72.829 288.60   10. Sepsis with acute renal failure without septic shock, due to unspecified organism, unspecified acute renal failure type (CMS/HCC) A41.9 038.9    R65.20 995.92    N17.9 584.9   11. Oropharyngeal dysphagia R13.12 787.22     Patient Active Problem List   Diagnosis   • Acute alcoholic hepatitis   • Acute variceal GI bleeding   • BORIS (acute kidney injury) (CMS/HCC)   • Bandemia   • Acute upper GI bleeding   • Sepsis with acute renal failure without septic shock (CMS/HCC)   • Acute respiratory failure with hypoxia (CMS/HCC)   • Alcohol withdrawal delirium (CMS/HCC)   • Hepatic encephalopathy (CMS/HCC)   • Ascites due to alcoholic hepatitis   • Pleural effusion on left   • S/P TIPS (transjugular intrahepatic portosystemic shunt) 2020     Past Medical History:   Diagnosis Date   • Alcohol abuse    • GERD (gastroesophageal reflux disease)    • GIB (gastrointestinal bleeding)    • Tobacco abuse      Past Surgical History:   Procedure Laterality Date   • ARM WOUND REPAIR / CLOSURE     • ENDOSCOPY N/A 2020    Procedure: ESOPHAGOGASTRODUODENOSCOPY;  Surgeon: Brunner, Mark I, MD;  Location: [facility] ENDOSCOPY;  Service: Gastroenterology;  Laterality: N/A;     General Information     Row Name 20 1601          PT Evaluation  Time/Intention    Document Type  therapy note (daily note)  -KR     Mode of Treatment  physical therapy  -KR     Row Name 05/09/20 1601          General Information    Existing Precautions/Restrictions  fall;other (see comments) NG  -KR     Row Name 05/09/20 1601          Cognitive Assessment/Intervention- PT/OT    Orientation Status (Cognition)  oriented to;person;place  -KR     Row Name 05/09/20 1601          Safety Issues, Functional Mobility    Safety Issues Affecting Function (Mobility)  ability to follow commands;awareness of need for assistance;insight into deficits/self awareness;safety precaution awareness;safety precautions follow-through/compliance  -KR     Impairments Affecting Function (Mobility)  balance;cognition;endurance/activity tolerance;postural/trunk control;strength  -KR       User Key  (r) = Recorded By, (t) = Taken By, (c) = Cosigned By    Initials Name Provider Type    KR Nataliya Patel, PT Physical Therapist        Mobility     Row Name 05/09/20 1602          Bed Mobility Assessment/Treatment    Bed Mobility Assessment/Treatment  sit-supine  -KR     Sit-Supine [place] (Bed Mobility)  moderate assist (50% patient effort);2 person assist;verbal cues  -KR     Assistive Device (Bed Mobility)  draw sheet;head of bed elevated  -KR     Comment (Bed Mobility)  VC's for sequencing. Pt required assistance at trunk and BLEs when returning to supine.   -KR     Row Name 05/09/20 1602          Transfer Assessment/Treatment    Comment (Transfers)  VC's for sequencing and safe hand placement.   -KR     Row Name 05/09/20 1602          Sit-Stand Transfer    Sit-Stand [place] (Transfers)  minimum assist (75% patient effort);2 person assist;verbal cues  -KR     Assistive Device (Sit-Stand Transfers)  walker, front-wheeled  -KR     Row Name 05/09/20 1602          Gait/Stairs Assessment/Training    Gait/Stairs Assessment/Training  gait/ambulation assistive device  -KR     [place] Level (Gait)   minimum assist (75% patient effort);2 person assist;verbal cues  -KR     Assistive Device (Gait)  walker, front-wheeled  -KR     Distance in Feet (Gait)  200  -KR     Pattern (Gait)  step-through  -KR     Deviations/Abnormal Patterns (Gait)  sherwin decreased;stride length decreased  -KR     Bilateral Gait Deviations  forward flexed posture;heel strike decreased  -KR     Comment (Gait/Stairs)  Pt demonstrated step through gait pattern with slow sherwin. VC's for upright posture and to keep RW close with feet inside middle. Pt motivated to progress ambulation distance; demonstrated improved balance and coordination this date. Mobility limited by c/o increased B LE weakness.   -KR       User Key  (r) = Recorded By, (t) = Taken By, (c) = Cosigned By    Initials Name Provider Type    Nataliya Bourgeois, PT Physical Therapist        Obj/Interventions     Row Name 05/09/20 1603          Static Sitting Balance    Level of [place] (Unsupported Sitting, Static Balance)  contact guard assist  -KR     Sitting Position (Unsupported Sitting, Static Balance)  sitting on edge of bed  -KR     Row Name 05/09/20 1603          Static Standing Balance    Level of [place] (Supported Standing, Static Balance)  minimal assist, 75% patient effort  -KR     Assistive Device Utilized (Supported Standing, Static Balance)  walker, rolling  -KR     Row Name 05/09/20 1603          Dynamic Standing Balance    Level of [place], Reaches Outside Midline (Standing, Dynamic Balance)  minimal assist, 75% patient effort;2 person assist  -KR     Assistive Device Utilized (Supported Standing, Dynamic Balance)  walker, rolling  -KR       User Key  (r) = Recorded By, (t) = Taken By, (c) = Cosigned By    Initials Name Provider Type    Nataliya Bourgeois, PT Physical Therapist        Goals/Plan    No documentation.       Clinical Impression     Row Name 05/09/20 1604          Pain Assessment    Additional Documentation  Pain Scale: Numbers  Pre/Post-Treatment (Group)  -KR     Row Name 05/09/20 1604          Pain Scale: Numbers Pre/Post-Treatment    Pain Scale: Numbers, Pretreatment  0/10 - no pain  -KR     Pain Scale: Numbers, Post-Treatment  0/10 - no pain  -KR     Row Name 05/09/20 1604          Vital Signs    Pre Systolic BP Rehab  112  -KR     Pre Treatment Diastolic BP  65  -KR     Post Systolic BP Rehab  105  -KR     Post Treatment Diastolic BP  84  -KR     Pretreatment Heart Rate (beats/min)  106  -KR     Posttreatment Heart Rate (beats/min)  100  -KR     Pre SpO2 (%)  94  -KR     O2 Delivery Pre Treatment  room air  -KR     Post SpO2 (%)  92  -KR     O2 Delivery Post Treatment  room air  -KR     Pre Patient Position  Sitting  -KR     Intra Patient Position  Standing  -KR     Post Patient Position  Supine  -KR     Row Name 05/09/20 1604          Positioning and Restraints    Pre-Treatment Position  in bed  -KR     Post Treatment Position  bed  -KR     In Bed  notified nsg;supine;call light within reach;encouraged to call for assist;exit alarm on;side rails up x3;RUE elevated;LUE elevated  -KR       User Key  (r) = Recorded By, (t) = Taken By, (c) = Cosigned By    Initials Name Provider Type    Nataliya Bourgeois, PT Physical Therapist        Outcome Measures     Row Name 05/09/20 1604          How much help from another person do you currently need...    Turning from your back to your side while in flat bed without using bedrails?  3  -KR     Moving from lying on back to sitting on the side of a flat bed without bedrails?  2  -KR     Moving to and from a bed to a chair (including a wheelchair)?  3  -KR     Standing up from a chair using your arms (e.g., wheelchair, bedside chair)?  3  -KR     Climbing 3-5 steps with a railing?  1  -KR     To walk in hospital room?  3  -KR     AM-PAC 6 Clicks Score (PT)  15  -KR     Row Name 05/09/20 1604          Functional Assessment    Outcome Measure Options  AM-PAC 6 Clicks Basic Mobility (PT)  -KR        User Key  (r) = Recorded By, (t) = Taken By, (c) = Cosigned By    Initials Name Provider Type    Nataliya Bourgeois PT Physical Therapist        Physical Therapy Education                 Title: PT OT SLP Therapies (In Progress)     Topic: Physical Therapy (In Progress)     Point: Mobility training (In Progress)     Description:   Instruct learner(s) on safety and technique for assisting patient out of bed, chair or wheelchair.  Instruct in the proper use of assistive devices, such as walker, crutches, cane or brace.              Patient Friendly Description:   It's important to get you on your feet again, but we need to do so in a way that is safe for you. Falling has serious consequences, and your personal safety is the most important thing of all.        When it's time to get out of bed, one of us or a family member will sit next to you on the bed to give you support.     If your doctor or nurse tells you to use a walker, crutches, a cane, or a brace, be sure you use it every time you get out of bed, even if you think you don't need it.    Learning Progress Summary           Patient Acceptance, E, NR by KR at 5/9/2020 1420    Acceptance, E,D, VU,NR by LS at 5/8/2020 1612    Nonacceptance, E, NR,NL by SINDY at 5/7/2020 1508    Acceptance, E,D, VU,NR by MP at 5/7/2020 1120    Acceptance, E,D, VU,NR by LS at 5/6/2020 1552    Acceptance, E, NR by KR at 5/5/2020 1426    Acceptance, E,D, NR by LS at 5/4/2020 1605    Acceptance, E,D, NR by LS at 5/2/2020 1507    Acceptance, E,D, NR by LS at 4/29/2020 1009                   Point: Home exercise program (In Progress)     Description:   Instruct learner(s) on appropriate technique for monitoring, assisting and/or progressing patient with therapeutic exercises and activities.              Learning Progress Summary           Patient Acceptance, E, NR by KR at 5/9/2020 1420    Acceptance, E,D, VU,NR by LS at 5/8/2020 1612    Nonacceptance, E, NR,NL by SINDY at 5/7/2020 1508     Acceptance, E,D, VU,NR by LS at 5/6/2020 1552    Acceptance, E, NR by KR at 5/5/2020 1426    Acceptance, E,D, NR by LS at 5/4/2020 1605    Acceptance, E,D, NR by LS at 5/2/2020 1507    Acceptance, E,D, NR by LS at 4/29/2020 1009                   Point: Body mechanics (In Progress)     Description:   Instruct learner(s) on proper positioning and spine alignment for patient and/or caregiver during mobility tasks and/or exercises.              Learning Progress Summary           Patient Acceptance, E, NR by KR at 5/9/2020 1420    Acceptance, E,D, VU,NR by LS at 5/8/2020 1612    Nonacceptance, E, NR,NL by SINDY at 5/7/2020 1508    Acceptance, E,D, VU,NR by MP at 5/7/2020 1120    Acceptance, E,D, VU,NR by LS at 5/6/2020 1552    Acceptance, E, NR by KR at 5/5/2020 1426    Acceptance, E,D, NR by LS at 5/4/2020 1605    Acceptance, E,D, NR by LS at 5/2/2020 1507    Acceptance, E,D, NR by LS at 4/29/2020 1009                   Point: Precautions (In Progress)     Description:   Instruct learner(s) on prescribed precautions during mobility and gait tasks              Learning Progress Summary           Patient Acceptance, E, NR by KR at 5/9/2020 1420    Acceptance, E,D, VU,NR by LS at 5/8/2020 1612    Nonacceptance, E, NR,NL by SINDY at 5/7/2020 1508    Acceptance, E,D, VU,NR by MP at 5/7/2020 1120    Acceptance, E,D, VU,NR by LS at 5/6/2020 1552    Acceptance, E, NR by KR at 5/5/2020 1426    Acceptance, E,D, NR by LS at 5/4/2020 1605    Acceptance, E,D, NR by LS at 5/2/2020 1507    Acceptance, E,D, NR by LS at 4/29/2020 1009                               User Key     Initials Effective Dates Name Provider Type Discipline     06/19/15 -  Savanah Matthews, PT Physical Therapist PT    KR 04/03/18 -  Nataliya Patel, PT Physical Therapist PT    SINDY 11/19/18 -  Piedad Lao, RN Registered Nurse Nurse    MP 11/06/19 -  Michael Allen, RODOLFO Physical Therapist PT              PT Recommendation and Plan     Plan of Care Reviewed With:  patient  Progress: improving  Outcome Summary: Pt increased ambulation distance to 200ft with RW and Esmer x2+1. VC's for upright posture and proper management of RW. Pt demonstrated improved balance and stability this session. Mobility limited by fatigue and c/o B LE weakness. Continue to progress as appropriate.     Time Calculation:   PT Charges     Row Name 05/09/20 1420             Time Calculation    Start Time  1420  -KR      PT Received On  05/09/20  -KR      PT Goal Re-Cert Due Date  05/12/20  -KR         Time Calculation- PT    Total Timed Code Minutes- PT  15 minute(s)  -KR         Timed Charges    80346 - Gait Training Minutes   15  -KR        User Key  (r) = Recorded By, (t) = Taken By, (c) = Cosigned By    Initials Name Provider Type    Nataliya Bourgeois, PT Physical Therapist        Therapy Charges for Today     Code Description Service Date Service Provider Modifiers Qty    09417000123 HC GAIT TRAINING EA 15 MIN 5/9/2020 Nataliya Patel PT GP 1          PT G-Codes  Outcome Measure Options: AM-PAC 6 Clicks Basic Mobility (PT)  AM-PAC 6 Clicks Score (PT): 15  AM-PAC 6 Clicks Score (OT): 13    Yessica Patel PT  5/9/2020

## 2020-05-09 NOTE — POST-PROCEDURE NOTE
Interventional Radiology Operative Note    Date: 05/08/20     Time: 20:30     Pre-op Diagnosis: Liver disease. Ascites  Post-op Diagnosis: Same    Procedure: U/S guided paracentesis (bedside)    Assistants: None    Sedation: None    Estimated Blood Loss (EBL): Trace     Urine Output (UOP): N/A (short procedure)    IVF: N/A (short procedure)    Findings: Small volume ascites    Specimens: 1000 mL serous fluid    Complications: No immediate     Disposition: Stable. Remain in ICU

## 2020-05-09 NOTE — PROGRESS NOTES
Intensive Care Follow-up     Hospital:  LOS: 16 days   Mr. John Varma, 24 y.o. male is followed for:   Acute GI bleeding            History of present illness:   24-year-old male with past medical history of alcoholism, alcoholic cirrhosis, tobacco abuse admitted on April 23 with alcoholic hepatitis, decompensated cirrhosis, acute kidney injury and hepatic encephalopathy.  Patient also was having hematemesis so underwent EGD on 4/24.  Patient was intubated for EGD and extubated on April 27.  Underwent TIPS procedure on April 29 for which he was intubated but extubated again on May 1.  Patient has undergone paracentesis x3 on April 24, 5 L, April 30 2.5 L, May 8 1 L.      Subjective   Interval History:  Overnight no acute events.  Patient is tolerating tube feeds okay.  TPN is being tapered down and tube feeds are increased.  Hemodynamically okay overnight.                 The patient's past medical, surgical and social history were reviewed and updated in Epic as appropriate.       Objective     Infusions:    Adult Central 2-in-1 TPN  Last Rate: 35 mL/hr at 05/09/20 1205   Pharmacy to Dose TPN     Pharmacy to dose vancomycin       Medications:    Pharmacy Consult  Does not apply Once   enoxaparin 40 mg Subcutaneous Q24H   Fat Emul Fish Oil/Plant Based 250 mL Intravenous Q24H (TPN)   folic acid 1 mg Oral Daily   insulin detemir 15 Units Subcutaneous Daily   insulin regular 0-7 Units Subcutaneous Q6H   insulin regular 5 Units Subcutaneous Q6H   lactulose 20 g Nasogastric BID   melatonin 5 mg Oral Nightly   meropenem 1 g Intravenous Q8H   metoclopramide 5 mg Intravenous Q6H   micafungin (MYCAMINE)  mg Intravenous Q24H   multivitamin and minerals 15 mL Oral Daily   pantoprazole 40 mg Oral Q AM   PRO-STAT 30 mL Nasogastric BID   propranolol 10 mg Oral Q8H   QUEtiapine 100 mg Oral Nightly   QUEtiapine 50 mg Oral Daily   rifAXIMin 550 mg Nasogastric Q12H   thiamine 100 mg Oral Daily   vancomycin 1,000 mg  Intravenous Q8H   zinc gluconate 50 mg Oral Daily       Vital Sign Min/Max for last 24 hours  Temp  Min: 98.1 °F (36.7 °C)  Max: 100.1 °F (37.8 °C)   BP  Min: 88/55  Max: 157/72   Pulse  Min: 101  Max: 118   Resp  Min: 26  Max: 28   SpO2  Min: 85 %  Max: 99 %   No data recorded       Input/Output for last 24 hour shift  05/08 0701 - 05/09 0700  In: 4587.9 [P.O.:354; I.V.:502.8]  Out: 2695 [Urine:1695]      Objective     General Appearance: Awake, lethargic, in no acute distress  Head:   Pupils reactive & symmetrical B/L.  Lungs:   B/L Breath sounds present with decreased breath sounds on bases, no wheezing heard, occ crackles.   Heart: S1 and S2 present, no murmur  Abdomen: Soft, distended, non-tender, no guarding or rigidity.  Extremities: Atraumatic, no cyanosis or clubbing,  2+ edema, warm to touch.  Pulses: Positive and symmetric.  Neurologic:  Moving all four extremities.  Generalized weak    Results from last 7 days   Lab Units 05/09/20  0459 05/08/20  0429 05/07/20  0520   WBC 10*3/mm3 28.99* 33.29* 45.78*   HEMOGLOBIN g/dL 8.5* 9.3* 10.5*   PLATELETS 10*3/mm3 257 308 316     Results from last 7 days   Lab Units 05/09/20  0459 05/08/20  0429 05/07/20  1202 05/07/20  0639   SODIUM mmol/L 138 141 143 143   POTASSIUM mmol/L 4.0 3.9 4.7 4.3   CO2 mmol/L 20.0* 21.0* 21.0* 22.0   BUN mg/dL 16 25* 34* 37*   CREATININE mg/dL 0.25* 0.40* 0.57* 0.63*   MAGNESIUM mg/dL 2.0 2.0  --  2.3   PHOSPHORUS mg/dL 3.6 3.9  --  5.2*   GLUCOSE mg/dL 145* 145* 96 97     Estimated Creatinine Clearance: 502.2 mL/min (A) (by C-G formula based on SCr of 0.25 mg/dL (L)).    Results from last 7 days   Lab Units 05/03/20  0115   PH, ARTERIAL pH units 7.561*   PCO2, ARTERIAL mm Hg 23.4*   PO2 ART mm Hg 188.0*       Images:   None new    I reviewed the patient's results and images.     Assessment/Plan   Impression        Acute variceal GI bleeding    Acute alcoholic hepatitis    BORIS (acute kidney injury) (CMS/HCC)    Bandemia    Acute upper  GI bleeding    Sepsis with acute renal failure without septic shock (CMS/HCC)    Acute respiratory failure with hypoxia (CMS/HCC)    Alcohol withdrawal delirium (CMS/HCC)    Hepatic encephalopathy (CMS/HCC)    Ascites due to alcoholic hepatitis    Pleural effusion on left    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       Plan        1.  Patient with ongoing sepsis and thought to have intra-abdominal infection.  On broad-spectrum antibiotics per ID team.  Also on antifungal agents.  Awaiting cultures.  Repeat paracentesis shows improvement in leukocytosis in the fluid.  Cultures remain negative.    2.  Portosystemic encephalopathy and patient is on rifaximin and lactulose.  Will check ammonia level again in the morning.  Patient is on Seroquel as well.  If things are not improving will plan on cutting down on Seroquel tomorrow.    3.  Patient with refractory ascites and fluid overload.  Will start patient on low-dose Lasix and spironolactone.  Monitor closely.  Has had 3 paracentesis but significant recurrence of ascites.  Does have significant hypoalbuminemia as well.  Will monitor.  On propranolol for portal hypertension.    4.  Increase his tube feeds and wean off TPN.    5.  Remains quite lethargic.  Likely metabolic components playing a role along with encephalopathy..    6.  Monitor blood glucose and insulin as needed for hyperglycemia management.    7.  GI and DVT prophylaxis.    Overall prognosis is guarded.  We will continue supportive care.    Plan of care and goals reviewed with mulitdisciplinary/antibiotic stewardship team during rounds.   I discussed the patient's findings and my recommendations with nursing staff     High level of risk due to:  illness with threat to life or bodily function.    Time spent Critical care 35 min (exclusive of procedure time)  including high complexity decision making to assess, manipulate, and support vital organ system failure in this individual  who has impairment of one or more vital organ systems such that there is a high probability of imminent or life threatening deterioration in the patient’s condition.      Ariel Galindo MD, [facility]  Pulmonary, Critical care and Sleep Medicine

## 2020-05-09 NOTE — PLAN OF CARE
Problem: Patient Care Overview  Goal: Plan of Care Review  Outcome: Ongoing (interventions implemented as appropriate)  Flowsheets (Taken 5/9/2020 6051)  Plan of Care Reviewed With: patient; father  Outcome Summary: Patient oriented to person, place, situation and sometimes date. GOINS, follows commands. Some confusion/hallucinations regarding friends being at the hospital with him. VSS. BM X1. Ambulated in gibbs with PT/OT. Father updated on patients condition. Restraints continued. Will continue to monitor.

## 2020-05-09 NOTE — PROGRESS NOTES
John Varma  1995  [id number]      Requesting Provider: Dory Wells MD  Evaluating Physician: TORI Khanna    Chief Complaint: GI bleed    History of present illness:   5/7: Patient is a 24 y.o.  Yr old male with history of alcoholism and alcohol cirrhosis who was admitted 4/23 with ETOH hepatitis and decompensated cirrhosis, BORIS, hepatic encephalopathy, and hematemesis secondary to esophageal variceal bleed s/p EGD on 4/24. He was intubated for the EGD and then subsequently extubated on 4/27. He then underwent a TIPS procedure on 4/29 and was intubated for that until subsequent extubation on 5/1/20. He has additionally undergone paracentesis x2 with 5 liters removed during first paracentesis and 2.5L removed on second paracentesis.  He has had a leukocytosis since arrival, initially up to 41.55. Peritoneal fluid with 46 WBC on 4/24 with 6% neutrophils. BF culture was no growth at 5 days. Subsequent paracentesis on 4/30 with 1,0018 WBC with 77% neutrophils but I do not see a culture was sent from this fluid sample. multiple blood culture sets have been negative. He was initially on Aztreonam for SBP (has PCN allergy) but was more recently on levaquin. Given recent worsening of his leukocytosis again to 45.8, the patient was switched to meropenem today. Cr has improved. LFTs still somewhat elevated. CT Chest/A/P today showed mod-large bilateral pleural effusions with complete left lower lobe atelectasis and moderate right lower lobe atelectasis and moderate ascites. Sputum culture done with few GPCs in pairs and clusters. C diff screen was negative. COVID-19 PCR was negative on 5/6. Blood cultures were repeat today and are pending. Of noted, MRSA PCR nares from 4/25 was positive.The patient has experienced new fevers up to 101.5F since 5/3.  ID consulted for antibiotic recs.     5/8: Patient sitting up in bed awake upon arrival.  Patient answering some questions with some confusion noted.   Patient said T-max of 100.4 and has been tachycardic overnight.  Labs show patient remains with significant leukocytosis at 33.2 with a neutrophilia of 81.2%.  Procalcitonin is elevated 4.61.  BUN/creatinine is 25 0.4.  5/8 chest x-ray shows improving perihilar interstitial disease.  Cultures remain no growth to date.  Patient was placed and mitt restraints as he continued to try to pull out Flores catheter in [place] overnight.  He was subsequently moved from room 222 to room 234 to be closer to the nurses station.    5/9/20: Following for Dr. Hipolito Barkley: LGF yesterday afternoon, has remained afebrile since.  He is tachycardic with intermittent hypotension. WBC 29,000 with 79% neutrophils, PCT 2.1, both which have improved. Culture remain negative. Had paracentesis yesterday with 1 liter of serous fluid removed.  Culture negative to date.  NGT in place. On tube feeding.  He is very tearful today and appears a little confused. He denies any fever or chills, abdominal pain, nausea, vomiting, or rashes. He has intermittent shortness of breath and cough.  He has some diarrhea, but has been on TPN and now on tube feeding.      Past Medical History:   Diagnosis Date   • Alcohol abuse    • GERD (gastroesophageal reflux disease)    • GIB (gastrointestinal bleeding)    • Tobacco abuse        Past Surgical History:   Procedure Laterality Date   • ARM WOUND REPAIR / CLOSURE     • ENDOSCOPY N/A 4/24/2020    Procedure: ESOPHAGOGASTRODUODENOSCOPY;  Surgeon: Brunner, Mark I, MD;  Location: [facility] ENDOSCOPY;  Service: Gastroenterology;  Laterality: N/A;       Pediatric History   Patient Guardian Status   • Father:  ROBERTA GUEVARA     Other Topics Concern   • Not on file   Social History Narrative   • Not on file       family history is not on file.    Allergies   Allergen Reactions   • Penicillins Hives       Medication:  Current Facility-Administered Medications   Medication Dose Route Frequency Provider Last Rate Last Dose    • ! vanco trough Sat 5/9 @ 1330   Does not apply Once Miesha Santiago, PharmD       • acetaminophen (TYLENOL) tablet 650 mg  650 mg Oral Q8H PRN Jacky Riojas MD   650 mg at 05/06/20 2106   • Adult Central 2-in-1 TPN   Intravenous Continuous Miesha Santiago, PharmPAULETTE 35 mL/hr at 05/09/20 1205     • albuterol (PROVENTIL) nebulizer solution 0.083% 2.5 mg/3mL  2.5 mg Nebulization Q4H PRN Amarilis Quevedo APRN       • bisacodyl (DULCOLAX) suppository 10 mg  10 mg Rectal Daily PRN Brunner, Mark I, MD   10 mg at 04/27/20 1839   • enoxaparin (LOVENOX) syringe 40 mg  40 mg Subcutaneous Q24H Valerio Emmanuel MD   40 mg at 05/09/20 0802   • folic acid (FOLVITE) tablet 1 mg  1 mg Oral Daily Valerio Emmanuel MD   1 mg at 05/09/20 0801   • furosemide (LASIX) injection 20 mg  20 mg Intravenous Q12H Ariel Galindo MD       • insulin detemir (LEVEMIR) injection 15 Units  15 Units Subcutaneous Daily Valerio Emmanuel MD   15 Units at 05/09/20 0802   • insulin regular (humuLIN R,novoLIN R) injection 0-7 Units  0-7 Units Subcutaneous Q6H Pavel Tan,    Stopped at 05/06/20 1140   • insulin regular (humuLIN R,novoLIN R) injection 5 Units  5 Units Subcutaneous Q6H Jacky Riojas MD   5 Units at 05/09/20 1159   • lactulose (CHRONULAC) 10 GM/15ML solution 20 g  20 g Nasogastric BID Valerio Emmanuel MD   20 g at 05/09/20 0802   • LORazepam (ATIVAN) injection 1 mg  1 mg Intravenous Q1H PRN Valerio Emmanuel MD   1 mg at 05/07/20 0025   • Magnesium Sulfate 2 gram Bolus, followed by 8 gram infusion (total Mg dose 10 grams)- Mg less than or equal to 1mg/dL  2 g Intravenous PRN Brunner, Mark I, MD        Or   • Magnesium Sulfate 2 gram / 50mL Infusion (GIVE X 3 BAGS TO EQUAL 6GM TOTAL DOSE) - Mg 1.1 - 1.5 mg/dl  2 g Intravenous PRN Brunner, Mark I, MD        Or   • Magnesium Sulfate 4 gram infusion- Mg 1.6-1.9 mg/dL  4 g Intravenous PRN Brunner, Mark I, MD  25 mL/hr at 05/05/20 0639 4 g at 05/05/20 0639   • melatonin tablet 5 mg  5 mg Oral Nightly Valerio Emmanuel MD   5 mg at 05/08/20 2011   • meropenem (MERREM) 1 g/50 mL 0.9% NS IVPB (mbp)  1 g Intravenous Q8H Valerio Emmanuel MD   1 g at 05/09/20 0802   • metoclopramide (REGLAN) injection 5 mg  5 mg Intravenous Q6H Kaz Arellano MD   5 mg at 05/09/20 1158   • micafungin 100 mg/100 mL 0.9% NS IVPB (mbp)  100 mg Intravenous Q24H Hiploito Barkley MD   100 mg at 05/08/20 2011   • multivitamin and minerals liquid 15 mL  15 mL Oral Daily Valerio Emmanuel MD   15 mL at 05/09/20 0802   • ondansetron (ZOFRAN) injection 4 mg  4 mg Intravenous Q6H PRN Brunner, Mark I, MD   4 mg at 05/07/20 0025   • pantoprazole (PROTONIX) EC tablet 40 mg  40 mg Oral Q AM Brunner, Mark I, MD   40 mg at 05/09/20 0549   • Pharmacy to dose vancomycin   Does not apply Continuous PRN Valerio Emmanuel MD       • potassium chloride 20 mEq in 50 mL IVPB  20 mEq Intravenous Q1H PRN Miesha Santiago, PharmD       • PRO-STAT oral liquid 30 mL  30 mL Nasogastric BID Elissa Garay, MS,RD,LD   30 mL at 05/09/20 0802   • propranolol (INDERAL) tablet 10 mg  10 mg Oral Q8H Puma Willard APRN   10 mg at 05/09/20 0550   • QUEtiapine (SEROquel) tablet 100 mg  100 mg Oral Nightly Valerio Emmanuel MD   100 mg at 05/08/20 2012   • QUEtiapine (SEROquel) tablet 50 mg  50 mg Oral Daily Valerio Emmanuel MD   50 mg at 05/09/20 0802   • riFAXIMin (XIFAXAN) tablet 550 mg  550 mg Nasogastric Q12H Moises Estrella APRN   550 mg at 05/09/20 0801   • sodium chloride 0.9 % flush 10 mL  10 mL Intravenous PRN Brunner, Mark I, MD   10 mL at 05/03/20 0807   • sodium chloride 0.9 % flush 10 mL  10 mL Intravenous PRN Pavel Tan,        • sodium chloride 0.9 % flush 20 mL  20 mL Intravenous PRN Pavel Tan, DO       • spironolactone (ALDACTONE) tablet 25 mg  25  mg Oral Daily Ariel Galindo MD       • thiamine (VITAMIN B-1) tablet 100 mg  100 mg Oral Daily Valerio Emmanuel MD   100 mg at 05/09/20 0801   • vancomycin (VANCOCIN) in iso-osmotic dextrose IVPB 1 g (premix) 200 mL  1,000 mg Intravenous Q8H Miesha Santiago, PharmD   1,000 mg at 05/09/20 0550   • zinc gluconate tablet 50 mg  50 mg Oral Daily Valerio Emmanuel MD   50 mg at 05/09/20 0801         Infectious History:  C.difficile (rule out), MRSA    Antibiotics:  Anti-Infectives (From admission, onward)    Ordered     Dose/Rate Route Frequency Start Stop    05/08/20 1240  vancomycin (VANCOCIN) in iso-osmotic dextrose IVPB 1 g (premix) 200 mL  Review   Ordering Provider:  Miesha Santiago PharmD    1,000 mg  over 60 Minutes Intravenous Every 8 Hours 05/08/20 2200 05/14/20 2159    05/08/20 1240  vancomycin 1500 mg/500 mL 0.9% NS IVPB ([facility])     Ordering Provider:  Miesha Santiago, PharmD    1,500 mg  over 90 Minutes Intravenous Once 05/08/20 1330 05/08/20 1544    05/07/20 1845  micafungin 100 mg/100 mL 0.9% NS IVPB (mbp)     Miesha Santiago, PharmD reviewed the order on 05/08/20 0703.   Ordering Provider:  Hipolito Barkley MD    100 mg  over 60 Minutes Intravenous Every 24 Hours 05/07/20 2000 05/14/20 1959 05/07/20 0944  meropenem (MERREM) 1 g/50 mL 0.9% NS IVPB (mbp)     Hipolito Barkley MD reviewed the order on 05/07/20 1845.   Ordering Provider:  Valerio Emmanuel MD    1 g  over 3 Hours Intravenous Every 8 Hours 05/07/20 1630 05/14/20 1629    05/07/20 0944  meropenem (MERREM) 1 g/50 mL 0.9% NS IVPB (mbp)     Ordering Provider:  Valerio Emmanuel MD    1 g  over 30 Minutes Intravenous Once 05/07/20 1030 05/07/20 1126    05/07/20 0944  vancomycin 1500 mg/500 mL 0.9% NS IVPB ([facility])     Ordering Provider:  Valerio Emmanuel MD    1,500 mg  over 90 Minutes Intravenous Once 05/07/20 1030 05/07/20 1227    05/07/20 0944  Pharmacy to dose vancomycin    "  Miesha Santiago, PharmD reviewed the order on 05/07/20 1121.   Ordering Provider:  Valerio Emmanuel MD     Does not apply Continuous PRN 05/07/20 0942 05/14/20 0941    05/02/20 1316  levoFLOXacin (LEVAQUIN) 500 mg/100 mL D5W (premix) (LEVAQUIN) 500 mg     Miesha Santiago, PharmD reviewed the order on 05/06/20 0903.   Ordering Provider:  Kaz Aerllano MD    500 mg Intravenous Every 24 Hours Scheduled 05/02/20 1430 05/06/20 1300    04/28/20 1501  levoFLOXacin (LEVAQUIN) 500 mg/100 mL D5W (premix) (LEVAQUIN) 500 mg     Note to Pharmacy:  For on call to Radiology for TIPS, estimated 10am   Ordering Provider:  Cullen Walker MD    500 mg Intravenous Once When Specified 04/29/20 0900 04/29/20 1118    04/28/20 1906  aztreonam (AZACTAM) 1 g in 50 mL NS IVPB     Ordering Provider:  Len Baires RP    1 g  over 30 Minutes Intravenous Every 8 Hours 04/29/20 0000 04/30/20 1615    04/24/20 1411  riFAXIMin (XIFAXAN) tablet 550 mg     Kya Christianson [facility] reviewed the order on 04/25/20 0716.   Ordering Provider:  Moises Estrella APRN    550 mg Nasogastric Every 12 Hours Scheduled 04/24/20 2100      04/24/20 0630  vancomycin 500 mg/100 mL 0.9% NS IVPB (mbp)     Ordering Provider:  Ashwin Siegel RP    9 mg/kg × 65.8 kg Intravenous Once 04/24/20 0730 04/24/20 1013    04/23/20 2314  vancomycin 1000 mg/250 mL 0.9% NS IVPB ([facility])     Ordering Provider:  Dory Wells MD    15 mg/kg × 65.8 kg  over 60 Minutes Intravenous Once 04/23/20 2316 04/24/20 0223    04/23/20 2314  aztreonam (AZACTAM) 2 g/100 mL 0.9% NS (mbp)     Ordering Provider:  Dory Wells MD    2 g  over 30 Minutes Intravenous Once 04/23/20 2316 04/24/20 0100            Review of Systems  See HPI    Physical Exam:   Vital Signs   BP (!) 88/55 (BP Location: Left arm, Patient Position: Lying)   Pulse 108   Temp 98.3 °F (36.8 °C) (Oral)   Resp 28   Ht 172.7 cm (68\")   Wt 78.6 kg (173 lb 4.5 oz)   SpO2 91%   " BMI 26.35 kg/m²     GENERAL: Awake and alert, in no acute distress. Ill appearing.  HEENT: Normocephalic, atraumatic. Oropharynx clear without evidence of thrush or exudate.  Feeding tube in place  Eyes: +scleral icterus. No conjunctival hemorrhages.   HEART: RRR; No murmurs. anasarca  LUNGS: decreased breath sounds over lung bases but otherwise CTA bilaterally. Normal respiratory effort. Non-labored breathing  ABDOMEN: distended abdomen. Normal bowel sounds. No tenderness  EXT:  No cyanosis. 2+ pitting edema over BLE  : Genitalia generally unremarkable aside from some mild scrotal edema.   MSK: FROM without joint effusions noted arms/legs.  Bilateral mitt and wrist restraints in place.  SKIN: Jaundiced. No rash noted. No peripheral stigmata of infective endocarditis noted  NEURO: awake. Oriented to person but otherwise not oriented. Able to answer more questions appropriates.   PSYCHIATRIC: poor insight. Having visual hallucinations and thinks that he sees his father in the room and is crying because he thinks someone close to him has just passed away.  LUE PICC line site is without erythema or drainage     Laboratory Data    Results from last 7 days   Lab Units 05/09/20  0459 05/08/20  0429 05/07/20  0520   WBC 10*3/mm3 28.99* 33.29* 45.78*   HEMOGLOBIN g/dL 8.5* 9.3* 10.5*   HEMATOCRIT % 27.4* 29.5* 34.0*   PLATELETS 10*3/mm3 257 308 316     Results from last 7 days   Lab Units 05/09/20  0459   SODIUM mmol/L 138   POTASSIUM mmol/L 4.0   CHLORIDE mmol/L 108*   CO2 mmol/L 20.0*   BUN mg/dL 16   CREATININE mg/dL 0.25*   GLUCOSE mg/dL 145*   CALCIUM mg/dL 7.9*     Results from last 7 days   Lab Units 05/09/20  0459  05/04/20  0414   ALK PHOS U/L 93   < > 199*   BILIRUBIN mg/dL 5.0*   < > 5.8*   BILIRUBIN DIRECT mg/dL  --   --  3.3*   ALT (SGPT) U/L 52*   < > 89*   AST (SGOT) U/L 73*   < > 93*    < > = values in this interval not displayed.         Results from last 7 days   Lab Units 05/07/20  1202   CRP mg/dL  4.88*       Estimated Creatinine Clearance: 502.2 mL/min (A) (by C-G formula based on SCr of 0.25 mg/dL (L)).       Microbiology:    Blood Culture   Date Value Ref Range Status   05/03/2020 No growth at 4 days  Preliminary   05/03/2020 No growth at 4 days  Preliminary     COVID-19 PCR: negative     4/24 peritoneal fluid cx: NG    5/8 peritoneal fluid cx: NGSF  5/7: Cdiff not detected  5/7: Sputum cx Normal amado   5/7: Blood cx NGSF  MRSA PCR positive.    Radiology:  Ct Abdomen Pelvis Without Contrast    Result Date: 5/7/2020  Symmetric, moderate or moderate-to-large bilateral pleural effusions, with complete left lower lobe atelectasis, and moderate right lower lobe atelectasis. Minimal pulmonary interstitial disease elsewhere.    CT SCAN OF THE ABDOMEN AND PELVIS WITHOUT CONTRAST: Note is made of patient's tips shunt. Liver parenchyma appears uniform on today's unenhanced exam. There is vicarious excretion of contrast into the gallbladder. Spleen is not enlarged. No significant abnormalities are seen of the pancreas, adrenal glands, or kidneys. There is mild ascites, stable in extent from the prior exam. NG tube is seen in the distal stomach. No free or discrete inflammatory focus is seen. Coronal images show a few shotty left mid abdominal small bowel mesenteric lymph nodes, but these are markedly improved from the prior study. Bowel loops are normal in caliber.  Regarding the lower abdomen and pelvis, there is mild to moderate ascites. No discrete inflammatory focus is appreciated. No abnormally dilated bowel loops or grossly abnormal appearing bowel loops are seen. Bony structures appear intact.  IMPRESSION:  1. Moderately extensive ascites, but not increased from prior study. 2. Interval tips shunt placement. 3. Near resolution of small bowel mesenteric lymphadenopathy since prior scan. No evidence of new intra-abdominal or intrapelvic pathology is identified elsewhere.  D:  05/07/2020 E:  05/07/2020  This  report was finalized on 5/7/2020 10:28 PM by Dr. Moi Rich MD.      Ct Chest Without Contrast    Result Date: 5/7/2020  Symmetric, moderate or moderate-to-large bilateral pleural effusions, with complete left lower lobe atelectasis, and moderate right lower lobe atelectasis. Minimal pulmonary interstitial disease elsewhere.    CT SCAN OF THE ABDOMEN AND PELVIS WITHOUT CONTRAST: Note is made of patient's tips shunt. Liver parenchyma appears uniform on today's unenhanced exam. There is vicarious excretion of contrast into the gallbladder. Spleen is not enlarged. No significant abnormalities are seen of the pancreas, adrenal glands, or kidneys. There is mild ascites, stable in extent from the prior exam. NG tube is seen in the distal stomach. No free or discrete inflammatory focus is seen. Coronal images show a few shotty left mid abdominal small bowel mesenteric lymph nodes, but these are markedly improved from the prior study. Bowel loops are normal in caliber.  Regarding the lower abdomen and pelvis, there is mild to moderate ascites. No discrete inflammatory focus is appreciated. No abnormally dilated bowel loops or grossly abnormal appearing bowel loops are seen. Bony structures appear intact.  IMPRESSION:  1. Moderately extensive ascites, but not increased from prior study. 2. Interval tips shunt placement. 3. Near resolution of small bowel mesenteric lymphadenopathy since prior scan. No evidence of new intra-abdominal or intrapelvic pathology is identified elsewhere.  D:  05/07/2020 E:  05/07/2020  This report was finalized on 5/7/2020 10:28 PM by Dr. Moi Rich MD.      Ct Abdomen With & Without Contrast    Result Date: 5/5/2020  1. Diffusely abnormal appearance of the hepatic parenchyma status post TIPS likely perfusional changes along with low signal surrounding the portal veins consistent with periportal edema. 2. 3.2 cm focus in segment IVB has at least partial imaging characteristics of involvement for  concern for HCC although recommend followup given limited evaluation of perfusional changes and therefore attenuation changes on the current exam. 3. Perihepatic and perisplenic ascites with top normal size for spleen at 13 cm. 4. Small-to-moderate bilateral pleural effusions.  D:  05/05/2020 E:  05/05/2020    This report was finalized on 5/5/2020 2:27 PM by Dr. Luis Eduardo Mosley.      Fl Video Swallow With Speech Single Contrast    Result Date: 5/7/2020  Fluoroscopy provided for a modified barium swallow. Please see speech therapy report for full details and recommendations.    This report was finalized on 5/7/2020 5:13 PM by Dr. Luis Eduardo Mosley.      Xr Chest 1 View    Result Date: 5/8/2020  Improving perihilar interstitial disease, perhaps resolving edema, decreasing left basilar atelectasis and minimal remaining effusions. No new chest pathology is seen.   D:  05/08/2020 E:  05/08/2020  This report was finalized on 5/8/2020 11:01 PM by Dr. Moi Rich MD.      Xr Chest 1 View    Result Date: 5/7/2020  1. Increased perihilar disease that may represent interstitial edema or ARDS. 2. Left lower lung atelectasis, probably unchanged.  D:  05/07/2020 E:  05/07/2020    This report was finalized on 5/7/2020 10:14 PM by Dr. Moi Rich MD.      Xr Chest 1 View    Result Date: 5/6/2020  Bilateral pleural effusions with patchy airspace disease seen within the right mid and left lower lung field. Lines and tubes in satisfactory position.  D:  05/06/2020 E:  05/06/2020  This report was finalized on 5/6/2020 4:02 PM by Dr. Carmen Morales MD.      Xr Chest 1 View    Result Date: 5/5/2020  Support hardware unchanged and in grossly satisfactory positioning. Cardiac silhouette remains enlarged with increased central pulmonary vascularity and trace bilateral pleural effusions, left greater than right, with improved aeration at the lung bases from likely decreased atelectasis versus decreased effusions or layering component. No new  consolidation or pulmonary parenchymal involvement.     D:  05/05/2020 E:  05/05/2020  This report was finalized on 5/5/2020 2:28 PM by Dr. Luis Eduardo Mosley.      Xr Chest 1 View    Result Date: 5/3/2020  1. Interval extubation. 2. Persistent bilateral infiltrates with slight worsening of bilateral pleural effusions. 3. Stable cardiac enlargement. Signer Name: Christian Villa MD  Signed: 5/3/2020 2:30 AM  Workstation Name: [facility]  [facility]    Ct Guided Paracentesis    Result Date: 5/4/2020   CT-guided paracentesis (aborted)  Plan:  Consider reattempt diagnostic paracentesis once there is increased amount of fluid _______________________________________________________________  PROCEDURE SUMMARY: - Limited CT - CT-guided paracentesis - Additional procedure(s): None  PROCEDURE DETAILS:  Pre-procedure Consent: Informed consent for the procedure including risks, benefits and alternatives was obtained and time-out was performed prior to the procedure. Preparation: The site was prepared and draped using maximal sterile barrier technique including cutaneous antisepsis.  Anesthesia/sedation Level of anesthesia/sedation: None  Initial abdominal CT Initial abdominal CT was performed. Findings: Tiny volume ascites. A safe window for paracentesis was identified.  Paracentesis Local anesthesia was administered. Giving constraints of no CT fluoroscopy and patient inability to move arms from side -no safe way to access minimal perihepatic fluid. Procedure aborted.  Radiation Dose CT dose length product (mGy-cm): 1534  Additional Details Additional description of procedure: None Equipment details: None Specimens removed: None. Procedure aborted. Estimated blood loss (mL): Less than 10 Standardized report: Paracentesis  Attestation I was present and scrubbed for the entire procedure. Imaging reviewed. Agree with final report as written.  This report was finalized on 5/4/2020 3:53 PM by Cullen Walker.       Us Paracentesis    Result Date: 5/8/2020   Ultrasound-guided paracentesis with drainage of 1000 mL of serous fluid. Portion sent for requested laboratory analysis.  Plan:  Resume care by clinical team. _______________________________________________________________  PROCEDURE SUMMARY: - Limited ultrasound - Ultrasound-guided paracentesis - Additional procedure(s): None  PROCEDURE DETAILS:  Pre-procedure Consent: Informed consent for the procedure including risks, benefits and alternatives was obtained and time-out was performed prior to the procedure. Preparation: The site was prepared and draped using maximal sterile barrier technique including cutaneous antisepsis.  Anesthesia/sedation Level of anesthesia/sedation: No sedation  Initial abdominal ultrasound Initial abdominal ultrasound was performed. Findings: Small volume ascites. A safe window for paracentesis was identified.  Paracentesis Local anesthesia was administered. An 18-gauge Chiba needle was advanced under ultrasound guidance into the ascites. Ascites was drained. The needle was then removed, and a sterile bandage was applied. Paracentesis access technique: Ultrasound-guided Needle placed: 18-gauge Chiba  Additional Details Additional description of procedure: None Equipment details: None Specimens removed: Abdominal fluid Estimated blood loss (mL): Less than 10 Standardized report: Paracentesis  Attestation I was present and scrubbed for the entire procedure. Imaging reviewed. Agree with final report as written.  This report was finalized on 5/8/2020 8:37 PM by Cullen Walker.      Xr Abdomen Kub    Result Date: 5/8/2020  Feeding tube and NG tube both in the distal stomach.  DICTATED:   05/08/2020 EDITED/ls :   05/08/2020    This report was finalized on 5/8/2020 11:04 PM by Dr. Moi Rich MD.      Xr Abdomen Kub    Result Date: 5/8/2020  Feeding tube tip in the proximal jejunum. NG tube remains in the distal stomach.        Xr Abdomen  Kub    Result Date: 5/7/2020  Nonobstructive bowel gas pattern with paucity of air throughout the visualized bowel loops.  This report was finalized on 5/7/2020 11:37 AM by Dr. Luis Eduardo Mosley.      Xr Abdomen Kub    Result Date: 5/6/2020  NG tube in the distal stomach. Borderline dilatation of the transverse colon with air but no evidence of dilated bowel elsewhere to suggest significant ileus or obstruction.  D:  05/06/2020 E:  05/06/2020    This report was finalized on 5/6/2020 9:24 PM by Dr. Moi Rich MD.       I independently read the patient's CT Chest from 5/7 and I agree with the above report    Impression:   Patient is a 24 y.o.  Yr old male with history of alcoholism and alcohol cirrhosis who was admitted 4/23 with ETOH hepatitis and decompensated cirrhosis, BORIS, hepatic encephalopathy, and hematemesis secondary to esophageal variceal bleed s/p EGD on 4/24. He unfortuantely has multiple medical problems and remains very encephalopathic. He has had SBP by cell count on last paracentesis but I do not see that a culture was sent on that peritoneal fluid. He has elevated WBC count and ongoing low grade fevers. Possible sources for his sepsis include SBP vs. Bacteremia with possible line infection. C diff has been ruled out. Pulmonary source thought less likely as lungs look mostly clear although he does have large bilateral pleural effusions. Urinalysis was negative for signs of UTI.    Problems:  -Sepsis with fevers, tachycardia, lactic acidosis, and Leukocytosis with neutrophilia  -SBP  -Decompensated alcohol cirrhosis with ascites, hepatic -Encephalopathy, and esophageal varices  -Alcohol hepatitis/elevated LFTs  -Hematemesis due to bleeding esophageal varices s/p EGD and TIPS -procedure  -BORIS- likely ATN due to bleeding. Improved  -Acute blood loss anemia  -MRSA colonization  -Alcoholism  -diarrhea- likely due to lactulose. C diff negative  -Penicillin allergy (Hives)    PLAN: Thank you for asking us to see  John Varma, I recommend the following:     -follow blood cultures-currently NGTD  -s/p paracentesis today with repeat peritoneal fluid cx--NGSF  -Continue meropenem and Vancomycin for now. PK service to assist with vancomycin dosing. We need to closely monitor renal function while on vancomycin  -Continue micafungin 100 mg IV q24h in case line infection/fungemia while we are awaiting repeat blood cultures    Complex set of medical issues requiring a high level of medical decision making    PA saw and examined patient today.  D/w TORI Rodgers-ROME  [facility]      TORI Khanna  5/9/2020

## 2020-05-10 LAB
ALBUMIN SERPL-MCNC: 2.5 G/DL (ref 3.5–5.2)
ALBUMIN/GLOB SERPL: 1 G/DL
ALP SERPL-CCNC: 134 U/L (ref 39–117)
ALT SERPL W P-5'-P-CCNC: 57 U/L (ref 1–41)
AMMONIA BLD-SCNC: 67 UMOL/L (ref 16–60)
ANION GAP SERPL CALCULATED.3IONS-SCNC: 11 MMOL/L (ref 5–15)
AST SERPL-CCNC: 98 U/L (ref 1–40)
BASOPHILS # BLD AUTO: 0.18 10*3/MM3 (ref 0–0.2)
BASOPHILS NFR BLD AUTO: 0.6 % (ref 0–1.5)
BILIRUB SERPL-MCNC: 4.6 MG/DL (ref 0.2–1.2)
BUN BLD-MCNC: 17 MG/DL (ref 6–20)
BUN/CREAT SERPL: 54.8 (ref 7–25)
BURR CELLS BLD QL SMEAR: NORMAL
CALCIUM SPEC-SCNC: 7.5 MG/DL (ref 8.6–10.5)
CHLORIDE SERPL-SCNC: 106 MMOL/L (ref 98–107)
CO2 SERPL-SCNC: 20 MMOL/L (ref 22–29)
CREAT BLD-MCNC: 0.31 MG/DL (ref 0.76–1.27)
DEPRECATED RDW RBC AUTO: 89.8 FL (ref 37–54)
EOSINOPHIL # BLD AUTO: 0.62 10*3/MM3 (ref 0–0.4)
EOSINOPHIL NFR BLD AUTO: 2 % (ref 0.3–6.2)
ERYTHROCYTE [DISTWIDTH] IN BLOOD BY AUTOMATED COUNT: 21.1 % (ref 12.3–15.4)
GFR SERPL CREATININE-BSD FRML MDRD: >150 ML/MIN/1.73
GLOBULIN UR ELPH-MCNC: 2.6 GM/DL
GLUCOSE BLD-MCNC: 110 MG/DL (ref 65–99)
GLUCOSE BLDC GLUCOMTR-MCNC: 113 MG/DL (ref 70–130)
GLUCOSE BLDC GLUCOMTR-MCNC: 116 MG/DL (ref 70–130)
GLUCOSE BLDC GLUCOMTR-MCNC: 133 MG/DL (ref 70–130)
GLUCOSE BLDC GLUCOMTR-MCNC: 153 MG/DL (ref 70–130)
HCT VFR BLD AUTO: 31.8 % (ref 37.5–51)
HGB BLD-MCNC: 9.7 G/DL (ref 13–17.7)
IMM GRANULOCYTES # BLD AUTO: 0.39 10*3/MM3 (ref 0–0.05)
IMM GRANULOCYTES NFR BLD AUTO: 1.2 % (ref 0–0.5)
LYMPHOCYTES # BLD AUTO: 2.9 10*3/MM3 (ref 0.7–3.1)
LYMPHOCYTES NFR BLD AUTO: 9.3 % (ref 19.6–45.3)
MACROCYTES BLD QL SMEAR: NORMAL
MAGNESIUM SERPL-MCNC: 1.9 MG/DL (ref 1.6–2.6)
MCH RBC QN AUTO: 35 PG (ref 26.6–33)
MCHC RBC AUTO-ENTMCNC: 30.5 G/DL (ref 31.5–35.7)
MCV RBC AUTO: 114.8 FL (ref 79–97)
MONOCYTES # BLD AUTO: 3.14 10*3/MM3 (ref 0.1–0.9)
MONOCYTES NFR BLD AUTO: 10 % (ref 5–12)
NEUTROPHILS # BLD AUTO: 24.09 10*3/MM3 (ref 1.7–7)
NEUTROPHILS NFR BLD AUTO: 76.9 % (ref 42.7–76)
NRBC BLD AUTO-RTO: 0 /100 WBC (ref 0–0.2)
PHOSPHATE SERPL-MCNC: 3.4 MG/DL (ref 2.5–4.5)
PLAT MORPH BLD: NORMAL
PLATELET # BLD AUTO: 282 10*3/MM3 (ref 140–450)
PMV BLD AUTO: 11 FL (ref 6–12)
POTASSIUM BLD-SCNC: 4.1 MMOL/L (ref 3.5–5.2)
PROT SERPL-MCNC: 5.1 G/DL (ref 6–8.5)
RBC # BLD AUTO: 2.77 10*6/MM3 (ref 4.14–5.8)
SODIUM BLD-SCNC: 137 MMOL/L (ref 136–145)
VANCOMYCIN TROUGH SERPL-MCNC: 17.3 MCG/ML (ref 5–20)
WBC MORPH BLD: NORMAL
WBC NRBC COR # BLD: 31.32 10*3/MM3 (ref 3.4–10.8)

## 2020-05-10 PROCEDURE — 25010000002 METOCLOPRAMIDE PER 10 MG: Performed by: INTERNAL MEDICINE

## 2020-05-10 PROCEDURE — 25010000002 ENOXAPARIN PER 10 MG: Performed by: INTERNAL MEDICINE

## 2020-05-10 PROCEDURE — 82962 GLUCOSE BLOOD TEST: CPT

## 2020-05-10 PROCEDURE — 25010000002 MEROPENEM PER 100 MG: Performed by: INTERNAL MEDICINE

## 2020-05-10 PROCEDURE — 63710000001 INSULIN REGULAR HUMAN PER 5 UNITS: Performed by: INTERNAL MEDICINE

## 2020-05-10 PROCEDURE — 80053 COMPREHEN METABOLIC PANEL: CPT | Performed by: INTERNAL MEDICINE

## 2020-05-10 PROCEDURE — 85007 BL SMEAR W/DIFF WBC COUNT: CPT | Performed by: INTERNAL MEDICINE

## 2020-05-10 PROCEDURE — 25010000002 VANCOMYCIN 10 G RECONSTITUTED SOLUTION

## 2020-05-10 PROCEDURE — 25010000003 MAGNESIUM SULFATE 4 GM/100ML SOLUTION: Performed by: INTERNAL MEDICINE

## 2020-05-10 PROCEDURE — 85025 COMPLETE CBC W/AUTO DIFF WBC: CPT | Performed by: INTERNAL MEDICINE

## 2020-05-10 PROCEDURE — 92526 ORAL FUNCTION THERAPY: CPT

## 2020-05-10 PROCEDURE — 97530 THERAPEUTIC ACTIVITIES: CPT

## 2020-05-10 PROCEDURE — 83735 ASSAY OF MAGNESIUM: CPT | Performed by: INTERNAL MEDICINE

## 2020-05-10 PROCEDURE — 80202 ASSAY OF VANCOMYCIN: CPT

## 2020-05-10 PROCEDURE — 25010000002 FUROSEMIDE PER 20 MG: Performed by: INTERNAL MEDICINE

## 2020-05-10 PROCEDURE — 92523 SPEECH SOUND LANG COMPREHEN: CPT

## 2020-05-10 PROCEDURE — 99232 SBSQ HOSP IP/OBS MODERATE 35: CPT | Performed by: INTERNAL MEDICINE

## 2020-05-10 PROCEDURE — 82140 ASSAY OF AMMONIA: CPT | Performed by: INTERNAL MEDICINE

## 2020-05-10 PROCEDURE — 63710000001 INSULIN DETEMIR PER 5 UNITS: Performed by: INTERNAL MEDICINE

## 2020-05-10 PROCEDURE — 84100 ASSAY OF PHOSPHORUS: CPT | Performed by: INTERNAL MEDICINE

## 2020-05-10 PROCEDURE — 25010000002 MICAFUNGIN SODIUM 100 MG RECONSTITUTED SOLUTION: Performed by: INTERNAL MEDICINE

## 2020-05-10 RX ADMIN — MEROPENEM 1 G: 1 INJECTION, POWDER, FOR SOLUTION INTRAVENOUS at 16:21

## 2020-05-10 RX ADMIN — METOCLOPRAMIDE 5 MG: 5 INJECTION, SOLUTION INTRAMUSCULAR; INTRAVENOUS at 00:20

## 2020-05-10 RX ADMIN — VANCOMYCIN HYDROCHLORIDE 1250 MG: 10 INJECTION, POWDER, LYOPHILIZED, FOR SOLUTION INTRAVENOUS at 00:20

## 2020-05-10 RX ADMIN — MEROPENEM 1 G: 1 INJECTION, POWDER, FOR SOLUTION INTRAVENOUS at 00:19

## 2020-05-10 RX ADMIN — METOCLOPRAMIDE 5 MG: 5 INJECTION, SOLUTION INTRAMUSCULAR; INTRAVENOUS at 18:08

## 2020-05-10 RX ADMIN — LACTULOSE 20 G: 20 SOLUTION ORAL at 08:36

## 2020-05-10 RX ADMIN — SPIRONOLACTONE 25 MG: 25 TABLET ORAL at 08:35

## 2020-05-10 RX ADMIN — PROPRANOLOL HYDROCHLORIDE 10 MG: 20 TABLET ORAL at 06:10

## 2020-05-10 RX ADMIN — INSULIN HUMAN 5 UNITS: 100 INJECTION, SOLUTION PARENTERAL at 06:11

## 2020-05-10 RX ADMIN — METOCLOPRAMIDE 5 MG: 5 INJECTION, SOLUTION INTRAMUSCULAR; INTRAVENOUS at 11:46

## 2020-05-10 RX ADMIN — MULTIVITAMIN 15 ML: LIQUID ORAL at 08:36

## 2020-05-10 RX ADMIN — FUROSEMIDE 20 MG: 10 INJECTION, SOLUTION INTRAMUSCULAR; INTRAVENOUS at 02:05

## 2020-05-10 RX ADMIN — VANCOMYCIN HYDROCHLORIDE 1250 MG: 10 INJECTION, POWDER, LYOPHILIZED, FOR SOLUTION INTRAVENOUS at 16:34

## 2020-05-10 RX ADMIN — METOCLOPRAMIDE 5 MG: 5 INJECTION, SOLUTION INTRAMUSCULAR; INTRAVENOUS at 06:11

## 2020-05-10 RX ADMIN — RIFAXIMIN 550 MG: 550 TABLET ORAL at 20:04

## 2020-05-10 RX ADMIN — INSULIN HUMAN 2 UNITS: 100 INJECTION, SOLUTION PARENTERAL at 18:08

## 2020-05-10 RX ADMIN — FOLIC ACID 1 MG: 1 TABLET ORAL at 08:36

## 2020-05-10 RX ADMIN — Medication 30 ML: at 08:52

## 2020-05-10 RX ADMIN — QUETIAPINE FUMARATE 100 MG: 100 TABLET ORAL at 20:04

## 2020-05-10 RX ADMIN — RIFAXIMIN 550 MG: 550 TABLET ORAL at 08:35

## 2020-05-10 RX ADMIN — INSULIN DETEMIR 15 UNITS: 100 INJECTION, SOLUTION SUBCUTANEOUS at 08:54

## 2020-05-10 RX ADMIN — MEROPENEM 1 G: 1 INJECTION, POWDER, FOR SOLUTION INTRAVENOUS at 08:36

## 2020-05-10 RX ADMIN — Medication 50 MG: at 08:35

## 2020-05-10 RX ADMIN — MAGNESIUM SULFATE HEPTAHYDRATE 4 G: 40 INJECTION, SOLUTION INTRAVENOUS at 08:37

## 2020-05-10 RX ADMIN — LACTULOSE 20 G: 20 SOLUTION ORAL at 20:04

## 2020-05-10 RX ADMIN — ENOXAPARIN SODIUM 40 MG: 40 INJECTION SUBCUTANEOUS at 08:36

## 2020-05-10 RX ADMIN — PANTOPRAZOLE SODIUM 40 MG: 40 TABLET, DELAYED RELEASE ORAL at 06:10

## 2020-05-10 RX ADMIN — MICAFUNGIN SODIUM 100 MG: 20 INJECTION, POWDER, LYOPHILIZED, FOR SOLUTION INTRAVENOUS at 20:04

## 2020-05-10 RX ADMIN — VANCOMYCIN HYDROCHLORIDE 1250 MG: 10 INJECTION, POWDER, LYOPHILIZED, FOR SOLUTION INTRAVENOUS at 08:36

## 2020-05-10 RX ADMIN — QUETIAPINE FUMARATE 50 MG: 100 TABLET ORAL at 08:36

## 2020-05-10 RX ADMIN — THIAMINE HCL TAB 100 MG 100 MG: 100 TAB at 08:36

## 2020-05-10 RX ADMIN — MELATONIN TAB 5 MG 5 MG: 5 TAB at 20:04

## 2020-05-10 RX ADMIN — PROPRANOLOL HYDROCHLORIDE 10 MG: 20 TABLET ORAL at 14:49

## 2020-05-10 RX ADMIN — FUROSEMIDE 20 MG: 10 INJECTION, SOLUTION INTRAMUSCULAR; INTRAVENOUS at 14:50

## 2020-05-10 RX ADMIN — PROPRANOLOL HYDROCHLORIDE 10 MG: 20 TABLET ORAL at 22:12

## 2020-05-10 RX ADMIN — INSULIN HUMAN 5 UNITS: 100 INJECTION, SOLUTION PARENTERAL at 00:20

## 2020-05-10 NOTE — PLAN OF CARE
Problem: Patient Care Overview  Goal: Plan of Care Review  Outcome: Ongoing (interventions implemented as appropriate)  Flowsheets (Taken 5/10/2020 0341)  Plan of Care Reviewed With: patient  Outcome Summary: pt oriented to person, place, time, and situation.  However, conversations are illogical. VSS. Slept well. Restraints continued. Father updated.     Problem: Restraint, Nonbehavioral (Nonviolent)  Goal: Rationale and Justification  Outcome: Ongoing (interventions implemented as appropriate)  Flowsheets (Taken 5/9/2020 1537 by Paul Sood, RN)  Rationale and Justification: prevent harm to self;prevent line/tube removal;failure of less restrictive safety measures  Goal: Nonbehavioral (Nonviolent) Restraint: Absence of Injury/Harm  Outcome: Ongoing (interventions implemented as appropriate)  Flowsheets (Taken 5/10/2020 0341)  Nonbehavioral (Nonviolent) Restraint: Absence of Injury/Harm: met  Goal: Nonbehavioral (Nonviolent) Restraint: Achievement of Discontinuation Criteria  Outcome: Ongoing (interventions implemented as appropriate)  Flowsheets (Taken 5/10/2020 0341)  Nonbehavioral (Nonviolent) Restraint: Achievement of Discontinuation Criteria: not met  Goal: Nonbehavioral (Nonviolent) Restraint: Preservation of Dignity and Wellbeing  Outcome: Ongoing (interventions implemented as appropriate)  Flowsheets (Taken 5/10/2020 0341)  Nonbehavioral (Nonviolent) Restraint: Preservation of Dignity and Wellbeing: met     Problem: Skin Injury Risk (Adult)  Goal: Skin Health and Integrity  Outcome: Ongoing (interventions implemented as appropriate)  Flowsheets (Taken 5/10/2020 0341)  Skin Health and Integrity: making progress toward outcome     Problem: Fall Risk (Adult)  Goal: Absence of Fall  Outcome: Ongoing (interventions implemented as appropriate)  Flowsheets (Taken 5/10/2020 0341)  Absence of Fall: achieves outcome

## 2020-05-10 NOTE — PLAN OF CARE
Problem: Patient Care Overview  Goal: Plan of Care Review  Outcome: Ongoing (interventions implemented as appropriate)  Flowsheets (Taken 5/10/2020 1631)  Plan of Care Reviewed With: patient  Note:   SLP evaluation and treatment completed. Will continue to address cog-comm deficits. Please see note for further details and recommendations.

## 2020-05-10 NOTE — PROGRESS NOTES
John Varma  1995  [id number]      Requesting Provider: Dory Wells MD  Evaluating Physician: TORI Khanna    Chief Complaint: GI bleed    History of present illness:   5/7: Patient is a 24 y.o.  Yr old male with history of alcoholism and alcohol cirrhosis who was admitted 4/23 with ETOH hepatitis and decompensated cirrhosis, BORIS, hepatic encephalopathy, and hematemesis secondary to esophageal variceal bleed s/p EGD on 4/24. He was intubated for the EGD and then subsequently extubated on 4/27. He then underwent a TIPS procedure on 4/29 and was intubated for that until subsequent extubation on 5/1/20. He has additionally undergone paracentesis x2 with 5 liters removed during first paracentesis and 2.5L removed on second paracentesis.  He has had a leukocytosis since arrival, initially up to 41.55. Peritoneal fluid with 46 WBC on 4/24 with 6% neutrophils. BF culture was no growth at 5 days. Subsequent paracentesis on 4/30 with 1,0018 WBC with 77% neutrophils but I do not see a culture was sent from this fluid sample. multiple blood culture sets have been negative. He was initially on Aztreonam for SBP (has PCN allergy) but was more recently on levaquin. Given recent worsening of his leukocytosis again to 45.8, the patient was switched to meropenem today. Cr has improved. LFTs still somewhat elevated. CT Chest/A/P today showed mod-large bilateral pleural effusions with complete left lower lobe atelectasis and moderate right lower lobe atelectasis and moderate ascites. Sputum culture done with few GPCs in pairs and clusters. C diff screen was negative. COVID-19 PCR was negative on 5/6. Blood cultures were repeat today and are pending. Of noted, MRSA PCR nares from 4/25 was positive.The patient has experienced new fevers up to 101.5F since 5/3.  ID consulted for antibiotic recs.     5/8: Patient sitting up in bed awake upon arrival.  Patient answering some questions with some confusion noted.   Patient said T-max of 100.4 and has been tachycardic overnight.  Labs show patient remains with significant leukocytosis at 33.2 with a neutrophilia of 81.2%.  Procalcitonin is elevated 4.61.  BUN/creatinine is 25 0.4.  5/8 chest x-ray shows improving perihilar interstitial disease.  Cultures remain no growth to date.  Patient was placed and mitt restraints as he continued to try to pull out Flores catheter in [place] overnight.  He was subsequently moved from room 222 to room 234 to be closer to the nurses station.    5/9/20: Following for Dr. Hipolito Barkley: LGF yesterday afternoon, has remained afebrile since.  He is tachycardic with intermittent hypotension. WBC 29,000 with 79% neutrophils, PCT 2.1, both which have improved. Culture remain negative. Had paracentesis yesterday with 1 liter of serous fluid removed.  Culture negative to date.  NGT in place. On tube feeding.  He is very tearful today and appears a little confused. He denies any fever or chills, abdominal pain, nausea, vomiting, or rashes. He has intermittent shortness of breath and cough.  He has some diarrhea, but has been on TPN and now on tube feeding.      5/10/20: Afebrile, tolerating tube feeds.  Still with diarrhea on lactulose and tube feedings.  He denies f/c, sob, n/v, rashes.  He appears more alert and following commands.  Would like to go home, but discussed with him that he is not ready and still very ill.     Past Medical History:   Diagnosis Date   • Alcohol abuse    • GERD (gastroesophageal reflux disease)    • GIB (gastrointestinal bleeding)    • Tobacco abuse        Past Surgical History:   Procedure Laterality Date   • ARM WOUND REPAIR / CLOSURE     • ENDOSCOPY N/A 4/24/2020    Procedure: ESOPHAGOGASTRODUODENOSCOPY;  Surgeon: Brunner, Mark I, MD;  Location: [facility] ENDOSCOPY;  Service: Gastroenterology;  Laterality: N/A;       Pediatric History   Patient Guardian Status   • Father:  ROBERTA GUEVARA     Other Topics Concern   • Not  on file   Social History Narrative   • Not on file       family history is not on file.    Allergies   Allergen Reactions   • Penicillins Hives       Medication:  Current Facility-Administered Medications   Medication Dose Route Frequency Provider Last Rate Last Dose   • !Vancomycin trough 5/10 @ 1530. Please hold dose until pharmacy evaluates level   Does not apply Once Basilio Sood, [facility]       • acetaminophen (TYLENOL) tablet 650 mg  650 mg Oral Q8H PRN Jacky Riojas MD   650 mg at 05/06/20 2106   • albuterol (PROVENTIL) nebulizer solution 0.083% 2.5 mg/3mL  2.5 mg Nebulization Q4H PRN Amarilis Quevedo, APRN       • bisacodyl (DULCOLAX) suppository 10 mg  10 mg Rectal Daily PRN Brunner, Mark I, MD   10 mg at 04/27/20 1839   • enoxaparin (LOVENOX) syringe 40 mg  40 mg Subcutaneous Q24H Valerio Emmanuel MD   40 mg at 05/10/20 0836   • folic acid (FOLVITE) tablet 1 mg  1 mg Oral Daily Valerio Emmanuel MD   1 mg at 05/10/20 0836   • furosemide (LASIX) injection 20 mg  20 mg Intravenous Q12H Ariel Galindo MD   20 mg at 05/10/20 0205   • insulin detemir (LEVEMIR) injection 15 Units  15 Units Subcutaneous Daily Valerio Emmanuel MD   15 Units at 05/10/20 0854   • insulin regular (humuLIN R,novoLIN R) injection 0-7 Units  0-7 Units Subcutaneous Q6H Pavel Tan DO   Stopped at 05/06/20 1140   • lactulose (CHRONULAC) 10 GM/15ML solution 20 g  20 g Nasogastric BID Valerio Emmanuel MD   20 g at 05/10/20 0836   • LORazepam (ATIVAN) injection 1 mg  1 mg Intravenous Q1H PRN Valerio Emmanuel MD   1 mg at 05/07/20 0025   • Magnesium Sulfate 2 gram Bolus, followed by 8 gram infusion (total Mg dose 10 grams)- Mg less than or equal to 1mg/dL  2 g Intravenous PRN Brunner, Mark I, MD        Or   • Magnesium Sulfate 2 gram / 50mL Infusion (GIVE X 3 BAGS TO EQUAL 6GM TOTAL DOSE) - Mg 1.1 - 1.5 mg/dl  2 g Intravenous PRN Brunner, Mark I, MD        Or   •  Magnesium Sulfate 4 gram infusion- Mg 1.6-1.9 mg/dL  4 g Intravenous PRN Brunner, Mark I, MD 25 mL/hr at 05/10/20 0837 4 g at 05/10/20 0837   • melatonin tablet 5 mg  5 mg Oral Nightly Valerio Emmanuel MD   5 mg at 05/09/20 2123   • meropenem (MERREM) 1 g/50 mL 0.9% NS IVPB (mbp)  1 g Intravenous Q8H Valerio Emmanuel MD   1 g at 05/10/20 0836   • metoclopramide (REGLAN) injection 5 mg  5 mg Intravenous Q6H Kaz Arellano MD   5 mg at 05/10/20 1146   • micafungin 100 mg/100 mL 0.9% NS IVPB (mbp)  100 mg Intravenous Q24H Hipolito Barkley MD   100 mg at 05/09/20 2008   • multivitamin and minerals liquid 15 mL  15 mL Oral Daily Valerio Emmanuel MD   15 mL at 05/10/20 0836   • ondansetron (ZOFRAN) injection 4 mg  4 mg Intravenous Q6H PRN Brunner, Mark I, MD   4 mg at 05/07/20 0025   • pantoprazole (PROTONIX) EC tablet 40 mg  40 mg Oral Q AM Brunner, Mark I, MD   40 mg at 05/10/20 0610   • Pharmacy to dose vancomycin   Does not apply Continuous PRN Valerio Emmanuel MD       • potassium chloride 20 mEq in 50 mL IVPB  20 mEq Intravenous Q1H PRN Miesha Santiago, PharmD       • PRO-STAT oral liquid 30 mL  30 mL Nasogastric BID Elissa Garay MS,RD,LD   30 mL at 05/10/20 0852   • propranolol (INDERAL) tablet 10 mg  10 mg Oral Q8H Puma Willard APRN   10 mg at 05/10/20 0610   • QUEtiapine (SEROquel) tablet 100 mg  100 mg Oral Nightly Valerio Emmanuel MD   100 mg at 05/09/20 2123   • QUEtiapine (SEROquel) tablet 50 mg  50 mg Oral Daily Valerio Emmanuel MD   50 mg at 05/10/20 0836   • riFAXIMin (XIFAXAN) tablet 550 mg  550 mg Nasogastric Q12H Moises Estrella, SMOOTH   550 mg at 05/10/20 0835   • sodium chloride 0.9 % flush 10 mL  10 mL Intravenous PRN Brunner, Mark I, MD   10 mL at 05/03/20 0807   • sodium chloride 0.9 % flush 10 mL  10 mL Intravenous PRN Pavel Tan DO       • sodium chloride 0.9 % flush 20 mL  20 mL  Intravenous PRN Pavel Tan, DO       • spironolactone (ALDACTONE) tablet 25 mg  25 mg Oral Daily Ariel Galindo MD   25 mg at 05/10/20 0835   • thiamine (VITAMIN B-1) tablet 100 mg  100 mg Oral Daily Valerio Emmanuel MD   100 mg at 05/10/20 0836   • vancomycin 1250 mg/250 mL 0.9% NS IVPB ([facility])  1,250 mg Intravenous Q8H Basilio Sood RPH   1,250 mg at 05/10/20 0836   • zinc gluconate tablet 50 mg  50 mg Oral Daily Valerio Emmanuel MD   50 mg at 05/10/20 0835         Infectious History:  C.difficile (rule out), MRSA    Antibiotics:  Anti-Infectives (From admission, onward)    Ordered     Dose/Rate Route Frequency Start Stop    05/09/20 1503  vancomycin 1250 mg/250 mL 0.9% NS IVPB ([facility])  Review   Ordering Provider:  Basilio Sood RPH    1,250 mg  over 90 Minutes Intravenous Every 8 Hours 05/09/20 1600 05/16/20 1559    05/08/20 1240  vancomycin 1500 mg/500 mL 0.9% NS IVPB ([facility])     Ordering Provider:  Miesha Santiago, PharmD    1,500 mg  over 90 Minutes Intravenous Once 05/08/20 1330 05/08/20 1544    05/07/20 1845  micafungin 100 mg/100 mL 0.9% NS IVPB (mbp)     Miesha Santiago, PharmD reviewed the order on 05/08/20 0703.   Ordering Provider:  Hipolito Barkley MD    100 mg  over 60 Minutes Intravenous Every 24 Hours 05/07/20 2000 05/14/20 1959    05/07/20 0944  meropenem (MERREM) 1 g/50 mL 0.9% NS IVPB (mbp)     Hipolito Barkley MD reviewed the order on 05/07/20 1845.   Ordering Provider:  Valerio Emmanuel MD    1 g  over 3 Hours Intravenous Every 8 Hours 05/07/20 1630 05/14/20 1629    05/07/20 0944  meropenem (MERREM) 1 g/50 mL 0.9% NS IVPB (mbp)     Ordering Provider:  Valerio Emmanuel MD    1 g  over 30 Minutes Intravenous Once 05/07/20 1030 05/07/20 1126    05/07/20 0944  vancomycin 1500 mg/500 mL 0.9% NS IVPB ([facility])     Ordering Provider:  Valerio Emmanuel MD    1,500 mg  over 90 Minutes Intravenous Once 05/07/20 1030  05/07/20 1227    05/07/20 0944  Pharmacy to dose vancomycin     Miesha Santiago, PharmD reviewed the order on 05/07/20 1121.   Ordering Provider:  Valerio Emmanuel MD     Does not apply Continuous PRN 05/07/20 0942 05/14/20 0941    05/02/20 1316  levoFLOXacin (LEVAQUIN) 500 mg/100 mL D5W (premix) (LEVAQUIN) 500 mg     Miesha Santiago, PharmD reviewed the order on 05/06/20 0903.   Ordering Provider:  Kaz Arellano MD    500 mg Intravenous Every 24 Hours Scheduled 05/02/20 1430 05/06/20 1300    04/28/20 1501  levoFLOXacin (LEVAQUIN) 500 mg/100 mL D5W (premix) (LEVAQUIN) 500 mg     Note to Pharmacy:  For on call to Radiology for TIPS, estimated 10am   Ordering Provider:  Cullen Walker MD    500 mg Intravenous Once When Specified 04/29/20 0900 04/29/20 1118    04/28/20 1906  aztreonam (AZACTAM) 1 g in 50 mL NS IVPB     Ordering Provider:  Len Baires RPH    1 g  over 30 Minutes Intravenous Every 8 Hours 04/29/20 0000 04/30/20 1615    04/24/20 1411  riFAXIMin (XIFAXAN) tablet 550 mg     Kya Christianson [facility] reviewed the order on 04/25/20 0716.   Ordering Provider:  Moises Estrella APRN    550 mg Nasogastric Every 12 Hours Scheduled 04/24/20 2100      04/24/20 0630  vancomycin 500 mg/100 mL 0.9% NS IVPB (mbp)     Ordering Provider:  Ashwin Siegel RPH    9 mg/kg × 65.8 kg Intravenous Once 04/24/20 0730 04/24/20 1013    04/23/20 2314  vancomycin 1000 mg/250 mL 0.9% NS IVPB ([facility])     Ordering Provider:  Dory Wells MD    15 mg/kg × 65.8 kg  over 60 Minutes Intravenous Once 04/23/20 2316 04/24/20 0223    04/23/20 2314  aztreonam (AZACTAM) 2 g/100 mL 0.9% NS (mbp)     Ordering Provider:  Dory Wells MD    2 g  over 30 Minutes Intravenous Once 04/23/20 2316 04/24/20 0100            Review of Systems  See HPI    Physical Exam:   Vital Signs   /72 (BP Location: Left arm, Patient Position: Lying)   Pulse 112   Temp 97.9 °F (36.6 °C) (Axillary)   Resp (!) 32   " Ht 172.7 cm (68\")   Wt 76.9 kg (169 lb 8.5 oz)   SpO2 94%   BMI 25.78 kg/m²     GENERAL: Awake and alert, in no acute distress. Ill appearing.  HEENT: Normocephalic, atraumatic. Oropharynx clear without evidence of thrush or exudate.  Feeding tube in place  Eyes: +scleral icterus. No conjunctival hemorrhages.   HEART: RRR; No murmurs. anasarca  LUNGS: decreased breath sounds over lung bases but otherwise CTA bilaterally. Normal respiratory effort. Non-labored breathing  ABDOMEN: distended abdomen. Normal bowel sounds. No tenderness  EXT:  No cyanosis. 2+ pitting edema over BLE  : Genitalia generally unremarkable aside from some mild scrotal edema.   MSK: FROM without joint effusions noted arms/legs.  Bilateral mitt and wrist restraints in place.  SKIN: Jaundiced. No rash noted. No peripheral stigmata of infective endocarditis noted  NEURO: awake. Oriented to person. Able to answer more questions appropriately.  Was less anxious today.  PSYCHIATRIC: poor insight, but cooperative. Following commands.  LUE PICC line site is without erythema or drainage     Laboratory Data    Results from last 7 days   Lab Units 05/10/20  0521 05/09/20  0459 05/08/20  0429   WBC 10*3/mm3 31.32* 28.99* 33.29*   HEMOGLOBIN g/dL 9.7* 8.5* 9.3*   HEMATOCRIT % 31.8* 27.4* 29.5*   PLATELETS 10*3/mm3 282 257 308     Results from last 7 days   Lab Units 05/10/20  0521   SODIUM mmol/L 137   POTASSIUM mmol/L 4.1   CHLORIDE mmol/L 106   CO2 mmol/L 20.0*   BUN mg/dL 17   CREATININE mg/dL 0.31*   GLUCOSE mg/dL 110*   CALCIUM mg/dL 7.5*     Results from last 7 days   Lab Units 05/10/20  0521  05/04/20  0414   ALK PHOS U/L 134*   < > 199*   BILIRUBIN mg/dL 4.6*   < > 5.8*   BILIRUBIN DIRECT mg/dL  --   --  3.3*   ALT (SGPT) U/L 57*   < > 89*   AST (SGOT) U/L 98*   < > 93*    < > = values in this interval not displayed.         Results from last 7 days   Lab Units 05/07/20  1202   CRP mg/dL 4.88*       Estimated Creatinine Clearance: 396.2 " mL/min (A) (by C-G formula based on SCr of 0.31 mg/dL (L)).       Microbiology:    Blood Culture   Date Value Ref Range Status   05/03/2020 No growth at 4 days  Preliminary   05/03/2020 No growth at 4 days  Preliminary     COVID-19 PCR: negative     4/24 peritoneal fluid cx: NG    5/8 peritoneal fluid cx: NGSF  5/7: Cdiff not detected  5/7: Sputum cx Normal amado   5/7: Blood cx NGSF  MRSA PCR positive.    Radiology:  Ct Abdomen Pelvis Without Contrast    Result Date: 5/7/2020  Symmetric, moderate or moderate-to-large bilateral pleural effusions, with complete left lower lobe atelectasis, and moderate right lower lobe atelectasis. Minimal pulmonary interstitial disease elsewhere.    CT SCAN OF THE ABDOMEN AND PELVIS WITHOUT CONTRAST: Note is made of patient's tips shunt. Liver parenchyma appears uniform on today's unenhanced exam. There is vicarious excretion of contrast into the gallbladder. Spleen is not enlarged. No significant abnormalities are seen of the pancreas, adrenal glands, or kidneys. There is mild ascites, stable in extent from the prior exam. NG tube is seen in the distal stomach. No free or discrete inflammatory focus is seen. Coronal images show a few shotty left mid abdominal small bowel mesenteric lymph nodes, but these are markedly improved from the prior study. Bowel loops are normal in caliber.  Regarding the lower abdomen and pelvis, there is mild to moderate ascites. No discrete inflammatory focus is appreciated. No abnormally dilated bowel loops or grossly abnormal appearing bowel loops are seen. Bony structures appear intact.  IMPRESSION:  1. Moderately extensive ascites, but not increased from prior study. 2. Interval tips shunt placement. 3. Near resolution of small bowel mesenteric lymphadenopathy since prior scan. No evidence of new intra-abdominal or intrapelvic pathology is identified elsewhere.  D:  05/07/2020 E:  05/07/2020  This report was finalized on 5/7/2020 10:28 PM by Dr. Prater  MD Hilario.      Ct Chest Without Contrast    Result Date: 5/7/2020  Symmetric, moderate or moderate-to-large bilateral pleural effusions, with complete left lower lobe atelectasis, and moderate right lower lobe atelectasis. Minimal pulmonary interstitial disease elsewhere.    CT SCAN OF THE ABDOMEN AND PELVIS WITHOUT CONTRAST: Note is made of patient's tips shunt. Liver parenchyma appears uniform on today's unenhanced exam. There is vicarious excretion of contrast into the gallbladder. Spleen is not enlarged. No significant abnormalities are seen of the pancreas, adrenal glands, or kidneys. There is mild ascites, stable in extent from the prior exam. NG tube is seen in the distal stomach. No free or discrete inflammatory focus is seen. Coronal images show a few shotty left mid abdominal small bowel mesenteric lymph nodes, but these are markedly improved from the prior study. Bowel loops are normal in caliber.  Regarding the lower abdomen and pelvis, there is mild to moderate ascites. No discrete inflammatory focus is appreciated. No abnormally dilated bowel loops or grossly abnormal appearing bowel loops are seen. Bony structures appear intact.  IMPRESSION:  1. Moderately extensive ascites, but not increased from prior study. 2. Interval tips shunt placement. 3. Near resolution of small bowel mesenteric lymphadenopathy since prior scan. No evidence of new intra-abdominal or intrapelvic pathology is identified elsewhere.  D:  05/07/2020 E:  05/07/2020  This report was finalized on 5/7/2020 10:28 PM by Dr. Moi Rich MD.      Ct Abdomen With & Without Contrast    Result Date: 5/5/2020  1. Diffusely abnormal appearance of the hepatic parenchyma status post TIPS likely perfusional changes along with low signal surrounding the portal veins consistent with periportal edema. 2. 3.2 cm focus in segment IVB has at least partial imaging characteristics of involvement for concern for HCC although recommend followup given  limited evaluation of perfusional changes and therefore attenuation changes on the current exam. 3. Perihepatic and perisplenic ascites with top normal size for spleen at 13 cm. 4. Small-to-moderate bilateral pleural effusions.  D:  05/05/2020 E:  05/05/2020    This report was finalized on 5/5/2020 2:27 PM by Dr. Luis Eduardo Mosley.      Fl Video Swallow With Speech Single Contrast    Result Date: 5/7/2020  Fluoroscopy provided for a modified barium swallow. Please see speech therapy report for full details and recommendations.    This report was finalized on 5/7/2020 5:13 PM by Dr. Luis Eduardo Mosley.      Xr Chest 1 View    Result Date: 5/8/2020  Improving perihilar interstitial disease, perhaps resolving edema, decreasing left basilar atelectasis and minimal remaining effusions. No new chest pathology is seen.   D:  05/08/2020 E:  05/08/2020  This report was finalized on 5/8/2020 11:01 PM by Dr. Moi Rich MD.      Xr Chest 1 View    Result Date: 5/7/2020  1. Increased perihilar disease that may represent interstitial edema or ARDS. 2. Left lower lung atelectasis, probably unchanged.  D:  05/07/2020 E:  05/07/2020    This report was finalized on 5/7/2020 10:14 PM by Dr. Moi Rich MD.      Xr Chest 1 View    Result Date: 5/6/2020  Bilateral pleural effusions with patchy airspace disease seen within the right mid and left lower lung field. Lines and tubes in satisfactory position.  D:  05/06/2020 E:  05/06/2020  This report was finalized on 5/6/2020 4:02 PM by Dr. Carmen Morales MD.      Xr Chest 1 View    Result Date: 5/5/2020  Support hardware unchanged and in grossly satisfactory positioning. Cardiac silhouette remains enlarged with increased central pulmonary vascularity and trace bilateral pleural effusions, left greater than right, with improved aeration at the lung bases from likely decreased atelectasis versus decreased effusions or layering component. No new consolidation or pulmonary parenchymal involvement.      D:  05/05/2020 E:  05/05/2020  This report was finalized on 5/5/2020 2:28 PM by Dr. Luis Eduardo Mosley.      Xr Chest 1 View    Result Date: 5/3/2020  1. Interval extubation. 2. Persistent bilateral infiltrates with slight worsening of bilateral pleural effusions. 3. Stable cardiac enlargement. Signer Name: Christian Villa MD  Signed: 5/3/2020 2:30 AM  Workstation Name: [facility]  [facility]    Ct Guided Paracentesis    Result Date: 5/4/2020   CT-guided paracentesis (aborted)  Plan:  Consider reattempt diagnostic paracentesis once there is increased amount of fluid _______________________________________________________________  PROCEDURE SUMMARY: - Limited CT - CT-guided paracentesis - Additional procedure(s): None  PROCEDURE DETAILS:  Pre-procedure Consent: Informed consent for the procedure including risks, benefits and alternatives was obtained and time-out was performed prior to the procedure. Preparation: The site was prepared and draped using maximal sterile barrier technique including cutaneous antisepsis.  Anesthesia/sedation Level of anesthesia/sedation: None  Initial abdominal CT Initial abdominal CT was performed. Findings: Tiny volume ascites. A safe window for paracentesis was identified.  Paracentesis Local anesthesia was administered. Giving constraints of no CT fluoroscopy and patient inability to move arms from side -no safe way to access minimal perihepatic fluid. Procedure aborted.  Radiation Dose CT dose length product (mGy-cm): 1534  Additional Details Additional description of procedure: None Equipment details: None Specimens removed: None. Procedure aborted. Estimated blood loss (mL): Less than 10 Standardized report: Paracentesis  Attestation I was present and scrubbed for the entire procedure. Imaging reviewed. Agree with final report as written.  This report was finalized on 5/4/2020 3:53 PM by Cullen Walker.      Us Paracentesis    Result Date: 5/8/2020    Ultrasound-guided paracentesis with drainage of 1000 mL of serous fluid. Portion sent for requested laboratory analysis.  Plan:  Resume care by clinical team. _______________________________________________________________  PROCEDURE SUMMARY: - Limited ultrasound - Ultrasound-guided paracentesis - Additional procedure(s): None  PROCEDURE DETAILS:  Pre-procedure Consent: Informed consent for the procedure including risks, benefits and alternatives was obtained and time-out was performed prior to the procedure. Preparation: The site was prepared and draped using maximal sterile barrier technique including cutaneous antisepsis.  Anesthesia/sedation Level of anesthesia/sedation: No sedation  Initial abdominal ultrasound Initial abdominal ultrasound was performed. Findings: Small volume ascites. A safe window for paracentesis was identified.  Paracentesis Local anesthesia was administered. An 18-gauge Chiba needle was advanced under ultrasound guidance into the ascites. Ascites was drained. The needle was then removed, and a sterile bandage was applied. Paracentesis access technique: Ultrasound-guided Needle placed: 18-gauge Chiba  Additional Details Additional description of procedure: None Equipment details: None Specimens removed: Abdominal fluid Estimated blood loss (mL): Less than 10 Standardized report: Paracentesis  Attestation I was present and scrubbed for the entire procedure. Imaging reviewed. Agree with final report as written.  This report was finalized on 5/8/2020 8:37 PM by Cullen Walker.      Xr Abdomen Kub    Result Date: 5/10/2020  Feeding tube tip in the proximal jejunum. NG tube remains in the distal stomach.   DICTATED:   05/08/2020 EDITED/ls :   05/09/2020  This report was finalized on 5/10/2020 11:37 AM by Dr. Moi Rich MD.      Xr Abdomen Kub    Result Date: 5/8/2020  Feeding tube and NG tube both in the distal stomach.  DICTATED:   05/08/2020 EDITED/ls :   05/08/2020    This report was  finalized on 5/8/2020 11:04 PM by Dr. Moi Rich MD.      Xr Abdomen Kub    Result Date: 5/7/2020  Nonobstructive bowel gas pattern with paucity of air throughout the visualized bowel loops.  This report was finalized on 5/7/2020 11:37 AM by Dr. Luis Eduardo Mosley.      Xr Abdomen Kub    Result Date: 5/6/2020  NG tube in the distal stomach. Borderline dilatation of the transverse colon with air but no evidence of dilated bowel elsewhere to suggest significant ileus or obstruction.  D:  05/06/2020 E:  05/06/2020    This report was finalized on 5/6/2020 9:24 PM by Dr. Moi Rich MD.       I independently read the patient's CT Chest from 5/7 and I agree with the above report    Impression:   Patient is a 24 y.o.  Yr old male with history of alcoholism and alcohol cirrhosis who was admitted 4/23 with ETOH hepatitis and decompensated cirrhosis, BORIS, hepatic encephalopathy, and hematemesis secondary to esophageal variceal bleed s/p EGD on 4/24. He unfortuantely has multiple medical problems and remains very encephalopathic. He has had SBP by cell count on last paracentesis but I do not see that a culture was sent on that peritoneal fluid. He has elevated WBC count and ongoing low grade fevers. Possible sources for his sepsis include SBP vs. Bacteremia with possible line infection. C diff has been ruled out. Pulmonary source thought less likely as lungs look mostly clear although he does have large bilateral pleural effusions. Urinalysis was negative for signs of UTI.    Problems:  -Sepsis with fevers, tachycardia, lactic acidosis, and Leukocytosis with neutrophilia  -SBP  -Decompensated alcohol cirrhosis with ascites, hepatic -Encephalopathy, and esophageal varices  -Alcohol hepatitis/elevated LFTs  -Hematemesis due to bleeding esophageal varices s/p EGD and TIPS -procedure  -BORIS- likely ATN due to bleeding. Improved  -Acute blood loss anemia  -MRSA colonization  -Alcoholism  -diarrhea- likely due to lactulose. C diff  negative  -Penicillin allergy (Hives)    PLAN: Thank you for asking us to see John Varma, I recommend the following:     -follow blood cultures-currently NGTD  -s/p paracentesis today with repeat peritoneal fluid cx--NGSF  -Continue meropenem and Vancomycin for now. PK service to assist with vancomycin dosing. We need to closely monitor renal function while on vancomycin  -Continue micafungin 100 mg IV q24h in case line infection/fungemia while we are awaiting repeat blood cultures    Complex set of medical issues requiring a high level of medical decision making    PA saw and examined patient today.  D/w TORI Rodgers-ROME  [facility]      TORI Khanna  5/10/2020

## 2020-05-10 NOTE — THERAPY EVALUATION
Acute Care - Speech Language Pathology Initial Evaluation  [facility]     Patient Name: John Varma  : 1995  MRN: [id number]  Today's Date: 5/10/2020               Admit Date: 2020     Visit Dx:    ICD-10-CM ICD-9-CM   1. Sepsis with acute liver failure without hepatic coma or septic shock, due to unspecified organism (CMS/HCC) A41.9 038.9    R65.20 995.92    K72.00 570   2. Hypoxia R09.02 799.02   3. Acute renal failure, unspecified acute renal failure type (CMS/HCC) N17.9 584.9   4. Acute hepatitis B17.9 570   5. Alcohol abuse F10.10 305.00   6. Acute upper GI bleed K92.2 578.9   7. Elevated lactic acid level R79.89 276.2   8. Ascites due to alcoholic hepatitis K70.11 789.59   9. Leukocytosis, unspecified type D72.829 288.60   10. Sepsis with acute renal failure without septic shock, due to unspecified organism, unspecified acute renal failure type (CMS/HCC) A41.9 038.9    R65.20 995.92    N17.9 584.9   11. Oropharyngeal dysphagia R13.12 787.22     Patient Active Problem List   Diagnosis   • Acute alcoholic hepatitis   • Acute variceal GI bleeding   • BORIS (acute kidney injury) (CMS/HCC)   • Bandemia   • Acute upper GI bleeding   • Sepsis with acute renal failure without septic shock (CMS/HCC)   • Acute respiratory failure with hypoxia (CMS/HCC)   • Alcohol withdrawal delirium (CMS/HCC)   • Hepatic encephalopathy (CMS/HCC)   • Ascites due to alcoholic hepatitis   • Pleural effusion on left   • S/P TIPS (transjugular intrahepatic portosystemic shunt) 2020     Past Medical History:   Diagnosis Date   • Alcohol abuse    • GERD (gastroesophageal reflux disease)    • GIB (gastrointestinal bleeding)    • Tobacco abuse      Past Surgical History:   Procedure Laterality Date   • ARM WOUND REPAIR / CLOSURE     • ENDOSCOPY N/A 2020    Procedure: ESOPHAGOGASTRODUODENOSCOPY;  Surgeon: Brunner, Mark I, MD;  Location: [facility] ENDOSCOPY;  Service: Gastroenterology;  Laterality: N/A;        SLP  EVALUATION (last 72 hours)      SLP SLC Evaluation     Row Name 05/10/20 4928                   Communication Assessment/Intervention    Document Type  therapy note (daily note);evaluation  -           General Information    Prior Level of Function-Communication  WFL  -        Plans/Goals Discussed with  patient;agreed upon  -           Motor Speech Assessment/Intervention    Motor Speech Function  WFL  -           Cognitive Assessment Intervention- SLP    Cognitive Function (Cognition)  moderate impairment  -SM        Orientation Status (Cognition)  person;time;WFL;situation;mild impairment;place;moderate impairment  -SM        Memory (Cognitive)  mental manipulation;short-term;moderate impairment  -SM        Attention (Cognitive)  sustained;moderate impairment  -SM        Thought Organization (Cognitive)  moderate impairment  -SM           SLP Clinical Impressions    SLP Diagnosis  Moderate cognitive deficits  -SM        Rehab Potential/Prognosis  good  -        SLC Criteria for Skilled Therapy Interventions Met  yes  -        Functional Impact  difficulty completing home management task;difficulty completing vocational tasks  -           Recommendations    Therapy Frequency (SLP SLC)  5 days per week  -        Predicted Duration Therapy Intervention (Days)  until discharge  -          User Key  (r) = Recorded By, (t) = Taken By, (c) = Cosigned By    Initials Name Effective Dates    Bernadette Alexander MS CCC-SLP 06/22/15 -              EDUCATION  The patient has been educated in the following areas:     Cognitive Impairment.    SLP Recommendation and Plan  SLP Diagnosis: Moderate cognitive deficits     SLC Criteria for Skilled Therapy Interventions Met: yes  Anticipated Dischage Disposition: inpatient rehabilitation facility, anticipate therapy at next level of care     Predicted Duration Therapy Intervention (Days): until discharge    Plan of Care Reviewed With: patient      SLP GOALS      Row Name 05/10/20 1530             Oral Nutrition/Hydration Goal 1 (SLP)    Oral Nutrition/Hydration Goal 1, SLP  LTG: Pt will return to regular diet and thin liquids and tolerate w/o s/sxs aspiration w/ 100% acc w/o cues.  -SM      Time Frame (Oral Nutrition/Hydration Goal 1, SLP)  by discharge  -SM      Progress/Outcomes (Oral Nutrition/Hydration Goal 1, SLP)  good progress toward goal  -SM         Oral Nutrition/Hydration Goal 2 (SLP)    Oral Nutrition/Hydration Goal 2, SLP  Pt will tolerate trials of soft/regular solids and thin liquids w/o s/sxs aspiration w/ 100% acc w/o cues.  -SM      Time Frame (Oral Nutrition/Hydration Goal 2, SLP)  short term goal (STG)  -SM      Barriers (Oral Nutrition/Hydration Goal 2, SLP)  Continue for carryover  -SM      Progress/Outcomes (Oral Nutrition/Hydration Goal 2, SLP)  goal revised this date;goal met  -SM         Lingual Strengthening Goal 1 (SLP)    Activity (Lingual Strengthening Goal 1, SLP)  increase tongue back strength;increase lingual tone/sensation/control/coordination/movement  -SM      Increase Lingual Tone/Sensation/Control/Coordination/Movement  swallow trials;lingual resistance exercises  -SM      Increase Tongue Back Strength  lingual resistance exercises  -SM      [place]/Accuracy (Lingual Strengthening Goal 1, SLP)  with minimal cues (75-90% accuracy)  -SM      Time Frame (Lingual Strengthening Goal 1, SLP)  short term goal (STG)  -SM      Barriers (Lingual Strengthening Goal 1, SLP)  Ready for advancement to regular solids  -SM      Progress/Outcomes (Lingual Strengthening Goal 1, SLP)  good progress toward goal  -SM         Pharyngeal Strengthening Exercise Goal 1 (SLP)    Activity (Pharyngeal Strengthening Goal 1, SLP)  increase timing;increase superior movement of the hyolaryngeal complex;increase anterior movement of the hyolaryngeal complex;increase closure at entrance to airway/closure of airway at glottis;increase tongue base retraction  -SM       Increase Timing  prepping - 3 second prep or suck swallow or 3-step swallow  -SM      Increase Superior Movement of the Hyolaryngeal Complex  effortful pitch glide (falsetto + pharyngeal squeeze)  -SM      Increase Anterior Movement of the Hyolaryngeal Complex  chin tuck against resistance (CTAR)  -SM      Increase Closure at Entrance to Airway/Closure of Airway at Glottis  super-supraglottic swallow  -SM      Increase Tongue Base Retraction  hard effortful swallow;patrick  -SM      [place]/Accuracy (Pharyngeal Strengthening Goal 1, SLP)  with minimal cues (75-90% accuracy)  -SM      Time Frame (Pharyngeal Strengthening Goal 1, SLP)  short term goal (STG)  -SM      Progress/Outcomes (Pharyngeal Strengthening Goal 1, SLP)  goal ongoing  -SM         Attention Goal 1 (SLP)    Improve Attention by Goal 1 (SLP)  complete sustained attention task;100%;with minimal cues (75-90%)  -SM      Time Frame (Attention Goal 1, SLP)  short term goal (STG)  -SM         Memory Skills Goal 1 (SLP)    Improve Memory Skills Through Goal 1 (SLP)  recalling related word lists with an imposed delay;select a word from a list by exclusion;repeat list in sequential order;90%;with minimal cues (75-90%)  -SM      Time Frame (Memory Skills Goal 1, SLP)  short term goal (STG)  -SM         Organizational Skills Goal 1 (SLP)    Improve Thought Organization Through Goal 1 (SLP)  completing a verbal sequencing task;completing mental manipulation task;100%;with minimal cues (75-90%)  -SM      Time Frame (Thought Organization Skills Goal 1, SLP)  short term goal (STG)  -SM         Functional Problem Solving Skills Goal 1 (SLP)    Improve Problem Solving Through Goal 1 (SLP)  determine multiple solutions to problems;complete organization/home management task;100%;with minimal cues (75-90%)  -SM      Time Frame (Problem Solving Goal 1, SLP)  short term goal (STG)  -SM         Additional Goal 1 (SLP)    Additional Goal 1, SLP  LTG: Improve cog-comm  skills in order to participate in care while in hospital setting with 100% accuracy and cues  -      Time Frame (Additional Goal 1, SLP)  by discharge  -        User Key  (r) = Recorded By, (t) = Taken By, (c) = Cosigned By    Initials Name Provider Type    Bernadette Alexander MS CCC-SLP Speech and Language Pathologist                  Time Calculation:     Time Calculation- SLP     Row Name 05/10/20 1631             Time Calculation- SLP    SLP Start Time  1530  -      SLP Received On  05/10/20  -        User Key  (r) = Recorded By, (t) = Taken By, (c) = Cosigned By    Initials Name Provider Type    Bernadette Alexander, MS CCC-SLP Speech and Language Pathologist          Therapy Charges for Today     Code Description Service Date Service Provider Modifiers Qty    33779593128 HC ST EVAL SPEECH AND PROD W LANG  4 5/10/2020 Bernadette Serra MS CCC-SLP GN 1    [id number] HC ST TREATMENT SWALLOW 2 5/10/2020 Bernadette Serra MS CCC-SLP GN 1                     Bernadette Serra MS CCC-SLP  5/10/2020 and Acute Care - Speech Language Pathology   Swallow Treatment Note  [facility]     Patient Name: John Varma  : 1995  MRN: [id number]  Today's Date: 5/10/2020               Admit Date: 2020    Visit Dx:      ICD-10-CM ICD-9-CM   1. Sepsis with acute liver failure without hepatic coma or septic shock, due to unspecified organism (CMS/HCC) A41.9 038.9    R65.20 995.92    K72.00 570   2. Hypoxia R09.02 799.02   3. Acute renal failure, unspecified acute renal failure type (CMS/HCC) N17.9 584.9   4. Acute hepatitis B17.9 570   5. Alcohol abuse F10.10 305.00   6. Acute upper GI bleed K92.2 578.9   7. Elevated lactic acid level R79.89 276.2   8. Ascites due to alcoholic hepatitis K70.11 789.59   9. Leukocytosis, unspecified type D72.829 288.60   10. Sepsis with acute renal failure without septic shock, due to unspecified organism, unspecified acute renal failure type (CMS/HCC) A41.9  038.9    R65.20 995.92    N17.9 584.9   11. Oropharyngeal dysphagia R13.12 787.22     Patient Active Problem List   Diagnosis   • Acute alcoholic hepatitis   • Acute variceal GI bleeding   • BORIS (acute kidney injury) (CMS/HCC)   • Bandemia   • Acute upper GI bleeding   • Sepsis with acute renal failure without septic shock (CMS/HCC)   • Acute respiratory failure with hypoxia (CMS/HCC)   • Alcohol withdrawal delirium (CMS/HCC)   • Hepatic encephalopathy (CMS/HCC)   • Ascites due to alcoholic hepatitis   • Pleural effusion on left   • 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       Therapy Treatment  Rehabilitation Treatment Summary     Row Name 05/10/20 1530             Treatment Time/Intention    Discipline  speech language pathologist  -SM      Subjective Information  no complaints  -SM      Recorded by [SM] Bernadette Serra, MS CCC-SLP 05/10/20 1625      Row Name 05/10/20 1530             Pain Scale: Numbers Pre/Post-Treatment    Pain Scale: Numbers, Pretreatment  0/10 - no pain  -SM      Pain Scale: Numbers, Post-Treatment  0/10 - no pain  -SM      Recorded by [SM] Bernadette Serra, MS CCC-SLP 05/10/20 1625      Row Name                Wound 05/09/20 0800 Right lower flank Puncture    Wound - Properties Group Date first assessed: 05/09/20 [CB] Time first assessed: 0800 [CB] Present on Hospital Admission: N [CB] Side: Right [CB] Orientation: lower [CB] Location: flank [CB] Primary Wound Type: Puncture [CB], paracentesis  Recorded by:  [CB] Paul Sood RN 05/09/20 1027    Row Name 05/10/20 1530             Outcome Summary/Treatment Plan (SLP)    Daily Summary of Progress (SLP)  progress toward functional goals is good  -SM      Plan for Continued Treatment (SLP)  Advance to regular solids. Will check back to determine if any further tx needed, may be close enough to functional swallow state where no further benefit from tx.   -SM      Anticipated Dischage Disposition  inpatient  rehabilitation facility;anticipate therapy at next level of care  -SM      Recorded by [SM] Bernadette Serra, MS CCC-SLP 05/10/20 0539        User Key  (r) = Recorded By, (t) = Taken By, (c) = Cosigned By    Initials Name Effective Dates Discipline     Bernadette Serra, MS CCC-SLP 06/22/15 -  SLP    Paul Duenas RN 06/19/19 -  Nurse          Outcome Summary  Outcome Summary/Treatment Plan (SLP)  Daily Summary of Progress (SLP): progress toward functional goals is good (05/10/20 1530 : Bernadette Serra, MS CCC-SLP)  Plan for Continued Treatment (SLP): Advance to regular solids. Will check back to determine if any further tx needed, may be close enough to functional swallow state where no further benefit from tx.  (05/10/20 1530 : Bernadette Serra, MS CCC-SLP)  Anticipated Dischage Disposition: inpatient rehabilitation facility, anticipate therapy at next level of care (05/10/20 1530 : Bernadette Serra, MS [facility]-SLP)      SLP GOALS     Row Name 05/10/20 1530             Oral Nutrition/Hydration Goal 1 (SLP)    Oral Nutrition/Hydration Goal 1, SLP  LTG: Pt will return to regular diet and thin liquids and tolerate w/o s/sxs aspiration w/ 100% acc w/o cues.  -SM      Time Frame (Oral Nutrition/Hydration Goal 1, SLP)  by discharge  -SM      Progress/Outcomes (Oral Nutrition/Hydration Goal 1, SLP)  good progress toward goal  -SM         Oral Nutrition/Hydration Goal 2 (SLP)    Oral Nutrition/Hydration Goal 2, SLP  Pt will tolerate trials of soft/regular solids and thin liquids w/o s/sxs aspiration w/ 100% acc w/o cues.  -SM      Time Frame (Oral Nutrition/Hydration Goal 2, SLP)  short term goal (STG)  -SM      Barriers (Oral Nutrition/Hydration Goal 2, SLP)  Continue for carryover  -SM      Progress/Outcomes (Oral Nutrition/Hydration Goal 2, SLP)  goal revised this date;goal met  -SM         Lingual Strengthening Goal 1 (SLP)    Activity (Lingual Strengthening Goal 1, SLP)  increase tongue back  strength;increase lingual tone/sensation/control/coordination/movement  -SM      Increase Lingual Tone/Sensation/Control/Coordination/Movement  swallow trials;lingual resistance exercises  -SM      Increase Tongue Back Strength  lingual resistance exercises  -SM      [place]/Accuracy (Lingual Strengthening Goal 1, SLP)  with minimal cues (75-90% accuracy)  -SM      Time Frame (Lingual Strengthening Goal 1, SLP)  short term goal (STG)  -SM      Barriers (Lingual Strengthening Goal 1, SLP)  Ready for advancement to regular solids  -SM      Progress/Outcomes (Lingual Strengthening Goal 1, SLP)  good progress toward goal  -SM         Pharyngeal Strengthening Exercise Goal 1 (SLP)    Activity (Pharyngeal Strengthening Goal 1, SLP)  increase timing;increase superior movement of the hyolaryngeal complex;increase anterior movement of the hyolaryngeal complex;increase closure at entrance to airway/closure of airway at glottis;increase tongue base retraction  -SM      Increase Timing  prepping - 3 second prep or suck swallow or 3-step swallow  -SM      Increase Superior Movement of the Hyolaryngeal Complex  effortful pitch glide (falsetto + pharyngeal squeeze)  -SM      Increase Anterior Movement of the Hyolaryngeal Complex  chin tuck against resistance (CTAR)  -SM      Increase Closure at Entrance to Airway/Closure of Airway at Glottis  super-supraglottic swallow  -SM      Increase Tongue Base Retraction  hard effortful swallow;patrick  -SM      [place]/Accuracy (Pharyngeal Strengthening Goal 1, SLP)  with minimal cues (75-90% accuracy)  -SM      Time Frame (Pharyngeal Strengthening Goal 1, SLP)  short term goal (STG)  -SM      Progress/Outcomes (Pharyngeal Strengthening Goal 1, SLP)  goal ongoing  -SM         Attention Goal 1 (SLP)    Improve Attention by Goal 1 (SLP)  complete sustained attention task;100%;with minimal cues (75-90%)  -SM      Time Frame (Attention Goal 1, SLP)  short term goal (STG)  -SM          Memory Skills Goal 1 (SLP)    Improve Memory Skills Through Goal 1 (SLP)  recalling related word lists with an imposed delay;select a word from a list by exclusion;repeat list in sequential order;90%;with minimal cues (75-90%)  -SM      Time Frame (Memory Skills Goal 1, SLP)  short term goal (STG)  -SM         Organizational Skills Goal 1 (SLP)    Improve Thought Organization Through Goal 1 (SLP)  completing a verbal sequencing task;completing mental manipulation task;100%;with minimal cues (75-90%)  -SM      Time Frame (Thought Organization Skills Goal 1, SLP)  short term goal (STG)  -SM         Functional Problem Solving Skills Goal 1 (SLP)    Improve Problem Solving Through Goal 1 (SLP)  determine multiple solutions to problems;complete organization/home management task;100%;with minimal cues (75-90%)  -SM      Time Frame (Problem Solving Goal 1, SLP)  short term goal (STG)  -SM         Additional Goal 1 (SLP)    Additional Goal 1, SLP  LTG: Improve cog-comm skills in order to participate in care while in hospital setting with 100% accuracy and cues  -SM      Time Frame (Additional Goal 1, SLP)  by discharge  -SM        User Key  (r) = Recorded By, (t) = Taken By, (c) = Cosigned By    Initials Name Provider Type    Bernadette Alexander MS CCC-SLP Speech and Language Pathologist          EDUCATION  The patient has been educated in the following areas:   Dysphagia (Swallowing Impairment).    SLP Recommendation and Plan  Daily Summary of Progress (SLP): progress toward functional goals is good     Plan for Continued Treatment (SLP): Advance to regular solids. Will check back to determine if any further tx needed, may be close enough to functional swallow state where no further benefit from tx.   Anticipated Dischage Disposition: inpatient rehabilitation facility, anticipate therapy at next level of care                    Time Calculation:   Time Calculation- SLP     Row Name 05/10/20 4798             Time  Calculation- SLP    SLP Start Time  1530  -      SLP Received On  05/10/20  -        User Key  (r) = Recorded By, (t) = Taken By, (c) = Cosigned By    Initials Name Provider Type    Bernadette Alexander MS CCC-SLP Speech and Language Pathologist          Therapy Charges for Today     Code Description Service Date Service Provider Modifiers Qty    35672133592  ST EVAL SPEECH AND PROD W LANG  4 5/10/2020 Bernadette Serra MS CCC-SLP GN 1    [id number]  ST TREATMENT SWALLOW 2 5/10/2020 Bernadette Serra MS CCC-SLP GN 1                 Bernadette Serra MS CCC-SLP  5/10/2020

## 2020-05-10 NOTE — PROGRESS NOTES
"Pharmacy Consult-Vancomycin Dosing  John Varma is a  24 y.o. male receiving vancomycin therapy.     Indication: empiric (r/o sepsis)  Consulting Provider: pulm  ID Consult: ID    Goal Trough: 15-20 mcg/mL    Current Antimicrobial Therapy  IV Vanco (5/7-5/13)   Merrem (5/7-5/13)   Mycamine (5/7-5/13)      Allergies  Allergies as of 04/23/2020 - Reviewed 04/23/2020   Allergen Reaction Noted    Penicillins Hives 04/23/2020       Labs  Results from last 7 days   Lab Units 05/10/20  0521 05/09/20  0459 05/08/20  0429   BUN mg/dL 17 16 25*   CREATININE mg/dL 0.31* 0.25* 0.40*       Results from last 7 days   Lab Units 05/10/20  0521 05/09/20 0459 05/08/20  0429   WBC 10*3/mm3 31.32* 28.99* 33.29*       Evaluation of Dosing  Ht - 172.7 cm (68\")  Wt - 76.9 kg (169 lb 8.5 oz)    Estimated Creatinine Clearance: 396.2 mL/min (A) (by C-G formula based on SCr of 0.31 mg/dL (L)).    Intake & Output (last 3 days)         05/07 0701 - 05/08 0700 05/08 0701 - 05/09 0700 05/09 0701 - 05/10 0700 05/10 0701 - 05/11 0700    P.O.  354 240 75    I.V. (mL/kg) 118 (1.5) 502.8 (6.5) 275.6 (3.6) 195.4 (2.5)    Other 440 571 503 186    NG/GT  636 1551 729    IV Piggyback 1001.4 854.6 918.1 392     1669.5 527.5     Total Intake(mL/kg) 2247.4 (29.1) 4587.9 (59.6) 4015.2 (52.2) 1577.4 (20.5)    Urine (mL/kg/hr) 1860 (1) 1695 (0.9) 2275 (1.2) 445 (0.6)    Emesis/NG output 750       Other  1000      Stool  0      Total Output 2610 2695 2275 445    Net -362.6 +1892.9 +1740.2 +1132.4            Stool Unmeasured Occurrence  2 x              Microbiology and Radiology  Microbiology Results (last 10 days)       Procedure Component Value - Date/Time    Body Fluid Culture - Body Fluid, Peritoneum [[id number]] Collected:  05/08/20 1520    Lab Status:  Preliminary result Specimen:  Body Fluid from Peritoneum Updated:  05/10/20 0705     Body Fluid Culture No growth at 2 days     Gram Stain Few (2+) WBCs seen      No organisms seen    AFB Culture " - Body Fluid, Peritoneum [[id number]] Collected:  05/08/20 1520    Lab Status:  Preliminary result Specimen:  Body Fluid from Peritoneum Updated:  05/09/20 1108     AFB Stain No acid fast bacilli seen on concentrated smear    Clostridium Difficile Toxin - Stool, Per Rectum [[id number]] Collected:  05/07/20 1410    Lab Status:  Final result Specimen:  Stool from Per Rectum Updated:  05/07/20 1541    Narrative:       The following orders were created for panel order Clostridium Difficile Toxin - Stool, Per Rectum.  Procedure                               Abnormality         Status                     ---------                               -----------         ------                     Clostridium Difficile To...[[id number]]  Normal              Final result                 Please view results for these tests on the individual orders.    Clostridium Difficile Toxin, PCR - Stool, Per Rectum [[id number]]  (Normal) Collected:  05/07/20 1410    Lab Status:  Final result Specimen:  Stool from Per Rectum Updated:  05/07/20 1541     C. Difficile Toxins by PCR Not Detected    Narrative:       Performance characteristics of test not established for patients <2 years of age.  Negative for Toxigenic C. Difficile    Respiratory Culture - Sputum, Cough [[id number]] Collected:  05/07/20 1213    Lab Status:  Final result Specimen:  Sputum from Cough Updated:  05/09/20 1054     Respiratory Culture Light growth (2+) Normal Respiratory Jordyn     Gram Stain Many (4+) WBCs per low power field      Moderate (3+) Epithelial cells per low power field      Few (2+) Gram positive cocci in pairs and clusters    Blood Culture - Blood, Arm, Left [[id number]] Collected:  05/07/20 1202    Lab Status:  Preliminary result Specimen:  Blood from Arm, Left Updated:  05/10/20 1230     Blood Culture No growth at 3 days    Narrative:       Aerobic bottle only      Blood Culture - Blood, Wrist, Left [[id number]] Collected:  05/07/20 1202    Lab Status:   Preliminary result Specimen:  Blood from Wrist, Left Updated:  05/10/20 1300     Blood Culture No growth at 3 days    Narrative:       Aerobic bottle only      SARS-CoV-2 PCR ([place] IN-HOUSE PERFORMED), NP SWAB IN TRANSPORT MEDIA - Swab, Nasopharynx [[id number]]  (Normal) Collected:  05/06/20 1840    Lab Status:  Final result Specimen:  Swab from Nasopharynx Updated:  05/07/20 0033     COVID19 Not Detected    Blood Culture - Blood, Arm, Left [[id number]] Collected:  05/03/20 1421    Lab Status:  Final result Specimen:  Blood from Arm, Left Updated:  05/08/20 1515     Blood Culture No growth at 5 days    Blood Culture - Blood, Hand, Left [[id number]] Collected:  05/03/20 1421    Lab Status:  Final result Specimen:  Blood from Hand, Left Updated:  05/08/20 1515     Blood Culture No growth at 5 days            Evaluation of Level      Results from last 7 days   Lab Units 05/10/20  1535 05/09/20  1315 05/08/20  1129   VANCOMYCIN TR mcg/mL 17.30 8.00 4.60*     5/10: ~7 hr level, appropriate     Assessment/Plan:    Pharmacy dosing Vancomycin for sepsis. Goal trough 15-20mcg/ml.  Trough level therapeutic at 17.3mcg/mL on Vancomycin 1250mg IV q8h. Continue current regimen.  Recheck trough on 5/12 @0730.    Pharmacy will continue to monitor and make adjustments to dose as necessary based on renal function, cultures, labs, and clinical status.     Peter Mclaughlin PharmD  Pharmacy Resident   5/10/2020  16:28

## 2020-05-10 NOTE — PLAN OF CARE
Problem: Patient Care Overview  Goal: Plan of Care Review  Outcome: Ongoing (interventions implemented as appropriate)  Flowsheets (Taken 5/10/2020 0018)  Progress: improving  Plan of Care Reviewed With: patient  Outcome Summary: Pt ambulated 200ft with RW and Esmer x2. VC's for upright posture and proper management of RW. Pt with more steady gait this session. Pt required one standing rest break; mobility limited by fatigue and c/o B LE weakness. Continue to progress as appropriate.

## 2020-05-10 NOTE — PLAN OF CARE
Problem: Patient Care Overview  Goal: Plan of Care Review  Outcome: Ongoing (interventions implemented as appropriate)  Flowsheets (Taken 5/10/2020 1877)  Progress: improving  Plan of Care Reviewed With: patient; father  Outcome Summary: Patient slept well until noon. Oriented X4 at times, confused to time and place intermittently. GOINS, follows commands. VSS. UOP adequate. Tube feedings continued. Ambulated in gibbs with PT. Restraints continued. Father updated. Will continue to monitor.

## 2020-05-10 NOTE — PROGRESS NOTES
Intensive Care Follow-up     Hospital:  LOS: 17 days   Mr. John Varma, 24 y.o. male is followed for:   Acute GI bleeding            History of present illness:   24-year-old male with past medical history of alcoholism, alcoholic cirrhosis, tobacco abuse admitted on April 23 with alcoholic hepatitis, decompensated cirrhosis, acute kidney injury and hepatic encephalopathy.  Patient also was having hematemesis so underwent EGD on 4/24.  Patient was intubated for EGD and extubated on April 27.  Underwent TIPS procedure on April 29 for which he was intubated but extubated again on May 1.  Patient has undergone paracentesis x3 on April 24, 5 L, April 30 2.5 L, May 8 1 L.      Subjective   Interval History:  Overnight patient did okay.  Off TPN and tolerating tube feeds well.  He is more awake today and wanting to go home.  Discussed that he is not ready to go home at this point.  Hemodynamically stable otherwise.                 The patient's past medical, surgical and social history were reviewed and updated in Epic as appropriate.       Objective     Infusions:    Pharmacy to dose vancomycin      Medications:    Pharmacy Consult  Does not apply Once   enoxaparin 40 mg Subcutaneous Q24H   folic acid 1 mg Oral Daily   furosemide 20 mg Intravenous Q12H   insulin detemir 15 Units Subcutaneous Daily   insulin regular 0-7 Units Subcutaneous Q6H   insulin regular 5 Units Subcutaneous Q6H   lactulose 20 g Nasogastric BID   melatonin 5 mg Oral Nightly   meropenem 1 g Intravenous Q8H   metoclopramide 5 mg Intravenous Q6H   micafungin (MYCAMINE)  mg Intravenous Q24H   multivitamin and minerals 15 mL Oral Daily   pantoprazole 40 mg Oral Q AM   PRO-STAT 30 mL Nasogastric BID   propranolol 10 mg Oral Q8H   QUEtiapine 100 mg Oral Nightly   QUEtiapine 50 mg Oral Daily   rifAXIMin 550 mg Nasogastric Q12H   spironolactone 25 mg Oral Daily   thiamine 100 mg Oral Daily   vancomycin 1,250 mg Intravenous Q8H   zinc gluconate 50  "mg Oral Daily       Vital Sign Min/Max for last 24 hours  Temp  Min: 97.6 °F (36.4 °C)  Max: 98.4 °F (36.9 °C)   BP  Min: 84/45  Max: 123/69   Pulse  Min: 94  Max: 109   Resp  Min: 24  Max: 32   SpO2  Min: 91 %  Max: 99 %   Flow (L/min)  Min: 2  Max: 2       Input/Output for last 24 hour shift  05/09 0701 - 05/10 0700  In: 4015.2 [P.O.:240; I.V.:275.6]  Out: 2275 [Urine:2275]      Objective:  Vital signs: (most recent): Blood pressure 107/63, pulse 104, temperature 97.7 °F (36.5 °C), temperature source Oral, resp. rate 28, height 172.7 cm (68\"), weight 76.9 kg (169 lb 8.5 oz), SpO2 95 %.               General Appearance: Awake, following commands, in no acute distress  Head:   Pupils reactive & symmetrical B/L.  Lungs:   B/L Breath sounds present with decreased breath sounds on bases, no wheezing heard, occ crackles.   Heart: S1 and S2 present, no murmur  Abdomen: Mildly distended, non-tender, no guarding or rigidity.  Extremities: Atraumatic, no cyanosis or clubbing,  2+ edema, warm to touch.  Pulses: Positive and symmetric.  Neurologic:  Moving all four extremities.  Generalized weak    Results from last 7 days   Lab Units 05/10/20  0521 05/09/20  0459 05/08/20  0429   WBC 10*3/mm3 31.32* 28.99* 33.29*   HEMOGLOBIN g/dL 9.7* 8.5* 9.3*   PLATELETS 10*3/mm3 282 257 308     Results from last 7 days   Lab Units 05/10/20  0521 05/09/20  0459 05/08/20  0429   SODIUM mmol/L 137 138 141   POTASSIUM mmol/L 4.1 4.0 3.9   CO2 mmol/L 20.0* 20.0* 21.0*   BUN mg/dL 17 16 25*   CREATININE mg/dL 0.31* 0.25* 0.40*   MAGNESIUM mg/dL 1.9 2.0 2.0   PHOSPHORUS mg/dL 3.4 3.6 3.9   GLUCOSE mg/dL 110* 145* 145*     Estimated Creatinine Clearance: 396.2 mL/min (A) (by C-G formula based on SCr of 0.31 mg/dL (L)).          Cytology on peritoneal fluid pending.    Images:   None new    I reviewed the patient's results and images.     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  Patient with ongoing sepsis and thought to have intra-abdominal infection.  On broad-spectrum antibiotics per ID team including antifungal agents. Repeat paracentesis shows improvement in leukocytosis in the fluid.  Cultures remain negative.  Some of the cultures are pending.  Cytology and peritoneal fluid is pending as well.  White count did go up a little but no new fevers or hemodynamic instability.  Monitor for now.    2.  Portosystemic encephalopathy and patient is on rifaximin and lactulose.  Slowly improving.  Ammonia level is still mildly elevated.  Will check ammonia level again in the morning.  Patient is on Seroquel as well.  Appears comfortable without any acute agitation.    3.  Patient with refractory ascites and fluid overload.  Patient was started on low-dose Lasix and spironolactone.  Tolerating well for now.  Monitor closely.  Has had 3 paracentesis but significant recurrence of ascites.  Does have significant hypoalbuminemia as well. On propranolol for portal hypertension.    4.  Tolerating tube feeds well.  Off TPN.  Moving bowels okay.  Continue Reglan.    5.  Encephalopathy slightly improved.    6.  Monitor blood glucose and insulin as needed for hyperglycemia management.  Patient is on scheduled insulin as well as Levemir and sliding scale.  Since off TPN blood glucose levels are trending down.  Will continue Levemir and hold scheduled insulin for today and continue sliding scale and reassess needs again in the morning.    7.  GI and DVT prophylaxis.    Overall prognosis is guarded.   We will continue supportive care.  Patient will be transferred out to progressive care unit under hospitalist team.  Will be available if any further concerns on this patient. Updated father.     Plan of care and goals reviewed with mulitdisciplinary/antibiotic stewardship team during rounds.   I discussed the patient's findings and my recommendations with nursing staff     High level of risk due to:  illness with threat to life or bodily function.    Time spent 25 min (exclusive of procedure time)  including high complexity decision making to assess, manipulate, and support vital organ system failure in this individual who has impairment of one or more vital organ systems such that there is a high probability of imminent or life threatening deterioration in the patient’s condition.      Ariel Galindo MD, FCCP  Pulmonary, Critical care and Sleep Medicine

## 2020-05-10 NOTE — THERAPY TREATMENT NOTE
Patient Name: John Varma  : 1995    MRN: [id number]                              Today's Date: 5/10/2020       Admit Date: 2020    Visit Dx:     ICD-10-CM ICD-9-CM   1. Sepsis with acute liver failure without hepatic coma or septic shock, due to unspecified organism (CMS/HCC) A41.9 038.9    R65.20 995.92    K72.00 570   2. Hypoxia R09.02 799.02   3. Acute renal failure, unspecified acute renal failure type (CMS/HCC) N17.9 584.9   4. Acute hepatitis B17.9 570   5. Alcohol abuse F10.10 305.00   6. Acute upper GI bleed K92.2 578.9   7. Elevated lactic acid level R79.89 276.2   8. Ascites due to alcoholic hepatitis K70.11 789.59   9. Leukocytosis, unspecified type D72.829 288.60   10. Sepsis with acute renal failure without septic shock, due to unspecified organism, unspecified acute renal failure type (CMS/HCC) A41.9 038.9    R65.20 995.92    N17.9 584.9   11. Oropharyngeal dysphagia R13.12 787.22     Patient Active Problem List   Diagnosis   • Acute alcoholic hepatitis   • Acute variceal GI bleeding   • BORIS (acute kidney injury) (CMS/HCC)   • Bandemia   • Acute upper GI bleeding   • Sepsis with acute renal failure without septic shock (CMS/HCC)   • Acute respiratory failure with hypoxia (CMS/HCC)   • Alcohol withdrawal delirium (CMS/HCC)   • Hepatic encephalopathy (CMS/HCC)   • Ascites due to alcoholic hepatitis   • Pleural effusion on left   • S/P TIPS (transjugular intrahepatic portosystemic shunt) 2020     Past Medical History:   Diagnosis Date   • Alcohol abuse    • GERD (gastroesophageal reflux disease)    • GIB (gastrointestinal bleeding)    • Tobacco abuse      Past Surgical History:   Procedure Laterality Date   • ARM WOUND REPAIR / CLOSURE     • ENDOSCOPY N/A 2020    Procedure: ESOPHAGOGASTRODUODENOSCOPY;  Surgeon: Brunner, Mark I, MD;  Location: [facility] ENDOSCOPY;  Service: Gastroenterology;  Laterality: N/A;     General Information     Row Name 05/10/20 1520          PT Evaluation  Time/Intention    Document Type  therapy note (daily note)  -KR     Mode of Treatment  physical therapy  -KR     Row Name 05/10/20 1520          General Information    Existing Precautions/Restrictions  fall;other (see comments) NG  -KR     Row Name 05/10/20 1520          Cognitive Assessment/Intervention- PT/OT    Orientation Status (Cognition)  oriented to;person;place  -KR     Row Name 05/10/20 1520          Safety Issues, Functional Mobility    Safety Issues Affecting Function (Mobility)  ability to follow commands;awareness of need for assistance;insight into deficits/self awareness;safety precaution awareness;safety precautions follow-through/compliance  -KR     Impairments Affecting Function (Mobility)  balance;cognition;endurance/activity tolerance;strength  -KR       User Key  (r) = Recorded By, (t) = Taken By, (c) = Cosigned By    Initials Name Provider Type    KR Nataliya Patel PT Physical Therapist        Mobility     Row Name 05/10/20 1521          Bed Mobility Assessment/Treatment    Bed Mobility Assessment/Treatment  supine-sit;sit-supine  -KR     Supine-Sit [place] (Bed Mobility)  moderate assist (50% patient effort);2 person assist;verbal cues  -KR     Sit-Supine [place] (Bed Mobility)  moderate assist (50% patient effort);2 person assist;verbal cues  -KR     Assistive Device (Bed Mobility)  draw sheet;head of bed elevated  -KR     Comment (Bed Mobility)  VC's for sequencing. Pt required assistance at trunk and BLEs. Increased time required to complete.   -KR     Row Name 05/10/20 1521          Transfer Assessment/Treatment    Comment (Transfers)  VC's for sequencing and safe hand placement.   -     Row Name 05/10/20 1521          Sit-Stand Transfer    Sit-Stand [place] (Transfers)  minimum assist (75% patient effort);2 person assist;verbal cues  -KR     Assistive Device (Sit-Stand Transfers)  walker, front-wheeled  -KR     Row Name 05/10/20 1521          Gait/Stairs  Assessment/Training    Gait/Stairs Assessment/Training  gait/ambulation assistive device  -KR     [place] Level (Gait)  minimum assist (75% patient effort);2 person assist;verbal cues  -KR     Assistive Device (Gait)  walker, front-wheeled  -KR     Distance in Feet (Gait)  200  -KR     Pattern (Gait)  step-through  -KR     Deviations/Abnormal Patterns (Gait)  sherwin decreased;stride length decreased  -KR     Bilateral Gait Deviations  forward flexed posture;heel strike decreased  -KR     Comment (Gait/Stairs)  Pt demonstrated step through gait pattern with slow sherwin and decreased step length. VC's for upright posture and to keep RW close with feet inside middle. Pt demonstrated improved stability this date. Pt required one standing rest break due to c/o fatigue and BLE weakness.   -KR       User Key  (r) = Recorded By, (t) = Taken By, (c) = Cosigned By    Initials Name Provider Type    Nataliya Bourgeois, PT Physical Therapist        Obj/Interventions     Row Name 05/10/20 1523          Static Sitting Balance    Level of [place] (Unsupported Sitting, Static Balance)  standby assist  -KR     Sitting Position (Unsupported Sitting, Static Balance)  sitting on edge of bed  -KR     Row Name 05/10/20 1523          Static Standing Balance    Level of [place] (Supported Standing, Static Balance)  minimal assist, 75% patient effort  -KR     Assistive Device Utilized (Supported Standing, Static Balance)  walker, rolling  -KR     Row Name 05/10/20 1523          Dynamic Standing Balance    Level of [place], Reaches Outside Midline (Standing, Dynamic Balance)  minimal assist, 75% patient effort;2 person assist  -KR     Assistive Device Utilized (Supported Standing, Dynamic Balance)  walker, rolling  -KR       User Key  (r) = Recorded By, (t) = Taken By, (c) = Cosigned By    Initials Name Provider Type    Nataliya Bourgeois, PT Physical Therapist        Goals/Plan    No documentation.       Clinical  Impression     Row Name 05/10/20 1523          Pain Assessment    Additional Documentation  Pain Scale: Numbers Pre/Post-Treatment (Group)  -KR     Row Name 05/10/20 1523          Pain Scale: Numbers Pre/Post-Treatment    Pain Scale: Numbers, Pretreatment  0/10 - no pain  -KR     Pain Scale: Numbers, Post-Treatment  0/10 - no pain  -KR     Row Name 05/10/20 1523          Vital Signs    Pre Systolic BP Rehab  111  -KR     Pre Treatment Diastolic BP  69  -KR     Post Systolic BP Rehab  120  -KR     Post Treatment Diastolic BP  76  -KR     Pretreatment Heart Rate (beats/min)  110  -KR     Posttreatment Heart Rate (beats/min)  112  -KR     Pre SpO2 (%)  95  -KR     O2 Delivery Pre Treatment  room air  -KR     Post SpO2 (%)  96  -KR     O2 Delivery Post Treatment  room air  -KR     Pre Patient Position  Supine  -KR     Intra Patient Position  Standing  -KR     Post Patient Position  Supine  -KR     Row Name 05/10/20 1523          Positioning and Restraints    Pre-Treatment Position  in bed  -KR     Post Treatment Position  bed  -KR     In Bed  notified nsg;supine;call light within reach;encouraged to call for assist;exit alarm on;side rails up x3;RUE elevated;LUE elevated  -KR     Restraints  released:;reapplied:;notified nsg:;soft limb  -KR       User Key  (r) = Recorded By, (t) = Taken By, (c) = Cosigned By    Initials Name Provider Type    Nataliya Bourgeois, PT Physical Therapist        Outcome Measures     Row Name 05/10/20 1524          How much help from another person do you currently need...    Turning from your back to your side while in flat bed without using bedrails?  3  -KR     Moving from lying on back to sitting on the side of a flat bed without bedrails?  2  -KR     Moving to and from a bed to a chair (including a wheelchair)?  3  -KR     Standing up from a chair using your arms (e.g., wheelchair, bedside chair)?  3  -KR     Climbing 3-5 steps with a railing?  2  -KR     To walk in hospital room?  3  -KR      -[facility] 6 Clicks Score (PT)  16  -KR     Row Name 05/10/20 1524          Functional Assessment    Outcome Measure Options  -[facility] 6 Clicks Basic Mobility (PT)  -KR       User Key  (r) = Recorded By, (t) = Taken By, (c) = Cosigned By    Initials Name Provider Type    Nataliya Bourgeois, PT Physical Therapist        Physical Therapy Education                 Title: PT OT SLP Therapies (In Progress)     Topic: Physical Therapy (In Progress)     Point: Mobility training (In Progress)     Description:   Instruct learner(s) on safety and technique for assisting patient out of bed, chair or wheelchair.  Instruct in the proper use of assistive devices, such as walker, crutches, cane or brace.              Patient Friendly Description:   It's important to get you on your feet again, but we need to do so in a way that is safe for you. Falling has serious consequences, and your personal safety is the most important thing of all.        When it's time to get out of bed, one of us or a family member will sit next to you on the bed to give you support.     If your doctor or nurse tells you to use a walker, crutches, a cane, or a brace, be sure you use it every time you get out of bed, even if you think you don't need it.    Learning Progress Summary           Patient Acceptance, E, NR by KR at 5/10/2020 1357    Acceptance, E, NR by KR at 5/9/2020 1420    Acceptance, E,D, VU,NR by LS at 5/8/2020 1612    Nonacceptance, E, NR,NL by SINDY at 5/7/2020 1508    Acceptance, E,D, VU,NR by MP at 5/7/2020 1120    Acceptance, E,D, VU,NR by LS at 5/6/2020 1552    Acceptance, E, NR by KR at 5/5/2020 1426    Acceptance, E,D, NR by LS at 5/4/2020 1605    Acceptance, E,D, NR by LS at 5/2/2020 1507    Acceptance, E,D, NR by LS at 4/29/2020 1009                   Point: Home exercise program (In Progress)     Description:   Instruct learner(s) on appropriate technique for monitoring, assisting and/or progressing patient with therapeutic exercises  and activities.              Learning Progress Summary           Patient Acceptance, E, NR by KR at 5/10/2020 1357    Acceptance, E, NR by KR at 5/9/2020 1420    Acceptance, E,D, VU,NR by LS at 5/8/2020 1612    Nonacceptance, E, NR,NL by SINDY at 5/7/2020 1508    Acceptance, E,D, VU,NR by LS at 5/6/2020 1552    Acceptance, E, NR by KR at 5/5/2020 1426    Acceptance, E,D, NR by LS at 5/4/2020 1605    Acceptance, E,D, NR by LS at 5/2/2020 1507    Acceptance, E,D, NR by LS at 4/29/2020 1009                   Point: Body mechanics (In Progress)     Description:   Instruct learner(s) on proper positioning and spine alignment for patient and/or caregiver during mobility tasks and/or exercises.              Learning Progress Summary           Patient Acceptance, E, NR by KR at 5/10/2020 1357    Acceptance, E, NR by KR at 5/9/2020 1420    Acceptance, E,D, VU,NR by LS at 5/8/2020 1612    Nonacceptance, E, NR,NL by SINDY at 5/7/2020 1508    Acceptance, E,D, VU,NR by MP at 5/7/2020 1120    Acceptance, E,D, VU,NR by LS at 5/6/2020 1552    Acceptance, E, NR by KR at 5/5/2020 1426    Acceptance, E,D, NR by LS at 5/4/2020 1605    Acceptance, E,D, NR by LS at 5/2/2020 1507    Acceptance, E,D, NR by LS at 4/29/2020 1009                   Point: Precautions (In Progress)     Description:   Instruct learner(s) on prescribed precautions during mobility and gait tasks              Learning Progress Summary           Patient Acceptance, E, NR by KR at 5/10/2020 1357    Acceptance, E, NR by KR at 5/9/2020 1420    Acceptance, E,D, VU,NR by LS at 5/8/2020 1612    Nonacceptance, E, NR,NL by SINDY at 5/7/2020 1508    Acceptance, E,D, VU,NR by MP at 5/7/2020 1120    Acceptance, E,D, VU,NR by LS at 5/6/2020 1552    Acceptance, E, NR by KR at 5/5/2020 1426    Acceptance, E,D, NR by LS at 5/4/2020 1605    Acceptance, E,D, NR by LS at 5/2/2020 1507    Acceptance, E,D, NR by LS at 4/29/2020 1009                               User Key     Initials Effective  Dates Name Provider Type Discipline    LS 06/19/15 -  Savanah Matthews, PT Physical Therapist PT    KR 04/03/18 -  Nataliya Patel PT Physical Therapist PT    SINDY 11/19/18 -  Piedad Lao, RN Registered Nurse Nurse    MP 11/06/19 -  Michael Allen, PT Physical Therapist PT              PT Recommendation and Plan     Plan of Care Reviewed With: patient  Progress: improving  Outcome Summary: Pt ambulated 200ft with RW and Esmer x2. VC's for upright posture and proper management of RW. Pt with more steady gait this session. Pt required one standing rest break; mobility limited by fatigue and c/o B LE weakness. Continue to progress as appropriate.     Time Calculation:   PT Charges     Row Name 05/10/20 1357             Time Calculation    Start Time  1357  -KR      PT Received On  05/10/20  -KR      PT Goal Re-Cert Due Date  05/12/20  -KR         Time Calculation- PT    Total Timed Code Minutes- PT  25 minute(s)  -KR         Timed Charges    95406 - PT Therapeutic Activity Minutes  25  -KR        User Key  (r) = Recorded By, (t) = Taken By, (c) = Cosigned By    Initials Name Provider Type    KR Nataliya Patel, PT Physical Therapist        Therapy Charges for Today     Code Description Service Date Service Provider Modifiers Qty    60335432434 HC GAIT TRAINING EA 15 MIN 5/9/2020 Nataliya Patel, PT GP 1    22475385312 HC PT THERAPEUTIC ACT EA 15 MIN 5/10/2020 Nataliya Patel, PT GP 2    94550528877 HC PT THER SUPP EA 15 MIN 5/10/2020 Nataliya Patel, PT GP 2          PT G-Codes  Outcome Measure Options: AM-PAC 6 Clicks Basic Mobility (PT)  AM-PAC 6 Clicks Score (PT): 16  AM-PAC 6 Clicks Score (OT): 13    Yessica Patel PT  5/10/2020

## 2020-05-11 PROBLEM — K65.2 SBP (SPONTANEOUS BACTERIAL PERITONITIS) (HCC): Status: ACTIVE | Noted: 2020-05-11

## 2020-05-11 PROBLEM — D62 ACUTE BLOOD LOSS ANEMIA: Status: ACTIVE | Noted: 2020-05-11

## 2020-05-11 PROBLEM — Z72.0 TOBACCO ABUSE: Status: ACTIVE | Noted: 2020-05-11

## 2020-05-11 LAB
ALBUMIN SERPL-MCNC: 2.4 G/DL (ref 3.5–5.2)
ALBUMIN/GLOB SERPL: 1 G/DL
ALP SERPL-CCNC: 205 U/L (ref 39–117)
ALT SERPL W P-5'-P-CCNC: 61 U/L (ref 1–41)
ANION GAP SERPL CALCULATED.3IONS-SCNC: 9 MMOL/L (ref 5–15)
AST SERPL-CCNC: 101 U/L (ref 1–40)
BACTERIA FLD CULT: NORMAL
BASOPHILS # BLD AUTO: 0.13 10*3/MM3 (ref 0–0.2)
BASOPHILS NFR BLD AUTO: 0.4 % (ref 0–1.5)
BILIRUB SERPL-MCNC: 4.3 MG/DL (ref 0.2–1.2)
BUN BLD-MCNC: 18 MG/DL (ref 6–20)
BUN/CREAT SERPL: 60 (ref 7–25)
CA-I SERPL ISE-MCNC: 1.15 MMOL/L (ref 1.12–1.32)
CALCIUM SPEC-SCNC: 7.4 MG/DL (ref 8.6–10.5)
CHLORIDE SERPL-SCNC: 105 MMOL/L (ref 98–107)
CO2 SERPL-SCNC: 21 MMOL/L (ref 22–29)
CREAT BLD-MCNC: 0.3 MG/DL (ref 0.76–1.27)
CRP SERPL-MCNC: 3.01 MG/DL (ref 0–0.5)
DEPRECATED RDW RBC AUTO: 84.9 FL (ref 37–54)
EOSINOPHIL # BLD AUTO: 0.58 10*3/MM3 (ref 0–0.4)
EOSINOPHIL NFR BLD AUTO: 1.9 % (ref 0.3–6.2)
ERYTHROCYTE [DISTWIDTH] IN BLOOD BY AUTOMATED COUNT: 21 % (ref 12.3–15.4)
GFR SERPL CREATININE-BSD FRML MDRD: >150 ML/MIN/1.73
GLOBULIN UR ELPH-MCNC: 2.5 GM/DL
GLUCOSE BLD-MCNC: 127 MG/DL (ref 65–99)
GLUCOSE BLDC GLUCOMTR-MCNC: 118 MG/DL (ref 70–130)
GLUCOSE BLDC GLUCOMTR-MCNC: 132 MG/DL (ref 70–130)
GLUCOSE BLDC GLUCOMTR-MCNC: 85 MG/DL (ref 70–130)
GRAM STN SPEC: NORMAL
GRAM STN SPEC: NORMAL
HCT VFR BLD AUTO: 26.8 % (ref 37.5–51)
HGB BLD-MCNC: 8.8 G/DL (ref 13–17.7)
IMM GRANULOCYTES # BLD AUTO: 0.42 10*3/MM3 (ref 0–0.05)
IMM GRANULOCYTES NFR BLD AUTO: 1.4 % (ref 0–0.5)
LYMPHOCYTES # BLD AUTO: 2.54 10*3/MM3 (ref 0.7–3.1)
LYMPHOCYTES NFR BLD AUTO: 8.5 % (ref 19.6–45.3)
MAGNESIUM SERPL-MCNC: 2 MG/DL (ref 1.6–2.6)
MCH RBC QN AUTO: 36.2 PG (ref 26.6–33)
MCHC RBC AUTO-ENTMCNC: 32.8 G/DL (ref 31.5–35.7)
MCV RBC AUTO: 110.3 FL (ref 79–97)
MONOCYTES # BLD AUTO: 2.9 10*3/MM3 (ref 0.1–0.9)
MONOCYTES NFR BLD AUTO: 9.7 % (ref 5–12)
NEUTROPHILS # BLD AUTO: 23.21 10*3/MM3 (ref 1.7–7)
NEUTROPHILS NFR BLD AUTO: 78.1 % (ref 42.7–76)
NRBC BLD AUTO-RTO: 0 /100 WBC (ref 0–0.2)
PHOSPHATE SERPL-MCNC: 3.1 MG/DL (ref 2.5–4.5)
PLAT MORPH BLD: NORMAL
PLATELET # BLD AUTO: 305 10*3/MM3 (ref 140–450)
PMV BLD AUTO: 10.6 FL (ref 6–12)
POTASSIUM BLD-SCNC: 4 MMOL/L (ref 3.5–5.2)
PREALB SERPL-MCNC: 3.7 MG/DL (ref 20–40)
PROT SERPL-MCNC: 4.9 G/DL (ref 6–8.5)
RBC # BLD AUTO: 2.43 10*6/MM3 (ref 4.14–5.8)
RBC MORPH BLD: NORMAL
SODIUM BLD-SCNC: 135 MMOL/L (ref 136–145)
WBC MORPH BLD: NORMAL
WBC NRBC COR # BLD: 29.78 10*3/MM3 (ref 3.4–10.8)

## 2020-05-11 PROCEDURE — 63710000001 INSULIN DETEMIR PER 5 UNITS: Performed by: INTERNAL MEDICINE

## 2020-05-11 PROCEDURE — 25010000002 METOCLOPRAMIDE PER 10 MG: Performed by: INTERNAL MEDICINE

## 2020-05-11 PROCEDURE — 84100 ASSAY OF PHOSPHORUS: CPT | Performed by: INTERNAL MEDICINE

## 2020-05-11 PROCEDURE — 82330 ASSAY OF CALCIUM: CPT

## 2020-05-11 PROCEDURE — 25010000002 MEROPENEM PER 100 MG: Performed by: INTERNAL MEDICINE

## 2020-05-11 PROCEDURE — 84134 ASSAY OF PREALBUMIN: CPT | Performed by: INTERNAL MEDICINE

## 2020-05-11 PROCEDURE — 82962 GLUCOSE BLOOD TEST: CPT

## 2020-05-11 PROCEDURE — 25010000002 VANCOMYCIN 10 G RECONSTITUTED SOLUTION: Performed by: INTERNAL MEDICINE

## 2020-05-11 PROCEDURE — 85025 COMPLETE CBC W/AUTO DIFF WBC: CPT | Performed by: INTERNAL MEDICINE

## 2020-05-11 PROCEDURE — 99233 SBSQ HOSP IP/OBS HIGH 50: CPT | Performed by: FAMILY MEDICINE

## 2020-05-11 PROCEDURE — 97116 GAIT TRAINING THERAPY: CPT

## 2020-05-11 PROCEDURE — 25010000002 VANCOMYCIN 10 G RECONSTITUTED SOLUTION

## 2020-05-11 PROCEDURE — 25010000002 FUROSEMIDE PER 20 MG: Performed by: INTERNAL MEDICINE

## 2020-05-11 PROCEDURE — 86140 C-REACTIVE PROTEIN: CPT

## 2020-05-11 PROCEDURE — 99232 SBSQ HOSP IP/OBS MODERATE 35: CPT | Performed by: PHYSICIAN ASSISTANT

## 2020-05-11 PROCEDURE — 97110 THERAPEUTIC EXERCISES: CPT

## 2020-05-11 PROCEDURE — 80053 COMPREHEN METABOLIC PANEL: CPT

## 2020-05-11 PROCEDURE — 83735 ASSAY OF MAGNESIUM: CPT | Performed by: INTERNAL MEDICINE

## 2020-05-11 PROCEDURE — 97530 THERAPEUTIC ACTIVITIES: CPT

## 2020-05-11 PROCEDURE — 85007 BL SMEAR W/DIFF WBC COUNT: CPT | Performed by: INTERNAL MEDICINE

## 2020-05-11 PROCEDURE — 25010000002 ENOXAPARIN PER 10 MG: Performed by: INTERNAL MEDICINE

## 2020-05-11 RX ADMIN — FOLIC ACID 1 MG: 1 TABLET ORAL at 08:40

## 2020-05-11 RX ADMIN — Medication 30 ML: at 00:30

## 2020-05-11 RX ADMIN — FUROSEMIDE 20 MG: 10 INJECTION, SOLUTION INTRAMUSCULAR; INTRAVENOUS at 14:29

## 2020-05-11 RX ADMIN — SPIRONOLACTONE 25 MG: 25 TABLET ORAL at 08:40

## 2020-05-11 RX ADMIN — MELATONIN TAB 5 MG 5 MG: 5 TAB at 21:07

## 2020-05-11 RX ADMIN — PROPRANOLOL HYDROCHLORIDE 10 MG: 20 TABLET ORAL at 14:30

## 2020-05-11 RX ADMIN — METOCLOPRAMIDE 5 MG: 5 INJECTION, SOLUTION INTRAMUSCULAR; INTRAVENOUS at 17:00

## 2020-05-11 RX ADMIN — RIFAXIMIN 550 MG: 550 TABLET ORAL at 21:07

## 2020-05-11 RX ADMIN — INSULIN DETEMIR 15 UNITS: 100 INJECTION, SOLUTION SUBCUTANEOUS at 09:17

## 2020-05-11 RX ADMIN — Medication 30 ML: at 21:06

## 2020-05-11 RX ADMIN — SODIUM CHLORIDE, PRESERVATIVE FREE 10 ML: 5 INJECTION INTRAVENOUS at 21:06

## 2020-05-11 RX ADMIN — PROPRANOLOL HYDROCHLORIDE 10 MG: 20 TABLET ORAL at 05:40

## 2020-05-11 RX ADMIN — VANCOMYCIN HYDROCHLORIDE 1250 MG: 10 INJECTION, POWDER, LYOPHILIZED, FOR SOLUTION INTRAVENOUS at 17:00

## 2020-05-11 RX ADMIN — MULTIVITAMIN 15 ML: LIQUID ORAL at 09:16

## 2020-05-11 RX ADMIN — RIFAXIMIN 550 MG: 550 TABLET ORAL at 09:16

## 2020-05-11 RX ADMIN — Medication 50 MG: at 09:16

## 2020-05-11 RX ADMIN — QUETIAPINE FUMARATE 100 MG: 100 TABLET ORAL at 21:07

## 2020-05-11 RX ADMIN — FUROSEMIDE 20 MG: 10 INJECTION, SOLUTION INTRAMUSCULAR; INTRAVENOUS at 02:07

## 2020-05-11 RX ADMIN — QUETIAPINE FUMARATE 50 MG: 100 TABLET ORAL at 08:40

## 2020-05-11 RX ADMIN — MEROPENEM 1 G: 1 INJECTION, POWDER, FOR SOLUTION INTRAVENOUS at 08:41

## 2020-05-11 RX ADMIN — MEROPENEM 1 G: 1 INJECTION, POWDER, FOR SOLUTION INTRAVENOUS at 00:31

## 2020-05-11 RX ADMIN — METOCLOPRAMIDE 5 MG: 5 INJECTION, SOLUTION INTRAMUSCULAR; INTRAVENOUS at 12:22

## 2020-05-11 RX ADMIN — LACTULOSE 20 G: 20 SOLUTION ORAL at 08:40

## 2020-05-11 RX ADMIN — THIAMINE HCL TAB 100 MG 100 MG: 100 TAB at 08:40

## 2020-05-11 RX ADMIN — ENOXAPARIN SODIUM 40 MG: 40 INJECTION SUBCUTANEOUS at 08:40

## 2020-05-11 RX ADMIN — METOCLOPRAMIDE 5 MG: 5 INJECTION, SOLUTION INTRAMUSCULAR; INTRAVENOUS at 00:31

## 2020-05-11 RX ADMIN — VANCOMYCIN HYDROCHLORIDE 1250 MG: 10 INJECTION, POWDER, LYOPHILIZED, FOR SOLUTION INTRAVENOUS at 11:15

## 2020-05-11 RX ADMIN — LACTULOSE 20 G: 20 SOLUTION ORAL at 21:07

## 2020-05-11 RX ADMIN — PANTOPRAZOLE SODIUM 40 MG: 40 TABLET, DELAYED RELEASE ORAL at 05:40

## 2020-05-11 RX ADMIN — PROPRANOLOL HYDROCHLORIDE 10 MG: 20 TABLET ORAL at 21:07

## 2020-05-11 RX ADMIN — MEROPENEM 1 G: 1 INJECTION, POWDER, FOR SOLUTION INTRAVENOUS at 16:54

## 2020-05-11 RX ADMIN — METOCLOPRAMIDE 5 MG: 5 INJECTION, SOLUTION INTRAMUSCULAR; INTRAVENOUS at 05:39

## 2020-05-11 RX ADMIN — VANCOMYCIN HYDROCHLORIDE 1250 MG: 10 INJECTION, POWDER, LYOPHILIZED, FOR SOLUTION INTRAVENOUS at 00:31

## 2020-05-11 RX ADMIN — Medication 30 ML: at 08:41

## 2020-05-11 NOTE — PLAN OF CARE
The patient was oriented X4 during the shift. He worked with physical therapy and stayed in the chair for a couple of hours in the afternoon. He then ambulated back to bed with assist X2. His otto catheter was pulled today per orders. His tube feed was stopped per . The patient was adamant that he was going home today. He then called his Dad to come and pick him up. He then handed the phone to me. I explained that John was not medically ready to be discharged. The Dad then talked to John and he stopped asking to leave. His vital signs remain stable, will continue POC.

## 2020-05-11 NOTE — PLAN OF CARE
Problem: Patient Care Overview  Goal: Plan of Care Review  Outcome: Ongoing (interventions implemented as appropriate)  Flowsheets (Taken 5/11/2020 1331)  Progress: improving  Outcome Summary: Pt demo improved independence by simulating LBD tasks w/ Supervision and bed mobility w/ Min A. Pt conts to be limited d/t generalized weakness though demo good effort. Recommend cont skilled IPOT POC. Recommend pt DC to IP rehab based on current level of performance, however will monitor progress closely.

## 2020-05-11 NOTE — NURSING NOTE
"The patient kept stating \"I want to go home today.\" I explained how important it is for him to stay in the hospital and that if he left it would be AMA. The patient stated \"It doesn't matter if I leave today or two days from know, I am going to drink.\" He called his Dad, asked him to come pick him up. The patient then handed his cell phone to me, I talked to Fortino Varma and explained that he is not medically ready to leave and that if he does, it will be AMA. Fortino stated \"I understand, I'm not coming to get him, he needs to stay. I just don't know what to tell him.\" The patient was frustrated that his Dad would not come to get him.  "

## 2020-05-11 NOTE — PLAN OF CARE
Problem: Patient Care Overview  Goal: Plan of Care Review  Outcome: Ongoing (interventions implemented as appropriate)  Flowsheets (Taken 5/11/2020 4663)  Progress: improving  Plan of Care Reviewed With: patient  Outcome Summary: Pt demonstrated ability to progress forward ambulation distance; required 4 standing rest breaks due to fatigue and repeated vc's for increasing heel strike and foot clearance. Cont to be limited by decreased safety awareness and decreased functional endurance, but motivated to participate and progress. Will cont PT POC.

## 2020-05-11 NOTE — PROGRESS NOTES
Continued Stay Note  [facility]     Patient Name: John Varma  MRN: [id number]  Today's Date: 5/11/2020    Admit Date: 4/23/2020    Discharge Plan     Row Name 05/11/20 1437       Plan    Plan  Ongoing     Patient/Family in Agreement with Plan  yes    Plan Comments  CM following - will continue to assess daily as he improves to determine if patient will need physical rehab upon discharge - patient is wanting to go home at this time. Patient did walk 200 ft during PT eval on 5/10. Chemical dependency CM following as well for resources. - janessa [phone]         Discharge Codes    No documentation.       Expected Discharge Date and Time     Expected Discharge Date Expected Discharge Time    May 4, 2020             Janessa Freitas RN

## 2020-05-11 NOTE — PROGRESS NOTES
John Varma  1995  [id number]      Requesting Provider: Dory Wells MD  Evaluating Physician: Hipolito Barkley MD    Chief Complaint: GI bleed    History of present illness:   5/7: Patient is a 24 y.o.  Yr old male with history of alcoholism and alcohol cirrhosis who was admitted 4/23 with ETOH hepatitis and decompensated cirrhosis, BORIS, hepatic encephalopathy, and hematemesis secondary to esophageal variceal bleed s/p EGD on 4/24. He was intubated for the EGD and then subsequently extubated on 4/27. He then underwent a TIPS procedure on 4/29 and was intubated for that until subsequent extubation on 5/1/20. He has additionally undergone paracentesis x2 with 5 liters removed during first paracentesis and 2.5L removed on second paracentesis.  He has had a leukocytosis since arrival, initially up to 41.55. Peritoneal fluid with 46 WBC on 4/24 with 6% neutrophils. BF culture was no growth at 5 days. Subsequent paracentesis on 4/30 with 1,0018 WBC with 77% neutrophils but I do not see a culture was sent from this fluid sample. multiple blood culture sets have been negative. He was initially on Aztreonam for SBP (has PCN allergy) but was more recently on levaquin. Given recent worsening of his leukocytosis again to 45.8, the patient was switched to meropenem today. Cr has improved. LFTs still somewhat elevated. CT Chest/A/P today showed mod-large bilateral pleural effusions with complete left lower lobe atelectasis and moderate right lower lobe atelectasis and moderate ascites. Sputum culture done with few GPCs in pairs and clusters. C diff screen was negative. COVID-19 PCR was negative on 5/6. Blood cultures were repeat today and are pending. Of noted, MRSA PCR nares from 4/25 was positive.The patient has experienced new fevers up to 101.5F since 5/3.  ID consulted for antibiotic recs.     5/8: Patient sitting up in bed awake upon arrival.  Patient answering some questions with some confusion noted.   Patient said T-max of 100.4 and has been tachycardic overnight.  Labs show patient remains with significant leukocytosis at 33.2 with a neutrophilia of 81.2%.  Procalcitonin is elevated 4.61.  BUN/creatinine is 25 0.4.  5/8 chest x-ray shows improving perihilar interstitial disease.  Cultures remain no growth to date.  Patient was placed and mitt restraints as he continued to try to pull out Flores catheter in [place] overnight.  He was subsequently moved from room 222 to room 234 to be closer to the nurses station.    5/9/20: Following for Dr. Hipolito Barkley: LGF yesterday afternoon, has remained afebrile since.  He is tachycardic with intermittent hypotension. WBC 29,000 with 79% neutrophils, PCT 2.1, both which have improved. Culture remain negative. Had paracentesis yesterday with 1 liter of serous fluid removed.  Culture negative to date.  NGT in place. On tube feeding.  He is very tearful today and appears a little confused. He denies any fever or chills, abdominal pain, nausea, vomiting, or rashes. He has intermittent shortness of breath and cough.  He has some diarrhea, but has been on TPN and now on tube feeding.      5/10/20: Afebrile, tolerating tube feeds.  Still with diarrhea on lactulose and tube feedings.  He denies f/c, sob, n/v, rashes.  He appears more alert and following commands.  Would like to go home, but discussed with him that he is not ready and still very ill.     5/11/20: The patient is less confused. Denies any pain including abdominal pain. Having constant watery diarrhea but on lactulose. No rash. Still jaundiced. No fevers.     Past Medical History:   Diagnosis Date   • Alcohol abuse    • GERD (gastroesophageal reflux disease)    • GIB (gastrointestinal bleeding)    • Tobacco abuse        Past Surgical History:   Procedure Laterality Date   • ARM WOUND REPAIR / CLOSURE     • ENDOSCOPY N/A 4/24/2020    Procedure: ESOPHAGOGASTRODUODENOSCOPY;  Surgeon: Brunner, Mark I, MD;  Location:   [facility];  Service: Gastroenterology;  Laterality: N/A;       Pediatric History   Patient Guardian Status   • Father:  ROBERTA GUEVARA     Other Topics Concern   • Not on file   Social History Narrative   • Not on file       family history is not on file.    Allergies   Allergen Reactions   • Penicillins Hives       Medication:  Current Facility-Administered Medications   Medication Dose Route Frequency Provider Last Rate Last Dose   • [START ON 5/12/2020] ! Vancomycin trough scheduled 5/12 @0730. Please hold 0800 dose until evaluated by pharmacy.   Does not apply Once Go Mclaughlin, [facility]       • acetaminophen (TYLENOL) tablet 650 mg  650 mg Oral Q8H PRN Jacky Riojas MD   650 mg at 05/06/20 2106   • albuterol (PROVENTIL) nebulizer solution 0.083% 2.5 mg/3mL  2.5 mg Nebulization Q4H PRN Amarilis Quevedo APRN       • bisacodyl (DULCOLAX) suppository 10 mg  10 mg Rectal Daily PRN Brunner, Mark I, MD   10 mg at 04/27/20 1839   • enoxaparin (LOVENOX) syringe 40 mg  40 mg Subcutaneous Q24H Valerio Emmanuel MD   40 mg at 05/11/20 0840   • folic acid (FOLVITE) tablet 1 mg  1 mg Oral Daily Valerio Emmanuel MD   1 mg at 05/11/20 0840   • furosemide (LASIX) injection 20 mg  20 mg Intravenous Q12H Ariel Galindo MD   20 mg at 05/11/20 0207   • insulin detemir (LEVEMIR) injection 15 Units  15 Units Subcutaneous Daily Valerio Emmanuel MD   15 Units at 05/11/20 0917   • insulin regular (humuLIN R,novoLIN R) injection 0-7 Units  0-7 Units Subcutaneous Q6H Pavel Tan DO   2 Units at 05/10/20 1808   • lactulose (CHRONULAC) 10 GM/15ML solution 20 g  20 g Nasogastric BID Valerio Emmanuel MD   20 g at 05/11/20 0840   • LORazepam (ATIVAN) injection 1 mg  1 mg Intravenous Q1H PRN Valerio Emmanuel MD   1 mg at 05/07/20 0025   • Magnesium Sulfate 2 gram Bolus, followed by 8 gram infusion (total Mg dose 10 grams)- Mg less than or equal to 1mg/dL  2  g Intravenous PRN Brunner, Mark I, MD        Or   • Magnesium Sulfate 2 gram / 50mL Infusion (GIVE X 3 BAGS TO EQUAL 6GM TOTAL DOSE) - Mg 1.1 - 1.5 mg/dl  2 g Intravenous PRN Brunner, Mark I, MD        Or   • Magnesium Sulfate 4 gram infusion- Mg 1.6-1.9 mg/dL  4 g Intravenous PRN Brunner, Mark I, MD 25 mL/hr at 05/10/20 0837 4 g at 05/10/20 0837   • melatonin tablet 5 mg  5 mg Oral Nightly Valerio Emmanuel MD   5 mg at 05/10/20 2004   • meropenem (MERREM) 1 g/50 mL 0.9% NS IVPB (mbp)  1 g Intravenous Q8H Valerio Emmanuel MD   1 g at 05/11/20 0841   • metoclopramide (REGLAN) injection 5 mg  5 mg Intravenous Q6H Kaz Arellano MD   5 mg at 05/11/20 0539   • multivitamin and minerals liquid 15 mL  15 mL Oral Daily Valerio Emmanuel MD   15 mL at 05/11/20 0916   • ondansetron (ZOFRAN) injection 4 mg  4 mg Intravenous Q6H PRN Brunner, Mark I, MD   4 mg at 05/07/20 0025   • pantoprazole (PROTONIX) EC tablet 40 mg  40 mg Oral Q AM Brunner, Mark I, MD   40 mg at 05/11/20 0540   • Pharmacy to dose vancomycin   Does not apply Continuous PRN Valerio Emmanuel MD       • potassium chloride 20 mEq in 50 mL IVPB  20 mEq Intravenous Q1H PRN Miesha Santiago, PharmD       • PRO-STAT oral liquid 30 mL  30 mL Nasogastric BID Elissa Garay MS,RD,LD   30 mL at 05/11/20 0841   • propranolol (INDERAL) tablet 10 mg  10 mg Oral Q8H Puma Willard APRN   10 mg at 05/11/20 0540   • QUEtiapine (SEROquel) tablet 100 mg  100 mg Oral Nightly Valerio Emmanuel MD   100 mg at 05/10/20 2004   • QUEtiapine (SEROquel) tablet 50 mg  50 mg Oral Daily Valerio Emmanuel MD   50 mg at 05/11/20 0840   • riFAXIMin (XIFAXAN) tablet 550 mg  550 mg Nasogastric Q12H Moises Estrella APRN   550 mg at 05/11/20 0916   • sodium chloride 0.9 % flush 10 mL  10 mL Intravenous PRN Brunner, Mark I, MD   10 mL at 05/03/20 0807   • sodium chloride 0.9 % flush 10 mL  10 mL Intravenous PRN  Pavel Tan, DO       • sodium chloride 0.9 % flush 20 mL  20 mL Intravenous PRN Pavel Tan DO       • spironolactone (ALDACTONE) tablet 25 mg  25 mg Oral Daily Ariel Galindo MD   25 mg at 05/11/20 0840   • thiamine (VITAMIN B-1) tablet 100 mg  100 mg Oral Daily Valerio Emmanuel MD   100 mg at 05/11/20 0840   • vancomycin 1250 mg/250 mL 0.9% NS IVPB ([facility])  1,250 mg Intravenous Q8H Basilio Sood RP   1,250 mg at 05/11/20 1115   • zinc gluconate tablet 50 mg  50 mg Oral Daily Valerio Emmanuel MD   50 mg at 05/11/20 0916         Infectious History:  C.difficile (rule out), MRSA    Antibiotics:  Anti-Infectives (From admission, onward)    Ordered     Dose/Rate Route Frequency Start Stop    05/09/20 1503  vancomycin 1250 mg/250 mL 0.9% NS IVPB ([facility])  Review   Ordering Provider:  Basilio Sood RP    1,250 mg  over 90 Minutes Intravenous Every 8 Hours 05/09/20 1600 05/16/20 1559    05/08/20 1240  vancomycin 1500 mg/500 mL 0.9% NS IVPB ([facility])     Ordering Provider:  Miesha Santiago, PharmD    1,500 mg  over 90 Minutes Intravenous Once 05/08/20 1330 05/08/20 1544    05/07/20 0944  meropenem (MERREM) 1 g/50 mL 0.9% NS IVPB (mbp)     Hipolito Barkley MD reviewed the order on 05/07/20 1845.   Ordering Provider:  Valerio Emmanuel MD    1 g  over 3 Hours Intravenous Every 8 Hours 05/07/20 1630 05/14/20 1629    05/07/20 0944  meropenem (MERREM) 1 g/50 mL 0.9% NS IVPB (mbp)     Ordering Provider:  Valerio Emmanuel MD    1 g  over 30 Minutes Intravenous Once 05/07/20 1030 05/07/20 1126    05/07/20 0944  vancomycin 1500 mg/500 mL 0.9% NS IVPB ([facility])     Ordering Provider:  Valerio Emmanuel MD    1,500 mg  over 90 Minutes Intravenous Once 05/07/20 1030 05/07/20 1227    05/07/20 0944  Pharmacy to dose vancomycin     Miesha Santiago, PharmD reviewed the order on 05/07/20 1121.   Ordering Provider:  Valerio Emmanuel MD  "    Does not apply Continuous PRN 05/07/20 0942 05/14/20 0941    05/02/20 1316  levoFLOXacin (LEVAQUIN) 500 mg/100 mL D5W (premix) (LEVAQUIN) 500 mg     Miesha Santiago, PharmD reviewed the order on 05/06/20 0903.   Ordering Provider:  Kaz Arellano MD    500 mg Intravenous Every 24 Hours Scheduled 05/02/20 1430 05/06/20 1300    04/28/20 1501  levoFLOXacin (LEVAQUIN) 500 mg/100 mL D5W (premix) (LEVAQUIN) 500 mg     Note to Pharmacy:  For on call to Radiology for TIPS, estimated 10am   Ordering Provider:  Cullen Walker MD    500 mg Intravenous Once When Specified 04/29/20 0900 04/29/20 1118    04/28/20 1906  aztreonam (AZACTAM) 1 g in 50 mL NS IVPB     Ordering Provider:  Len Baires RPH    1 g  over 30 Minutes Intravenous Every 8 Hours 04/29/20 0000 04/30/20 1615    04/24/20 1411  riFAXIMin (XIFAXAN) tablet 550 mg     Kya Christianson [facility] reviewed the order on 04/25/20 0716.   Ordering Provider:  Moises Estrella APRN    550 mg Nasogastric Every 12 Hours Scheduled 04/24/20 2100      04/24/20 0630  vancomycin 500 mg/100 mL 0.9% NS IVPB (mbp)     Ordering Provider:  Ashwin Siegel RPH    9 mg/kg × 65.8 kg Intravenous Once 04/24/20 0730 04/24/20 1013    04/23/20 2314  vancomycin 1000 mg/250 mL 0.9% NS IVPB ([facility])     Ordering Provider:  Dory Wells MD    15 mg/kg × 65.8 kg  over 60 Minutes Intravenous Once 04/23/20 2316 04/24/20 0223    04/23/20 2314  aztreonam (AZACTAM) 2 g/100 mL 0.9% NS (mbp)     Ordering Provider:  Dory Wells MD    2 g  over 30 Minutes Intravenous Once 04/23/20 2316 04/24/20 0100            Review of Systems  See HPI    Physical Exam:   Vital Signs   /67 (BP Location: Left arm, Patient Position: Lying)   Pulse 104   Temp 97.9 °F (36.6 °C) (Oral)   Resp (!) 29   Ht 172.7 cm (68\")   Wt 78 kg (171 lb 15.3 oz)   SpO2 93%   BMI 26.15 kg/m²     GENERAL: Awake and alert, in no acute distress. Ill appearing.  HEENT: Normocephalic, atraumatic. " Oropharynx clear without evidence of thrush or exudate.  Feeding tube in place  Eyes: +scleral icterus. No conjunctival hemorrhages.   HEART: RRR; No murmurs. anasarca  LUNGS: decreased breath sounds over lung bases but otherwise CTA bilaterally. Normal respiratory effort. Non-labored breathing  ABDOMEN: distended abdomen. Normal bowel sounds. No tenderness  EXT:  No cyanosis. 2+ pitting edema over BLE  : Genitalia generally unremarkable aside from some mild scrotal edema.   MSK: FROM without joint effusions noted arms/legs.  Bilateral mitt and wrist restraints in place.  SKIN: Jaundiced. No rash noted. No peripheral stigmata of infective endocarditis noted  NEURO: awake. Oriented to person. Able to answer more questions appropriately.  Was less anxious today.  PSYCHIATRIC: poor insight, but cooperative. Following commands. Oriented to person, place, and time  LUE PICC line site is without erythema or drainage     Laboratory Data    Results from last 7 days   Lab Units 05/11/20  0444 05/10/20  0521 05/09/20  0459   WBC 10*3/mm3 29.78* 31.32* 28.99*   HEMOGLOBIN g/dL 8.8* 9.7* 8.5*   HEMATOCRIT % 26.8* 31.8* 27.4*   PLATELETS 10*3/mm3 305 282 257     Results from last 7 days   Lab Units 05/10/20  0521   SODIUM mmol/L 137   POTASSIUM mmol/L 4.1   CHLORIDE mmol/L 106   CO2 mmol/L 20.0*   BUN mg/dL 17   CREATININE mg/dL 0.31*   GLUCOSE mg/dL 110*   CALCIUM mg/dL 7.5*     Results from last 7 days   Lab Units 05/10/20  0521   ALK PHOS U/L 134*   BILIRUBIN mg/dL 4.6*   ALT (SGPT) U/L 57*   AST (SGOT) U/L 98*         Results from last 7 days   Lab Units 05/07/20  1202   CRP mg/dL 4.88*       Estimated Creatinine Clearance: 401.9 mL/min (A) (by C-G formula based on SCr of 0.31 mg/dL (L)).       Microbiology:    Blood Culture   Date Value Ref Range Status   05/03/2020 No growth at 4 days  Preliminary   05/03/2020 No growth at 4 days  Preliminary     COVID-19 PCR: negative     4/24 peritoneal fluid cx: NG    5/8 peritoneal  fluid cx: NGSF  5/7: Cdiff not detected  5/7: Sputum cx Normal amado   5/7: Blood cx NGSF  MRSA PCR positive.    Radiology:  Ct Abdomen Pelvis Without Contrast    Result Date: 5/7/2020  Symmetric, moderate or moderate-to-large bilateral pleural effusions, with complete left lower lobe atelectasis, and moderate right lower lobe atelectasis. Minimal pulmonary interstitial disease elsewhere.    CT SCAN OF THE ABDOMEN AND PELVIS WITHOUT CONTRAST: Note is made of patient's tips shunt. Liver parenchyma appears uniform on today's unenhanced exam. There is vicarious excretion of contrast into the gallbladder. Spleen is not enlarged. No significant abnormalities are seen of the pancreas, adrenal glands, or kidneys. There is mild ascites, stable in extent from the prior exam. NG tube is seen in the distal stomach. No free or discrete inflammatory focus is seen. Coronal images show a few shotty left mid abdominal small bowel mesenteric lymph nodes, but these are markedly improved from the prior study. Bowel loops are normal in caliber.  Regarding the lower abdomen and pelvis, there is mild to moderate ascites. No discrete inflammatory focus is appreciated. No abnormally dilated bowel loops or grossly abnormal appearing bowel loops are seen. Bony structures appear intact.  IMPRESSION:  1. Moderately extensive ascites, but not increased from prior study. 2. Interval tips shunt placement. 3. Near resolution of small bowel mesenteric lymphadenopathy since prior scan. No evidence of new intra-abdominal or intrapelvic pathology is identified elsewhere.  D:  05/07/2020 E:  05/07/2020  This report was finalized on 5/7/2020 10:28 PM by Dr. Moi Rich MD.      Ct Chest Without Contrast    Result Date: 5/7/2020  Symmetric, moderate or moderate-to-large bilateral pleural effusions, with complete left lower lobe atelectasis, and moderate right lower lobe atelectasis. Minimal pulmonary interstitial disease elsewhere.    CT SCAN OF THE  ABDOMEN AND PELVIS WITHOUT CONTRAST: Note is made of patient's tips shunt. Liver parenchyma appears uniform on today's unenhanced exam. There is vicarious excretion of contrast into the gallbladder. Spleen is not enlarged. No significant abnormalities are seen of the pancreas, adrenal glands, or kidneys. There is mild ascites, stable in extent from the prior exam. NG tube is seen in the distal stomach. No free or discrete inflammatory focus is seen. Coronal images show a few shotty left mid abdominal small bowel mesenteric lymph nodes, but these are markedly improved from the prior study. Bowel loops are normal in caliber.  Regarding the lower abdomen and pelvis, there is mild to moderate ascites. No discrete inflammatory focus is appreciated. No abnormally dilated bowel loops or grossly abnormal appearing bowel loops are seen. Bony structures appear intact.  IMPRESSION:  1. Moderately extensive ascites, but not increased from prior study. 2. Interval tips shunt placement. 3. Near resolution of small bowel mesenteric lymphadenopathy since prior scan. No evidence of new intra-abdominal or intrapelvic pathology is identified elsewhere.  D:  05/07/2020 E:  05/07/2020  This report was finalized on 5/7/2020 10:28 PM by Dr. Moi Rich MD.      Ct Abdomen With & Without Contrast    Result Date: 5/5/2020  1. Diffusely abnormal appearance of the hepatic parenchyma status post TIPS likely perfusional changes along with low signal surrounding the portal veins consistent with periportal edema. 2. 3.2 cm focus in segment IVB has at least partial imaging characteristics of involvement for concern for HCC although recommend followup given limited evaluation of perfusional changes and therefore attenuation changes on the current exam. 3. Perihepatic and perisplenic ascites with top normal size for spleen at 13 cm. 4. Small-to-moderate bilateral pleural effusions.  D:  05/05/2020 E:  05/05/2020    This report was finalized on  5/5/2020 2:27 PM by Dr. Luis Eduardo Mosley.      Fl Video Swallow With Speech Single Contrast    Result Date: 5/7/2020  Fluoroscopy provided for a modified barium swallow. Please see speech therapy report for full details and recommendations.    This report was finalized on 5/7/2020 5:13 PM by Dr. Luis Eduardo Mosley.      Xr Chest 1 View    Result Date: 5/8/2020  Improving perihilar interstitial disease, perhaps resolving edema, decreasing left basilar atelectasis and minimal remaining effusions. No new chest pathology is seen.   D:  05/08/2020 E:  05/08/2020  This report was finalized on 5/8/2020 11:01 PM by Dr. Moi Rich MD.      Xr Chest 1 View    Result Date: 5/7/2020  1. Increased perihilar disease that may represent interstitial edema or ARDS. 2. Left lower lung atelectasis, probably unchanged.  D:  05/07/2020 E:  05/07/2020    This report was finalized on 5/7/2020 10:14 PM by Dr. Moi Rich MD.      Xr Chest 1 View    Result Date: 5/6/2020  Bilateral pleural effusions with patchy airspace disease seen within the right mid and left lower lung field. Lines and tubes in satisfactory position.  D:  05/06/2020 E:  05/06/2020  This report was finalized on 5/6/2020 4:02 PM by Dr. Carmen Morales MD.      Xr Chest 1 View    Result Date: 5/5/2020  Support hardware unchanged and in grossly satisfactory positioning. Cardiac silhouette remains enlarged with increased central pulmonary vascularity and trace bilateral pleural effusions, left greater than right, with improved aeration at the lung bases from likely decreased atelectasis versus decreased effusions or layering component. No new consolidation or pulmonary parenchymal involvement.     D:  05/05/2020 E:  05/05/2020  This report was finalized on 5/5/2020 2:28 PM by Dr. Luis Eduardo Mosley.      Ct Guided Paracentesis    Result Date: 5/4/2020   CT-guided paracentesis (aborted)  Plan:  Consider reattempt diagnostic paracentesis once there is increased amount of fluid  _______________________________________________________________  PROCEDURE SUMMARY: - Limited CT - CT-guided paracentesis - Additional procedure(s): None  PROCEDURE DETAILS:  Pre-procedure Consent: Informed consent for the procedure including risks, benefits and alternatives was obtained and time-out was performed prior to the procedure. Preparation: The site was prepared and draped using maximal sterile barrier technique including cutaneous antisepsis.  Anesthesia/sedation Level of anesthesia/sedation: None  Initial abdominal CT Initial abdominal CT was performed. Findings: Tiny volume ascites. A safe window for paracentesis was identified.  Paracentesis Local anesthesia was administered. Giving constraints of no CT fluoroscopy and patient inability to move arms from side -no safe way to access minimal perihepatic fluid. Procedure aborted.  Radiation Dose CT dose length product (mGy-cm): 1534  Additional Details Additional description of procedure: None Equipment details: None Specimens removed: None. Procedure aborted. Estimated blood loss (mL): Less than 10 Standardized report: Paracentesis  Attestation I was present and scrubbed for the entire procedure. Imaging reviewed. Agree with final report as written.  This report was finalized on 5/4/2020 3:53 PM by Cullen Walker.      Us Paracentesis    Result Date: 5/8/2020   Ultrasound-guided paracentesis with drainage of 1000 mL of serous fluid. Portion sent for requested laboratory analysis.  Plan:  Resume care by clinical team. _______________________________________________________________  PROCEDURE SUMMARY: - Limited ultrasound - Ultrasound-guided paracentesis - Additional procedure(s): None  PROCEDURE DETAILS:  Pre-procedure Consent: Informed consent for the procedure including risks, benefits and alternatives was obtained and time-out was performed prior to the procedure. Preparation: The site was prepared and draped using maximal sterile barrier technique  including cutaneous antisepsis.  Anesthesia/sedation Level of anesthesia/sedation: No sedation  Initial abdominal ultrasound Initial abdominal ultrasound was performed. Findings: Small volume ascites. A safe window for paracentesis was identified.  Paracentesis Local anesthesia was administered. An 18-gauge Chiba needle was advanced under ultrasound guidance into the ascites. Ascites was drained. The needle was then removed, and a sterile bandage was applied. Paracentesis access technique: Ultrasound-guided Needle placed: 18-gauge Chiba  Additional Details Additional description of procedure: None Equipment details: None Specimens removed: Abdominal fluid Estimated blood loss (mL): Less than 10 Standardized report: Paracentesis  Attestation I was present and scrubbed for the entire procedure. Imaging reviewed. Agree with final report as written.  This report was finalized on 5/8/2020 8:37 PM by Cullen Walker.      Xr Abdomen Kub    Result Date: 5/10/2020  Feeding tube tip in the proximal jejunum. NG tube remains in the distal stomach.   DICTATED:   05/08/2020 EDITED/ls :   05/09/2020  This report was finalized on 5/10/2020 11:37 AM by Dr. Moi Rich MD.      Xr Abdomen Kub    Result Date: 5/8/2020  Feeding tube and NG tube both in the distal stomach.  DICTATED:   05/08/2020 EDITED/ls :   05/08/2020    This report was finalized on 5/8/2020 11:04 PM by Dr. Moi Rich MD.      Xr Abdomen Kub    Result Date: 5/7/2020  Nonobstructive bowel gas pattern with paucity of air throughout the visualized bowel loops.  This report was finalized on 5/7/2020 11:37 AM by Dr. Luis Eduardo Mosley.      Xr Abdomen Kub    Result Date: 5/6/2020  NG tube in the distal stomach. Borderline dilatation of the transverse colon with air but no evidence of dilated bowel elsewhere to suggest significant ileus or obstruction.  D:  05/06/2020 E:  05/06/2020    This report was finalized on 5/6/2020 9:24 PM by Dr. Moi Rich MD.        Impression:    Patient is a 24 y.o.  Yr old male with history of alcoholism and alcohol cirrhosis who was admitted 4/23 with ETOH hepatitis and decompensated cirrhosis, BORIS, hepatic encephalopathy, and hematemesis secondary to esophageal variceal bleed s/p EGD on 4/24. He unfortuantely has multiple medical problems and remains very encephalopathic. He has had SBP by cell count on last paracentesis but I do not see that a culture was sent on that peritoneal fluid. He has elevated WBC count and ongoing low grade fevers. Sepsis likely due to SBP. C diff has been ruled out. Blood cultures were no growth. Pulmonary source thought less likely as lungs look mostly clear although he does have large bilateral pleural effusions. Urinalysis was negative for signs of UTI.    Problems:  -Sepsis with fevers, tachycardia, lactic acidosis, and Leukocytosis with neutrophilia- [id number]  -SBP  -Decompensated alcohol cirrhosis with ascites, hepatic encephalopathy, and esophageal varices- encephalopathy improving  -Alcohol hepatitis/elevated LFTs  -Hematemesis due to bleeding esophageal varices s/p EGD and TIPS -procedure  -BORIS- likely ATN due to bleeding. Improved  -Acute blood loss anemia  -MRSA colonization  -Alcoholism  -diarrhea- likely due to lactulose. C diff negative  -Penicillin allergy (Hives)    PLAN: Thank you for asking us to see John Varma, I recommend the following:     -follow blood cultures-currently NGTD  -s/p paracentesis today with repeat peritoneal fluid cx--NGSF  -Continue meropenem and Vancomycin for now. PK service to assist with vancomycin dosing. We need to closely monitor renal function while on vancomycin  -stop micafungin    Complex set of medical issues requiring a high level of medical decision making    I discussed with nursing today      Hipolito Barkley MD  5/11/2020

## 2020-05-11 NOTE — NURSING NOTE
Spoke with Fortino Varma, his Father and provided an update about the patient. Explained that his tube feeding was stopped so the doctors can evaluate how much he eats on his own. Also evaluating if he needs his otto, may pull it today or tomorrow. Patient also continues to be on antibiotics.

## 2020-05-11 NOTE — THERAPY TREATMENT NOTE
Acute Care - Occupational Therapy Treatment Note  [facility]     Patient Name: John Varma  : 1995  MRN: [id number]  Today's Date: 2020             Admit Date: 2020       ICD-10-CM ICD-9-CM   1. Sepsis with acute liver failure without hepatic coma or septic shock, due to unspecified organism (CMS/HCC) A41.9 038.9    R65.20 995.92    K72.00 570   2. Hypoxia R09.02 799.02   3. Acute renal failure, unspecified acute renal failure type (CMS/HCC) N17.9 584.9   4. Acute hepatitis B17.9 570   5. Alcohol abuse F10.10 305.00   6. Acute upper GI bleed K92.2 578.9   7. Elevated lactic acid level R79.89 276.2   8. Ascites due to alcoholic hepatitis K70.11 789.59   9. Leukocytosis, unspecified type D72.829 288.60   10. Sepsis with acute renal failure without septic shock, due to unspecified organism, unspecified acute renal failure type (CMS/HCC) A41.9 038.9    R65.20 995.92    N17.9 584.9   11. Oropharyngeal dysphagia R13.12 787.22     Patient Active Problem List   Diagnosis   • Acute alcoholic hepatitis   • Acute variceal GI bleeding   • BORIS (acute kidney injury) (CMS/HCC)   • Sepsis with acute renal failure without septic shock due to SBP (CMS/HCC)   • Acute respiratory failure with hypoxia. Intubated - and - (CMS/HCC)   • Alcohol withdrawal delirium (CMS/HCC)   • Hepatic encephalopathy (CMS/HCC)   • Ascites due to alcoholic hepatitis. S/P paracentesis , , and    • Pleural effusion on left   • S/P TIPS (transjugular intrahepatic portosystemic shunt) 2020   • Tobacco abuse   • SBP (spontaneous bacterial peritonitis) (CMS/HCC)   • Acute blood loss anemia due to variceal UGIB     Past Medical History:   Diagnosis Date   • Alcohol abuse    • GERD (gastroesophageal reflux disease)    • GIB (gastrointestinal bleeding)    • Tobacco abuse      Past Surgical History:   Procedure Laterality Date   • ARM WOUND REPAIR / CLOSURE     • ENDOSCOPY N/A 2020    Procedure:  ESOPHAGOGASTRODUODENOSCOPY;  Surgeon: Brunner, Mark I, MD;  Location: [facility] ENDOSCOPY;  Service: Gastroenterology;  Laterality: N/A;       Therapy Treatment    Rehabilitation Treatment Summary     Row Name 05/11/20 1331             Treatment Time/Intention    Discipline  occupational therapist  -CL      Document Type  therapy note (daily note)  -CL      Subjective Information  complains of;weakness  -CL      Patient Effort  good  -CL      Existing Precautions/Restrictions  fall;other (see comments) NG  -CL      Recorded by [CL] Selena Terrell, OT 05/11/20 1458      Row Name 05/11/20 1331             Vital Signs    Pre Systolic BP Rehab  -- VSS  -CL      Recorded by [CL] Selena Terrell OT 05/11/20 1458      Row Name 05/11/20 1331             Cognitive Assessment/Intervention- PT/OT    Affect/Mental Status (Cognitive)  confused  -CL      Orientation Status (Cognition)  oriented to;person;place  -CL      Follows Commands (Cognition)  follows one step commands;75-90% accuracy;repetition of directions required;verbal cues/prompting required;increased processing time needed  -CL      Cognitive Function (Cognitive)  safety deficit  -CL      Safety Deficit (Cognitive)  mild deficit;awareness of need for assistance;safety precautions awareness  -CL      Recorded by [CL] Selena Terrell OT 05/11/20 1458      Row Name 05/11/20 1331             Bed Mobility Assessment/Treatment    Bed Mobility Assessment/Treatment  supine-sit  -CL      Supine-Sit [place] (Bed Mobility)  verbal cues;minimum assist (75% patient effort)  -CL      Assistive Device (Bed Mobility)  bed rails;draw sheet;head of bed elevated  -CL      Recorded by [CL] Seelna Terrell OT 05/11/20 1458      Row Name 05/11/20 1331             Transfer Assessment/Treatment    Transfer Assessment/Treatment  sit-stand transfer  -CL      Comment (Transfers)  Pt transitioned to PT treatment.   -CL      Recorded by [CL] Selena Terrell OT 05/11/20 1458      Row Name 05/11/20 1331              Sit-Stand Transfer    Sit-Stand [place] (Transfers)  minimum assist (75% patient effort);verbal cues  -CL      Assistive Device (Sit-Stand Transfers)  walker, front-wheeled  -CL      Recorded by [CL] Selena Terrell OT 05/11/20 1458      Row Name 05/11/20 1331             ADL Assessment/Intervention    34985 - OT Self Care/Mgmt Minutes  7  -CL      BADL Assessment/Intervention  lower body dressing  -CL      Recorded by [CL] Selena Terrell OT 05/11/20 1458      Row Name 05/11/20 1331             Lower Body Dressing Assessment/Training    Lower Body Dressing [place] Level  don;doff;socks;supervision  -CL      Lower Body Dressing Position  edge of bed sitting  -CL      Comment (Lower Body Dressing)  Pt simulated crossed leg technique  -CL      Recorded by [CL] Selena Terrell OT 05/11/20 1458      Row Name 05/11/20 1331             Self-Feeding Assessment/Training    [place] Level (Feeding)  finger foods;supervision  -CL      Position (Self-Feeding)  supported sitting  -CL      Recorded by [CL] Selena Terrell OT 05/11/20 1458      Row Name 05/11/20 1331             Therapeutic Exercise    77803 - OT Therapeutic Activity Minutes  8  -CL      Recorded by [CL] Selena Terrell OT 05/11/20 1458      Row Name 05/11/20 1331             Static Sitting Balance    Level of [place] (Unsupported Sitting, Static Balance)  supervision  -CL      Sitting Position (Unsupported Sitting, Static Balance)  sitting on edge of bed  -CL      Recorded by [CL] Selena Terrell OT 05/11/20 1458      Row Name 05/11/20 1331             Static Standing Balance    Level of [place] (Supported Standing, Static Balance)  minimal assist, 75% patient effort  -CL      Assistive Device Utilized (Supported Standing, Static Balance)  walker, rolling  -CL      Recorded by [CL] Selena Terrell OT 05/11/20 1458      Row Name 05/11/20 1331             Positioning and Restraints    Pre-Treatment Position  in bed  -CL      Post Treatment  Position  other  -CL      Other Position  with other staff w/ PT  -CL      Recorded by [CL] Selena Terrell, OT 05/11/20 1458      Row Name 05/11/20 1331             Pain Scale: Numbers Pre/Post-Treatment    Pain Scale: Numbers, Pretreatment  0/10 - no pain  -CL      Pain Scale: Numbers, Post-Treatment  0/10 - no pain  -CL      Recorded by [CL] Selena Terrell, OT 05/11/20 1458      Row Name                Wound 05/09/20 0800 Right lower flank Puncture    Wound - Properties Group Date first assessed: 05/09/20 [CB] Time first assessed: 0800 [CB] Present on Hospital Admission: N [CB] Side: Right [CB] Orientation: lower [CB] Location: flank [CB] Primary Wound Type: Puncture [CB], paracentesis  Recorded by:  [CB] Paul Sood RN 05/09/20 1027      User Key  (r) = Recorded By, (t) = Taken By, (c) = Cosigned By    Initials Name Effective Dates Discipline    CL Selena Terrell, STACY 04/03/18 -  OT    CB Paul Sood RN 06/19/19 -  Nurse        Wound 05/09/20 0800 Right lower flank Puncture (Active)   Dressing Appearance open to air 5/11/2020 12:31 PM   Closure Liquid skin adhesive 5/11/2020 12:31 PM   Base dry 5/11/2020 12:31 PM   Drainage Amount none 5/11/2020 12:31 PM       Occupational Therapy Education                 Title: PT OT SLP Therapies (In Progress)     Topic: Occupational Therapy (In Progress)     Point: ADL training (In Progress)     Description:   Instruct learner(s) on proper safety adaptation and remediation techniques during self care or transfers.   Instruct in proper use of assistive devices.              Learning Progress Summary           Patient Acceptance, E, NR by CL at 5/11/2020 1331    Acceptance, E,D, NR by PILAR at 5/9/2020 1404    Comment:  re-orientation, UE TE, benefits of activity, bed mobility, walker safety    Acceptance, E, NR by CL at 5/7/2020 1417    Acceptance, E,D, NR by JY at 5/6/2020 1523    Acceptance, E, NR by CL at 5/4/2020 1110    Acceptance, E, NR by CL at 5/2/2020 1059                    Point: Home exercise program (In Progress)     Description:   Instruct learner(s) on appropriate technique for monitoring, assisting and/or progressing therapeutic exercises/activities.              Learning Progress Summary           Patient Acceptance, E, NR by CL at 5/11/2020 1331    Acceptance, E,D, NR by PILAR at 5/9/2020 1404    Comment:  re-orientation, UE TE, benefits of activity, bed mobility, walker safety    Acceptance, E,D, NR by JY at 5/6/2020 1523    Acceptance, E, NR by CL at 5/4/2020 1110                   Point: Precautions (In Progress)     Description:   Instruct learner(s) on prescribed precautions during self-care and functional transfers.              Learning Progress Summary           Patient Acceptance, E, NR by CL at 5/11/2020 1331    Acceptance, E,D, NR by PILAR at 5/9/2020 1404    Comment:  re-orientation, UE TE, benefits of activity, bed mobility, walker safety    Acceptance, E, NR by CL at 5/7/2020 1417    Acceptance, E,D, NR by JY at 5/6/2020 1523    Acceptance, E, NR by CL at 5/4/2020 1110    Acceptance, E, NR by CL at 5/2/2020 1059                   Point: Body mechanics (In Progress)     Description:   Instruct learner(s) on proper positioning and spine alignment during self-care, functional mobility activities and/or exercises.              Learning Progress Summary           Patient Acceptance, E, NR by CL at 5/11/2020 1331    Acceptance, E,D, NR by JY at 5/6/2020 1523    Acceptance, E, NR by CL at 5/4/2020 1110    Acceptance, E, NR by CL at 5/2/2020 1059                               User Key     Initials Effective Dates Name Provider Type Discipline    PILAR 06/08/18 -  Lisa Robertson, OT Occupational Therapist OT    CL 04/03/18 -  Selena Terrell, OT Occupational Therapist OT    JY 01/29/20 -  Mary Quevedo OT Occupational Therapist OT                OT Recommendation and Plan  Outcome Summary/Treatment Plan (OT)  Anticipated Equipment Needs at Discharge (OT): (TBA  further)  Anticipated Discharge Disposition (OT): inpatient rehabilitation facility  Therapy Frequency (OT Eval): daily  Plan of Care Review  Plan of Care Reviewed With: patient  Plan of Care Reviewed With: patient  Outcome Summary: Pt demo improved independence by simulating LBD tasks w/ Supervision and bed mobility w/ Min A. Pt conts to be limited d/t generalized weakness though demo good effort. Recommend cont skilled IPOT POC. Recommend pt DC to IP rehab based on current level of performance, however will monitor progress closely.  Outcome Measures     Row Name 05/11/20 1331 05/09/20 1404          How much help from another is currently needed...    Putting on and taking off regular lower body clothing?  2  -CL  2  -PILAR     Bathing (including washing, rinsing, and drying)  2  -CL  2  -PILAR     Toileting (which includes using toilet bed pan or urinal)  2  -CL  2  -PILAR     Putting on and taking off regular upper body clothing  2  -CL  2  -PILAR     Taking care of personal grooming (such as brushing teeth)  3  -CL  2  -PILAR     Eating meals  4  -CL  3  -PILAR     AM-PAC 6 Clicks Score (OT)  15  -CL  13  -PILAR        Functional Assessment    Outcome Measure Options  AM-PAC 6 Clicks Daily Activity (OT)  -CL  AM-PAC 6 Clicks Daily Activity (OT)  -PILAR       User Key  (r) = Recorded By, (t) = Taken By, (c) = Cosigned By    Initials Name Provider Type    Lisa Chaudhry, OT Occupational Therapist    CL Selena Terrell OT Occupational Therapist           Time Calculation:   Time Calculation- OT     Row Name 05/11/20 1331             Time Calculation- OT    OT Start Time  1331  -CL      OT Received On  05/11/20  -CL      OT Goal Re-Cert Due Date  05/12/20  -CL         Timed Charges    64075 - OT Therapeutic Activity Minutes  8  -CL      20879 - OT Self Care/Mgmt Minutes  7  -CL        User Key  (r) = Recorded By, (t) = Taken By, (c) = Cosigned By    Initials Name Provider Type    CL Selena Terrell, OT Occupational Therapist        Therapy  Charges for Today     Code Description Service Date Service Provider Modifiers Qty    [id number]  OT THERAPEUTIC ACT EA 15 MIN 5/11/2020 Selena Terrell OT GO 1               Selena Terrell OT  5/11/2020

## 2020-05-11 NOTE — PROGRESS NOTES
Will follow patient through hospital course and offer patient treatment and recovery options.     MICHAEL Alegre, MSW  [place] Certified Adult [employer]/Mental Health  [phone]

## 2020-05-11 NOTE — PROGRESS NOTES
"GI Daily Progress Note  Subjective:    Chief Complaint:  Follow up decompensated cirrhosis     Patient is asking about discharge home today.   He ate some pizza last night and small amount of cereal this morning.   Tube feeds are now held.      Objective:    /68 (BP Location: Left arm, Patient Position: Lying)   Pulse 104   Temp 97.5 °F (36.4 °C) (Oral)   Resp (!) 32   Ht 172.7 cm (68\")   Wt 78 kg (171 lb 15.3 oz)   SpO2 92%   BMI 26.15 kg/m²     Physical Exam   Constitutional: He is oriented to person, place, and time. No distress.   Appears ill but improving   HENT:   Head: Normocephalic and atraumatic.   Eyes: Scleral icterus is present.   Cardiovascular: Regular rhythm. Tachycardia present.   Pulmonary/Chest: Effort normal. No respiratory distress.   Abdominal: Soft. Bowel sounds are normal. He exhibits distension. There is no tenderness.   Mild ascites    Neurological: He is alert and oriented to person, place, and time.   Skin: Skin is warm and dry.   Jaundice        Lab  Lab Results   Component Value Date    WBC 29.78 (H) 05/11/2020    HGB 8.8 (L) 05/11/2020    HGB 9.7 (L) 05/10/2020    HGB 8.5 (L) 05/09/2020    .3 (H) 05/11/2020     05/11/2020    INR 2.16 (H) 05/09/2020    INR 1.96 (H) 05/08/2020    INR 1.79 (H) 05/07/2020    INR 2.01 (H) 05/06/2020    INR 1.84 (H) 05/05/2020       Lab Results   Component Value Date    GLUCOSE 127 (H) 05/11/2020    BUN 18 05/11/2020    CREATININE 0.30 (L) 05/11/2020    EGFRIFNONA >150 05/11/2020    EGFRIFAFRI 37 (L) 04/23/2020    BCR 60.0 (H) 05/11/2020     (L) 05/11/2020    K 4.0 05/11/2020    CO2 21.0 (L) 05/11/2020    CALCIUM 7.4 (L) 05/11/2020    ALBUMIN 2.40 (L) 05/11/2020    ALKPHOS 205 (H) 05/11/2020    BILITOT 4.3 (H) 05/11/2020    BILIDIR 3.3 (H) 05/04/2020    ALT 61 (H) 05/11/2020     (H) 05/11/2020       Assessment:    1. Acute blood loss anemia, improved.  3 units transfused since admission. No signs of GI " bleeding.  2. Gastric varices (GOV type I) with hemorrhage, status post TIPS 4/29/2020 (pressure gradient reduced from 16 to 6)  3. Large esophageal varices with red hanh signs.  4. Acute alcoholic hepatitis, severe.  Bilirubin is stable.  Maddrey discriminant function 56 at time of admission.  Not a candidate for corticosteroids (nil effect with MDF >50).  5. Alcohol cirrhosis with ascites.    6. Sepsis.  C. difficile negative.  Had white cell criteria for SBP on 4/30/2020.  Repeat paracentesis on 5/8 was negative for SBP.      Plan:    Discussed at length with patient that even though he is improving, he is not ready for discharge today.        >>> Discussed importance of complete abstinence from alcohol at discharge.   He voiced understanding and willingness to do so.  He however also states he lives with is dad that regularly drinks alcohol.   This will be a significant challenge.     >>> Repeat cultures from 5/8/20 are negative for growth.   Infectious disease is following.   IV Micafungin discontinued.     >>> Continue Lactulose and Xifaxan  >>> Continue low dose diuretics (Lasix and Aldactone)     Increase PO intake as tolerated.  If eating ~ 60 % of meals, can remove Keofeed.      Increase ambulation - PTOT to see.       TORI Harmon  05/11/20  12:39

## 2020-05-11 NOTE — PROGRESS NOTES
[facility] Medicine Services  PROGRESS NOTE    Patient Name: John Varma  : 1995  MRN: [id number]    Date of Admission: 2020  Primary Care Physician: Provider, No Known    Subjective   Subjective     CC:  Sepsis, SBP, hepatic encephalopathy    HPI:  Yesterday per report walked in ICU with PT, was first day he was reliably overall improved!  Was very tired and sleepy at time of eval today but then per nursing he has been up again this afternoon in halls with PT today too.  Appears generally weakened since now s/p ~17 days ICU stay now out to PCU bed.  No fevers, no reports of active hallucinations or agitation today.     Review of Systems  Unable to reliably obtain due to drowsiness and hepatic encephalopthy    Objective   Objective     Vital Signs:   Temp:  [97.5 °F (36.4 °C)-98.9 °F (37.2 °C)] 97.5 °F (36.4 °C)  Heart Rate:  [] 99  Resp:  [26-32] 32  BP: (108-123)/(58-78) 114/62        Physical Exam:  Constitutional: No acute distress, awake, alert, nontoxic, unkempt appearance with normal body habitus with exception of round ascities  Respiratory: Clear to auscultation bilaterally, weak effort while asleep, nonlabored respirations   Cardiovascular: RRR, no murmur  Gastrointestinal: No grimace, distended but not tense  Musculoskeletal: 1+ boggy nonpitting peripheral edema, normal muscle tone for age  Psych: groggy (napping at time of eval) but per chart review had appropriate conversations with other providers and was asking about discharge, no nursing reports of hallucinations  Skin: No rashes, no jaundice, no petechiae    Results Reviewed:  Results from last 7 days   Lab Units 20  0444 05/10/20  0521 20  0459 20  0429 20  0639 20  0520   WBC 10*3/mm3 29.78* 31.32* 28.99* 33.29*  --  45.78*   HEMOGLOBIN g/dL 8.8* 9.7* 8.5* 9.3*  --  10.5*   HEMATOCRIT % 26.8* 31.8* 27.4* 29.5*  --  34.0*   PLATELETS 10*3/mm3 305 282 257 308  --  316    INR   --   --  2.16* 1.96*  --  1.79*   PROCALCITONIN ng/mL  --   --  2.10* 4.61* 6.85*  --      Results from last 7 days   Lab Units 05/11/20  1131 05/10/20  0521 05/09/20  0459   SODIUM mmol/L 135* 137 138   POTASSIUM mmol/L 4.0 4.1 4.0   CHLORIDE mmol/L 105 106 108*   CO2 mmol/L 21.0* 20.0* 20.0*   BUN mg/dL 18 17 16   CREATININE mg/dL 0.30* 0.31* 0.25*   GLUCOSE mg/dL 127* 110* 145*   CALCIUM mg/dL 7.4* 7.5* 7.9*   ALT (SGPT) U/L 61* 57* 52*   AST (SGOT) U/L 101* 98* 73*     Estimated Creatinine Clearance: 415.3 mL/min (A) (by C-G formula based on SCr of 0.3 mg/dL (L)).    Microbiology Results Abnormal     Procedure Component Value - Date/Time    Blood Culture - Blood, Wrist, Left [[id number]] Collected:  05/07/20 1202    Lab Status:  Preliminary result Specimen:  Blood from Wrist, Left Updated:  05/11/20 1300     Blood Culture No growth at 4 days    Narrative:       Aerobic bottle only      Blood Culture - Blood, Arm, Left [[id number]] Collected:  05/07/20 1202    Lab Status:  Preliminary result Specimen:  Blood from Arm, Left Updated:  05/11/20 1230     Blood Culture No growth at 4 days    Narrative:       Aerobic bottle only      Body Fluid Culture - Body Fluid, Peritoneum [[id number]] Collected:  05/08/20 1520    Lab Status:  Final result Specimen:  Body Fluid from Peritoneum Updated:  05/11/20 0918     Body Fluid Culture No growth at 3 days     Gram Stain Few (2+) WBCs seen      No organisms seen    Anaerobic Culture - Body Fluid, Peritoneum [[id number]] Collected:  05/08/20 1520    Lab Status:  Preliminary result Specimen:  Body Fluid from Peritoneum Updated:  05/11/20 0718     Anaerobic Culture No anaerobes isolated at 3 days    AFB Culture - Body Fluid, Peritoneum [[id number]] Collected:  05/08/20 1520    Lab Status:  Preliminary result Specimen:  Body Fluid from Peritoneum Updated:  05/09/20 1108     AFB Stain No acid fast bacilli seen on concentrated smear    Respiratory Culture - Sputum, Cough  [[id number]] Collected:  05/07/20 1213    Lab Status:  Final result Specimen:  Sputum from Cough Updated:  05/09/20 1054     Respiratory Culture Light growth (2+) Normal Respiratory Jordyn     Gram Stain Many (4+) WBCs per low power field      Moderate (3+) Epithelial cells per low power field      Few (2+) Gram positive cocci in pairs and clusters    Blood Culture - Blood, Arm, Left [[id number]] Collected:  05/03/20 1421    Lab Status:  Final result Specimen:  Blood from Arm, Left Updated:  05/08/20 1515     Blood Culture No growth at 5 days    Blood Culture - Blood, Hand, Left [[id number]] Collected:  05/03/20 1421    Lab Status:  Final result Specimen:  Blood from Hand, Left Updated:  05/08/20 1515     Blood Culture No growth at 5 days    Clostridium Difficile Toxin - Stool, Per Rectum [[id number]] Collected:  05/07/20 1410    Lab Status:  Final result Specimen:  Stool from Per Rectum Updated:  05/07/20 1541    Narrative:       The following orders were created for panel order Clostridium Difficile Toxin - Stool, Per Rectum.  Procedure                               Abnormality         Status                     ---------                               -----------         ------                     Clostridium Difficile To...[[id number]]  Normal              Final result                 Please view results for these tests on the individual orders.    Clostridium Difficile Toxin, PCR - Stool, Per Rectum [[id number]]  (Normal) Collected:  05/07/20 1410    Lab Status:  Final result Specimen:  Stool from Per Rectum Updated:  05/07/20 1541     C. Difficile Toxins by PCR Not Detected    Narrative:       Performance characteristics of test not established for patients <2 years of age.  Negative for Toxigenic C. Difficile    SARS-CoV-2 PCR ([place] IN-HOUSE PERFORMED), NP SWAB IN TRANSPORT MEDIA - Swab, Nasopharynx [[id number]]  (Normal) Collected:  05/06/20 1840    Lab Status:  Final result Specimen:  Swab from Nasopharynx  Updated:  05/07/20 0033     COVID19 Not Detected    Body Fluid Culture - Body Fluid, Peritoneum [[id number]] Collected:  04/24/20 1700    Lab Status:  Final result Specimen:  Body Fluid from Peritoneum Updated:  04/30/20 0805     Body Fluid Culture No growth at 5 days    Narrative:       Aerobic bottle only      Blood Culture - Blood, Arm, Left [[id number]] Collected:  04/23/20 2152    Lab Status:  Final result Specimen:  Blood from Arm, Left Updated:  04/28/20 2215     Blood Culture No growth at 5 days    Blood Culture - Blood, Arm, Right [[id number]] Collected:  04/23/20 2147    Lab Status:  Final result Specimen:  Blood from Arm, Right Updated:  04/28/20 2215     Blood Culture No growth at 5 days    Respiratory Culture - Sputum, Cough [[id number]] Collected:  04/24/20 2049    Lab Status:  Final result Specimen:  Sputum from Cough Updated:  04/26/20 0905     Respiratory Culture Scant growth (1+) Normal Respiratory Jordyn     Gram Stain Many (4+) WBCs per low power field      Few (2+) Epithelial cells per low power field      No organisms seen    MRSA Screen, PCR (Inpatient) - Swab, Nares [[id number]]  (Abnormal) Collected:  04/25/20 0923    Lab Status:  Final result Specimen:  Swab from Nares Updated:  04/25/20 1113     MRSA PCR Positive    Urine Culture - Urine, Urine, Clean Catch [[id number]]  (Normal) Collected:  04/24/20 0033    Lab Status:  Final result Specimen:  Urine, Clean Catch Updated:  04/25/20 1030     Urine Culture No growth          Imaging Results (Last 24 Hours)     ** No results found for the last 24 hours. **          Results for orders placed during the hospital encounter of 04/23/20   Adult Transthoracic Echo Complete W/ Cont if Necessary Per Protocol    Narrative · Estimated EF = 70%.  · Left ventricular systolic function is mildly hyperdynamic  · There is a small (<1cm) pericardial effusion. No chamber impingement is   seen  · Trace mitral and tricuspid regurgitation  · A large left pleural  effusion is seen with what appears to be compressed   lung tissue noted.  · Tachycardia is noted throughout the study.  · No obvious valvular vegetations are seen.          I have reviewed the medications:  Scheduled Meds:  [START ON 5/12/2020] Pharmacy Consult  Does not apply Once   enoxaparin 40 mg Subcutaneous Q24H   folic acid 1 mg Oral Daily   furosemide 20 mg Intravenous Q12H   insulin detemir 15 Units Subcutaneous Daily   insulin regular 0-7 Units Subcutaneous Q6H   lactulose 20 g Nasogastric BID   melatonin 5 mg Oral Nightly   meropenem 1 g Intravenous Q8H   metoclopramide 5 mg Intravenous Q6H   multivitamin and minerals 15 mL Oral Daily   pantoprazole 40 mg Oral Q AM   PRO-STAT 30 mL Nasogastric BID   propranolol 10 mg Oral Q8H   QUEtiapine 100 mg Oral Nightly   QUEtiapine 50 mg Oral Daily   rifAXIMin 550 mg Nasogastric Q12H   spironolactone 25 mg Oral Daily   thiamine 100 mg Oral Daily   vancomycin 1,250 mg Intravenous Q8H   zinc gluconate 50 mg Oral Daily     Continuous Infusions:  Pharmacy to dose vancomycin      PRN Meds:.acetaminophen  •  albuterol  •  bisacodyl  •  LORazepam  •  magnesium sulfate **OR** magnesium sulfate **OR** magnesium sulfate  •  ondansetron  •  Pharmacy to dose vancomycin  •  potassium chloride  •  [COMPLETED] Insert peripheral IV **AND** sodium chloride  •  sodium chloride  •  sodium chloride    Assessment/Plan   Assessment & Plan     Active Hospital Problems    Diagnosis  POA   • **Acute variceal GI bleeding [K92.2]  Yes   • Tobacco abuse [Z72.0]  Yes   • SBP (spontaneous bacterial peritonitis) (CMS/HCC) [K65.2]  Yes   • Acute blood loss anemia due to variceal UGIB [D62]  Yes   • 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 [Z95.828]  Not Applicable   • Sepsis with acute renal failure without septic shock due to SBP (CMS/HCC) [A41.9, R65.20, N17.9]  Yes   • Acute respiratory failure with hypoxia. Intubated 4/24-4/27 and 4/29-5/1 (CMS/[facility]) [J96.01]  Yes   •  Alcohol withdrawal delirium (CMS/HCC) [F10.231]  Yes   • Hepatic encephalopathy (CMS/HCC) [K72.90]  Yes   • Ascites due to alcoholic hepatitis. S/P paracentesis 4/24, 4/30, and 5/8 [K70.11]  Yes   • Pleural effusion on left [J90]  Yes   • Acute alcoholic hepatitis [K70.10]  Yes   • BORIS (acute kidney injury) (CMS/HCC) [N17.9]  Yes      Resolved Hospital Problems   No resolved problems to display.        Brief Hospital Course to date:  John Varma is a 24 y.o. male alcoholic admitted to [facility] 4/23/20 with acute ETOH hepatitis (at presentation [facility] discriminate function was 56 and was not candidate for steroids with MDF>50), decompensated cirrhosis, UGI gastric and esophageal variceal bleed (s/p EGD 4/24 and TIPS 4/29), and BORIS due to ATN of acute blood loss anemia.      Patient was transfused total 3 units pRBC for presentation with ABL anemia.  He was intubated for his 4/24 EGD and extubated 4/27.  He is now is s/p 4/29 TIPS procedure for which he was intubated overnight and extubated 5/1 again.  Pstient has been followed by GI and initiated on appropriate Lasix/aldactone/propranolol/rifaximin/lactulose therapy for his cirrhosis and associated hepatic encephalopathy which persists but is slowly improving.  He underewent 5L paracentesis on admission date 4/23 without evidence of SBP but due to ongoing fever and persistent marked leukocytosis tap was repeated day after his TIPS on 4/30 of 2.5L showing his sepsis presumed to be due to SBP based on the 4/30/20 paracentesis cell counts (no culture was resulted unfortunately).  The most recent 5/8 1L paracentesis findings show improvement on current abx regimen, while his leukocytosis persists it is markedly improved.  ID has followed for abx management.    He is now out of ICU and starting to participate with PT/OT.  He is more alert and less confused overall.  His goal for discharge is home to help take care of his grandmother with whom he and his father live.   "Patient's father also struggles with long-standing alcoholism and presents a high risk home environment for ETOH relapse after discharge.      - On abx for SBP, WBC's trending down  - LFT's improved  - Remove otto today, scrotal edema improved on his new diuretic regimen  - Hgb stable (fluctuates mildly with fluid shifts but overall stable since transfusions)  - TF's off currently and trialing increasing his PO intake -->  Plan to d/c keofeed tube in am if eats well through today and tonight  - Walked halls with PT per nursing, encourage ambulation with supervision and increasing his stamina  - At d/c he will continue his maximized cirrhosis treatment -->  Lasix/aldactone/propranolol/rifaximin/lactulose which he is tolerating well here with exception of expected diarrhea related to lactulose    DVT Prophylaxis:  Lovenox    Patient expressing concern about wanting to get better and get home..he and his father live with his grandmother.  Patient takes care of grandmother, his father is an alcoholic and \"not doing well\".  Patient would like his father \"to get help\".  The social situation seems very high risk for relapse.  He has been counseled aggressively by GI and also is being followed by the Addiction Team while here.       Disposition: I expect the patient to be discharged home pending his progress with PT/OT over next 48hrs.      CODE STATUS:   Code Status and Medical Interventions:   Ordered at: 04/23/20 2249     Code Status:    CPR     Medical Interventions (Level of Support Prior to Arrest):    Full     More than 50% of time spent counseling on current illness and plan of care. Case discussed with: nurse, CM, consultants  Total time spent face to face with the patient was 10 minutes.  Total time of the encounter was 40 minutes.        Electronically signed by Neda Carter MD, 05/11/20, 13:26.      "

## 2020-05-11 NOTE — PLAN OF CARE
Problem: Patient Care Overview  Goal: Plan of Care Review  Outcome: Ongoing (interventions implemented as appropriate)  Flowsheets (Taken 5/11/2020 0230)  Plan of Care Reviewed With: patient; father  Note:   VSS, Alert but disoriented to place and situation at times. Adequate UOP. Pt slept well most the night. GOINS. Follows commands. Tube feeding and ABX were continued. Will continue to monitor.   Goal: Individualization and Mutuality  Outcome: Ongoing (interventions implemented as appropriate)  Goal: Discharge Needs Assessment  Outcome: Ongoing (interventions implemented as appropriate)  Goal: Interprofessional Rounds/Family Conf  Outcome: Ongoing (interventions implemented as appropriate)     Problem: Skin Injury Risk (Adult)  Goal: Skin Health and Integrity  Outcome: Ongoing (interventions implemented as appropriate)     Problem: Fall Risk (Adult)  Goal: Absence of Fall  Outcome: Ongoing (interventions implemented as appropriate)     Problem: Restraint, Nonbehavioral (Nonviolent)  Goal: Rationale and Justification  Outcome: Ongoing (interventions implemented as appropriate)  Goal: Nonbehavioral (Nonviolent) Restraint: Absence of Injury/Harm  Outcome: Ongoing (interventions implemented as appropriate)  Goal: Nonbehavioral (Nonviolent) Restraint: Achievement of Discontinuation Criteria  Outcome: Ongoing (interventions implemented as appropriate)  Goal: Nonbehavioral (Nonviolent) Restraint: Preservation of Dignity and Wellbeing  Outcome: Ongoing (interventions implemented as appropriate)

## 2020-05-12 LAB
ANION GAP SERPL CALCULATED.3IONS-SCNC: 10 MMOL/L (ref 5–15)
BACTERIA SPEC AEROBE CULT: NORMAL
BACTERIA SPEC AEROBE CULT: NORMAL
BUN BLD-MCNC: 18 MG/DL (ref 6–20)
BUN/CREAT SERPL: 75 (ref 7–25)
CALCIUM SPEC-SCNC: 7.7 MG/DL (ref 8.6–10.5)
CHLORIDE SERPL-SCNC: 105 MMOL/L (ref 98–107)
CO2 SERPL-SCNC: 21 MMOL/L (ref 22–29)
CREAT BLD-MCNC: 0.24 MG/DL (ref 0.76–1.27)
GFR SERPL CREATININE-BSD FRML MDRD: >150 ML/MIN/1.73
GLUCOSE BLD-MCNC: 79 MG/DL (ref 65–99)
GLUCOSE BLDC GLUCOMTR-MCNC: 82 MG/DL (ref 70–130)
GLUCOSE BLDC GLUCOMTR-MCNC: 94 MG/DL (ref 70–130)
LAB AP CASE REPORT: NORMAL
MAGNESIUM SERPL-MCNC: 2 MG/DL (ref 1.6–2.6)
PATH REPORT.FINAL DX SPEC: NORMAL
PHOSPHATE SERPL-MCNC: 4.6 MG/DL (ref 2.5–4.5)
POTASSIUM BLD-SCNC: 4.2 MMOL/L (ref 3.5–5.2)
SODIUM BLD-SCNC: 136 MMOL/L (ref 136–145)
VANCOMYCIN TROUGH SERPL-MCNC: 17 MCG/ML (ref 5–20)

## 2020-05-12 PROCEDURE — 84100 ASSAY OF PHOSPHORUS: CPT | Performed by: INTERNAL MEDICINE

## 2020-05-12 PROCEDURE — 80202 ASSAY OF VANCOMYCIN: CPT | Performed by: INTERNAL MEDICINE

## 2020-05-12 PROCEDURE — 97535 SELF CARE MNGMENT TRAINING: CPT

## 2020-05-12 PROCEDURE — 25010000002 VANCOMYCIN 10 G RECONSTITUTED SOLUTION: Performed by: INTERNAL MEDICINE

## 2020-05-12 PROCEDURE — 99232 SBSQ HOSP IP/OBS MODERATE 35: CPT | Performed by: PHYSICIAN ASSISTANT

## 2020-05-12 PROCEDURE — 97110 THERAPEUTIC EXERCISES: CPT

## 2020-05-12 PROCEDURE — 63710000001 INSULIN DETEMIR PER 5 UNITS: Performed by: INTERNAL MEDICINE

## 2020-05-12 PROCEDURE — 97530 THERAPEUTIC ACTIVITIES: CPT

## 2020-05-12 PROCEDURE — 99233 SBSQ HOSP IP/OBS HIGH 50: CPT | Performed by: FAMILY MEDICINE

## 2020-05-12 PROCEDURE — 82962 GLUCOSE BLOOD TEST: CPT

## 2020-05-12 PROCEDURE — 25010000002 VANCOMYCIN: Performed by: INTERNAL MEDICINE

## 2020-05-12 PROCEDURE — 25010000002 METOCLOPRAMIDE PER 10 MG: Performed by: INTERNAL MEDICINE

## 2020-05-12 PROCEDURE — 92507 TX SP LANG VOICE COMM INDIV: CPT

## 2020-05-12 PROCEDURE — 97116 GAIT TRAINING THERAPY: CPT

## 2020-05-12 PROCEDURE — 25010000002 ENOXAPARIN PER 10 MG: Performed by: INTERNAL MEDICINE

## 2020-05-12 PROCEDURE — 83735 ASSAY OF MAGNESIUM: CPT | Performed by: INTERNAL MEDICINE

## 2020-05-12 PROCEDURE — 25010000002 FUROSEMIDE PER 20 MG: Performed by: INTERNAL MEDICINE

## 2020-05-12 PROCEDURE — 80048 BASIC METABOLIC PNL TOTAL CA: CPT | Performed by: INTERNAL MEDICINE

## 2020-05-12 PROCEDURE — 25010000002 MEROPENEM PER 100 MG: Performed by: INTERNAL MEDICINE

## 2020-05-12 PROCEDURE — 92526 ORAL FUNCTION THERAPY: CPT

## 2020-05-12 RX ADMIN — MEROPENEM 1 G: 1 INJECTION, POWDER, FOR SOLUTION INTRAVENOUS at 16:58

## 2020-05-12 RX ADMIN — METOCLOPRAMIDE 5 MG: 5 INJECTION, SOLUTION INTRAMUSCULAR; INTRAVENOUS at 11:38

## 2020-05-12 RX ADMIN — PROPRANOLOL HYDROCHLORIDE 10 MG: 20 TABLET ORAL at 05:49

## 2020-05-12 RX ADMIN — LACTULOSE 20 G: 20 SOLUTION ORAL at 21:26

## 2020-05-12 RX ADMIN — METOCLOPRAMIDE 5 MG: 5 INJECTION, SOLUTION INTRAMUSCULAR; INTRAVENOUS at 00:30

## 2020-05-12 RX ADMIN — RIFAXIMIN 550 MG: 550 TABLET ORAL at 11:16

## 2020-05-12 RX ADMIN — VANCOMYCIN HYDROCHLORIDE 1250 MG: 10 INJECTION, POWDER, LYOPHILIZED, FOR SOLUTION INTRAVENOUS at 11:17

## 2020-05-12 RX ADMIN — PROPRANOLOL HYDROCHLORIDE 10 MG: 20 TABLET ORAL at 14:05

## 2020-05-12 RX ADMIN — METOCLOPRAMIDE 5 MG: 5 INJECTION, SOLUTION INTRAMUSCULAR; INTRAVENOUS at 05:48

## 2020-05-12 RX ADMIN — FUROSEMIDE 20 MG: 10 INJECTION, SOLUTION INTRAMUSCULAR; INTRAVENOUS at 16:59

## 2020-05-12 RX ADMIN — FOLIC ACID 1 MG: 1 TABLET ORAL at 08:47

## 2020-05-12 RX ADMIN — QUETIAPINE FUMARATE 100 MG: 100 TABLET ORAL at 21:33

## 2020-05-12 RX ADMIN — MEROPENEM 1 G: 1 INJECTION, POWDER, FOR SOLUTION INTRAVENOUS at 08:47

## 2020-05-12 RX ADMIN — VANCOMYCIN HYDROCHLORIDE 1250 MG: 10 INJECTION, POWDER, LYOPHILIZED, FOR SOLUTION INTRAVENOUS at 02:54

## 2020-05-12 RX ADMIN — VANCOMYCIN HYDROCHLORIDE 1250 MG: 10 INJECTION, POWDER, LYOPHILIZED, FOR SOLUTION INTRAVENOUS at 16:59

## 2020-05-12 RX ADMIN — ENOXAPARIN SODIUM 40 MG: 40 INJECTION SUBCUTANEOUS at 08:50

## 2020-05-12 RX ADMIN — MULTIVITAMIN 15 ML: LIQUID ORAL at 11:15

## 2020-05-12 RX ADMIN — SPIRONOLACTONE 25 MG: 25 TABLET ORAL at 08:47

## 2020-05-12 RX ADMIN — THIAMINE HCL TAB 100 MG 100 MG: 100 TAB at 08:46

## 2020-05-12 RX ADMIN — Medication 50 MG: at 11:16

## 2020-05-12 RX ADMIN — INSULIN DETEMIR 15 UNITS: 100 INJECTION, SOLUTION SUBCUTANEOUS at 08:50

## 2020-05-12 RX ADMIN — MEROPENEM 1 G: 1 INJECTION, POWDER, FOR SOLUTION INTRAVENOUS at 00:30

## 2020-05-12 RX ADMIN — SODIUM CHLORIDE, PRESERVATIVE FREE 10 ML: 5 INJECTION INTRAVENOUS at 08:48

## 2020-05-12 RX ADMIN — MELATONIN TAB 5 MG 5 MG: 5 TAB at 21:20

## 2020-05-12 RX ADMIN — PANTOPRAZOLE SODIUM 40 MG: 40 TABLET, DELAYED RELEASE ORAL at 05:49

## 2020-05-12 RX ADMIN — FUROSEMIDE 20 MG: 10 INJECTION, SOLUTION INTRAMUSCULAR; INTRAVENOUS at 05:48

## 2020-05-12 RX ADMIN — PROPRANOLOL HYDROCHLORIDE 10 MG: 20 TABLET ORAL at 21:20

## 2020-05-12 RX ADMIN — LACTULOSE 20 G: 20 SOLUTION ORAL at 08:47

## 2020-05-12 RX ADMIN — QUETIAPINE FUMARATE 50 MG: 100 TABLET ORAL at 08:47

## 2020-05-12 RX ADMIN — RIFAXIMIN 550 MG: 550 TABLET ORAL at 21:20

## 2020-05-12 RX ADMIN — Medication 30 ML: at 21:27

## 2020-05-12 NOTE — PROGRESS NOTES
"Pharmacy Consult-Vancomycin Dosing  John Varma is a  24 y.o. male receiving vancomycin therapy.     Indication: empiric (r/o sepsis)  Consulting Provider: pulm  ID Consult: ID  Goal Trough: 15-20 mcg/mL    Current Antimicrobial Therapy  Vancomycin IV PTD, start 5/7  Meropenem 1g IV q8h, start 5/7  Rifaximin 550mg PO q12h, start 4/24    Allergies  Allergies as of 04/23/2020 - Reviewed 04/23/2020   Allergen Reaction Noted    Penicillins Hives 04/23/2020     Labs  Results from last 7 days   Lab Units 05/12/20  0537 05/11/20  1131 05/10/20  0521   BUN mg/dL 18 18 17   CREATININE mg/dL 0.24* 0.30* 0.31*     Results from last 7 days   Lab Units 05/11/20  0444 05/10/20  0521 05/09/20  0459   WBC 10*3/mm3 29.78* 31.32* 28.99*     Evaluation of Dosing  Ht - 172.7 cm (68\")  Wt - 82.7 kg (182 lb 5.1 oz)    Estimated Creatinine Clearance: 493.1 mL/min (A) (by C-G formula based on SCr of 0.24 mg/dL (L)).    Intake & Output (last 3 days)         05/09 0701 - 05/10 0700 05/10 0701 - 05/11 0700 05/11 0701 - 05/12 0700 05/12 0701 - 05/13 0700    P.O.  240    I.V. (mL/kg) 275.6 (3.6) 412.4 (5.3) 599 (7.2)     Other 503 488 211 0    NG/GT 1551 1994 672 0    IV Piggyback 918.1 1164 726.2 10    .5       Total Intake(mL/kg) 4015.2 (52.2) 4613.4 (59.1) 3963.2 (47.9) 250 (3)    Urine (mL/kg/hr) 2275 (1.2) 1945 (1) 1620 (0.8)     Other        Stool   0     Total Output 2275 1945 1620     Net +1740.2 +2668.4 +2343.2 +250            Urine Unmeasured Occurrence    701 x    Stool Unmeasured Occurrence   1 x           Microbiology and Radiology  Microbiology Results (last 10 days)       Procedure Component Value - Date/Time    Body Fluid Culture - Body Fluid, Peritoneum [[id number]] Collected:  05/08/20 1520    Lab Status:  Final result Specimen:  Body Fluid from Peritoneum Updated:  05/11/20 0918     Body Fluid Culture No growth at 3 days     Gram Stain Few (2+) WBCs seen      No organisms seen    Anaerobic Culture - " Body Fluid, Peritoneum [[id number]] Collected:  05/08/20 1520    Lab Status:  Preliminary result Specimen:  Body Fluid from Peritoneum Updated:  05/11/20 0718     Anaerobic Culture No anaerobes isolated at 3 days    AFB Culture - Body Fluid, Peritoneum [[id number]] Collected:  05/08/20 1520    Lab Status:  Preliminary result Specimen:  Body Fluid from Peritoneum Updated:  05/09/20 1108     AFB Stain No acid fast bacilli seen on concentrated smear    Clostridium Difficile Toxin - Stool, Per Rectum [[id number]] Collected:  05/07/20 1410    Lab Status:  Final result Specimen:  Stool from Per Rectum Updated:  05/07/20 1541    Narrative:       The following orders were created for panel order Clostridium Difficile Toxin - Stool, Per Rectum.  Procedure                               Abnormality         Status                     ---------                               -----------         ------                     Clostridium Difficile To...[[id number]]  Normal              Final result                 Please view results for these tests on the individual orders.    Clostridium Difficile Toxin, PCR - Stool, Per Rectum [[id number]]  (Normal) Collected:  05/07/20 1410    Lab Status:  Final result Specimen:  Stool from Per Rectum Updated:  05/07/20 1541     C. Difficile Toxins by PCR Not Detected    Narrative:       Performance characteristics of test not established for patients <2 years of age.  Negative for Toxigenic C. Difficile    Respiratory Culture - Sputum, Cough [[id number]] Collected:  05/07/20 1213    Lab Status:  Final result Specimen:  Sputum from Cough Updated:  05/09/20 1054     Respiratory Culture Light growth (2+) Normal Respiratory Jordyn     Gram Stain Many (4+) WBCs per low power field      Moderate (3+) Epithelial cells per low power field      Few (2+) Gram positive cocci in pairs and clusters    Blood Culture - Blood, Arm, Left [[id number]] Collected:  05/07/20 1202    Lab Status:  Final result Specimen:  Blood  from Arm, Left Updated:  05/12/20 1230     Blood Culture No growth at 5 days    Narrative:       Aerobic bottle only      Blood Culture - Blood, Wrist, Left [[id number]] Collected:  05/07/20 1202    Lab Status:  Preliminary result Specimen:  Blood from Wrist, Left Updated:  05/11/20 1300     Blood Culture No growth at 4 days    Narrative:       Aerobic bottle only      SARS-CoV-2 PCR ([place] IN-HOUSE PERFORMED), NP SWAB IN TRANSPORT MEDIA - Swab, Nasopharynx [[id number]]  (Normal) Collected:  05/06/20 1840    Lab Status:  Final result Specimen:  Swab from Nasopharynx Updated:  05/07/20 0033     COVID19 Not Detected    Blood Culture - Blood, Arm, Left [[id number]] Collected:  05/03/20 1421    Lab Status:  Final result Specimen:  Blood from Arm, Left Updated:  05/08/20 1515     Blood Culture No growth at 5 days    Blood Culture - Blood, Hand, Left [[id number]] Collected:  05/03/20 1421    Lab Status:  Final result Specimen:  Blood from Hand, Left Updated:  05/08/20 1515     Blood Culture No growth at 5 days          Evaluation of Level  Results from last 7 days   Lab Units 05/12/20  0926 05/10/20  1535 05/09/20  1315 05/08/20  1129   VANCOMYCIN TR mcg/mL 17.00 17.30 8.00 4.60*     Assessment/Plan  Pharmacy to dose vancomycin for sepsis in this 24yoM. Today's draw represents about a 6.5 hour level, but expect the true trough is still therapeutic (15-20mcg/mL). For now, will continue vancomycin 1250mg IV q8h.  Antibiotics are likely to be discontinued tomorrow prior to anticipated discharge. Will repeat trough in 3-4 days, if not.   SCr has been stable and recent cultures are NGTD. Pharmacy will continue to monitor and adjust vancomycin dose as necessary based on renal function, cultures, labs, and clinical status.     Lizzette Briones TANESHA  5/12/2020  12:39

## 2020-05-12 NOTE — PLAN OF CARE
Problem: Patient Care Overview  Goal: Plan of Care Review  Outcome: Ongoing (interventions implemented as appropriate)  Flowsheets (Taken 5/12/2020 6619)  Plan of Care Reviewed With: patient  Note:   SLP treatment completed. Will continue to address swallowing and cognitive-communication in tx. Please see note for further details and recommendations.

## 2020-05-12 NOTE — THERAPY TREATMENT NOTE
Acute Care - Speech Language Pathology Treatment Note  [facility]     Patient Name: John Varma  : 1995  MRN: [id number]  Today's Date: 2020         Admit Date: 2020    Visit Dx:      ICD-10-CM ICD-9-CM   1. Sepsis with acute liver failure without hepatic coma or septic shock, due to unspecified organism (CMS/HCC) A41.9 038.9    R65.20 995.92    K72.00 570   2. Hypoxia R09.02 799.02   3. Acute renal failure, unspecified acute renal failure type (CMS/HCC) N17.9 584.9   4. Acute hepatitis B17.9 570   5. Alcohol abuse F10.10 305.00   6. Acute upper GI bleed K92.2 578.9   7. Elevated lactic acid level R79.89 276.2   8. Ascites due to alcoholic hepatitis K70.11 789.59   9. Leukocytosis, unspecified type D72.829 288.60   10. Sepsis with acute renal failure without septic shock, due to unspecified organism, unspecified acute renal failure type (CMS/HCC) A41.9 038.9    R65.20 995.92    N17.9 584.9   11. Oropharyngeal dysphagia R13.12 787.22   12. Cognitive communication deficit R41.841 799.52     Patient Active Problem List   Diagnosis   • Acute alcoholic hepatitis   • Acute variceal GI bleeding   • BORIS (acute kidney injury) (CMS/HCC)   • Sepsis with acute renal failure without septic shock due to SBP (CMS/HCC)   • Acute respiratory failure with hypoxia. Intubated - and - (CMS/HCC)   • Alcohol withdrawal delirium (CMS/HCC)   • Hepatic encephalopathy (CMS/HCC)   • Ascites due to alcoholic hepatitis. S/P paracentesis , , and    • Pleural effusion on left   • S/P TIPS (transjugular intrahepatic portosystemic shunt) 2020   • Tobacco abuse   • SBP (spontaneous bacterial peritonitis) (CMS/HCC)   • Acute blood loss anemia due to variceal UGIB        Therapy Treatment  Rehabilitation Treatment Summary     Row Name 20 1330             Treatment Time/Intention    Discipline  speech language pathologist  -AC      Document Type  therapy note (daily note)  -AC      Subjective  "Information  complains of;fatigue  -AC      Patient/Family Observations  Pt alert, cooperative. Reported feeling \"tired.\" Ate majority of food on tray, but reported feeling like he couldn't finish it.  -AC      Care Plan Review  evaluation/treatment results reviewed;risks/benefits reviewed;care plan/treatment goals reviewed;current/potential barriers reviewed;patient/other agree to care plan  -AC      Therapy Frequency (Swallow)  5 days per week  -AC      Therapy Frequency (SLP SLC)  5 days per week  -AC      Patient Effort  good  -AC      Recorded by [AC] Rebekah Tsai MS CCC-SLP 05/12/20 1433      Row Name 05/12/20 1610             Pain Scale: Numbers Pre/Post-Treatment    Pain Scale: Numbers, Pretreatment  0/10 - no pain  -AC      Pain Scale: Numbers, Post-Treatment  0/10 - no pain  -AC      Recorded by [AC] Rebekah Tsai MS CCC-SLP 05/12/20 1433      Row Name                Wound 05/09/20 0800 Right lower flank Puncture    Wound - Properties Group Date first assessed: 05/09/20 [CB] Time first assessed: 0800 [CB] Present on Hospital Admission: N [CB] Side: Right [CB] Orientation: lower [CB] Location: flank [CB] Primary Wound Type: Puncture [CB], paracentesis  Recorded by:  [CB] Paul Sood RN 05/09/20 1027    Row Name 05/12/20 3835             Outcome Summary/Treatment Plan (SLP)    Daily Summary of Progress (SLP)  progress toward functional goals as expected  -AC      Plan for Continued Treatment (SLP)  Cont regular diet and thin liquids. Demonstrated impulsivity w/ PO today, as well as fatigue. Rec general aspiration precautions (small bite/sips) and monitor for fatigue and take breaks accordingly during meals. Cognitive-communication skills improving. Pt would benefit from cont'd SLP tx.  -AC      Anticipated Dischage Disposition  inpatient rehabilitation facility;anticipate therapy at next level of care  -AC      Recorded by [AC] Rebekah Tsai MS CCC-SLP 05/12/20 4452        User Key  (r) = Recorded " By, (t) = Taken By, (c) = Cosigned By    Initials Name Effective Dates Discipline    AC Rebekah Tsai, MS CCC-SLP 07/27/17 -  SLP    Paul Duenas RN 06/19/19 -  Nurse          EDUCATION  The patient has been educated in the following areas:   Cognitive Impairment Dysphagia (Swallowing Impairment).    SLP Recommendation and Plan  Daily Summary of Progress (SLP): progress toward functional goals as expected     Plan for Continued Treatment (SLP): Cont regular diet and thin liquids. Demonstrated impulsivity w/ PO today, as well as fatigue. Rec general aspiration precautions (small bite/sips) and monitor for fatigue and take breaks accordingly during meals. Cognitive-communication skills improving. Pt would benefit from cont'd SLP tx.  Anticipated Dischage Disposition: inpatient rehabilitation facility, anticipate therapy at next level of care             SLP GOALS     Row Name 05/12/20 1330 05/10/20 1530          Oral Nutrition/Hydration Goal 1 (SLP)    Oral Nutrition/Hydration Goal 1, SLP  LTG: Pt will return to regular diet and thin liquids and tolerate w/o s/sxs aspiration w/ 100% acc w/o cues.  -AC  LTG: Pt will return to regular diet and thin liquids and tolerate w/o s/sxs aspiration w/ 100% acc w/o cues.  -SM     Time Frame (Oral Nutrition/Hydration Goal 1, SLP)  by discharge  -AC  by discharge  -SM     Progress/Outcomes (Oral Nutrition/Hydration Goal 1, SLP)  good progress toward goal  -AC  good progress toward goal  -SM        Oral Nutrition/Hydration Goal 2 (SLP)    Oral Nutrition/Hydration Goal 2, SLP  Pt will tolerate trials of soft/regular solids and thin liquids w/o s/sxs aspiration w/ 100% acc w/o cues.  -AC  Pt will tolerate trials of soft/regular solids and thin liquids w/o s/sxs aspiration w/ 100% acc w/o cues.  -SM     Time Frame (Oral Nutrition/Hydration Goal 2, SLP)  short term goal (STG)  -AC  short term goal (STG)  -SM     Barriers (Oral Nutrition/Hydration Goal 2, SLP)  No overt clinical  s/sxs aspiration w/ thin via straw or solid crackers. Pt did exhibit impulsivity -- taking very large bites/drinks at a time. Able to teach back aspiration precautions by end of session.  -AC  Continue for carryover  -SM     Progress/Outcomes (Oral Nutrition/Hydration Goal 2, SLP)  good progress toward goal  -AC  goal revised this date;goal met  -SM        Lingual Strengthening Goal 1 (SLP)    Activity (Lingual Strengthening Goal 1, SLP)  --  increase tongue back strength;increase lingual tone/sensation/control/coordination/movement  -SM     Increase Lingual Tone/Sensation/Control/Coordination/Movement  swallow trials;lingual resistance exercises  -AC  swallow trials;lingual resistance exercises  -SM     Increase Tongue Back Strength  lingual resistance exercises  -AC  lingual resistance exercises  -SM     Irving/Accuracy (Lingual Strengthening Goal 1, SLP)  with minimal cues (75-90% accuracy)  -AC  with minimal cues (75-90% accuracy)  -SM     Time Frame (Lingual Strengthening Goal 1, SLP)  short term goal (STG)  -AC  short term goal (STG)  -SM     Barriers (Lingual Strengthening Goal 1, SLP)  --  Ready for advancement to regular solids  -SM     Progress/Outcomes (Lingual Strengthening Goal 1, SLP)  goal ongoing  -AC  good progress toward goal  -SM        Pharyngeal Strengthening Exercise Goal 1 (SLP)    Activity (Pharyngeal Strengthening Goal 1, SLP)  --  increase timing;increase superior movement of the hyolaryngeal complex;increase anterior movement of the hyolaryngeal complex;increase closure at entrance to airway/closure of airway at glottis;increase tongue base retraction  -SM     Increase Timing  prepping - 3 second prep or suck swallow or 3-step swallow  -AC  prepping - 3 second prep or suck swallow or 3-step swallow  -SM     Increase Superior Movement of the Hyolaryngeal Complex  effortful pitch glide (falsetto + pharyngeal squeeze)  -AC  effortful pitch glide (falsetto + pharyngeal squeeze)  -SM      Increase Anterior Movement of the Hyolaryngeal Complex  chin tuck against resistance (CTAR)  -AC  chin tuck against resistance (CTAR)  -SM     Increase Closure at Entrance to Airway/Closure of Airway at Glottis  super-supraglottic swallow  -AC  super-supraglottic swallow  -SM     Increase Tongue Base Retraction  hard effortful swallow;dodie  -AC  hard effortful swallow;dodie  -SM     [employer]/Accuracy (Pharyngeal Strengthening Goal 1, SLP)  with minimal cues (75-90% accuracy)  -AC  with minimal cues (75-90% accuracy)  -SM     Time Frame (Pharyngeal Strengthening Goal 1, SLP)  short term goal (STG)  -AC  short term goal (STG)  -SM     Barriers (Pharyngeal Strengthening Goal 1, SLP)  Reviewed effortful swallow and Dodie. Pt able to demonstrate both w/ min cues.   -AC  --     Progress/Outcomes (Pharyngeal Strengthening Goal 1, SLP)  continuing progress toward goal  -AC  goal ongoing  -SM        Attention Goal 1 (SLP)    Improve Attention by Goal 1 (SLP)  complete sustained attention task;100%;with minimal cues (75-90%)  -AC  complete sustained attention task;100%;with minimal cues (75-90%)  -SM     Time Frame (Attention Goal 1, SLP)  short term goal (STG)  -AC  short term goal (STG)  -SM     Progress (Attention Goal 1, SLP)  80%;with minimal cues (75-90%)  -AC  --     Progress/Outcomes (Attention Goal 1, SLP)  good progress toward goal  -AC  --        Memory Skills Goal 1 (SLP)    Improve Memory Skills Through Goal 1 (SLP)  recalling unrelated word lists with an imposed delay;select a word from a list by exclusion;repeat list in sequential order;90%;with minimal cues (75-90%)  -AC  recalling related word lists with an imposed delay;select a word from a list by exclusion;repeat list in sequential order;90%;with minimal cues (75-90%)  -SM     Time Frame (Memory Skills Goal 1, SLP)  short term goal (STG)  -AC  short term goal (STG)  -SM     Progress (Memory Skills Goal 1, SLP)  60%;with minimal cues (75-90%)  -AC   "--     Progress/Outcomes (Memory Skills Goal 1, SLP)  good progress toward goal;goal revised this date  -AC  --     Comment (Memory Skills Goal 1, SLP)  Met goal for related word list after 5-min delay. Reviewed strategies of visualization, repetition, and use of smartphone \"Notes\" tomas as external strategy.  -AC  --        Organizational Skills Goal 1 (SLP)    Improve Thought Organization Through Goal 1 (SLP)  completing a verbal sequencing task;completing mental manipulation task;100%;with minimal cues (75-90%)  -AC  completing a verbal sequencing task;completing mental manipulation task;100%;with minimal cues (75-90%)  -SM     Time Frame (Thought Organization Skills Goal 1, SLP)  short term goal (STG)  -AC  short term goal (STG)  -SM     Progress (Thought Organization Skills Goal 1, SLP)  90%;with minimal cues (75-90%)  -AC  --     Progress/Outcomes (Thought Organization Skills Goal 1, SLP)  good progress toward goal  -AC  --        Functional Problem Solving Skills Goal 1 (SLP)    Improve Problem Solving Through Goal 1 (SLP)  determine multiple solutions to problems;complete organization/home management task;100%;with minimal cues (75-90%)  -AC  determine multiple solutions to problems;complete organization/home management task;100%;with minimal cues (75-90%)  -SM     Time Frame (Problem Solving Goal 1, SLP)  short term goal (STG)  -AC  short term goal (STG)  -SM     Progress (Problem Solving Goal 1, SLP)  80%;with minimal cues (75-90%)  -AC  --     Progress/Outcomes (Problem Solving Goal 1, SLP)  good progress toward goal  -AC  --        Additional Goal 1 (SLP)    Additional Goal 1, SLP  LTG: Improve cog-comm skills in order to participate in care while in hospital setting with 100% accuracy and cues  -AC  LTG: Improve cog-comm skills in order to participate in care while in hospital setting with 100% accuracy and cues  -SM     Time Frame (Additional Goal 1, SLP)  by discharge  -AC  by discharge  -SM     " Progress/Outcomes (Additional Goal 1, SLP)  good progress toward goal  -AC  --       User Key  (r) = Recorded By, (t) = Taken By, (c) = Cosigned By    Initials Name Provider Type    Bernadette Alexander, MS CCC-SLP Speech and Language Pathologist    Rebekah Smith MS CCC-SLP Speech and Language Pathologist              Time Calculation:     Time Calculation- SLP     Row Name 05/12/20 1438             Time Calculation- SLP    SLP Start Time  1330  -      SLP Received On  05/12/20  -        User Key  (r) = Recorded By, (t) = Taken By, (c) = Cosigned By    Initials Name Provider Type    Rebekah Smith MS CCC-SLP Speech and Language Pathologist          Therapy Charges for Today     Code Description Service Date Service Provider Modifiers Qty    58727774574 HC ST TREATMENT SWALLOW 2 5/12/2020 Rebekah Tsai MS CCC-SLP GN 1    15208601584 HC ST TREATMENT SPEECH 2 5/12/2020 Rebekah Tsai MS CCC-SLP GN 1                     Rebekah Tsai MS CCC-AIDA  5/12/2020

## 2020-05-12 NOTE — PROGRESS NOTES
John Varma  1995  [id number]      Requesting Provider: Dory Wells MD  Evaluating Physician: Hipolito Barkley MD    Chief Complaint: GI bleed    History of present illness:   5/7: Patient is a 24 y.o.  Yr old male with history of alcoholism and alcohol cirrhosis who was admitted 4/23 with ETOH hepatitis and decompensated cirrhosis, BORIS, hepatic encephalopathy, and hematemesis secondary to esophageal variceal bleed s/p EGD on 4/24. He was intubated for the EGD and then subsequently extubated on 4/27. He then underwent a TIPS procedure on 4/29 and was intubated for that until subsequent extubation on 5/1/20. He has additionally undergone paracentesis x2 with 5 liters removed during first paracentesis and 2.5L removed on second paracentesis.  He has had a leukocytosis since arrival, initially up to 41.55. Peritoneal fluid with 46 WBC on 4/24 with 6% neutrophils. BF culture was no growth at 5 days. Subsequent paracentesis on 4/30 with 1,0018 WBC with 77% neutrophils but I do not see a culture was sent from this fluid sample. multiple blood culture sets have been negative. He was initially on Aztreonam for SBP (has PCN allergy) but was more recently on levaquin. Given recent worsening of his leukocytosis again to 45.8, the patient was switched to meropenem today. Cr has improved. LFTs still somewhat elevated. CT Chest/A/P today showed mod-large bilateral pleural effusions with complete left lower lobe atelectasis and moderate right lower lobe atelectasis and moderate ascites. Sputum culture done with few GPCs in pairs and clusters. C diff screen was negative. COVID-19 PCR was negative on 5/6. Blood cultures were repeat today and are pending. Of noted, MRSA PCR nares from 4/25 was positive.The patient has experienced new fevers up to 101.5F since 5/3.  ID consulted for antibiotic recs.     5/8: Patient sitting up in bed awake upon arrival.  Patient answering some questions with some confusion noted.   Patient said T-max of 100.4 and has been tachycardic overnight.  Labs show patient remains with significant leukocytosis at 33.2 with a neutrophilia of 81.2%.  Procalcitonin is elevated 4.61.  BUN/creatinine is 25 0.4.  5/8 chest x-ray shows improving perihilar interstitial disease.  Cultures remain no growth to date.  Patient was placed and mitt restraints as he continued to try to pull out Flores catheter in [place] overnight.  He was subsequently moved from room 222 to room 234 to be closer to the nurses station.    5/9/20: Following for Dr. Hipolito Barkley: LGF yesterday afternoon, has remained afebrile since.  He is tachycardic with intermittent hypotension. WBC 29,000 with 79% neutrophils, PCT 2.1, both which have improved. Culture remain negative. Had paracentesis yesterday with 1 liter of serous fluid removed.  Culture negative to date.  NGT in place. On tube feeding.  He is very tearful today and appears a little confused. He denies any fever or chills, abdominal pain, nausea, vomiting, or rashes. He has intermittent shortness of breath and cough.  He has some diarrhea, but has been on TPN and now on tube feeding.      5/10/20: Afebrile, tolerating tube feeds.  Still with diarrhea on lactulose and tube feedings.  He denies f/c, sob, n/v, rashes.  He appears more alert and following commands.  Would like to go home, but discussed with him that he is not ready and still very ill.     5/11/20: The patient is less confused. Denies any pain including abdominal pain. Having constant watery diarrhea but on lactulose. No rash. Still jaundiced. No fevers.    5/12/20: patient was doing ok today. No worsening confusion. Denies any abdominal pain or nausea. Having diarrhea with lactulose. No new rashes. No fevers.      Past Medical History:   Diagnosis Date   • Alcohol abuse    • GERD (gastroesophageal reflux disease)    • GIB (gastrointestinal bleeding)    • Tobacco abuse        Past Surgical History:   Procedure  Laterality Date   • ARM WOUND REPAIR / CLOSURE     • ENDOSCOPY N/A 4/24/2020    Procedure: ESOPHAGOGASTRODUODENOSCOPY;  Surgeon: Brunner, Mark I, MD;  [facility];  Service: Gastroenterology;  Laterality: N/A;       Pediatric History   Patient Guardian Status   • Father:  ROBERTA GUEVARA     Other Topics Concern   • Not on file   Social History Narrative   • Not on file       family history is not on file.    Allergies   Allergen Reactions   • Penicillins Hives       Medication:  Current Facility-Administered Medications   Medication Dose Route Frequency Provider Last Rate Last Dose   • !Vanc trough scheduled 5/12 @ 0930. Please hold the 1000 dose until this level has been evaluated by pharmacy. Thank you!   Does not apply Once Hipolito Barkley MD   Stopped at 05/12/20 1118   • acetaminophen (TYLENOL) tablet 650 mg  650 mg Oral Q8H PRN Ariel Galindo MD   650 mg at 05/06/20 2106   • albuterol (PROVENTIL) nebulizer solution 0.083% 2.5 mg/3mL  2.5 mg Nebulization Q4H PRN Ariel Galindo MD       • bisacodyl (DULCOLAX) suppository 10 mg  10 mg Rectal Daily PRN Ariel Galindo MD   10 mg at 04/27/20 1839   • enoxaparin (LOVENOX) syringe 40 mg  40 mg Subcutaneous Q24H Ariel Galindo MD   40 mg at 05/12/20 0850   • folic acid (FOLVITE) tablet 1 mg  1 mg Oral Daily Ariel Galindo MD   1 mg at 05/12/20 0847   • furosemide (LASIX) injection 20 mg  20 mg Intravenous Q12H Ariel Galindo MD   20 mg at 05/12/20 0548   • insulin detemir (LEVEMIR) injection 15 Units  15 Units Subcutaneous Daily Ariel Galindo MD   15 Units at 05/12/20 0850   • lactulose (CHRONULAC) 10 GM/15ML solution 20 g  20 g Nasogastric BID Ariel Galindo MD   20 g at 05/12/20 0847   • LORazepam (ATIVAN) injection 1 mg  1 mg Intravenous Q1H PRN Ariel Galindo MD   1 mg at 05/07/20 0025   • Magnesium Sulfate 2 gram Bolus, followed by 8 gram infusion (total Mg dose 10 grams)- Mg less than or equal to 1mg/dL  2 g  Intravenous PRN Ariel Galindo MD        Or   • Magnesium Sulfate 2 gram / 50mL Infusion (GIVE X 3 BAGS TO EQUAL 6GM TOTAL DOSE) - Mg 1.1 - 1.5 mg/dl  2 g Intravenous PRN Ariel Galindo MD        Or   • Magnesium Sulfate 4 gram infusion- Mg 1.6-1.9 mg/dL  4 g Intravenous PRN Ariel Galindo MD 25 mL/hr at 05/10/20 0837 4 g at 05/10/20 0837   • melatonin tablet 5 mg  5 mg Oral Nightly Ariel Galindo MD   5 mg at 05/11/20 2107   • meropenem (MERREM) 1 g/50 mL 0.9% NS IVPB (mbp)  1 g Intravenous Q8H Ariel Galindo MD   1 g at 05/12/20 0847   • metoclopramide (REGLAN) injection 5 mg  5 mg Intravenous Q6H Ariel Galindo MD   5 mg at 05/12/20 1138   • multivitamin and minerals liquid 15 mL  15 mL Oral Daily Ariel Galindo MD   15 mL at 05/12/20 1115   • ondansetron (ZOFRAN) injection 4 mg  4 mg Intravenous Q6H PRN Ariel Galindo MD   4 mg at 05/07/20 0025   • pantoprazole (PROTONIX) EC tablet 40 mg  40 mg Oral Q AM Ariel Galindo MD   40 mg at 05/12/20 0549   • Pharmacy to dose vancomycin   Does not apply Continuous PRN Ariel Galindo MD       • potassium chloride 20 mEq in 50 mL IVPB  20 mEq Intravenous Q1H PRN Ariel Galindo MD       • PRO-STAT oral liquid 30 mL  30 mL Nasogastric BID Ariel Galindo MD   30 mL at 05/11/20 2106   • propranolol (INDERAL) tablet 10 mg  10 mg Oral Q8H Ariel Galindo MD   10 mg at 05/12/20 0549   • QUEtiapine (SEROquel) tablet 100 mg  100 mg Oral Nightly Ariel Galindo MD   100 mg at 05/11/20 2107   • QUEtiapine (SEROquel) tablet 50 mg  50 mg Oral Daily Ariel Galindo MD   50 mg at 05/12/20 0847   • riFAXIMin (XIFAXAN) tablet 550 mg  550 mg Nasogastric Q12H Ariel Galindo MD   550 mg at 05/12/20 1116   • sodium chloride 0.9 % flush 10 mL  10 mL Intravenous PRN Ariel Galindo MD   10 mL at 05/11/20 2106   • sodium chloride 0.9 % flush 10 mL  10 mL Intravenous PRN Ariel Galindo MD   10 mL at 05/12/20 0848   • sodium chloride 0.9 % flush 20  mL  20 mL Intravenous PRN Ariel Galindo MD       • spironolactone (ALDACTONE) tablet 25 mg  25 mg Oral Daily Ariel Galindo MD   25 mg at 05/12/20 0847   • thiamine (VITAMIN B-1) tablet 100 mg  100 mg Oral Daily Ariel Galindo MD   100 mg at 05/12/20 0846   • vancomycin 1250 mg/250 mL 0.9% NS IVPB ([facility])  1,250 mg Intravenous Q8H Ariel Galindo MD   1,250 mg at 05/12/20 1117   • zinc gluconate tablet 50 mg  50 mg Oral Daily Ariel Galindo MD   50 mg at 05/12/20 1116         Infectious History:  MRSA    Antibiotics:  Anti-Infectives (From admission, onward)    Ordered     Dose/Rate Route Frequency Start Stop    05/09/20 1503  vancomycin 1250 mg/250 mL 0.9% NS IVPB ([facility])  Review   Ordering Provider:  Ariel Galindo MD    1,250 mg  over 90 Minutes Intravenous Every 8 Hours 05/09/20 1600 05/16/20 1759    05/08/20 1240  vancomycin 1500 mg/500 mL 0.9% NS IVPB ([facility])     Ordering Provider:  Miesha Santiago, PharmD    1,500 mg  over 90 Minutes Intravenous Once 05/08/20 1330 05/08/20 1544    05/07/20 0944  meropenem (MERREM) 1 g/50 mL 0.9% NS IVPB (mbp)     Hipolito Barkley MD reviewed the order on 05/07/20 1845.   Ordering Provider:  Ariel Galindo MD    1 g  over 3 Hours Intravenous Every 8 Hours 05/07/20 1630 05/14/20 1629    05/07/20 0944  meropenem (MERREM) 1 g/50 mL 0.9% NS IVPB (mbp)     Ordering Provider:  Valerio Emmanuel MD    1 g  over 30 Minutes Intravenous Once 05/07/20 1030 05/07/20 1126    05/07/20 0944  vancomycin 1500 mg/500 mL 0.9% NS IVPB ([facility])     Ordering Provider:  Valerio Emmanuel MD    1,500 mg  over 90 Minutes Intravenous Once 05/07/20 1030 05/07/20 1227    05/07/20 0944  Pharmacy to dose vancomycin     Miesha Santiago, PharmD reviewed the order on 05/07/20 1121.   Ordering Provider:  Ariel Galindo MD     Does not apply Continuous PRN 05/07/20 0942 05/14/20 0941    05/02/20 1316  levoFLOXacin (LEVAQUIN) 500 mg/100 mL D5W (premix) (LEVAQUIN) 500  "mg     Miesha Santiago, PharmD reviewed the order on 05/06/20 0903.   Ordering Provider:  Kaz Arellano MD    500 mg Intravenous Every 24 Hours Scheduled 05/02/20 1430 05/06/20 1300    04/28/20 1501  levoFLOXacin (LEVAQUIN) 500 mg/100 mL D5W (premix) (LEVAQUIN) 500 mg     Note to Pharmacy:  For on call to Radiology for TIPS, estimated 10am   Ordering Provider:  Cullen Walker MD    500 mg Intravenous Once When Specified 04/29/20 0900 04/29/20 1118    04/28/20 1906  aztreonam (AZACTAM) 1 g in 50 mL NS IVPB     Ordering Provider:  Len Baires [facility]    1 g  over 30 Minutes Intravenous Every 8 Hours 04/29/20 0000 04/30/20 1615    04/24/20 1411  riFAXIMin (XIFAXAN) tablet 550 mg     Kya Christianson [facility] reviewed the order on 04/25/20 0716.   Ordering Provider:  Ariel Galindo MD    550 mg Nasogastric Every 12 Hours Scheduled 04/24/20 2100      04/24/20 0630  vancomycin 500 mg/100 mL 0.9% NS IVPB (mbp)     Ordering Provider:  Ashwin Siegel RPH    9 mg/kg × 65.8 kg Intravenous Once 04/24/20 0730 04/24/20 1013    04/23/20 2314  vancomycin 1000 mg/250 mL 0.9% NS IVPB ([facility])     Ordering Provider:  Dory Wells MD    15 mg/kg × 65.8 kg  over 60 Minutes Intravenous Once 04/23/20 2316 04/24/20 0223    04/23/20 2314  aztreonam (AZACTAM) 2 g/100 mL 0.9% NS (mbp)     Ordering Provider:  Dory Wells MD    2 g  over 30 Minutes Intravenous Once 04/23/20 2316 04/24/20 0100            Review of Systems  See HPI    Physical Exam:   Vital Signs   /76 (BP Location: Left arm, Patient Position: Lying)   Pulse 100   Temp 97.1 °F (36.2 °C) (Oral)   Resp 24   Ht 172.7 cm (68\")   Wt 82.7 kg (182 lb 5.1 oz)   SpO2 95%   BMI 27.72 kg/m²     GENERAL: Awake and alert, in no acute distress. Ill appearing.  HEENT: Normocephalic, atraumatic. Oropharynx clear without evidence of thrush or exudate.  Feeding tube in place  Eyes: +scleral icterus. No conjunctival hemorrhages.   HEART: RRR; No " murmurs. anasarca  LUNGS: CTAB. Non-labored breathing  ABDOMEN: distended abdomen. Normal bowel sounds. No tenderness  EXT:  No cyanosis. 2+ pitting edema over BLE  : deferred  SKIN: Jaundiced. No rash noted. No peripheral stigmata of infective endocarditis noted  NEURO: AAOX4. Normal speech. Answering questions appropriately.  PSYCHIATRIC: poor insight, but cooperative. Following commands. Oriented to person, place, and time  LUE PICC line site is without erythema or drainage     Laboratory Data    Results from last 7 days   Lab Units 05/11/20  0444 05/10/20  0521 05/09/20  0459   WBC 10*3/mm3 29.78* 31.32* 28.99*   HEMOGLOBIN g/dL 8.8* 9.7* 8.5*   HEMATOCRIT % 26.8* 31.8* 27.4*   PLATELETS 10*3/mm3 305 282 257     Results from last 7 days   Lab Units 05/12/20  0537   SODIUM mmol/L 136   POTASSIUM mmol/L 4.2   CHLORIDE mmol/L 105   CO2 mmol/L 21.0*   BUN mg/dL 18   CREATININE mg/dL 0.24*   GLUCOSE mg/dL 79   CALCIUM mg/dL 7.7*     Results from last 7 days   Lab Units 05/11/20  1131   ALK PHOS U/L 205*   BILIRUBIN mg/dL 4.3*   ALT (SGPT) U/L 61*   AST (SGOT) U/L 101*         Results from last 7 days   Lab Units 05/11/20  1131   CRP mg/dL 3.01*       Estimated Creatinine Clearance: 493.1 mL/min (A) (by C-G formula based on SCr of 0.24 mg/dL (L)).       Microbiology:    Blood Culture   Date Value Ref Range Status   05/03/2020 No growth at 4 days  Preliminary   05/03/2020 No growth at 4 days  Preliminary     COVID-19 PCR: negative     4/24 peritoneal fluid cx: NG    5/8 peritoneal fluid cx: NGSF  5/7: Cdiff not detected  5/7: Sputum cx Normal amado   5/7: Blood cx NGSF  MRSA PCR positive.    Radiology:  Ct Abdomen Pelvis Without Contrast    Result Date: 5/7/2020  Symmetric, moderate or moderate-to-large bilateral pleural effusions, with complete left lower lobe atelectasis, and moderate right lower lobe atelectasis. Minimal pulmonary interstitial disease elsewhere.    CT SCAN OF THE ABDOMEN AND PELVIS WITHOUT  CONTRAST: Note is made of patient's tips shunt. Liver parenchyma appears uniform on today's unenhanced exam. There is vicarious excretion of contrast into the gallbladder. Spleen is not enlarged. No significant abnormalities are seen of the pancreas, adrenal glands, or kidneys. There is mild ascites, stable in extent from the prior exam. NG tube is seen in the distal stomach. No free or discrete inflammatory focus is seen. Coronal images show a few shotty left mid abdominal small bowel mesenteric lymph nodes, but these are markedly improved from the prior study. Bowel loops are normal in caliber.  Regarding the lower abdomen and pelvis, there is mild to moderate ascites. No discrete inflammatory focus is appreciated. No abnormally dilated bowel loops or grossly abnormal appearing bowel loops are seen. Bony structures appear intact.  IMPRESSION:  1. Moderately extensive ascites, but not increased from prior study. 2. Interval tips shunt placement. 3. Near resolution of small bowel mesenteric lymphadenopathy since prior scan. No evidence of new intra-abdominal or intrapelvic pathology is identified elsewhere.  D:  05/07/2020 E:  05/07/2020  This report was finalized on 5/7/2020 10:28 PM by Dr. Moi Rich MD.      Ct Chest Without Contrast    Result Date: 5/7/2020  Symmetric, moderate or moderate-to-large bilateral pleural effusions, with complete left lower lobe atelectasis, and moderate right lower lobe atelectasis. Minimal pulmonary interstitial disease elsewhere.    CT SCAN OF THE ABDOMEN AND PELVIS WITHOUT CONTRAST: Note is made of patient's tips shunt. Liver parenchyma appears uniform on today's unenhanced exam. There is vicarious excretion of contrast into the gallbladder. Spleen is not enlarged. No significant abnormalities are seen of the pancreas, adrenal glands, or kidneys. There is mild ascites, stable in extent from the prior exam. NG tube is seen in the distal stomach. No free or discrete inflammatory  focus is seen. Coronal images show a few shotty left mid abdominal small bowel mesenteric lymph nodes, but these are markedly improved from the prior study. Bowel loops are normal in caliber.  Regarding the lower abdomen and pelvis, there is mild to moderate ascites. No discrete inflammatory focus is appreciated. No abnormally dilated bowel loops or grossly abnormal appearing bowel loops are seen. Bony structures appear intact.  IMPRESSION:  1. Moderately extensive ascites, but not increased from prior study. 2. Interval tips shunt placement. 3. Near resolution of small bowel mesenteric lymphadenopathy since prior scan. No evidence of new intra-abdominal or intrapelvic pathology is identified elsewhere.  D:  05/07/2020 E:  05/07/2020  This report was finalized on 5/7/2020 10:28 PM by Dr. Moi Rich MD.      Ct Abdomen With & Without Contrast    Result Date: 5/5/2020  1. Diffusely abnormal appearance of the hepatic parenchyma status post TIPS likely perfusional changes along with low signal surrounding the portal veins consistent with periportal edema. 2. 3.2 cm focus in segment IVB has at least partial imaging characteristics of involvement for concern for HCC although recommend followup given limited evaluation of perfusional changes and therefore attenuation changes on the current exam. 3. Perihepatic and perisplenic ascites with top normal size for spleen at 13 cm. 4. Small-to-moderate bilateral pleural effusions.  D:  05/05/2020 E:  05/05/2020    This report was finalized on 5/5/2020 2:27 PM by Dr. Luis Eduardo Mosley.      Fl Video Swallow With Speech Single Contrast    Result Date: 5/7/2020  Fluoroscopy provided for a modified barium swallow. Please see speech therapy report for full details and recommendations.    This report was finalized on 5/7/2020 5:13 PM by Dr. Luis Eduardo Mosley.      Xr Chest 1 View    Result Date: 5/8/2020  Improving perihilar interstitial disease, perhaps resolving edema, decreasing left basilar  atelectasis and minimal remaining effusions. No new chest pathology is seen.   D:  05/08/2020 E:  05/08/2020  This report was finalized on 5/8/2020 11:01 PM by Dr. Moi Rich MD.      Xr Chest 1 View    Result Date: 5/7/2020  1. Increased perihilar disease that may represent interstitial edema or ARDS. 2. Left lower lung atelectasis, probably unchanged.  D:  05/07/2020 E:  05/07/2020    This report was finalized on 5/7/2020 10:14 PM by Dr. Moi Rich MD.      Xr Chest 1 View    Result Date: 5/6/2020  Bilateral pleural effusions with patchy airspace disease seen within the right mid and left lower lung field. Lines and tubes in satisfactory position.  D:  05/06/2020 E:  05/06/2020  This report was finalized on 5/6/2020 4:02 PM by Dr. Carmen Morales MD.      Xr Chest 1 View    Result Date: 5/5/2020  Support hardware unchanged and in grossly satisfactory positioning. Cardiac silhouette remains enlarged with increased central pulmonary vascularity and trace bilateral pleural effusions, left greater than right, with improved aeration at the lung bases from likely decreased atelectasis versus decreased effusions or layering component. No new consolidation or pulmonary parenchymal involvement.     D:  05/05/2020 E:  05/05/2020  This report was finalized on 5/5/2020 2:28 PM by Dr. Luis Eduardo Mosley.      Ct Guided Paracentesis    Result Date: 5/4/2020   CT-guided paracentesis (aborted)  Plan:  Consider reattempt diagnostic paracentesis once there is increased amount of fluid _______________________________________________________________  PROCEDURE SUMMARY: - Limited CT - CT-guided paracentesis - Additional procedure(s): None  PROCEDURE DETAILS:  Pre-procedure Consent: Informed consent for the procedure including risks, benefits and alternatives was obtained and time-out was performed prior to the procedure. Preparation: The site was prepared and draped using maximal sterile barrier technique including cutaneous antisepsis.   Anesthesia/sedation Level of anesthesia/sedation: None  Initial abdominal CT Initial abdominal CT was performed. Findings: Tiny volume ascites. A safe window for paracentesis was identified.  Paracentesis Local anesthesia was administered. Giving constraints of no CT fluoroscopy and patient inability to move arms from side -no safe way to access minimal perihepatic fluid. Procedure aborted.  Radiation Dose CT dose length product (mGy-cm): 1534  Additional Details Additional description of procedure: None Equipment details: None Specimens removed: None. Procedure aborted. Estimated blood loss (mL): Less than 10 Standardized report: Paracentesis  Attestation I was present and scrubbed for the entire procedure. Imaging reviewed. Agree with final report as written.  This report was finalized on 5/4/2020 3:53 PM by Cullen Walker.      Us Paracentesis    Result Date: 5/8/2020   Ultrasound-guided paracentesis with drainage of 1000 mL of serous fluid. Portion sent for requested laboratory analysis.  Plan:  Resume care by clinical team. _______________________________________________________________  PROCEDURE SUMMARY: - Limited ultrasound - Ultrasound-guided paracentesis - Additional procedure(s): None  PROCEDURE DETAILS:  Pre-procedure Consent: Informed consent for the procedure including risks, benefits and alternatives was obtained and time-out was performed prior to the procedure. Preparation: The site was prepared and draped using maximal sterile barrier technique including cutaneous antisepsis.  Anesthesia/sedation Level of anesthesia/sedation: No sedation  Initial abdominal ultrasound Initial abdominal ultrasound was performed. Findings: Small volume ascites. A safe window for paracentesis was identified.  Paracentesis Local anesthesia was administered. An 18-gauge Chiba needle was advanced under ultrasound guidance into the ascites. Ascites was drained. The needle was then removed, and a sterile bandage was  applied. Paracentesis access technique: Ultrasound-guided Needle placed: 18-gauge Chiba  Additional Details Additional description of procedure: None Equipment details: None Specimens removed: Abdominal fluid Estimated blood loss (mL): Less than 10 Standardized report: Paracentesis  Attestation I was present and scrubbed for the entire procedure. Imaging reviewed. Agree with final report as written.  This report was finalized on 5/8/2020 8:37 PM by Cullen Walker.      Xr Abdomen Kub    Result Date: 5/10/2020  Feeding tube tip in the proximal jejunum. NG tube remains in the distal stomach.   DICTATED:   05/08/2020 EDITED/ls :   05/09/2020  This report was finalized on 5/10/2020 11:37 AM by Dr. Moi Rich MD.      Xr Abdomen Kub    Result Date: 5/8/2020  Feeding tube and NG tube both in the distal stomach.  DICTATED:   05/08/2020 EDITED/ls :   05/08/2020    This report was finalized on 5/8/2020 11:04 PM by Dr. Moi Rich MD.      Xr Abdomen Kub    Result Date: 5/7/2020  Nonobstructive bowel gas pattern with paucity of air throughout the visualized bowel loops.  This report was finalized on 5/7/2020 11:37 AM by Dr. Luis Eduardo Mosley.      Xr Abdomen Kub    Result Date: 5/6/2020  NG tube in the distal stomach. Borderline dilatation of the transverse colon with air but no evidence of dilated bowel elsewhere to suggest significant ileus or obstruction.  D:  05/06/2020 E:  05/06/2020    This report was finalized on 5/6/2020 9:24 PM by Dr. Moi Rich MD.        Impression:   Patient is a 24 y.o.  Yr old male with history of alcoholism and alcohol cirrhosis who was admitted 4/23 with ETOH hepatitis and decompensated cirrhosis, BORIS, hepatic encephalopathy, and hematemesis secondary to esophageal variceal bleed s/p EGD on 4/24. He unfortuantely has multiple medical problems and remains very encephalopathic. He has had SBP by cell count on last paracentesis but I do not see that a culture was sent on that peritoneal fluid. He has  elevated WBC count and ongoing low grade fevers. Sepsis likely due to SBP. C diff has been ruled out. Blood cultures were no growth. Pulmonary source thought less likely as lungs look mostly clear although he does have large bilateral pleural effusions. Urinalysis was negative for signs of UTI.    Problems:  -Sepsis with fevers, tachycardia, lactic acidosis, and Leukocytosis with neutrophilia- improving  -SBP  -Decompensated alcohol cirrhosis with ascites, hepatic encephalopathy, and esophageal varices- encephalopathy improving  -Alcohol hepatitis/elevated LFTs  -Hematemesis due to bleeding esophageal varices s/p EGD and TIPS -procedure  -BORIS- likely ATN due to bleeding. Improved  -Acute blood loss anemia  -MRSA colonization  -Alcoholism  -diarrhea- likely due to lactulose. C diff negative  -Penicillin allergy (Hives)    PLAN: Thank you for asking us to see John Varma, I recommend the following:     -s/p paracentesis today with repeat peritoneal fluid cx--NG (although most recent peritoneal fluid cultures were antibiotic modified)  -Continue meropenem and Vancomycin for 1 more day and then if he continues to improve then I will consider weaning off antibiotics tomorrow.    Complex set of medical issues requiring a high level of medical decision making    Possible discharge soon if he continues to improve from mental status standpoint and he is eating well enough. I will tentatively plan to wean him off antibiotics tomorrow.    I discussed with Dr. Carter today      Hipolito Barkley MD  5/12/2020

## 2020-05-12 NOTE — THERAPY PROGRESS REPORT/RE-CERT
Patient Name: John Varma  : 1995    MRN: [id number]                              Today's Date: 2020       Admit Date: 2020    Visit Dx:     ICD-10-CM ICD-9-CM   1. Sepsis with acute liver failure without hepatic coma or septic shock, due to unspecified organism (CMS/HCC) A41.9 038.9    R65.20 995.92    K72.00 570   2. Hypoxia R09.02 799.02   3. Acute renal failure, unspecified acute renal failure type (CMS/HCC) N17.9 584.9   4. Acute hepatitis B17.9 570   5. Alcohol abuse F10.10 305.00   6. Acute upper GI bleed K92.2 578.9   7. Elevated lactic acid level R79.89 276.2   8. Ascites due to alcoholic hepatitis K70.11 789.59   9. Leukocytosis, unspecified type D72.829 288.60   10. Sepsis with acute renal failure without septic shock, due to unspecified organism, unspecified acute renal failure type (CMS/HCC) A41.9 038.9    R65.20 995.92    N17.9 584.9   11. Oropharyngeal dysphagia R13.12 787.22     Patient Active Problem List   Diagnosis   • Acute alcoholic hepatitis   • Acute variceal GI bleeding   • BORIS (acute kidney injury) (CMS/HCC)   • Sepsis with acute renal failure without septic shock due to SBP (CMS/HCC)   • Acute respiratory failure with hypoxia. Intubated - and - (CMS/HCC)   • Alcohol withdrawal delirium (CMS/HCC)   • Hepatic encephalopathy (CMS/HCC)   • Ascites due to alcoholic hepatitis. S/P paracentesis , , and    • Pleural effusion on left   • S/P TIPS (transjugular intrahepatic portosystemic shunt) 2020   • Tobacco abuse   • SBP (spontaneous bacterial peritonitis) (CMS/HCC)   • Acute blood loss anemia due to variceal UGIB     Past Medical History:   Diagnosis Date   • Alcohol abuse    • GERD (gastroesophageal reflux disease)    • GIB (gastrointestinal bleeding)    • Tobacco abuse      Past Surgical History:   Procedure Laterality Date   • ARM WOUND REPAIR / CLOSURE     • ENDOSCOPY N/A 2020    Procedure: ESOPHAGOGASTRODUODENOSCOPY;  Surgeon:  Brunner, Mark I, MD;  Location: [facility] ENDOSCOPY;  Service: Gastroenterology;  Laterality: N/A;     General Information     Row Name 05/12/20 1133          PT Evaluation Time/Intention    Document Type  progress note/recertification  -MP     Mode of Treatment  physical therapy  -MP     Row Name 05/12/20 1133          General Information    Existing Precautions/Restrictions  fall  -MP     Row Name 05/12/20 1133          Cognitive Assessment/Intervention- PT/OT    Orientation Status (Cognition)  oriented to;person;place  -MP     Row Name 05/12/20 1133          Safety Issues, Functional Mobility    Impairments Affecting Function (Mobility)  balance;cognition;endurance/activity tolerance;strength  -MP       User Key  (r) = Recorded By, (t) = Taken By, (c) = Cosigned By    Initials Name Provider Type    MP Michael Allen, PT Physical Therapist        Mobility     Row Name 05/12/20 1133          Bed Mobility Assessment/Treatment    Bed Mobility Assessment/Treatment  supine-sit;scooting/bridging  -MP     Scooting/Bridging [place] (Bed Mobility)  verbal cues;contact guard  -MP     Supine-Sit [place] (Bed Mobility)  verbal cues;contact guard  -MP     Assistive Device (Bed Mobility)  bed rails;draw sheet;head of bed elevated  -MP     Comment (Bed Mobility)  VC's for hand placement to promote increased independence with bed mobility.  -MP     Row Name 05/12/20 1133          Sit-Stand Transfer    Sit-Stand [place] (Transfers)  minimum assist (75% patient effort);verbal cues  -MP     Assistive Device (Sit-Stand Transfers)  walker, front-wheeled  -MP     Row Name 05/12/20 1133          Gait/Stairs Assessment/Training    Gait/Stairs Assessment/Training  gait/ambulation independence  -MP     [place] Level (Gait)  minimum assist (75% patient effort);verbal cues;1 person assist;1 person to manage equipment  -MP     Assistive Device (Gait)  walker, front-wheeled  -MP     Distance in Feet (Gait)  350'  -MP      Pattern (Gait)  step-through  -MP     Deviations/Abnormal Patterns (Gait)  bilateral deviations;sherwin decreased;stride length decreased  -MP     Bilateral Gait Deviations  forward flexed posture;heel strike decreased  -MP     Comment (Gait/Stairs)  Patient increased distance ambulated to 350' with RW, min.ax1 plus one person for management of AD. As fatigue increased, patient began having decreased foot clearance. Patient required three ~20 second rest breaks.   -MP       User Key  (r) = Recorded By, (t) = Taken By, (c) = Cosigned By    Initials Name Provider Type    Michael Ramirez PT Physical Therapist        Obj/Interventions     Row Name 05/12/20 1136          Static Sitting Balance    Level of [place] (Unsupported Sitting, Static Balance)  supervision  -MP     Sitting Position (Unsupported Sitting, Static Balance)  sitting on edge of bed  -MP     Time Able to Maintain Position (Unsupported Sitting, Static Balance)  2 to 3 minutes  -MP     Row Name 05/12/20 1136          Static Standing Balance    Level of [place] (Supported Standing, Static Balance)  contact guard assist  -MP     Assistive Device Utilized (Supported Standing, Static Balance)  walker, rolling  -MP     Row Name 05/12/20 1136          Dynamic Standing Balance    Level of [place], Reaches Outside Midline (Standing, Dynamic Balance)  minimal assist, 75% patient effort;1 person assist;1 person to manage equipment  -MP     Assistive Device Utilized (Supported Standing, Dynamic Balance)  walker, rolling  -MP       User Key  (r) = Recorded By, (t) = Taken By, (c) = Cosigned By    Initials Name Provider Type    Michael Ramirez PT Physical Therapist        Goals/Plan     Row Name 05/12/20 1138          Bed Mobility Goal 1 (PT)    Activity/Assistive Device (Bed Mobility Goal 1, PT)  sit to supine/supine to sit  -MP     [place] Level/Cues Needed (Bed Mobility Goal 1, PT)  conditional independence  -MP     Time Frame (Bed Mobility  Goal 1, PT)  2 weeks  -MP     Progress/Outcomes (Bed Mobility Goal 1, PT)  goal revised this date  -MP     Row Name 05/12/20 1138          Transfer Goal 1 (PT)    Activity/Assistive Device (Transfer Goal 1, PT)  sit-to-stand/stand-to-sit;walker, rolling  -MP     [place] Level/Cues Needed (Transfer Goal 1, PT)  conditional independence  -MP     Time Frame (Transfer Goal 1, PT)  2 weeks  -MP     Progress/Outcome (Transfer Goal 1, PT)  goal revised this date  -MP     Row Name 05/12/20 1138          Gait Training Goal 1 (PT)    Activity/Assistive Device (Gait Training Goal 1, PT)  gait (walking locomotion)  -MP     [place] Level (Gait Training Goal 1, PT)  supervision required  -MP     Distance (Gait Goal 1, PT)  350'  -MP     Time Frame (Gait Training Goal 1, PT)  2 weeks  -MP     Progress/Outcome (Gait Training Goal 1, PT)  goal revised this date  -MP       User Key  (r) = Recorded By, (t) = Taken By, (c) = Cosigned By    Initials Name Provider Type    MP Michael Allen, PT Physical Therapist        Clinical Impression     Row Name 05/12/20 1137          Pain Assessment    Additional Documentation  Pain Scale: Numbers Pre/Post-Treatment (Group)  -MP     Row Name 05/12/20 1137          Pain Scale: Numbers Pre/Post-Treatment    Pain Scale: Numbers, Pretreatment  0/10 - no pain  -MP     Pain Scale: Numbers, Post-Treatment  0/10 - no pain  -MP     Row Name 05/12/20 1137          Vital Signs    Pre Systolic BP Rehab  -- VSS; RN cleared for PTx  -MP     Post Systolic BP Rehab  126  -MP     Post Treatment Diastolic BP  76  -MP     Posttreatment Heart Rate (beats/min)  7  -MP     O2 Delivery Pre Treatment  room air  -MP     O2 Delivery Intra Treatment  room air  -MP     Post SpO2 (%)  95  -MP     O2 Delivery Post Treatment  room air  -MP     Pre Patient Position  Supine  -MP     Intra Patient Position  Standing  -MP     Post Patient Position  Sitting  -MP     Row Name 05/12/20 1137          Positioning and  Restraints    Pre-Treatment Position  in bed  -MP     Post Treatment Position  chair  -MP     In Chair  notified nsg;reclined;call light within reach;encouraged to call for assist;exit alarm on;with nsg;waffle cushion;legs elevated;heels elevated  -MP       User Key  (r) = Recorded By, (t) = Taken By, (c) = Cosigned By    Initials Name Provider Type    Michael Ramirez PT Physical Therapist        Outcome Measures     Row Name 05/12/20 1138          How much help from another person do you currently need...    Turning from your back to your side while in flat bed without using bedrails?  4  -MP     Moving from lying on back to sitting on the side of a flat bed without bedrails?  3  -MP     Moving to and from a bed to a chair (including a wheelchair)?  3  -MP     Standing up from a chair using your arms (e.g., wheelchair, bedside chair)?  3  -MP     Climbing 3-5 steps with a railing?  2  -MP     To walk in hospital room?  3  -MP     AM-PAC 6 Clicks Score (PT)  18  -MP     Row Name 05/12/20 1138          Functional Assessment    Outcome Measure Options  AM-PAC 6 Clicks Basic Mobility (PT)  -MP       User Key  (r) = Recorded By, (t) = Taken By, (c) = Cosigned By    Initials Name Provider Type    Michael Ramirez PT Physical Therapist        Physical Therapy Education                 Title: PT OT SLP Therapies (In Progress)     Topic: Physical Therapy (In Progress)     Point: Mobility training (Done)     Description:   Instruct learner(s) on safety and technique for assisting patient out of bed, chair or wheelchair.  Instruct in the proper use of assistive devices, such as walker, crutches, cane or brace.              Patient Friendly Description:   It's important to get you on your feet again, but we need to do so in a way that is safe for you. Falling has serious consequences, and your personal safety is the most important thing of all.        When it's time to get out of bed, one of us or a family member will sit  next to you on the bed to give you support.     If your doctor or nurse tells you to use a walker, crutches, a cane, or a brace, be sure you use it every time you get out of bed, even if you think you don't need it.    Learning Progress Summary           Patient Acceptance, E,D, VU,NR by MP at 5/12/2020 1140    Acceptance, E,D, NR by LS at 5/11/2020 1548    Acceptance, E, NR by KR at 5/10/2020 1357    Acceptance, E, NR by KR at 5/9/2020 1420    Acceptance, E,D, VU,NR by LS at 5/8/2020 1612    Nonacceptance, E, NR,NL by SINDY at 5/7/2020 1508    Acceptance, E,D, VU,NR by MP at 5/7/2020 1120    Acceptance, E,D, VU,NR by LS at 5/6/2020 1552    Acceptance, E, NR by KR at 5/5/2020 1426    Acceptance, E,D, NR by LS at 5/4/2020 1605    Acceptance, E,D, NR by LS at 5/2/2020 1507    Acceptance, E,D, NR by LS at 4/29/2020 1009                   Point: Home exercise program (In Progress)     Description:   Instruct learner(s) on appropriate technique for monitoring, assisting and/or progressing patient with therapeutic exercises and activities.              Learning Progress Summary           Patient Acceptance, E,D, NR by LS at 5/11/2020 1548    Acceptance, E, NR by KR at 5/10/2020 1357    Acceptance, E, NR by KR at 5/9/2020 1420    Acceptance, E,D, VU,NR by LS at 5/8/2020 1612    Nonacceptance, E, NR,NL by SINDY at 5/7/2020 1508    Acceptance, E,D, VU,NR by LS at 5/6/2020 1552    Acceptance, E, NR by KR at 5/5/2020 1426    Acceptance, E,D, NR by LS at 5/4/2020 1605    Acceptance, E,D, NR by LS at 5/2/2020 1507    Acceptance, E,D, NR by LS at 4/29/2020 1009                   Point: Body mechanics (Done)     Description:   Instruct learner(s) on proper positioning and spine alignment for patient and/or caregiver during mobility tasks and/or exercises.              Learning Progress Summary           Patient Acceptance, E,D, VU,NR by MP at 5/12/2020 1140    Acceptance, E,D, NR by LS at 5/11/2020 1548    Acceptance, E, NR by KR at  5/10/2020 1357    Acceptance, E, NR by KR at 5/9/2020 1420    Acceptance, E,D, VU,NR by LS at 5/8/2020 1612    Nonacceptance, E, NR,NL by SINDY at 5/7/2020 1508    Acceptance, E,D, VU,NR by MP at 5/7/2020 1120    Acceptance, E,D, VU,NR by LS at 5/6/2020 1552    Acceptance, E, NR by KR at 5/5/2020 1426    Acceptance, E,D, NR by LS at 5/4/2020 1605    Acceptance, E,D, NR by LS at 5/2/2020 1507    Acceptance, E,D, NR by LS at 4/29/2020 1009                   Point: Precautions (Done)     Description:   Instruct learner(s) on prescribed precautions during mobility and gait tasks              Learning Progress Summary           Patient Acceptance, E,D, VU,NR by MP at 5/12/2020 1140    Acceptance, E,D, NR by LS at 5/11/2020 1548    Acceptance, E, NR by KR at 5/10/2020 1357    Acceptance, E, NR by KR at 5/9/2020 1420    Acceptance, E,D, VU,NR by LS at 5/8/2020 1612    Nonacceptance, E, NR,NL by SINDY at 5/7/2020 1508    Acceptance, E,D, VU,NR by MP at 5/7/2020 1120    Acceptance, E,D, VU,NR by LS at 5/6/2020 1552    Acceptance, E, NR by KR at 5/5/2020 1426    Acceptance, E,D, NR by LS at 5/4/2020 1605    Acceptance, E,D, NR by LS at 5/2/2020 1507    Acceptance, E,D, NR by LS at 4/29/2020 1009                               User Key     Initials Effective Dates Name Provider Type Discipline     06/19/15 -  Savanah Matthews, PT Physical Therapist PT    CIERA 04/03/18 -  Nataliya Patel, PT Physical Therapist PT    SINDY 11/19/18 -  Piedad Lao, RN Registered Nurse Nurse    CAS 11/06/19 -  Michael Allen, PT Physical Therapist PT              PT Recommendation and Plan  Planned Therapy Interventions (PT Eval): balance training, bed mobility training, gait training, home exercise program, patient/family education, transfer training, strengthening  Plan of Care Reviewed With: patient  Progress: improving  Outcome Summary: Patient required CGA for bed mobility. Patient increased distance ambulated to 350' with RW, min.ax1 plus one  person for management of AD. As fatigue increased, patient began having decreased foot clearance. Patient required three ~20 second rest breaks. PT Re-cert completed this day with goals address. Cont PT POC.     Time Calculation:   PT Charges     Row Name 05/12/20 1048             Time Calculation    Start Time  1048  -MP      PT Received On  05/12/20  -MP      PT Goal Re-Cert Due Date  05/22/20  -MP         Time Calculation- PT    Total Timed Code Minutes- PT  30 minute(s)  -MP         Timed Charges    94070 - Gait Training Minutes   20  -MP      63508 - PT Therapeutic Activity Minutes  10  -MP        User Key  (r) = Recorded By, (t) = Taken By, (c) = Cosigned By    Initials Name Provider Type    MP Michael Allen PT Physical Therapist        Therapy Charges for Today     Code Description Service Date Service Provider Modifiers Qty    10372914805 HC GAIT TRAINING EA 15 MIN 5/12/2020 Michael Allen, PT GP 1    01980190745 HC PT THERAPEUTIC ACT EA 15 MIN 5/12/2020 Michael Allen, PT GP 1    57777239452 HC PT THER SUPP EA 15 MIN 5/12/2020 Michael Allen, PT GP 2          PT G-Codes  Outcome Measure Options: AM-PAC 6 Clicks Basic Mobility (PT)  AM-PAC 6 Clicks Score (PT): 18  AM-PAC 6 Clicks Score (OT): 15    Michael Allen PT  5/12/2020

## 2020-05-12 NOTE — PLAN OF CARE
"  Problem: Patient Care Overview  Goal: Plan of Care Review  Outcome: Ongoing (interventions implemented as appropriate)  Note:   Patient cooperative this shift, slept most of the shift but easily arousable. Neuro checks intact. Patient mostly oriented, CIWA score of 0. Patient has been drinking fluids adequately this shift, hopes to have Keofeed out in am.  Patient still tachypneic, had to apply 2L NC during sleep as patient's sats kept dropping.  Remains in SR, no tachycardia. Patient had to be prompted to urinate, after bladder scan showed 718 mL, but then was able to spontaneously void 500 mL of dark yellow/orange urine.  Patient refuses oral care, states \"I'll do that in the morning,\" despite education on why this is important.  CHG bath complete.  Assisted patient to move around in bed, although he is able to do this himself. Patient refused to be turned but did allow this nurse to readjust him. VSS.  Will continue to monitor.       "

## 2020-05-12 NOTE — PLAN OF CARE
Problem: Patient Care Overview  Goal: Plan of Care Review  Outcome: Ongoing (interventions implemented as appropriate)  Flowsheets (Taken 5/12/2020 1140)  Progress: improving  Plan of Care Reviewed With: patient  Outcome Summary: Patient required CGA for bed mobility. Patient increased distance ambulated to 350' with RW, min.ax1 plus one person for management of AD. As fatigue increased, patient began having decreased foot clearance. Patient required three ~20 second rest breaks. PT Re-cert completed this day with goals address. Cont PT POC.

## 2020-05-12 NOTE — PROGRESS NOTES
"    [facility] Medicine Services  PROGRESS NOTE    Patient Name: John Varma  : 1995  MRN: [id number]    Date of Admission: 2020  Primary Care Physician: Provider, No Known    Subjective   Subjective     CC:  Sepsis, SBP, hepatic encephalopathy    HPI:  Alert, talkative and oriented. \"I feel the best I've felt in years\" being off ETOH currently.  Patient very dedicated to remaining off ETOH.  We spent ~10 min talking very frankly about his risk of death if he resumes ETOH.      Review of Systems  Gen- No fevers, chills, HA, uncontrolled pain  CV- No chest pain, palpitations, new edema  Resp- No cough, (+)HARDY with easy fatigue  GI- No N/V/D, abd pain, constipation  Skin - No rash    Objective   Objective     Vital Signs:   Temp:  [97.1 °F (36.2 °C)-98.3 °F (36.8 °C)] 97.1 °F (36.2 °C)  Heart Rate:  [] 97  Resp:  [20-32] 28  BP: (100-127)/(51-78) 113/68        Physical Exam:  Constitutional: No acute distress, awake, alert, nontoxic, unkempt appearance with normal body habitus with exception of round ascities  Respiratory: Clear to auscultation bilaterally, weak effort while asleep, nonlabored respirations   Cardiovascular: RRR, no murmur  Gastrointestinal: No grimace, distended but not tense  Musculoskeletal: 1+ pitting peripheral edema, normal muscle tone for age  Psych: Alert, oriented x3  Skin: (+) jaundice, no petechiae    Results Reviewed:  Results from last 7 days   Lab Units 20  0444 05/10/20  0521 20  0459 20  0429 20  0639 20  0520   WBC 10*3/mm3 29.78* 31.32* 28.99* 33.29*  --  45.78*   HEMOGLOBIN g/dL 8.8* 9.7* 8.5* 9.3*  --  10.5*   HEMATOCRIT % 26.8* 31.8* 27.4* 29.5*  --  34.0*   PLATELETS 10*3/mm3 305 282 257 308  --  316   INR   --   --  2.16* 1.96*  --  1.79*   PROCALCITONIN ng/mL  --   --  2.10* 4.61* 6.85*  --      Results from last 7 days   Lab Units 20  0537 20  1131 05/10/20  0521 20  0459   SODIUM " mmol/L 136 135* 137 138   POTASSIUM mmol/L 4.2 4.0 4.1 4.0   CHLORIDE mmol/L 105 105 106 108*   CO2 mmol/L 21.0* 21.0* 20.0* 20.0*   BUN mg/dL 18 18 17 16   CREATININE mg/dL 0.24* 0.30* 0.31* 0.25*   GLUCOSE mg/dL 79 127* 110* 145*   CALCIUM mg/dL 7.7* 7.4* 7.5* 7.9*   ALT (SGPT) U/L  --  61* 57* 52*   AST (SGOT) U/L  --  101* 98* 73*     Estimated Creatinine Clearance: 493.1 mL/min (A) (by C-G formula based on SCr of 0.24 mg/dL (L)).    Microbiology Results Abnormal     Procedure Component Value - Date/Time    Blood Culture - Blood, Wrist, Left [[id number]] Collected:  05/07/20 1202    Lab Status:  Preliminary result Specimen:  Blood from Wrist, Left Updated:  05/11/20 1300     Blood Culture No growth at 4 days    Narrative:       Aerobic bottle only      Blood Culture - Blood, Arm, Left [[id number]] Collected:  05/07/20 1202    Lab Status:  Preliminary result Specimen:  Blood from Arm, Left Updated:  05/11/20 1230     Blood Culture No growth at 4 days    Narrative:       Aerobic bottle only      Body Fluid Culture - Body Fluid, Peritoneum [[id number]] Collected:  05/08/20 1520    Lab Status:  Final result Specimen:  Body Fluid from Peritoneum Updated:  05/11/20 0918     Body Fluid Culture No growth at 3 days     Gram Stain Few (2+) WBCs seen      No organisms seen    Anaerobic Culture - Body Fluid, Peritoneum [[id number]] Collected:  05/08/20 1520    Lab Status:  Preliminary result Specimen:  Body Fluid from Peritoneum Updated:  05/11/20 0718     Anaerobic Culture No anaerobes isolated at 3 days    AFB Culture - Body Fluid, Peritoneum [[id number]] Collected:  05/08/20 1520    Lab Status:  Preliminary result Specimen:  Body Fluid from Peritoneum Updated:  05/09/20 1108     AFB Stain No acid fast bacilli seen on concentrated smear    Respiratory Culture - Sputum, Cough [[id number]] Collected:  05/07/20 1213    Lab Status:  Final result Specimen:  Sputum from Cough Updated:  05/09/20 1054     Respiratory Culture Light  growth (2+) Normal Respiratory Jordyn     Gram Stain Many (4+) WBCs per low power field      Moderate (3+) Epithelial cells per low power field      Few (2+) Gram positive cocci in pairs and clusters    Blood Culture - Blood, Arm, Left [[id number]] Collected:  05/03/20 1421    Lab Status:  Final result Specimen:  Blood from Arm, Left Updated:  05/08/20 1515     Blood Culture No growth at 5 days    Blood Culture - Blood, Hand, Left [[id number]] Collected:  05/03/20 1421    Lab Status:  Final result Specimen:  Blood from Hand, Left Updated:  05/08/20 1515     Blood Culture No growth at 5 days    Clostridium Difficile Toxin - Stool, Per Rectum [[id number]] Collected:  05/07/20 1410    Lab Status:  Final result Specimen:  Stool from Per Rectum Updated:  05/07/20 1541    Narrative:       The following orders were created for panel order Clostridium Difficile Toxin - Stool, Per Rectum.  Procedure                               Abnormality         Status                     ---------                               -----------         ------                     Clostridium Difficile To...[[id number]]  Normal              Final result                 Please view results for these tests on the individual orders.    Clostridium Difficile Toxin, PCR - Stool, Per Rectum [[id number]]  (Normal) Collected:  05/07/20 1410    Lab Status:  Final result Specimen:  Stool from Per Rectum Updated:  05/07/20 1541     C. Difficile Toxins by PCR Not Detected    Narrative:       Performance characteristics of test not established for patients <2 years of age.  Negative for Toxigenic C. Difficile    SARS-CoV-2 PCR ([facility] IN-HOUSE PERFORMED), NP SWAB IN TRANSPORT MEDIA - Swab, Nasopharynx [[id number]]  (Normal) Collected:  05/06/20 1840    Lab Status:  Final result Specimen:  Swab from Nasopharynx Updated:  05/07/20 0033     COVID19 Not Detected    Body Fluid Culture - Body Fluid, Peritoneum [[id number]] Collected:  04/24/20 1700    Lab Status:   Final result Specimen:  Body Fluid from Peritoneum Updated:  04/30/20 0805     Body Fluid Culture No growth at 5 days    Narrative:       Aerobic bottle only      Blood Culture - Blood, Arm, Left [[id number]] Collected:  04/23/20 2152    Lab Status:  Final result Specimen:  Blood from Arm, Left Updated:  04/28/20 2215     Blood Culture No growth at 5 days    Blood Culture - Blood, Arm, Right [[id number]] Collected:  04/23/20 2147    Lab Status:  Final result Specimen:  Blood from Arm, Right Updated:  04/28/20 2215     Blood Culture No growth at 5 days    Respiratory Culture - Sputum, Cough [[id number]] Collected:  04/24/20 2049    Lab Status:  Final result Specimen:  Sputum from Cough Updated:  04/26/20 0905     Respiratory Culture Scant growth (1+) Normal Respiratory Jordyn     Gram Stain Many (4+) WBCs per low power field      Few (2+) Epithelial cells per low power field      No organisms seen    MRSA Screen, PCR (Inpatient) - Swab, Nares [[id number]]  (Abnormal) Collected:  04/25/20 0923    Lab Status:  Final result Specimen:  Swab from Nares Updated:  04/25/20 1113     MRSA PCR Positive    Urine Culture - Urine, Urine, Clean Catch [[id number]]  (Normal) Collected:  04/24/20 0033    Lab Status:  Final result Specimen:  Urine, Clean Catch Updated:  04/25/20 1030     Urine Culture No growth          Imaging Results (Last 24 Hours)     ** No results found for the last 24 hours. **          Results for orders placed during the hospital encounter of 04/23/20   Adult Transthoracic Echo Complete W/ Cont if Necessary Per Protocol    Narrative · Estimated EF = 70%.  · Left ventricular systolic function is mildly hyperdynamic  · There is a small (<1cm) pericardial effusion. No chamber impingement is   seen  · Trace mitral and tricuspid regurgitation  · A large left pleural effusion is seen with what appears to be compressed   lung tissue noted.  · Tachycardia is noted throughout the study.  · No obvious valvular vegetations are  seen.          I have reviewed the medications:  Scheduled Meds:    Pharmacy Consult  Does not apply Once   enoxaparin 40 mg Subcutaneous Q24H   folic acid 1 mg Oral Daily   furosemide 20 mg Intravenous Q12H   insulin detemir 15 Units Subcutaneous Daily   lactulose 20 g Nasogastric BID   melatonin 5 mg Oral Nightly   meropenem 1 g Intravenous Q8H   metoclopramide 5 mg Intravenous Q6H   multivitamin and minerals 15 mL Oral Daily   pantoprazole 40 mg Oral Q AM   PRO-STAT 30 mL Nasogastric BID   propranolol 10 mg Oral Q8H   QUEtiapine 100 mg Oral Nightly   QUEtiapine 50 mg Oral Daily   rifAXIMin 550 mg Nasogastric Q12H   spironolactone 25 mg Oral Daily   thiamine 100 mg Oral Daily   vancomycin 1,250 mg Intravenous Q8H   zinc gluconate 50 mg Oral Daily     Continuous Infusions:    Pharmacy to dose vancomycin      PRN Meds:.acetaminophen  •  albuterol  •  bisacodyl  •  LORazepam  •  magnesium sulfate **OR** magnesium sulfate **OR** magnesium sulfate  •  ondansetron  •  Pharmacy to dose vancomycin  •  potassium chloride  •  [COMPLETED] Insert peripheral IV **AND** sodium chloride  •  sodium chloride  •  sodium chloride    Assessment/Plan   Assessment & Plan     Active Hospital Problems    Diagnosis  POA   • **Acute variceal GI bleeding [K92.2]  Yes   • Tobacco abuse [Z72.0]  Yes   • SBP (spontaneous bacterial peritonitis) (CMS/HCC) [K65.2]  Yes   • Acute blood loss anemia due to variceal UGIB [D62]  Yes   • 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 [Z95.828]  Not Applicable   • Sepsis with acute renal failure without septic shock due to SBP (CMS/HCC) [A41.9, R65.20, N17.9]  Yes   • Acute respiratory failure with hypoxia. Intubated 4/24-4/27 and 4/29-5/1 (CMS/HCC) [J96.01]  Yes   • Alcohol withdrawal delirium (CMS/HCC) [F10.231]  Yes   • Hepatic encephalopathy (CMS/HCC) [K72.90]  Yes   • Ascites due to alcoholic hepatitis. S/P paracentesis 4/24, 4/30, and 5/8 [K70.11]  Yes   • Pleural effusion on  left [J90]  Yes   • Acute alcoholic hepatitis [K70.10]  Yes   • BORIS (acute kidney injury) (CMS/HCC) [N17.9]  Yes      Resolved Hospital Problems   No resolved problems to display.        Brief Hospital Course to date:  John Varma is a 24 y.o. male alcoholic admitted to [facility] 4/23/20 with acute ETOH hepatitis (at presentation Maddrey discriminate function was 56 and was not candidate for steroids with MDF>50), decompensated cirrhosis, UGI gastric and esophageal variceal bleed (s/p EGD 4/24 and TIPS 4/29), and BORIS due to ATN of acute blood loss anemia.      Patient was transfused total 3 units pRBC for presentation with ABL anemia.  He was intubated for his 4/24 EGD and extubated 4/27.  He is now is s/p 4/29 TIPS procedure for which he was intubated overnight and extubated 5/1 again.  Pstient has been followed by GI and initiated on appropriate Lasix/aldactone/propranolol/rifaximin/lactulose therapy for his cirrhosis and associated hepatic encephalopathy which persists but is slowly improving.  He underewent 5L paracentesis on admission date 4/23 without evidence of SBP but due to ongoing fever and persistent marked leukocytosis tap was repeated day after his TIPS on 4/30 of 2.5L showing his sepsis presumed to be due to SBP based on the 4/30/20 paracentesis cell counts (no culture was resulted unfortunately).  The most recent 5/8 1L paracentesis findings show improvement on current abx regimen, while his leukocytosis persists it is markedly improved.  ID has followed for abx management.    He is now out of ICU and starting to participate with PT/OT.  He is more alert and less confused overall.  His goal for discharge is home to help take care of his grandmother with whom he and his father live.  Patient's father also struggles with long-standing alcoholism and presents a high risk home environment for ETOH relapse after discharge.      - On abx for SBP through today, WBC's trending down  - LFT's improved  - edema  "improved on his new cirrhosis diuretic regimen, although still with BLE edema per patient this is \"at least 50% better\" than his baseline prior to this admission  - Hgb stable (fluctuated mildly with fluid shifts but overall stable since transfusions)  - TF's off, eating well in past 24h --> d/c keofeed tube   - Walked halls 200ft with PT per nursing, encourage ambulation with supervision and increasing his stamina  - At d/c he will continue his maximized cirrhosis treatment -->  Lasix/aldactone/propranolol/rifaximin/lactulose which he is tolerating well here with exception of expected diarrhea related to lactulose    DVT Prophylaxis:  Lovenox      Daily Care Communication  Due to current limited visitation policies, an attempt will be made daily to update patient's identified best point-of-contact(s)   Contact: Grandfather (patient and his father live with Grandmother and Grandfather)   Relation:    Type of communication (phone or televideo): spoke on speaker phone using patient's phone in room    Time of communication: ~1500 for about 10 min   Notes (if applicable): Grandfather is a recovered alcoholic (sober x18 yrs) and very committed to helping patient stay sober once home.  Dx'd and POC discussed and all questions answered.       Disposition: D/C home tomorrow planned.  Will plan for close outpt f/u with [facility].       CODE STATUS:   Code Status and Medical Interventions:   Ordered at: 04/23/20 2249     Code Status:    CPR     Medical Interventions (Level of Support Prior to Arrest):    Full     More than 50% of time spent counseling on current illness and plan of care. Case discussed with: nurse, CM, consultants, patient, family  Total time spent face to face with the patient was 15 minutes.  Total time of the encounter was 40 minutes.        Electronically signed by Neda Carter MD, 05/12/20, 10:17.      "

## 2020-05-12 NOTE — PLAN OF CARE
Problem: Patient Care Overview  Goal: Plan of Care Review  Outcome: Ongoing (interventions implemented as appropriate)  Flowsheets (Taken 5/12/2020 9418)  Progress: no change  Plan of Care Reviewed With: patient  Outcome Summary: Pt ambulated around the [place]bs once with PT. VSS. Pt is tolerating food and drink and making urine. He had no complaints during shift. Pt is hoping to go home tomorrow. Plan to cont to monitor.

## 2020-05-12 NOTE — THERAPY PROGRESS REPORT/RE-CERT
Acute Care - Occupational Therapy Progress Note  Psychiatric     Patient Name: John Varma  : 1995  MRN: [id number]  Today's Date: 2020             Admit Date: 2020       ICD-10-CM ICD-9-CM   1. Sepsis with acute liver failure without hepatic coma or septic shock, due to unspecified organism (CMS/HCC) A41.9 038.9    R65.20 995.92    K72.00 570   2. Hypoxia R09.02 799.02   3. Acute renal failure, unspecified acute renal failure type (CMS/HCC) N17.9 584.9   4. Acute hepatitis B17.9 570   5. Alcohol abuse F10.10 305.00   6. Acute upper GI bleed K92.2 578.9   7. Elevated lactic acid level R79.89 276.2   8. Ascites due to alcoholic hepatitis K70.11 789.59   9. Leukocytosis, unspecified type D72.829 288.60   10. Sepsis with acute renal failure without septic shock, due to unspecified organism, unspecified acute renal failure type (CMS/HCC) A41.9 038.9    R65.20 995.92    N17.9 584.9   11. Oropharyngeal dysphagia R13.12 787.22   12. Cognitive communication deficit R41.841 799.52     Patient Active Problem List   Diagnosis   • Acute alcoholic hepatitis   • Acute variceal GI bleeding   • BORIS (acute kidney injury) (CMS/HCC)   • Sepsis with acute renal failure without septic shock due to SBP (CMS/HCC)   • Acute respiratory failure with hypoxia. Intubated - and - (CMS/HCC)   • Alcohol withdrawal delirium (CMS/HCC)   • Hepatic encephalopathy (CMS/HCC)   • Ascites due to alcoholic hepatitis. S/P paracentesis , , and    • Pleural effusion on left   • S/P TIPS (transjugular intrahepatic portosystemic shunt) 2020   • Tobacco abuse   • SBP (spontaneous bacterial peritonitis) (CMS/HCC)   • Acute blood loss anemia due to variceal UGIB     Past Medical History:   Diagnosis Date   • Alcohol abuse    • GERD (gastroesophageal reflux disease)    • GIB (gastrointestinal bleeding)    • Tobacco abuse      Past Surgical History:   Procedure Laterality Date   • ARM WOUND REPAIR / CLOSURE    "  • ENDOSCOPY N/A 4/24/2020    Procedure: ESOPHAGOGASTRODUODENOSCOPY;  Surgeon: Brunner, Mark I, MD;  Location: [facility] ENDOSCOPY;  Service: Gastroenterology;  Laterality: N/A;       Therapy Treatment    Rehabilitation Treatment Summary     Row Name 05/12/20 1418 05/12/20 1330          Treatment Time/Intention    Discipline  occupational therapist  -PILAR  speech language pathologist  -AC     Document Type  progress note/recertification  -JB2  therapy note (daily note)  -AC     Subjective Information  complains of;weakness;fatigue  -PILAR  complains of;fatigue  -AC     Patient/Family Observations  NO family present. Pt. up in recliner.  -PILAR  Pt alert, cooperative. Reported feeling \"tired.\" Ate majority of food on tray, but reported feeling like he couldn't finish it.  -AC     Care Plan Review  care plan/treatment goals reviewed;risks/benefits reviewed;patient/other agree to care plan  -PILAR  evaluation/treatment results reviewed;risks/benefits reviewed;care plan/treatment goals reviewed;current/potential barriers reviewed;patient/other agree to care plan  -AC     Therapy Frequency (OT Eval)  daily  -PILAR  --     Therapy Frequency (Swallow)  --  5 days per week  -AC     Therapy Frequency (SLP SLC)  --  5 days per week  -AC     Patient Effort  --  good  -AC     Existing Precautions/Restrictions  fall  -PILAR  --     Patient Response to Treatment  no adverse effects.  -PILAR  --     Recorded by [PILAR] Lisa Robertson, OT 05/12/20 1516  [JB2] Lisa Robertson, OT 05/12/20 1521 [AC] Rebekah Tsai MS CCC-SLP 05/12/20 1433     Row Name 05/12/20 1418             Vital Signs    Pre Systolic BP Rehab  119  -PILAR      Pre Treatment Diastolic BP  67  -PILAR      Post Systolic BP Rehab  119  -PILAR      Post Treatment Diastolic BP  74  -PILAR      Pretreatment Heart Rate (beats/min)  100  -PILAR      Posttreatment Heart Rate (beats/min)  100  -PILAR      Post SpO2 (%)  98  -PILAR      O2 Delivery Post Treatment  room air  -PILAR      Pre Patient Position  Sitting  -PILAR  "     Intra Patient Position  Standing  -PILAR      Post Patient Position  Sitting  -PILAR      Recorded by [PILAR] Lisa Robertson, OT 05/12/20 1516      Row Name 05/12/20 1418             Cognitive Assessment/Intervention- PT/OT    Affect/Mental Status (Cognitive)  WFL  -PILAR      Orientation Status (Cognition)  oriented to;person;place knew year, but not month  -PILAR      Follows Commands (Cognition)  follows one step commands;over 90% accuracy;physical/tactile prompts required;repetition of directions required  -PILAR      Safety Deficit (Cognitive)  mild deficit;insight into deficits/self awareness;awareness of need for assistance;safety precautions awareness  -PILAR      Recorded by [PILAR] Lisa Robertson, OT 05/12/20 1516      Row Name 05/12/20 1418             Safety Issues, Functional Mobility    Safety Issues Affecting Function (Mobility)  insight into deficits/self awareness;awareness of need for assistance;safety precaution awareness  -PILAR      Impairments Affecting Function (Mobility)  balance;endurance/activity tolerance;strength;cognition  -PILAR      Recorded by [PILAR] Lisa Robertson, OT 05/12/20 1516      Row Name 05/12/20 1418             Bed Mobility Assessment/Treatment    [employer] (Bed Mobility)  verbal cues;supervision  -PILAR      Bed Mobility, Safety Issues  decreased use of legs for bridging/pushing  -PILAR      Assistive Device (Bed Mobility)  bed rails  -PILAR      Comment (Bed Mobility)  pt. needed extra attempts to get LE's into bed.  -PILAR      Recorded by [PILAR] Lisa Robertson, OT 05/12/20 1516      Row Name 05/12/20 1418             Functional Mobility    Functional Mobility- Comment  defer to PT  -PILAR      Recorded by [PILAR] Lisa Robertson, OT 05/12/20 1516      Row Name 05/12/20 1418             Transfer Assessment/Treatment    Transfer Assessment/Treatment  sit-stand transfer;stand-sit transfer;chair-bed transfer  -PILAR      Recorded by [PILAR] Lisa Robertson, OT 05/12/20 1516      Row Name 05/12/20 1418              Chair-Bed Transfer    Chair-Bed [place] (Transfers)  contact guard  -PILAR      Assistive Device (Chair-Bed Transfers)  other (see comments) bed rail and gait belt  -PILAR      Recorded by [PILAR] Lisa Robertson, OT 05/12/20 1516      Row Name 05/12/20 1418             Sit-Stand Transfer    Sit-Stand [place] (Transfers)  contact guard  -PILAR      Assistive Device (Sit-Stand Transfers)  other (see comments) gait belt  -PILAR      Recorded by [PILAR] Lisa Robertson, OT 05/12/20 1516      Row Name 05/12/20 1418             Stand-Sit Transfer    Stand-Sit [place] (Transfers)  contact guard  -PILAR      Assistive Device (Stand-Sit Transfers)  other (see comments) gait belt  -PILAR      Recorded by [PILAR] Lisa Robertson, OT 05/12/20 1516      Row Name 05/12/20 1418             ADL Assessment/Intervention    91137 - OT Self Care/Mgmt Minutes  13  -PILAR      BADL Assessment/Intervention  lower body dressing;toileting;feeding  -PILAR      Recorded by [PILAR] Lisa Robertson, OT 05/12/20 1516      Row Name 05/12/20 1418             Lower Body Dressing Assessment/Training    Lower Body Dressing [place] Level  don;pants/bottoms;minimum assist (75% patient effort);verbal cues  -PILAR      Lower Body Dressing Position  supported sitting  -PILAR      Comment (Lower Body Dressing)  Pt.'s pants very tight at bottom and pt. needed assist to pull over gripper socks and edematous feet.  Pt. stood CGA and pulled up.  -PILAR      Recorded by [PILAR] Lisa Robertson, OT 05/12/20 1516      Row Name 05/12/20 1418             Self-Feeding Assessment/Training    [place] Level (Feeding)  liquids to mouth;independent  -PILAR      Position (Self-Feeding)  supported sitting  -PILAR      Recorded by [PILAR] Lisa Robertson, OT 05/12/20 1516      Row Name 05/12/20 1418             Toileting Assessment/Training    Comment (Toileting)  noted stool smear in chair when stood to pull up pants.  Pt. declined to clean buttocks himself asking OT to perform.  Pt. stood holding  to bed for balance.  -PILAR      Recorded by [PILAR] Lisa Robertson, OT 05/12/20 1516      Row Name 05/12/20 1418             BADL Safety/Performance    Impairments, BADL Safety/Performance  balance;endurance/activity tolerance;strength;trunk/postural control;cognition  -PILAR      Cognitive Impairments, BADL Safety/Performance  judgment;safety precaution awareness  -PILAR      Skilled BADL Treatment/Intervention  cognitive/safety deficit modifications  -PILAR      Recorded by [PILAR] Lisa Robertson, OT 05/12/20 1516      Row Name 05/12/20 1418             Therapeutic Exercise    29064 - OT Therapeutic Exercise Minutes  13  -PILAR      82728 - OT Therapeutic Activity Minutes  13  -PILAR      Recorded by [PILAR] Lisa Robertson, OT 05/12/20 1516      Row Name 05/12/20 1418             Upper Extremity Seated Therapeutic Exercise    Performed, Seated Upper Extremity (Therapeutic Exercise)  shoulder flexion/extension;shoulder horizontal abduction/adduction;shoulder external/internal rotation;elbow flexion/extension  -PILAR      Exercise Type, Seated Upper Extremity (Therapeutic Exercise)  AROM (active range of motion);resistive exercise pt. unable to do tband with sh flex.  -PILAR      Expected Outcomes, Seated Upper Extremity (Therapeutic Exercise)  improve functional tolerance, self-care activity;improve performance, BADLs  -PILAR      Sets/Reps Detail, Seated Upper Extremity (Therapeutic Exercise)  1/12  -PILAR      Comment, Seated Upper Extremity (Therapeutic Exercise)  cues for technique and rest periods throughout.  -PILAR      Recorded by [PILAR] Lisa Robertson, OT 05/12/20 1516      Row Name 05/12/20 1418             Dynamic Sitting Balance    Level of [place], Reaches Outside Midline (Sitting, Dynamic Balance)  standby assist  -PILAR      Sitting Position, Reaches Outside Midline (Sitting, Dynamic Balance)  sitting in chair  -PILAR      Comment, Reaches Outside Midline (Sitting, Dynamic Balance)  LBD  -PILAR      Recorded by [PILAR] Lisa Robertson, OT  05/12/20 1516      Row Name 05/12/20 1418             Static Standing Balance    Level of [place] (Supported Standing, Static Balance)  standby assist  -PILAR      Time Able to Maintain Position (Supported Standing, Static Balance)  -- while being cleaned holding bed rail  -PILAR      Recorded by [PILAR] Lisa Robertson, OT 05/12/20 1516      Row Name 05/12/20 1418             Dynamic Standing Balance    Level of [place], Reaches Outside Midline (Standing, Dynamic Balance)  contact guard assist  -PILAR      Time Able to Maintain Position, Reaches Outside Midline (Standing, Dynamic Balance)  -- pulling up pants.  -PILAR      Recorded by [PILAR] Lisa Robertson, OT 05/12/20 1516      Row Name 05/12/20 1418             Positioning and Restraints    Pre-Treatment Position  sitting in chair/recliner  -PILAR      Post Treatment Position  bed  -PILAR      In Chair  call light within reach;encouraged to call for assist;exit alarm on;with other staff  -PILAR      Recorded by [PILAR] Lisa Robertson, OT 05/12/20 1516      Row Name 05/12/20 1418 05/12/20 1330          Pain Scale: Numbers Pre/Post-Treatment    Pain Scale: Numbers, Pretreatment  0/10 - no pain  -PILAR  0/10 - no pain  -AC     Pain Scale: Numbers, Post-Treatment  0/10 - no pain  -PILAR  0/10 - no pain  -AC     Recorded by [PILAR] Lisa Robertson, OT 05/12/20 1516 [AC] Rebekah Tsai MS CCC-SLP 05/12/20 1433     Row Name                Wound 05/09/20 0800 Right lower flank Puncture    Wound - Properties Group Date first assessed: 05/09/20 [CB] Time first assessed: 0800 [CB] Present on Hospital Admission: N [CB] Side: Right [CB] Orientation: lower [CB] Location: flank [CB] Primary Wound Type: Puncture [CB], paracentesis  Recorded by:  [CB] Paul Sood RN 05/09/20 1027    Row Name 05/12/20 1418             Outcome Summary/Treatment Plan (OT)    Daily Summary of Progress (OT)  progress toward functional goals is good  -PILAR      Barriers to Overall Progress (OT)  limited endurance  -PILAR       Plan for Continued Treatment (OT)  Cont OT POC  -PILAR      Anticipated Discharge Disposition (OT)  inpatient rehabilitation facility if pt. declines then needs HH OT  -PILAR      Recorded by [PILAR] Lisa Robertson, OT 05/12/20 1516      Row Name 05/12/20 1330             Outcome Summary/Treatment Plan (SLP)    Daily Summary of Progress (SLP)  progress toward functional goals as expected  -AC      Plan for Continued Treatment (SLP)  Cont regular diet and thin liquids. Demonstrated impulsivity w/ PO today, as well as fatigue. Rec general aspiration precautions (small bite/sips) and monitor for fatigue and take breaks accordingly during meals. Cognitive-communication skills improving. Pt would benefit from cont'd SLP tx.  -AC      Anticipated Dischage Disposition  inpatient rehabilitation facility;anticipate therapy at next level of care  -AC      Recorded by [AC] Rebekah Tsai, MS CCC-SLP 05/12/20 1433        User Key  (r) = Recorded By, (t) = Taken By, (c) = Cosigned By    Initials Name Effective Dates Discipline    Lisa Chaudhry, OT 06/08/18 -  OT    AC Rebekah Tsai, MS CCC-SLP 07/27/17 -  SLP    CB Paul Sood RN 06/19/19 -  Nurse        Wound 05/09/20 0800 Right lower flank Puncture (Active)   Dressing Appearance open to air 5/12/2020 12:00 PM   Closure Liquid skin adhesive 5/12/2020 12:00 PM   Base dry 5/12/2020 12:00 PM   Drainage Amount none 5/12/2020 12:00 PM     Rehab Goal Summary     Row Name 05/12/20 1418 05/12/20 1330 05/12/20 1138       Bed Mobility Goal 1 (PT)    Activity/Assistive Device (Bed Mobility Goal 1, PT)  --  --  sit to supine/supine to sit  -MP    [place] Level/Cues Needed (Bed Mobility Goal 1, PT)  --  --  conditional independence  -MP    Time Frame (Bed Mobility Goal 1, PT)  --  --  2 weeks  -MP    Progress/Outcomes (Bed Mobility Goal 1, PT)  --  --  goal revised this date  -MP       Transfer Goal 1 (PT)    Activity/Assistive Device (Transfer Goal 1, PT)  --  --   sit-to-stand/stand-to-sit;walker, rolling  -MP    [place] Level/Cues Needed (Transfer Goal 1, PT)  --  --  conditional independence  -MP    Time Frame (Transfer Goal 1, PT)  --  --  2 weeks  -MP    Progress/Outcome (Transfer Goal 1, PT)  --  --  goal revised this date  -MP       Gait Training Goal 1 (PT)    Activity/Assistive Device (Gait Training Goal 1, PT)  --  --  gait (walking locomotion)  -MP    [place] Level (Gait Training Goal 1, PT)  --  --  supervision required  -MP    Distance (Gait Goal 1, PT)  --  --  350'  -MP    Time Frame (Gait Training Goal 1, PT)  --  --  2 weeks  -MP    Progress/Outcome (Gait Training Goal 1, PT)  --  --  goal revised this date  -MP       Transfer Goal 1 (OT)    Progress/Outcome (Transfer Goal 1, OT)  goal partially met met bed and chair level, not toilet  -PILAR  --  --       Dressing Goal 1 (OT)    Progress/Outcome (Dressing Goal 1, OT)  goal ongoing min with pants  -PILAR  --  --       Grooming Goal 1 (OT)    Progress/Outcome (Grooming Goal 1, OT)  continuing progress toward goal  -PILAR  --  --       Orientation Goal 1 (OT)    Progress/Outcome (Orientation Goal 1, OT)  good progress toward goal oriented minus month  -PILAR  --  --       Oral Nutrition/Hydration Goal 1 (SLP)    Oral Nutrition/Hydration Goal 1, SLP  --  LTG: Pt will return to regular diet and thin liquids and tolerate w/o s/sxs aspiration w/ 100% acc w/o cues.  -AC  --    Time Frame (Oral Nutrition/Hydration Goal 1, SLP)  --  by discharge  -AC  --    Progress/Outcomes (Oral Nutrition/Hydration Goal 1, SLP)  --  good progress toward goal  -AC  --       Oral Nutrition/Hydration Goal 2 (SLP)    Oral Nutrition/Hydration Goal 2, SLP  --  Pt will tolerate trials of soft/regular solids and thin liquids w/o s/sxs aspiration w/ 100% acc w/o cues.  -AC  --    Time Frame (Oral Nutrition/Hydration Goal 2, SLP)  --  short term goal (STG)  -AC  --    Barriers (Oral Nutrition/Hydration Goal 2, SLP)  --  No overt clinical s/sxs  aspiration w/ thin via straw or solid crackers. Pt did exhibit impulsivity -- taking very large bites/drinks at a time. Able to teach back aspiration precautions by end of session.  -AC  --    Progress/Outcomes (Oral Nutrition/Hydration Goal 2, SLP)  --  good progress toward goal  -AC  --       Lingual Strengthening Goal 1 (SLP)    Increase Lingual Tone/Sensation/Control/Coordination/Movement  --  swallow trials;lingual resistance exercises  -AC  --    Increase Tongue Back Strength  --  lingual resistance exercises  -AC  --    [place]/Accuracy (Lingual Strengthening Goal 1, SLP)  --  with minimal cues (75-90% accuracy)  -AC  --    Time Frame (Lingual Strengthening Goal 1, SLP)  --  short term goal (STG)  -AC  --    Progress/Outcomes (Lingual Strengthening Goal 1, SLP)  --  goal ongoing  -AC  --       Pharyngeal Strengthening Exercise Goal 1 (SLP)    Increase Timing  --  prepping - 3 second prep or suck swallow or 3-step swallow  -AC  --    Increase Superior Movement of the Hyolaryngeal Complex  --  effortful pitch glide (falsetto + pharyngeal squeeze)  -AC  --    Increase Anterior Movement of the Hyolaryngeal Complex  --  chin tuck against resistance (CTAR)  -AC  --    Increase Closure at Entrance to Airway/Closure of Airway at Glottis  --  super-supraglottic swallow  -AC  --    Increase Tongue Base Retraction  --  hard effortful swallow;dodie  -AC  --    [place]/Accuracy (Pharyngeal Strengthening Goal 1, SLP)  --  with minimal cues (75-90% accuracy)  -AC  --    Time Frame (Pharyngeal Strengthening Goal 1, SLP)  --  short term goal (STG)  -AC  --    Barriers (Pharyngeal Strengthening Goal 1, SLP)  --  Reviewed effortful swallow and Dodie. Pt able to demonstrate both w/ min cues.   -AC  --    Progress/Outcomes (Pharyngeal Strengthening Goal 1, SLP)  --  continuing progress toward goal  -AC  --       Attention Goal 1 (SLP)    Improve Attention by Goal 1 (SLP)  --  complete sustained attention  "task;100%;with minimal cues (75-90%)  -AC  --    Time Frame (Attention Goal 1, SLP)  --  short term goal (STG)  -AC  --    Progress (Attention Goal 1, SLP)  --  80%;with minimal cues (75-90%)  -AC  --    Progress/Outcomes (Attention Goal 1, SLP)  --  good progress toward goal  -AC  --       Memory Skills Goal 1 (SLP)    Improve Memory Skills Through Goal 1 (SLP)  --  recalling unrelated word lists with an imposed delay;select a word from a list by exclusion;repeat list in sequential order;90%;with minimal cues (75-90%)  -AC  --    Time Frame (Memory Skills Goal 1, SLP)  --  short term goal (STG)  -AC  --    Progress (Memory Skills Goal 1, SLP)  --  60%;with minimal cues (75-90%)  -AC  --    Progress/Outcomes (Memory Skills Goal 1, SLP)  --  good progress toward goal;goal revised this date  -AC  --    Comment (Memory Skills Goal 1, SLP)  --  Met goal for related word list after 5-min delay. Reviewed strategies of visualization, repetition, and use of smartphone \"Notes\" tomas as external strategy.  -AC  --       Organizational Skills Goal 1 (SLP)    Improve Thought Organization Through Goal 1 (SLP)  --  completing a verbal sequencing task;completing mental manipulation task;100%;with minimal cues (75-90%)  -AC  --    Time Frame (Thought Organization Skills Goal 1, SLP)  --  short term goal (STG)  -AC  --    Progress (Thought Organization Skills Goal 1, SLP)  --  90%;with minimal cues (75-90%)  -AC  --    Progress/Outcomes (Thought Organization Skills Goal 1, SLP)  --  good progress toward goal  -AC  --       Functional Problem Solving Skills Goal 1 (SLP)    Improve Problem Solving Through Goal 1 (SLP)  --  determine multiple solutions to problems;complete organization/home management task;100%;with minimal cues (75-90%)  -AC  --    Time Frame (Problem Solving Goal 1, SLP)  --  short term goal (STG)  -AC  --    Progress (Problem Solving Goal 1, SLP)  --  80%;with minimal cues (75-90%)  -AC  --    Progress/Outcomes " (Problem Solving Goal 1, SLP)  --  good progress toward goal  -AC  --       Additional Goal 1 (SLP)    Additional Goal 1, SLP  --  LTG: Improve cog-comm skills in order to participate in care while in hospital setting with 100% accuracy and cues  -AC  --    Time Frame (Additional Goal 1, SLP)  --  by discharge  -AC  --    Progress/Outcomes (Additional Goal 1, SLP)  --  good progress toward goal  -AC  --      User Key  (r) = Recorded By, (t) = Taken By, (c) = Cosigned By    Initials Name Provider Type Discipline    Lisa Chaudhry, OT Occupational Therapist OT    Rebekah Smith, MS CCC-SLP Speech and Language Pathologist SLP    Michael Ramirez, PT Physical Therapist PT        Occupational Therapy Education                 Title: PT OT SLP Therapies (In Progress)     Topic: Occupational Therapy (In Progress)     Point: ADL training (Done)     Description:   Instruct learner(s) on proper safety adaptation and remediation techniques during self care or transfers.   Instruct in proper use of assistive devices.              Learning Progress Summary           Patient Acceptance, E,D,H, VU,NR by PILAR at 5/12/2020 1418    Comment:  HEP tband/AROM, bathroom safety, transfer safety    Acceptance, E, NR by CL at 5/11/2020 1331    Acceptance, E,D, NR by PILAR at 5/9/2020 1404    Comment:  re-orientation, UE TE, benefits of activity, bed mobility, walker safety    Acceptance, E, NR by CL at 5/7/2020 1417    Acceptance, E,D, NR by JY at 5/6/2020 1523    Acceptance, E, NR by CL at 5/4/2020 1110    Acceptance, E, NR by CL at 5/2/2020 1059                   Point: Home exercise program (Done)     Description:   Instruct learner(s) on appropriate technique for monitoring, assisting and/or progressing therapeutic exercises/activities.              Learning Progress Summary           Patient Acceptance, E,D,H, VU,NR by PILAR at 5/12/2020 1418    Comment:  HEP tband/AROM, bathroom safety, transfer safety    Acceptance, E, NR by CL at  5/11/2020 1331    Acceptance, E,D, NR by PILAR at 5/9/2020 1404    Comment:  re-orientation, UE TE, benefits of activity, bed mobility, walker safety    Acceptance, E,D, NR by JY at 5/6/2020 1523    Acceptance, E, NR by CL at 5/4/2020 1110                   Point: Precautions (In Progress)     Description:   Instruct learner(s) on prescribed precautions during self-care and functional transfers.              Learning Progress Summary           Patient Acceptance, E, NR by CL at 5/11/2020 1331    Acceptance, E,D, NR by PILAR at 5/9/2020 1404    Comment:  re-orientation, UE TE, benefits of activity, bed mobility, walker safety    Acceptance, E, NR by CL at 5/7/2020 1417    Acceptance, E,D, NR by JY at 5/6/2020 1523    Acceptance, E, NR by CL at 5/4/2020 1110    Acceptance, E, NR by CL at 5/2/2020 1059                   Point: Body mechanics (In Progress)     Description:   Instruct learner(s) on proper positioning and spine alignment during self-care, functional mobility activities and/or exercises.              Learning Progress Summary           Patient Acceptance, E, NR by CL at 5/11/2020 1331    Acceptance, E,D, NR by JY at 5/6/2020 1523    Acceptance, E, NR by CL at 5/4/2020 1110    Acceptance, E, NR by CL at 5/2/2020 1059                               User Key     Initials Effective Dates Name Provider Type Discipline    PILAR 06/08/18 -  Lisa Robertson, OT Occupational Therapist OT    CL 04/03/18 -  Selena Terrell, OT Occupational Therapist OT    JY 01/29/20 -  Mary Quevedo OT Occupational Therapist OT                OT Recommendation and Plan  Outcome Summary/Treatment Plan (OT)  Daily Summary of Progress (OT): progress toward functional goals is good  Barriers to Overall Progress (OT): limited endurance  Plan for Continued Treatment (OT): Cont OT POC  Anticipated Discharge Disposition (OT): inpatient rehabilitation facility(if pt. declines then needs HH OT)  Therapy Frequency (OT Eval): daily  Daily Summary of  Progress (OT): progress toward functional goals is good  Plan of Care Review  Plan of Care Reviewed With: patient  Plan of Care Reviewed With: patient  Outcome Summary: Pt. donned pants min A  and compelted UE tband HEP with handout issued.  Pt. continues with limited endurance needing many rest periods.  CGA sit to stand and chair to bed.  Cont OT POC pending discharge tomorrow.  Per pt. family has shower chair he can use with grab bar in tub.  Outcome Measures     Row Name 05/12/20 1418 05/11/20 1331          How much help from another is currently needed...    Putting on and taking off regular lower body clothing?  3  -PILAR  2  -CL     Bathing (including washing, rinsing, and drying)  3  -PILAR  2  -CL     Toileting (which includes using toilet bed pan or urinal)  2  -PILAR  2  -CL     Putting on and taking off regular upper body clothing  3 once lines out  -PILAR  2  -CL     Taking care of personal grooming (such as brushing teeth)  3  -PILAR  3  -CL     Eating meals  4  -PILAR  4  -CL     AM-PAC 6 Clicks Score (OT)  18  -PILAR  15  -CL        Functional Assessment    Outcome Measure Options  AM-PAC 6 Clicks Daily Activity (OT)  -PILAR  AM-PAC 6 Clicks Daily Activity (OT)  -CL       User Key  (r) = Recorded By, (t) = Taken By, (c) = Cosigned By    Initials Name Provider Type    Lisa Chaudhry, OT Occupational Therapist    Selena Martínez, OT Occupational Therapist           Time Calculation:   Time Calculation- OT     Row Name 05/12/20 1521 05/12/20 1418 05/12/20 1048       Time Calculation- OT    OT Start Time  1418 -JB  --  --    OT Received On  05/12/20 -JB  --  --    OT Goal Re-Cert Due Date  05/24/20 -JB  --  --       Timed Charges    12102 - OT Therapeutic Exercise Minutes  --  13 -JB  --    64421 - Gait Training Minutes   --  --  20  -MP    49288 - OT Therapeutic Activity Minutes  --  13 -JB  --    42660 - OT Self Care/Mgmt Minutes  --  13 -JB  --      User Key  (r) = Recorded By, (t) = Taken By, (c) = Cosigned By     Initials Name Provider Type    Lisa Chaudhry, OT Occupational Therapist    Michael Ramirez, PT Physical Therapist        Therapy Charges for Today     Code Description Service Date Service Provider Modifiers Qty    [id number]  OT THER PROC EA 15 MIN 5/12/2020 Lisa Robertson, OT GO 1    [id number]  OT THERAPEUTIC ACT EA 15 MIN 5/12/2020 Lisa Robertson OT GO 1    [id number]  OT SELF CARE/MGMT/TRAIN EA 15 MIN 5/12/2020 Lisa Robertson OT GO 1               Lisa Robertson OT  5/12/2020

## 2020-05-12 NOTE — PROGRESS NOTES
Provided patient with resources for alcohol and substance treatment options and recovery support and services.    MICHAEL Alegre, MSW  [place] Certified Adult Chemical Dependency/Mental Health  [phone]

## 2020-05-12 NOTE — PROGRESS NOTES
Continued Stay Note  [facility]     Patient Name: John Varma  MRN: [id number]  Today's Date: 5/12/2020    Admit Date: 4/23/2020    Discharge Plan     Row Name 05/12/20 4907       Plan    Plan  update    Patient/Family in Agreement with Plan  yes    Plan Comments  Per MD rounds this a.m., patient likely to discharge tomorrow.   Spoke with patient at bedside regarding discharge plan who reports that he expects to go home tomorrow.  Feeling somewhat better today, was able to go 350' with PT.  No discharge needs verbalized.  Spoke to Meron with Chemical Dependency Team regarding potential discharge tomorrow who will see him today.  CM continues to follow-Mildred [phone]    Final Discharge Disposition Code  01 - home or self-care        Discharge Codes    No documentation.       Expected Discharge Date and Time     Expected Discharge Date Expected Discharge Time    May 4, 2020             Mildred Corley, RN

## 2020-05-12 NOTE — PROGRESS NOTES
"GI Daily Progress Note  Subjective:    Chief Complaint:  Follow up decompensated cirrhosis     Patient states he \"feels some better\".   Ambulated 350 ft with walker and PT.   Eating well.   Keofeed removed.     States he had 2-3 bowel movements yesterday.       Objective:    /66 (BP Location: Left arm, Patient Position: Sitting)   Pulse 102   Temp 97.2 °F (36.2 °C) (Oral)   Resp 23   Ht 172.7 cm (68\")   Wt 82.7 kg (182 lb 5.1 oz)   SpO2 97%   BMI 27.72 kg/m²     Physical Exam   Constitutional: He is oriented to person, place, and time.   Chronically ill appearing    Eyes: Scleral icterus is present.   Cardiovascular: Regular rhythm. Tachycardia present.   Pulmonary/Chest: Effort normal and breath sounds normal. No respiratory distress.   Abdominal: Soft. Bowel sounds are normal. He exhibits distension. There is no tenderness.   Mild ascites    Neurological: He is alert and oriented to person, place, and time.       Lab  Lab Results   Component Value Date    WBC 29.78 (H) 05/11/2020    HGB 8.8 (L) 05/11/2020    HGB 9.7 (L) 05/10/2020    HGB 8.5 (L) 05/09/2020    .3 (H) 05/11/2020     05/11/2020    INR 2.16 (H) 05/09/2020    INR 1.96 (H) 05/08/2020    INR 1.79 (H) 05/07/2020    INR 2.01 (H) 05/06/2020    INR 1.84 (H) 05/05/2020       Lab Results   Component Value Date    GLUCOSE 79 05/12/2020    BUN 18 05/12/2020    CREATININE 0.24 (L) 05/12/2020    EGFRIFNONA >150 05/12/2020    EGFRIFAFRI 37 (L) 04/23/2020    BCR 75.0 (H) 05/12/2020     05/12/2020    K 4.2 05/12/2020    CO2 21.0 (L) 05/12/2020    CALCIUM 7.7 (L) 05/12/2020    ALBUMIN 2.40 (L) 05/11/2020    ALKPHOS 205 (H) 05/11/2020    BILITOT 4.3 (H) 05/11/2020    BILIDIR 3.3 (H) 05/04/2020    ALT 61 (H) 05/11/2020     (H) 05/11/2020       Assessment:    1. Acute blood loss anemia, improved.  3 units transfused since admission. No signs of GI bleeding.  2. Gastric varices (GOV type I) with hemorrhage, status post " TIPS 4/29/2020 (pressure gradient reduced from 16 to 6)  3. Large esophageal varices with red hanh signs.  4. Acute alcoholic hepatitis, severe.  Bilirubin is stable.  Maddrey discriminant function 56 at time of admission.  Not a candidate for corticosteroids (nil effect with MDF >50).  5. Alcohol cirrhosis with ascites.    6. Sepsis.  C. difficile negative.  Had white cell criteria for SBP on 4/30/2020.  Repeat paracentesis on 5/8 was negative for SBP.      Plan:    >>> Will discontinue Reglan   >>> Continue Lasix and Aldactone  >>> Continue Lactulose and Xifaxan     TORI Harmon  05/12/20  13:33

## 2020-05-12 NOTE — PLAN OF CARE
Problem: Patient Care Overview  Goal: Plan of Care Review  Outcome: Ongoing (interventions implemented as appropriate)  Flowsheets (Taken 5/12/2020 4624)  Progress: improving  Plan of Care Reviewed With: patient  Outcome Summary: Pt. donned pants min A  and compelted UE tband HEP with handout issued.  Pt. continues with limited endurance needing many rest periods.  CGA sit to stand and chair to bed.  Cont OT POC pending discharge tomorrow.  Per pt. family has shower chair he can use with grab bar in tub.

## 2020-05-13 ENCOUNTER — READMISSION MANAGEMENT (OUTPATIENT)
Dept: CALL CENTER | Facility: HOSPITAL | Age: 25
End: 2020-05-13

## 2020-05-13 VITALS
WEIGHT: 182.1 LBS | HEART RATE: 93 BPM | SYSTOLIC BLOOD PRESSURE: 118 MMHG | TEMPERATURE: 98.8 F | DIASTOLIC BLOOD PRESSURE: 69 MMHG | OXYGEN SATURATION: 94 % | RESPIRATION RATE: 20 BRPM | HEIGHT: 68 IN | BODY MASS INDEX: 27.6 KG/M2

## 2020-05-13 PROBLEM — N17.9 SEPSIS WITH ACUTE RENAL FAILURE WITHOUT SEPTIC SHOCK: Status: RESOLVED | Noted: 2020-04-24 | Resolved: 2020-05-13

## 2020-05-13 PROBLEM — K76.82 HEPATIC ENCEPHALOPATHY: Status: RESOLVED | Noted: 2020-04-24 | Resolved: 2020-05-13

## 2020-05-13 PROBLEM — R65.20 SEPSIS WITH ACUTE RENAL FAILURE WITHOUT SEPTIC SHOCK: Status: RESOLVED | Noted: 2020-04-24 | Resolved: 2020-05-13

## 2020-05-13 PROBLEM — K70.10 ACUTE ALCOHOLIC HEPATITIS: Status: RESOLVED | Noted: 2020-04-23 | Resolved: 2020-05-13

## 2020-05-13 PROBLEM — J96.01 ACUTE RESPIRATORY FAILURE WITH HYPOXIA: Status: RESOLVED | Noted: 2020-04-24 | Resolved: 2020-05-13

## 2020-05-13 PROBLEM — K65.2 SBP (SPONTANEOUS BACTERIAL PERITONITIS): Status: RESOLVED | Noted: 2020-05-11 | Resolved: 2020-05-13

## 2020-05-13 PROBLEM — J90 PLEURAL EFFUSION ON LEFT: Status: RESOLVED | Noted: 2020-04-24 | Resolved: 2020-05-13

## 2020-05-13 PROBLEM — K70.11 ASCITES DUE TO ALCOHOLIC HEPATITIS: Status: RESOLVED | Noted: 2020-04-24 | Resolved: 2020-05-13

## 2020-05-13 PROBLEM — F10.931 ALCOHOL WITHDRAWAL DELIRIUM (HCC): Status: RESOLVED | Noted: 2020-04-24 | Resolved: 2020-05-13

## 2020-05-13 PROBLEM — K92.2 ACUTE GI BLEEDING: Status: RESOLVED | Noted: 2020-04-23 | Resolved: 2020-05-13

## 2020-05-13 PROBLEM — A41.9 SEPSIS WITH ACUTE RENAL FAILURE WITHOUT SEPTIC SHOCK (HCC): Status: RESOLVED | Noted: 2020-04-24 | Resolved: 2020-05-13

## 2020-05-13 PROBLEM — N17.9 AKI (ACUTE KIDNEY INJURY) (HCC): Status: RESOLVED | Noted: 2020-04-23 | Resolved: 2020-05-13

## 2020-05-13 PROBLEM — D62 ACUTE BLOOD LOSS ANEMIA: Status: RESOLVED | Noted: 2020-05-11 | Resolved: 2020-05-13

## 2020-05-13 LAB
BACTERIA SPEC ANAEROBE CULT: NORMAL
GIE STN SPEC: NORMAL

## 2020-05-13 PROCEDURE — 99232 SBSQ HOSP IP/OBS MODERATE 35: CPT | Performed by: PHYSICIAN ASSISTANT

## 2020-05-13 PROCEDURE — 97110 THERAPEUTIC EXERCISES: CPT

## 2020-05-13 PROCEDURE — 97116 GAIT TRAINING THERAPY: CPT

## 2020-05-13 PROCEDURE — 25010000002 ENOXAPARIN PER 10 MG: Performed by: INTERNAL MEDICINE

## 2020-05-13 PROCEDURE — 63710000001 INSULIN DETEMIR PER 5 UNITS: Performed by: INTERNAL MEDICINE

## 2020-05-13 PROCEDURE — 25010000002 VANCOMYCIN 10 G RECONSTITUTED SOLUTION: Performed by: INTERNAL MEDICINE

## 2020-05-13 PROCEDURE — 99239 HOSP IP/OBS DSCHRG MGMT >30: CPT | Performed by: FAMILY MEDICINE

## 2020-05-13 PROCEDURE — 25010000002 FUROSEMIDE PER 20 MG: Performed by: INTERNAL MEDICINE

## 2020-05-13 PROCEDURE — 25010000002 MEROPENEM PER 100 MG: Performed by: INTERNAL MEDICINE

## 2020-05-13 RX ORDER — PANTOPRAZOLE SODIUM 40 MG/1
40 TABLET, DELAYED RELEASE ORAL DAILY
Qty: 30 TABLET | Refills: 1 | Status: SHIPPED | OUTPATIENT
Start: 2020-05-13 | End: 2020-05-14

## 2020-05-13 RX ORDER — LANOLIN ALCOHOL/MO/W.PET/CERES
100 CREAM (GRAM) TOPICAL DAILY
Qty: 30 TABLET | Refills: 1 | Status: SHIPPED | OUTPATIENT
Start: 2020-05-14 | End: 2020-05-14

## 2020-05-13 RX ORDER — SPIRONOLACTONE 50 MG/1
50 TABLET, FILM COATED ORAL DAILY
Qty: 30 TABLET | Refills: 1 | Status: SHIPPED | OUTPATIENT
Start: 2020-05-14 | End: 2020-05-14

## 2020-05-13 RX ORDER — LANOLIN ALCOHOL/MO/W.PET/CERES
400 CREAM (GRAM) TOPICAL DAILY
Qty: 30 TABLET | Refills: 1 | Status: SHIPPED | OUTPATIENT
Start: 2020-05-13 | End: 2020-10-26

## 2020-05-13 RX ORDER — ZINC GLUCONATE 50 MG
50 TABLET ORAL DAILY
Qty: 30 TABLET | Refills: 1 | Status: SHIPPED | OUTPATIENT
Start: 2020-05-14 | End: 2020-05-14

## 2020-05-13 RX ORDER — FUROSEMIDE 20 MG/1
20 TABLET ORAL 2 TIMES DAILY
Qty: 60 TABLET | Refills: 1 | Status: SHIPPED | OUTPATIENT
Start: 2020-05-13 | End: 2020-08-12 | Stop reason: SDUPTHER

## 2020-05-13 RX ORDER — PROPRANOLOL HYDROCHLORIDE 10 MG/1
10 TABLET ORAL 3 TIMES DAILY
Qty: 90 TABLET | Refills: 1 | Status: SHIPPED | OUTPATIENT
Start: 2020-05-13 | End: 2020-05-14

## 2020-05-13 RX ORDER — LACTULOSE 10 G/15ML
20 SOLUTION ORAL 2 TIMES DAILY
Qty: 946 ML | Refills: 1 | Status: SHIPPED | OUTPATIENT
Start: 2020-05-13 | End: 2020-05-14

## 2020-05-13 RX ADMIN — MEROPENEM 1 G: 1 INJECTION, POWDER, FOR SOLUTION INTRAVENOUS at 09:01

## 2020-05-13 RX ADMIN — RIFAXIMIN 550 MG: 550 TABLET ORAL at 09:02

## 2020-05-13 RX ADMIN — ENOXAPARIN SODIUM 40 MG: 40 INJECTION SUBCUTANEOUS at 09:02

## 2020-05-13 RX ADMIN — PANTOPRAZOLE SODIUM 40 MG: 40 TABLET, DELAYED RELEASE ORAL at 05:49

## 2020-05-13 RX ADMIN — QUETIAPINE FUMARATE 50 MG: 100 TABLET ORAL at 09:02

## 2020-05-13 RX ADMIN — MEROPENEM 1 G: 1 INJECTION, POWDER, FOR SOLUTION INTRAVENOUS at 00:33

## 2020-05-13 RX ADMIN — VANCOMYCIN HYDROCHLORIDE 1250 MG: 10 INJECTION, POWDER, LYOPHILIZED, FOR SOLUTION INTRAVENOUS at 09:15

## 2020-05-13 RX ADMIN — VANCOMYCIN HYDROCHLORIDE 1250 MG: 10 INJECTION, POWDER, LYOPHILIZED, FOR SOLUTION INTRAVENOUS at 02:39

## 2020-05-13 RX ADMIN — FUROSEMIDE 20 MG: 10 INJECTION, SOLUTION INTRAMUSCULAR; INTRAVENOUS at 05:49

## 2020-05-13 RX ADMIN — LACTULOSE 20 G: 20 SOLUTION ORAL at 09:02

## 2020-05-13 RX ADMIN — MULTIVITAMIN 15 ML: LIQUID ORAL at 09:02

## 2020-05-13 RX ADMIN — INSULIN DETEMIR 15 UNITS: 100 INJECTION, SOLUTION SUBCUTANEOUS at 09:02

## 2020-05-13 RX ADMIN — SPIRONOLACTONE 25 MG: 25 TABLET ORAL at 09:02

## 2020-05-13 RX ADMIN — FOLIC ACID 1 MG: 1 TABLET ORAL at 09:02

## 2020-05-13 RX ADMIN — PROPRANOLOL HYDROCHLORIDE 10 MG: 20 TABLET ORAL at 05:49

## 2020-05-13 RX ADMIN — THIAMINE HCL TAB 100 MG 100 MG: 100 TAB at 09:02

## 2020-05-13 RX ADMIN — Medication 50 MG: at 09:02

## 2020-05-13 NOTE — PROGRESS NOTES
Case Management Discharge Note      Final Note: Received a call from PT services who have worked with patient and recommend a Rolling Walker for home.  Spoke with patient at bedside regarding discharge plan who advises that he called his family earlier today and they are already downstairs waiting.  Discussed PT recommendation for RW, patient is agreeable but wants whatever company can get it to him fast.  Patient is agreeable to using [facility].  No other discharge needs verbalized.  Called [facility] and spoke to Corinne who will deliver walker to patient room.  Received call from  Pharmacy advising that Rifaxamin is not covered under patient formulary and the out of pocket cost to patient would be $2418.26.  RN notified who will page physician for alternative if available.  Patient plan is to discharge home today via car with family to transport.           Destination      No service has been selected for the patient.      Durable Medical Equipment - Selection Complete      Service Provider Request Status Selected Services Address Phone Number Fax Number    [facility] Selected Durable Medical Equipment [address] [address] [phone]      Dialysis/Infusion      No service has been selected for the patient.      Home Medical Care      No service has been selected for the patient.      Therapy      No service has been selected for the patient.      Community Resources      No service has been selected for the patient.             Final Discharge Disposition Code: 01 - home or self-care

## 2020-05-13 NOTE — PROGRESS NOTES
"Adult Nutrition  Assessment/PES    Patient Name:  John Varma  YOB: 1995  MRN: [id number]  Admit Date:  4/23/2020    Assessment Date:  5/13/2020    Comments:      Reason for Assessment     Row Name 05/13/20 0830          Reason for Assessment    Reason For Assessment  follow-up protocol 15\"           Anthropometrics     Row Name 05/13/20 0500          Anthropometrics    Weight  82.6 kg (182 lb 1.6 oz)               Nutrition Prescription Ordered     Row Name 05/13/20 0830          Nutrition Prescription PO    Current PO Diet  Regular     Common Modifiers  Low Sodium     Low Sodium Details  2,000 mg Sodium                 Problem/Interventions:  Problem 1     Row Name 05/13/20 0830          Nutrition Diagnoses Problem 1    Problem 1  Inadequate Intake/Infusion     Inadequate Intake Type  Enteral     Macronutrient  Kcal;Protein     Micronutrient  Vitamin;Mineral     Etiology (related to)  Medical Diagnosis     Gastrointestinal  Gastrointestinal bleed;Esophageal varices decompensated cirrhosis     Percent (%) of EN goal  0 %     Percent (%) of  PN goal  24 %     Reported/Observed By  RN     Resolved?  Yes NO LONGER ON EN         Problem 2     Row Name 05/13/20 0831          Nutrition Diagnoses Problem 2    Problem 2  Increased Nutrient Needs     Micronutrient  Vitamin;Mineral;Other (comment) thiamine, folic acid, zinc     Etiology (related to)  Medical Diagnosis     Hepatic  Cirrhosis;Hepatitis     Substance Use  ETOH;Drug/illicit/recreational     Signs/Symptoms (evidenced by)  Other (comment);Biochemical substance w/d, jaundice, ascites     Labs Reviewed  Done     Resolved?  Yes         Problem 3     Row Name 05/13/20 0831          Nutrition Diagnoses Problem 3    Problem 3  Nutrition Appropriate for Condition at this Time     Signs/Symptoms (evidenced by)  PO Intake     Percent (%) intake recorded  67 %     Over number of meals  3                 Education/Evaluation     Row Name 05/13/20 0831    "       Monitor/Evaluation    Monitor  Per protocol           Electronically signed by:  Tammi Gerard RD  05/13/20 08:32

## 2020-05-13 NOTE — PLAN OF CARE
Problem: Patient Care Overview  Goal: Plan of Care Review  Outcome: Ongoing (interventions implemented as appropriate)  Flowsheets (Taken 5/13/2020 7621)  Progress: improving  Plan of Care Reviewed With: patient  Outcome Summary: Pt A/Ox4; Tolerated diet well; CIWA=0; Cont on ABX therapy without adverse reaction noted; No acute distress noted during the night; VSS; Will cont with plan of care

## 2020-05-13 NOTE — DISCHARGE SUMMARY
[facility] Medicine Services  DISCHARGE SUMMARY    Patient Name: John Varma  : 1995  MRN: [id number]    Date of Admission: 2020  9:28 PM  Date of Discharge:  2020    Primary Care Physician: Provider, No Known    Consults     Date and Time Order Name Status Description    2020 0939 Inpatient Infectious Diseases Consult Completed     2020 1304 Inpatient Diagnostic Radiology Consult      2020 0030 Inpatient Gastroenterology Consult Completed     2020 2308 Inpatient Gastroenterology Consult            Hospital Course     Presenting Problem:   Acute upper GI bleeding [K92.2]    Active Hospital Problems    Diagnosis  POA   • Tobacco abuse [Z72.0]  Yes   • S/P TIPS (transjugular intrahepatic portosystemic shunt) 2020 [Z95.828]  Not Applicable      Resolved Hospital Problems    Diagnosis Date Resolved POA   • **Acute variceal GI bleeding [K92.2] 2020 Yes   • SBP (spontaneous bacterial peritonitis) (CMS/HCC) [K65.2] 2020 Yes   • Acute blood loss anemia due to variceal UGIB [D62] 2020 Yes   • Sepsis with acute renal failure without septic shock due to SBP (CMS/HCC) [A41.9, R65.20, N17.9] 2020 Yes   • Acute respiratory failure with hypoxia. Intubated - and - (CMS/HCC) [J96.01] 2020 Yes   • Alcohol withdrawal delirium (CMS/HCC) [F10.231] 2020 Yes   • Hepatic encephalopathy (CMS/HCC) [K72.90] 2020 Yes   • Ascites due to alcoholic hepatitis. S/P paracentesis , , and  [K70.11] 2020 Yes   • Pleural effusion on left [J90] 2020 Yes   • Acute alcoholic hepatitis [K70.10] 2020 Yes   • BORIS (acute kidney injury) (CMS/HCC) [N17.9] 2020 Yes          Hospital Course:  John Varma is a 24 y.o. male alcoholic admitted to [facility] 20 with acute ETOH hepatitis ([facility] and was not candidate for steroids with MDF>50), decompensated  cirrhosis, UGI gastric and esophageal variceal bleed (s/p EGD 4/24 and TIPS 4/29), and BORIS due to ATN of acute blood loss anemia.       Patient was transfused total 3 units pRBC for presentation with ABL anemia.  He was intubated for his 4/24 for EGD and extubated 4/27.  He is now is s/p 4/29 TIPS procedure for which he was intubated overnight after TIPS and extubated 5/1 again.  Rudi has been followed by GI and initiated on appropriate Lasix/aldactone/propranolol/rifaximin/lactulose therapy for his cirrhosis and associated hepatic encephalopathy which persists but is slowly improving.  He underewent 5L paracentesis on admission date 4/23 without evidence of SBP but due to ongoing fever and persistent marked leukocytosis tap was repeated day after his TIPS on 4/30 of 2.5L showing his sepsis presumed to be due to SBP based on the 4/30/20 paracentesis cell counts (no culture was resulted unfortunately).  The most recent 5/8 1L paracentesis findings show improvement on current abx regimen, while his leukocytosis persists it is markedly improved.  ID followed for abx management for which he has now completed full 7 day course.     His goal for discharge is home despite qualifying for inpt physical rehab.  He and his father currently live with his grandfather and grandmother.  Patient's father also struggles with long-standing alcoholism and presents a high risk home environment for ETOH relapse after discharge.  I have spoken directly with patient's grandfather (a recovered alcoholic, sober x18yrs) who wants to be a support system for patient upon return home. Johana has been followed by the inpatient Addiction Medicine Team and received resources for ongoing sober living.       Day of Discharge     HPI:   Again more alert and looks overall improved.  Up to chair.  A,bulates in room but remains overall weakened from hospitalizations.  Agreeable to [facility] for home.  Spent time again today discussing importance of  ETOH cessation and patient still very empowered currently and wants to remain sober.      Review of Systems  Gen- No fevers, chills, HA, uncontrolled pain  CV- No chest pain, palpitations, new edema  Resp- No cough, (+) mild HARDY  GI- No N/V/D, abd pain, constipation  Skin - No rash    Vital Signs:   Temp:  [98.1 °F (36.7 °C)-98.9 °F (37.2 °C)] 98.8 °F (37.1 °C)  Heart Rate:  [] 92  Resp:  [20-22] 20  BP: (103-141)/(52-89) 118/69     Physical Exam:  Constitutional: No acute distress, awake, alert, nontoxic, chronically ill appearing for his young age, up to chair  Respiratory: Clear to auscultation bilaterally, weak effort while asleep, nonlabored respirations   Cardiovascular: RRR, no murmur  Gastrointestinal: No TTP, distended but not tense  Musculoskeletal: 1+ pitting peripheral edema, normal muscle tone for age  Psych: Alert, oriented x3  Skin: (+) improved jaundice, no petechiae    Pertinent  and/or Most Recent Results     Results from last 7 days   Lab Units 05/12/20  0537 05/11/20  1131 05/11/20  0444 05/10/20  0521 05/09/20  0459 05/08/20  0429 05/07/20  1202 05/07/20  0639 05/07/20  0520   WBC 10*3/mm3  --   --  29.78* 31.32* 28.99* 33.29*  --   --  45.78*   HEMOGLOBIN g/dL  --   --  8.8* 9.7* 8.5* 9.3*  --   --  10.5*   HEMATOCRIT %  --   --  26.8* 31.8* 27.4* 29.5*  --   --  34.0*   PLATELETS 10*3/mm3  --   --  305 282 257 308  --   --  316   SODIUM mmol/L 136 135*  --  137 138 141 143 143  --    POTASSIUM mmol/L 4.2 4.0  --  4.1 4.0 3.9 4.7 4.3  --    CHLORIDE mmol/L 105 105  --  106 108* 108* 107 108*  --    CO2 mmol/L 21.0* 21.0*  --  20.0* 20.0* 21.0* 21.0* 22.0  --    BUN mg/dL 18 18  --  17 16 25* 34* 37*  --    CREATININE mg/dL 0.24* 0.30*  --  0.31* 0.25* 0.40* 0.57* 0.63*  --    GLUCOSE mg/dL 79 127*  --  110* 145* 145* 96 97  --    CALCIUM mg/dL 7.7* 7.4*  --  7.5* 7.9* 7.8* 8.1* 8.2*  --      Results from last 7 days   Lab Units 05/11/20  1131 05/10/20  0521 05/09/20  0459 05/08/20  0429  05/07/20  1202 05/07/20  0639 05/07/20  0520   BILIRUBIN mg/dL 4.3* 4.6* 5.0* 5.9* 7.2* 6.8*  --    ALK PHOS U/L 205* 134* 93 107 165* 170*  --    ALT (SGPT) U/L 61* 57* 52* 60* 66* 71*  --    AST (SGOT) U/L 101* 98* 73* 78* 100* 108*  --    PROTIME Seconds  --   --  23.8* 22.0*  --   --  20.5*   INR   --   --  2.16* 1.96*  --   --  1.79*     Results from last 7 days   Lab Units 05/08/20  0429 05/07/20  1202   CHOLESTEROL mg/dL  --  77   TRIGLYCERIDES mg/dL 117  --      Results from last 7 days   Lab Units 05/09/20  0459 05/08/20  0429 05/07/20  1202 05/07/20  0639   PROCALCITONIN ng/mL 2.10* 4.61*  --  6.85*   LACTATE mmol/L 1.5  --  2.4*  --        Brief Urine Lab Results  (Last result in the past 365 days)      Color   Clarity   Blood   Leuk Est   Nitrite   Protein   CREAT   Urine HCG        05/07/20 1213 Dark Yellow Cloudy Negative Negative Negative Trace               Microbiology Results Abnormal     Procedure Component Value - Date/Time    Fungus Smear - Body Fluid, Peritoneum [[id number]] Collected:  05/08/20 1520    Lab Status:  Final result Specimen:  Body Fluid from Peritoneum Updated:  05/13/20 0833     Fungal Stain No yeast or hyphal elements seen    Anaerobic Culture - Body Fluid, Peritoneum [[id number]] Collected:  05/08/20 1520    Lab Status:  Final result Specimen:  Body Fluid from Peritoneum Updated:  05/13/20 0821     Anaerobic Culture No anaerobes isolated at 5 days    Blood Culture - Blood, Wrist, Left [[id number]] Collected:  05/07/20 1202    Lab Status:  Final result Specimen:  Blood from Wrist, Left Updated:  05/12/20 1300     Blood Culture No growth at 5 days    Narrative:       Aerobic bottle only      Blood Culture - Blood, Arm, Left [[id number]] Collected:  05/07/20 1202    Lab Status:  Final result Specimen:  Blood from Arm, Left Updated:  05/12/20 1230     Blood Culture No growth at 5 days    Narrative:       Aerobic bottle only      Body Fluid Culture - Body Fluid, Peritoneum [[id number]]  Collected:  05/08/20 1520    Lab Status:  Final result Specimen:  Body Fluid from Peritoneum Updated:  05/11/20 0918     Body Fluid Culture No growth at 3 days     Gram Stain Few (2+) WBCs seen      No organisms seen    AFB Culture - Body Fluid, Peritoneum [[id number]] Collected:  05/08/20 1520    Lab Status:  Preliminary result Specimen:  Body Fluid from Peritoneum Updated:  05/09/20 1108     AFB Stain No acid fast bacilli seen on concentrated smear    Respiratory Culture - Sputum, Cough [[id number]] Collected:  05/07/20 1213    Lab Status:  Final result Specimen:  Sputum from Cough Updated:  05/09/20 1054     Respiratory Culture Light growth (2+) Normal Respiratory Jordyn     Gram Stain Many (4+) WBCs per low power field      Moderate (3+) Epithelial cells per low power field      Few (2+) Gram positive cocci in pairs and clusters    Blood Culture - Blood, Arm, Left [[id number]] Collected:  05/03/20 1421    Lab Status:  Final result Specimen:  Blood from Arm, Left Updated:  05/08/20 1515     Blood Culture No growth at 5 days    Blood Culture - Blood, Hand, Left [[id number]] Collected:  05/03/20 1421    Lab Status:  Final result Specimen:  Blood from Hand, Left Updated:  05/08/20 1515     Blood Culture No growth at 5 days    Clostridium Difficile Toxin - Stool, Per Rectum [[id number]] Collected:  05/07/20 1410    Lab Status:  Final result Specimen:  Stool from Per Rectum Updated:  05/07/20 1541    Narrative:       The following orders were created for panel order Clostridium Difficile Toxin - Stool, Per Rectum.  Procedure                               Abnormality         Status                     ---------                               -----------         ------                     Clostridium Difficile To...[[id number]]  Normal              Final result                 Please view results for these tests on the individual orders.    Clostridium Difficile Toxin, PCR - Stool, Per Rectum [[id number]]  (Normal) Collected:   05/07/20 1410    Lab Status:  Final result Specimen:  Stool from Per Rectum Updated:  05/07/20 1541     C. Difficile Toxins by PCR Not Detected    Narrative:       Performance characteristics of test not established for patients <2 years of age.  Negative for Toxigenic C. Difficile    SARS-CoV-2 PCR ([facility] IN-HOUSE PERFORMED), NP SWAB IN TRANSPORT MEDIA - Swab, Nasopharynx [[id number]]  (Normal) Collected:  05/06/20 1840    Lab Status:  Final result Specimen:  Swab from Nasopharynx Updated:  05/07/20 0033     COVID19 Not Detected    Body Fluid Culture - Body Fluid, Peritoneum [[id number]] Collected:  04/24/20 1700    Lab Status:  Final result Specimen:  Body Fluid from Peritoneum Updated:  04/30/20 0805     Body Fluid Culture No growth at 5 days    Narrative:       Aerobic bottle only      Blood Culture - Blood, Arm, Left [[id number]] Collected:  04/23/20 2152    Lab Status:  Final result Specimen:  Blood from Arm, Left Updated:  04/28/20 2215     Blood Culture No growth at 5 days    Blood Culture - Blood, Arm, Right [[id number]] Collected:  04/23/20 2147    Lab Status:  Final result Specimen:  Blood from Arm, Right Updated:  04/28/20 2215     Blood Culture No growth at 5 days    Respiratory Culture - Sputum, Cough [[id number]] Collected:  04/24/20 2049    Lab Status:  Final result Specimen:  Sputum from Cough Updated:  04/26/20 0905     Respiratory Culture Scant growth (1+) Normal Respiratory Jordyn     Gram Stain Many (4+) WBCs per low power field      Few (2+) Epithelial cells per low power field      No organisms seen    MRSA Screen, PCR (Inpatient) - Swab, Nares [[id number]]  (Abnormal) Collected:  04/25/20 0923    Lab Status:  Final result Specimen:  Swab from Nares Updated:  04/25/20 1113     MRSA PCR Positive    Urine Culture - Urine, Urine, Clean Catch [[id number]]  (Normal) Collected:  04/24/20 0033    Lab Status:  Final result Specimen:  Urine, Clean Catch Updated:  04/25/20 1030     Urine Culture No growth           Imaging Results (All)     Procedure Component Value Units Date/Time    XR Abdomen KUB [[id number]] Collected:  05/08/20 1629     Updated:  05/10/20 1140    Narrative:       EXAMINATION: XR ABDOMEN KUB - 05/08/2020     INDICATION: Evaluate Keofeed tube placement.     A41.9-Sepsis, unspecified organism; R65.20-Severe sepsis without septic  shock; K72.00-Acute and subacute hepatic failure without coma;  R09.02-Hypoxemia; N17.9-Acute kidney failure, unspecified; B17.9-Acute  viral hepatitis, unspecified; F10.10-Alcohol abuse, uncomplicated;  K92.2-Gastrointestinal hemorrhage, unspecified; R79.89-Other specified  abnormal findings of blood chemistry.     COMPARISON: 05/08/2020     FINDINGS: NG tube is seen in the distal stomach. Feeding tube has passed  through the stomach into the distal duodenum and probably lies in the  proximal jejunum. Bowel gas pattern is nonobstructive. TIPS shunt is  again noted.       Impression:       Feeding tube tip in the proximal jejunum. NG tube remains in  the distal stomach.      DICTATED:   05/08/2020  EDITED/ls :   05/09/2020      This report was finalized on 5/10/2020 11:37 AM by Dr. Moi Rich MD.       XR Abdomen KUB [[id number]] Collected:  05/08/20 1311     Updated:  05/08/20 2307    Narrative:       EXAMINATION: XR ABDOMEN KUB - 05/08/2020     INDICATION: Evaluate Keofeed tube placement.     A41.9-Sepsis, unspecified organism; R65.20-Severe sepsis without septic  shock; K72.00-Acute and subacute hepatic failure without coma;  R09.02-Hypoxemia; N17.9-Acute kidney failure, unspecified; B17.9-Acute  viral hepatitis, unspecified; F10.10-Alcohol abuse, uncomplicated;  K92.2-Gastrointestinal hemorrhage, unspecified; R79.89-Other specified  abnormal findings of blood chemistry.     COMPARISON: 05/07/2020     FINDINGS: NG tube remains in the distal stomach. Feeding tube is now  also seen in the distal stomach. The bowel gas pattern is  nonobstructive. TIPS shunt is noted.        Impression:       Feeding tube and NG tube both in the distal stomach.     DICTATED:   05/08/2020  EDITED/ls :   05/08/2020          This report was finalized on 5/8/2020 11:04 PM by Dr. Moi Rich MD.       XR Chest 1 View [[id number]] Collected:  05/08/20 0832     Updated:  05/08/20 2304    Narrative:       EXAMINATION: XR CHEST 1 VW-     INDICATION: Followup; A41.9-Sepsis, unspecified organism; R65.20-Severe  sepsis without septic shock; K72.00-Acute and subacute hepatic failure  without coma; R09.02-Hypoxemia; N17.9-Acute kidney failure, unspecified;  B17.9-Acute viral hepatitis, unspecified; F10.10-Alcohol abuse,  uncomplicated; K92.2-Gastrointestinal hemorrhage, unspecified;  R79.89-Other specified abnormal findings of blood chemistry.     COMPARISON: 05/07/2020.     FINDINGS: Right upper extremity PICC line is seen with its tip in the  distal SVC. NG tube is seen in the stomach. Heart is upper normal size.  There is a diffuse perihilar disease pattern, but this is mildly  improved from 05/07/2020 exam. Left basilar atelectasis appears improved  as well. There appear to be only minimal remaining effusions.       Impression:       Improving perihilar interstitial disease, perhaps resolving  edema, decreasing left basilar atelectasis and minimal remaining  effusions. No new chest pathology is seen.      D:  05/08/2020  E:  05/08/2020     This report was finalized on 5/8/2020 11:01 PM by Dr. Moi Rich MD.       US Paracentesis [[id number]] Collected:  05/08/20 2034    Specimen:  Body Fluid Updated:  05/08/20 2102    Narrative:       PROCEDURE: Ultrasound-guided paracentesis (bedside)     Procedural Personnel  Attending physician(s): HENRY Walker M.D.  Fellow physician(s): None  Resident physician(s): None  Advanced practice provider(s): None     Pre-procedure diagnosis: Liver disease. Ascites.  Post-procedure diagnosis: Same  Indication: Diagnostic/therapeutic  Additional clinical history: None     Complications: No  immediate complications.       Impression:          Ultrasound-guided paracentesis with drainage of 1000 mL of serous fluid.  Portion sent for requested laboratory analysis.     Plan:      Resume care by clinical team.  _______________________________________________________________     PROCEDURE SUMMARY:  - Limited ultrasound  - Ultrasound-guided paracentesis  - Additional procedure(s): None     PROCEDURE DETAILS:     Pre-procedure  Consent: Informed consent for the procedure including risks, benefits  and alternatives was obtained and time-out was performed prior to the  procedure.  Preparation: The site was prepared and draped using maximal sterile  barrier technique including cutaneous antisepsis.     Anesthesia/sedation  Level of anesthesia/sedation: No sedation     Initial abdominal ultrasound  Initial abdominal ultrasound was performed.  Findings: Small volume ascites. A safe window for paracentesis was  identified.     Paracentesis  Local anesthesia was administered. An 18-gauge Chiba needle was advanced  under ultrasound guidance into the ascites. Ascites was drained. The  needle was then removed, and a sterile bandage was applied.  Paracentesis access technique: Ultrasound-guided  Needle placed: 18-gauge Chiba     Additional Details  Additional description of procedure: None  Equipment details: None  Specimens removed: Abdominal fluid  Estimated blood loss (mL): Less than 10  Standardized report: Paracentesis     Attestation  I was present and scrubbed for the entire procedure. Imaging reviewed.  Agree with final report as written.     This report was finalized on 5/8/2020 8:37 PM by Cullen Walker.       CT Chest Without Contrast [[id number]] Collected:  05/07/20 1050     Updated:  05/07/20 2231    Narrative:       EXAMINATION: CT CHEST WO CONTRAST-, CT ABDOMEN PELVIS WO CONTRAST-  05/07/2020     INDICATION: Pneumonia complicated / unresolved; A41.9-Sepsis,  unspecified organism; R65.20-Severe sepsis  without septic shock;  K72.00-Acute and subacute hepatic failure without coma;  R09.02-Hypoxemia; N17.9-Acute kidney failure, unspecified; B17.9-Acute  viral hepatitis, unspecified; F10.10-Alcohol abuse, uncomplicated;  K92.2-Gastrointestinal hemorrhage, unspecified; R79.89-Other specified  abnormal findings     TECHNIQUE: 5 mm unenhanced images through the chest and upper abdomen     The radiation dose reduction device was turned on for each scan per the  ALARA (As Low as Reasonably Achievable) protocol.     COMPARISON: Abdominal CT scan 05/04/2020. Chest CT scan 04/24/2020     FINDINGS: Patient history indicates GI bleed, nausea and vomiting, acute  alcoholic hepatitis.     CHEST CT SCAN WITHOUT CONTRAST: As on the prior study, there are  bilateral pleural effusions, improved on the left, increased on the  right, both at least moderate in size. There is associated atelectasis  of practically all the left lower lobe, and most of the right lower  lobe. Only minor interstitial changes are seen in the upper lungs and  right middle lobe. Mediastinal window images show only trace pericardial  fluid and no evidence of significant adenopathy. PICC line is noted in  the SVC. NG tube passes into the stomach.       Impression:       Symmetric, moderate or moderate-to-large bilateral pleural  effusions, with complete left lower lobe atelectasis, and moderate right  lower lobe atelectasis. Minimal pulmonary interstitial disease  elsewhere.           CT SCAN OF THE ABDOMEN AND PELVIS WITHOUT CONTRAST: Note is made of  patient's tips shunt. Liver parenchyma appears uniform on today's  unenhanced exam. There is vicarious excretion of contrast into the  gallbladder. Spleen is not enlarged. No significant abnormalities are  seen of the pancreas, adrenal glands, or kidneys. There is mild ascites,  stable in extent from the prior exam. NG tube is seen in the distal  stomach. No free or discrete inflammatory focus is seen. Coronal  images  show a few shotty left mid abdominal small bowel mesenteric lymph nodes,  but these are markedly improved from the prior study. Bowel loops are  normal in caliber.     Regarding the lower abdomen and pelvis, there is mild to moderate  ascites. No discrete inflammatory focus is appreciated. No abnormally  dilated bowel loops or grossly abnormal appearing bowel loops are seen.  Bony structures appear intact.     IMPRESSION:      1. Moderately extensive ascites, but not increased from prior study.  2. Interval tips shunt placement.  3. Near resolution of small bowel mesenteric lymphadenopathy since prior  scan. No evidence of new intra-abdominal or intrapelvic pathology is  identified elsewhere.     D:  05/07/2020  E:  05/07/2020     This report was finalized on 5/7/2020 10:28 PM by Dr. Moi Rich MD.       CT Abdomen Pelvis Without Contrast [[id number]] Collected:  05/07/20 1050     Updated:  05/07/20 2231    Narrative:       EXAMINATION: CT CHEST WO CONTRAST-, CT ABDOMEN PELVIS WO CONTRAST-  05/07/2020     INDICATION: Pneumonia complicated / unresolved; A41.9-Sepsis,  unspecified organism; R65.20-Severe sepsis without septic shock;  K72.00-Acute and subacute hepatic failure without coma;  R09.02-Hypoxemia; N17.9-Acute kidney failure, unspecified; B17.9-Acute  viral hepatitis, unspecified; F10.10-Alcohol abuse, uncomplicated;  K92.2-Gastrointestinal hemorrhage, unspecified; R79.89-Other specified  abnormal findings     TECHNIQUE: 5 mm unenhanced images through the chest and upper abdomen     The radiation dose reduction device was turned on for each scan per the  ALARA (As Low as Reasonably Achievable) protocol.     COMPARISON: Abdominal CT scan 05/04/2020. Chest CT scan 04/24/2020     FINDINGS: Patient history indicates GI bleed, nausea and vomiting, acute  alcoholic hepatitis.     CHEST CT SCAN WITHOUT CONTRAST: As on the prior study, there are  bilateral pleural effusions, improved on the left, increased on  the  right, both at least moderate in size. There is associated atelectasis  of practically all the left lower lobe, and most of the right lower  lobe. Only minor interstitial changes are seen in the upper lungs and  right middle lobe. Mediastinal window images show only trace pericardial  fluid and no evidence of significant adenopathy. PICC line is noted in  the SVC. NG tube passes into the stomach.       Impression:       Symmetric, moderate or moderate-to-large bilateral pleural  effusions, with complete left lower lobe atelectasis, and moderate right  lower lobe atelectasis. Minimal pulmonary interstitial disease  elsewhere.           CT SCAN OF THE ABDOMEN AND PELVIS WITHOUT CONTRAST: Note is made of  patient's tips shunt. Liver parenchyma appears uniform on today's  unenhanced exam. There is vicarious excretion of contrast into the  gallbladder. Spleen is not enlarged. No significant abnormalities are  seen of the pancreas, adrenal glands, or kidneys. There is mild ascites,  stable in extent from the prior exam. NG tube is seen in the distal  stomach. No free or discrete inflammatory focus is seen. Coronal images  show a few shotty left mid abdominal small bowel mesenteric lymph nodes,  but these are markedly improved from the prior study. Bowel loops are  normal in caliber.     Regarding the lower abdomen and pelvis, there is mild to moderate  ascites. No discrete inflammatory focus is appreciated. No abnormally  dilated bowel loops or grossly abnormal appearing bowel loops are seen.  Bony structures appear intact.     IMPRESSION:      1. Moderately extensive ascites, but not increased from prior study.  2. Interval tips shunt placement.  3. Near resolution of small bowel mesenteric lymphadenopathy since prior  scan. No evidence of new intra-abdominal or intrapelvic pathology is  identified elsewhere.     D:  05/07/2020  E:  05/07/2020     This report was finalized on 5/7/2020 10:28 PM by Dr. Moi Rich,  MD.       XR Chest 1 View [[id number]] Collected:  05/07/20 0815     Updated:  05/07/20 2217    Narrative:       EXAMINATION: XR CHEST 1 VW- 05/07/2020     INDICATION: A41.9-Sepsis, unspecified organism; R65.20-Severe sepsis  without septic shock; K72.00-Acute and subacute hepatic failure without  coma; R09.02-Hypoxemia; N17.9-Acute kidney failure, unspecified;  B17.9-Acute viral hepatitis, unspecified; F10.10-Alcohol abuse,  uncomplicated; K92.2-Gastrointestinal hemorrhage, unspecified;  R79.89-Other specified abnormal findings of blood chemistry;     COMPARISON: 05/06/2020     FINDINGS: NG tube is seen in the stomach. Right upper extremity PICC  line is seen with its tip in the distal SVC. Heart is upper normal size.  There is diffuse pulmonary interstitial disease which appears increased,  perhaps interstitial edema. Focal dense opacity in the left lower lobe  is stable and may represent generalized atelectasis. There is a small  amount of intrafissural fluid on the right. No pneumothorax is seen.       Impression:       1. Increased perihilar disease that may represent interstitial edema or  ARDS.  2. Left lower lung atelectasis, probably unchanged.     D:  05/07/2020  E:  05/07/2020         This report was finalized on 5/7/2020 10:14 PM by Dr. Moi Rich MD.       FL Video Swallow With Speech Single Contrast [[id number]] Collected:  05/07/20 1505     Updated:  05/07/20 1717    Narrative:       EXAMINATION: FL VIDEO SWALLOW W SPEECH SINGLE-CONTRAST-     INDICATION: dysphagia; A41.9-Sepsis, unspecified organism; R65.20-Severe  sepsis without septic shock; K72.00-Acute and subacute hepatic failure  without coma; R09.02-Hypoxemia; N17.9-Acute kidney failure, unspecified;  B17.9-Acute viral hepatitis, unspecified; F10.10-Alcohol abuse,  uncomplicated; K92.2-Gastrointestinal hemorrhage, unspecified;  R79.89-Other specified abnormal findings of blood chemistry; K70.11     TECHNIQUE: 1 minute and 36 seconds of  fluoroscopic time was used for  this exam. 8 associated fluoroscopic loops were saved. The patient was  evaluated in the seated lateral position while taking a variety of  consistencies of barium by mouth under the direction of speech  pathology.     COMPARISON: NONE     FINDINGS: 1 minute and 36 seconds of fluoroscopy provided for a modified  barium swallow. Please see speech therapy report for full details and  recommendations.          Impression:       Fluoroscopy provided for a modified barium swallow. Please  see speech therapy report for full details and recommendations.         This report was finalized on 5/7/2020 5:13 PM by Dr. Luis Eduardo Mosley.       XR Abdomen KUB [[id number]] Collected:  05/07/20 0759     Updated:  05/07/20 1140    Narrative:       EXAMINATION: XR ABDOMEN KUB- 05/07/2020     INDICATION: Evaluate for ileus; A41.9-Sepsis, unspecified organism;  R65.20-Severe sepsis without septic shock; K72.00-Acute and subacute  hepatic failure without coma; R09.02-Hypoxemia; N17.9-Acute kidney  failure, unspecified; B17.9-Acute viral hepatitis, unspecified;  F10.10-Alcohol abuse, uncomplicated; K92.2-Gastrointestinal hemorrhage,  unspecified; R79.89-Other specified abnormal findings of blood chemistry     COMPARISON: 05/06/2020     FINDINGS: Esophagogastric tube terminates within the distal stomach  similar to prior. Nonspecific nonobstructive bowel gas pattern with  relative paucity of air throughout the bowel. Pelvic catheter in situ.            Impression:       Nonobstructive bowel gas pattern with paucity of air  throughout the visualized bowel loops.     This report was finalized on 5/7/2020 11:37 AM by Dr. Luis Eduardo Mosley.       XR Abdomen KUB [[id number]] Collected:  05/06/20 0817     Updated:  05/06/20 2127    Narrative:       EXAMINATION: XR ABDOMEN KUB-     INDICATION: Rule out ileus/obstruction; A41.9-Sepsis, unspecified  organism; R65.20-Severe sepsis without septic shock; K72.00-Acute and  subacute  hepatic failure without coma; R09.02-Hypoxemia; N17.9-Acute  kidney failure, unspecified; B17.9-Acute viral hepatitis, unspecified;  F10.10-Alcohol abuse, uncomplicated; K92.2-Gastrointestinal hemorrhage,  unspecified.      COMPARISON: 04/28/2020.     FINDINGS: Stent now seen in the right upper quadrant presumably  represents patient's previous TIPS procedure. NG tube is seen in the  distal stomach. Bowel gas pattern is nonobstructive overall, but there  is borderline dilatation of the transverse colon with air.       Impression:       NG tube in the distal stomach. Borderline dilatation of the  transverse colon with air but no evidence of dilated bowel elsewhere to  suggest significant ileus or obstruction.     D:  05/06/2020  E:  05/06/2020         This report was finalized on 5/6/2020 9:24 PM by Dr. Moi Rich MD.       XR Chest 1 View [[id number]] Collected:  05/06/20 0809     Updated:  05/06/20 1606    Narrative:       EXAMINATION: XR CHEST 1 VW-      INDICATION: Followup possible aspiration; A41.9-Sepsis, unspecified  organism; R65.20-Severe sepsis without septic shock; K72.00-Acute and  subacute hepatic failure without coma; R09.02-Hypoxemia; N17.9-Acute  kidney failure, unspecified; B17.9-Acute viral hepatitis, unspecified;  F10.10-Alcohol abuse, uncomplicated; K92.2-Gastrointestinal hemorrhage,  unspecified.     COMPARISON: 05/05/2020     FINDINGS: Portable chest reveals ill-defined opacification again seen at  the lung bases bilaterally. Increased markings seen within the right  midlung. Lines and tubes are in satisfactory position. Small bilateral  pleural effusions. Bony structures are unremarkable.       Impression:       Bilateral pleural effusions with patchy airspace disease  seen within the right mid and left lower lung field. Lines and tubes in  satisfactory position.     D:  05/06/2020  E:  05/06/2020     This report was finalized on 5/6/2020 4:02 PM by Dr. Carmen Morales MD.       XR Chest 1  View [[id number]] Collected:  05/05/20 1234     Updated:  05/05/20 1431    Narrative:       EXAMINATION: XR CHEST 1 VW-05/05/2020:      INDICATION: R/o aspiration; A41.9-Sepsis, unspecified organism;  R65.20-Severe sepsis without septic shock; K72.00-Acute and subacute  hepatic failure without coma; R09.02-Hypoxemia; N17.9-Acute kidney  failure, unspecified; B17.9-Acute viral hepatitis, unspecified;  F10.10-Alcohol abuse, uncomplicated; K92.2-Gastrointestinal hemorrhage,  unspecified; R79.89-Other specified abnormal findings of blood  chemistry; K.      COMPARISON: 05/03/2020.     FINDINGS: Support hardware unchanged and in grossly satisfactory  positioning. Cardiac silhouette remains enlarged with increased central  pulmonary vascularity and trace bilateral pleural effusions, left  greater than right, with improved aeration at the lung bases from likely  decreased atelectasis versus decreased effusions or layering component.  No new consolidation or pulmonary parenchymal involvement.           Impression:       Support hardware unchanged and in grossly satisfactory  positioning. Cardiac silhouette remains enlarged with increased central  pulmonary vascularity and trace bilateral pleural effusions, left  greater than right, with improved aeration at the lung bases from likely  decreased atelectasis versus decreased effusions or layering component.  No new consolidation or pulmonary parenchymal involvement.         D:  05/05/2020  E:  05/05/2020     This report was finalized on 5/5/2020 2:28 PM by Dr. Luis Eduardo Mosley.       CT Abdomen With & Without Contrast [[id number]] Collected:  05/05/20 0852     Updated:  05/05/20 1430    Narrative:       EXAMINATION: CT ABDOMEN W WO CONTRAST-05/04/2020:      INDICATION: Abnormal liver function tests (LFTs); A41.9-Sepsis,  unspecified organism; R65.20-Severe sepsis without septic shock;  K72.00-Acute and subacute hepatic failure without coma;  R09.02-Hypoxemia; N17.9-Acute kidney  failure, unspecified; B17.9-Acute  viral hepatitis, unspecified; F10.10-Alcohol abuse, uncomplicated;  K92.2-Gastrointestinal hemorrhage, unspecified; R79.89-Other specified  abnormal findings.      TECHNIQUE: CT abdomen with and without intravenous contrast.     The radiation dose reduction device was turned on for each scan per the  ALARA (As Low as Reasonably Achievable) protocol.     COMPARISON: CT dated 04/24/2020.     FINDINGS: The lung bases demonstrate small-to-moderate bilateral pleural  effusions with adjacent atelectasis. The liver demonstrates diffuse  abnormal signal and perfusional characteristics with periportal low  signal concerning for periportal edema with noted tips in place. Area of  low signal seen on prior CT within segment IVB is identified but  partially obscured due to perfusional findings measuring up to 3.27 cm  with arterial hyperenhancement and washout along with likely  pseudocapsule appearance on delayed portal venous phase and further  delayed imaging concerning for HCC. TIPS appears grossly patent on  limited evaluation. Perihepatic ascites along with mesenteric edema. The  spleen measures top normal for size at 13 cm. Adrenals without distinct  nodule. Kidneys without hydronephrosis or hydroureter. Pancreas  unremarkable. Esophagogastric tube terminates within the duodenal bulb.  Diffuse body wall edema. The visualized GI tract grossly unremarkable  without mechanical obstructive findings. No aggressive osseous findings.       Impression:       1. Diffusely abnormal appearance of the hepatic parenchyma status post  TIPS likely perfusional changes along with low signal surrounding the  portal veins consistent with periportal edema.  2. 3.2 cm focus in segment IVB has at least partial imaging  characteristics of involvement for concern for HCC although recommend  followup given limited evaluation of perfusional changes and therefore  attenuation changes on the current exam.  3.  Perihepatic and perisplenic ascites with top normal size for spleen  at 13 cm.  4. Small-to-moderate bilateral pleural effusions.     D:  05/05/2020  E:  05/05/2020           This report was finalized on 5/5/2020 2:27 PM by Dr. Luis Eduardo Mosley.       CT Guided Paracentesis [[id number]] Collected:  05/04/20 1550     Updated:  05/04/20 1610    Narrative:       PROCEDURE: CT-guided paracentesis (Aborted)     Procedural Personnel  Attending physician(s): HENRY Walker M.D.  Fellow physician(s): None  Resident physician(s): None  Advanced practice provider(s): None     Pre-procedure diagnosis: Decompensated cirrhosis  Post-procedure diagnosis: Same  Indication: Diagnostic  Additional clinical history: None     Complications: No immediate complications.       Impression:          CT-guided paracentesis (aborted)      Plan:      Consider reattempt diagnostic paracentesis once there is increased  amount of fluid  _______________________________________________________________     PROCEDURE SUMMARY:  - Limited CT  - CT-guided paracentesis  - Additional procedure(s): None     PROCEDURE DETAILS:     Pre-procedure  Consent: Informed consent for the procedure including risks, benefits  and alternatives was obtained and time-out was performed prior to the  procedure.  Preparation: The site was prepared and draped using maximal sterile  barrier technique including cutaneous antisepsis.     Anesthesia/sedation  Level of anesthesia/sedation: None     Initial abdominal CT  Initial abdominal CT was performed.  Findings: Tiny volume ascites. A safe window for paracentesis was  identified.     Paracentesis  Local anesthesia was administered. Giving constraints of no CT  fluoroscopy and patient inability to move arms from side -no safe way to  access minimal perihepatic fluid. Procedure aborted.     Radiation Dose  CT dose length product (mGy-cm): 1534      Additional Details  Additional description of procedure: None  Equipment details:  None  Specimens removed: None. Procedure aborted.  Estimated blood loss (mL): Less than 10  Standardized report: Paracentesis     Attestation  I was present and scrubbed for the entire procedure. Imaging reviewed.  Agree with final report as written.     This report was finalized on 5/4/2020 3:53 PM by Cullen Walker.       XR Chest 1 View [[id number]] Collected:  05/03/20 0230     Updated:  05/03/20 0232    Narrative:       CR Chest 1 Vw    INDICATION:   Respiratory distress and sepsis.     COMPARISON:    4/30/2020    FINDINGS:  Single portable AP view(s) of the chest.    Endotracheal tube has been removed. Right arm PICC is in the SVC. Tip of NG tube is below the diaphragms. There are small bilateral pleural effusions that are slightly worsened. Continued infiltrates noted in the mid to lower lungs, relatively stable. No  pneumothorax. Heart is enlarged.       Impression:         1. Interval extubation.  2. Persistent bilateral infiltrates with slight worsening of bilateral pleural effusions.  3. Stable cardiac enlargement.    Signer Name: Christian Villa MD   Signed: 5/3/2020 2:30 AM   Workstation Name: [email or web address]    [facility]    XR Chest 1 View [[id number]] Collected:  04/30/20 0838     Updated:  04/30/20 2122    Narrative:       EXAMINATION: XR CHEST 1 VW-04/30/2020:     INDICATION: Intubated, poss. aspiration; A41.9-Sepsis, unspecified  organism; R65.20-Severe sepsis without septic shock; K72.00-Acute and  subacute hepatic failure without coma; R09.02-Hypoxemia; N17.9-Acute  kidney failure, unspecified; B17.9-Acute viral hepatitis, unspecified;  F10.10-Alcohol abuse, uncomplicated; K92.2-Gastrointestinal hemorrhage,  unspecified; R79.89-Other specified abnormal findings of blood.     COMPARISON: 04/29/2020.     FINDINGS: Right upper extremity PICC line is seen with its tip in the  distal SVC. The ET tube and NG tube appear to be in good position. The  heart is upper normal size. The  vasculature is cephalized and there is  mild residual perihilar interstitial disease, which appears improved  from yesterday's study. There is again a small left pleural effusion.  There is probably some focal atelectasis in the medial left lung base,  but obscured by the left heart shadow.       Impression:       1.  Mildly improved perihilar interstitial disease, whether interstitial  edema or mild changes of ARDS.  2.  Very small left pleural effusion, and focal left basilar atelectasis  unchanged.  3.  No evidence of pneumothorax or other new chest pathology.     D:  04/30/2020  E:  04/30/2020            This report was finalized on 4/30/2020 9:19 PM by Dr. Moi Rich MD.       XR Chest 1 View [[id number]] Collected:  04/29/20 1513     Updated:  04/30/20 1038    Narrative:       EXAMINATION: XR CHEST 1 VW-      INDICATION: PICC placement; A41.9-Sepsis, unspecified organism;  R65.20-Severe sepsis without septic shock; K72.00-Acute and subacute  hepatic failure without coma; R09.02-Hypoxemia; N17.9-Acute kidney  failure, unspecified; B17.9-Acute viral hepatitis, unspecified;  F10.10-Alcohol abuse, uncomplicated; K92.2-Gastrointestinal hemorrhage,  unspecified; R79.89-Other specified abnormal findings of blood  chemistry.     COMPARISON: 04/28/2020.     FINDINGS: Portable chest reveals heart to be enlarged. Increased  pulmonary vascularity bilaterally with bilateral pleural effusions. The  lines and tubes are in satisfactory position with nasogastric tube tip  below the level of the diaphragm. There is an endotracheal tube placed  in the interval with tip in satisfactory position above the antonina. PICC  line catheter identified on the right. Tip in the SVC.           Impression:       Right PICC line catheter tip in the SVC. Placement of an  endotracheal tube in satisfactory position above the antonina. Nasogastric  tube with tip below the diaphragm. The appearance of the chest is stable  with bilateral pleural  effusions and increased pulmonary vascularity  bilaterally.     D:  04/29/2020  E:  04/29/2020     This report was finalized on 4/30/2020 10:35 AM by Dr. Carmen Morales MD.       IR Transjugular Intrahepatic Portosystemic Shunt [[id number]] Collected:  04/29/20 1509     Updated:  04/29/20 1531    Narrative:       PROCEDURE: Transjugular Intrahepatic Portosystemic Shunt (TIPS)     Procedural Personnel  Attending physician(s): HENRY Walker M.D.  Fellow physician(s): None  Resident physician(s): None  Advanced practice provider(s): None     Pre-procedure diagnosis: Alcohol-related cirrhosis. Portal hypertension.  Variceal bleed. High risk for repeat variceal bleed status post medical  management.  Post-procedure diagnosis: Same  Indication: Decrease risk of portal hypertensive bleed  Additional clinical history: None     Complications: No immediate complications.       Impression:          TIPS placement with reduction of portosystemic gradient from 16 mmHg to  6 mmHg.      Plan:      Ultrasound evaluation of TIPS in approximately 2 weeks.   _______________________________________________________________     PROCEDURE SUMMARY:  - Venous access with ultrasound guidance  - Transjugular intrahepatic portosystemic stent placement  - Portosystemic pressure measurements  - Additional procedure(s): None     PROCEDURE DETAILS:     Pre-procedure  Consent: Informed consent for the procedure including risks, benefits  and alternatives was obtained and time-out was performed prior to the  procedure.  Preparation: The site was prepared and draped using maximal sterile  barrier technique including cutaneous antisepsis.     Anesthesia/sedation  Level of anesthesia/sedation: General anesthesia  Anesthesia/sedation administered by: Anesthesiology     Access  The access vein was sonographically evaluated and determined to be  patent. Real time ultrasound was used to visualize needle entry into the  vessel and a permanent image  obtained. A 10 sheath was placed.  Vein accessed: Right internal jugular vein  Access technique: Micropuncture technique under ultrasound guidance     Venography  Vein catheterized: Right hepatic vein  Indication for venography: Document catheter position  Findings: Confirmed position within right hepatic vein.    Wedged pressure obtained equaling 30 mmHg  Entry right atrial pressure obtained 14 mmHg     Indirect portography  Vein catheterized: Right hepatic vein   Portography technique: Wedge   Portography contrast agent: CO2  Findings: Portal vein patent      TIPS  Attempts were made to pass the Colapinto needle from the right/middle  hepatic vein (Colapinto needle would slip into the middle hepatic vein  occasionally. Therefore origin vein may be right or middle hepatic vein)  into a right side portal vein branch under fluoroscopic guidance.  Initially unsuccessful. The right hemiabdomen was prepped and draped in  the usual sterile manner. A 22-gauge Chiba needle was advanced under  fluoroscopic guidance into a branch right portal vein. A 0.018 inch  guidewire advanced. The needle exchanged for the inner dilator of  AccuStick set. Wire removed. Contrast study performed demonstrating  hepatofugal flow. Wire replaced. Wire was now targeted for placement of  TIPS. Colapinto needle advanced and wire removed. Could not aspirate  blood. A 0.035 inch Glidewire was advanced. The Colapinto needle  exchanged for the 5 Yemeni MPB catheter. Contrast study through the MPB  catheter demonstrates location within the main portal vein. Hepatofugal  flow confirmed. A 0.035 inch stiff Amplatz wire was advanced. MPB  catheter removed. Tract dilation performed with 6 mm x 4 cm ev3  EverCross semi-compliant balloon catheter. Contrast study performed  through the 10 Yemeni sheath near the confluence of the IVC and hepatic  vein. 10 Yemeni sheath advanced with inner dilator into the main portal  vein. Direct portal pressure obtained  equaling 30 mmHg. Direct portal  venography was performed. The TIPS was deployed. Post TIPS placement  balloon plasty performed using 10 mm x 4 cm ev3 EverCross semi-compliant  balloon catheter. Pressure measurements and portal venography were  repeated. 10 Wolof sheath removed and manual pressure held to  hemostasis. Occlusive dressing applied.     Inner dilator of AccuStick sheath removed from liver with placement of  Gelfoam pledget ancillary and tract. Occlusive dressing applied.  Access needle: 16-gauge Colapinto needle  Portal vein accessed: Right main  TIPS device: [place] Viatorr TIPS endoprosthesis. 8 to 10 mm by 10 cm (8 cm  covered, 2 cm uncovered).   TIPS diameter (mm): 10  TIPS covered length (mm): 80  TIPS uncovered length (mm): 20  Balloon dilation of TIPS (mm): 10  Findings: Excellent flow of contrast through TIPS stent     Pressure measurements  Pressure measurements were obtained before and after TIPS placement.  Mean portal pressure pre-TIPS (mmHg): 30  Mean right atrial pressure pre-TIPS (mmHg): 14  Mean portal pressure post-TIPS (mmHg): 20  Mean right atrial pressure post-TIPS (mmHg): 14      Closure  The sheath was removed and hemostasis was achieved with manual  compression. A sterile bandage was applied.  Central venous catheter placed: Not applicable     Contrast  Contrast agent: Isovue-300  Contrast volume (mL): 87     Radiation Dose  Fluoroscopy time: 38.5 minutes  Dose: 1544 mGy     Additional Details  Additional description of procedure: None  Equipment details: None  Specimens removed: None  Estimated blood loss (mL): Less than 10  Standardized report: TIPS     Attestation  I was present and scrubbed for the entire procedure. Imaging reviewed.  Agree with final report as written.     This report was finalized on 4/29/2020 3:27 PM by Cullen Walker.        Liver [[id number]] Collected:  04/28/20 1843     Updated:  04/28/20 1900    Narrative:       EXAMINATION: US LIVER-04/28/2020:      INDICATION: TIPS planning.  Evaluate for HCC, portal vein and hepatic  vein patency; A41.9-Sepsis, unspecified organism; R65.20-Severe sepsis  without septic shock; K72.00-Acute and subacute hepatic failure without  coma; R09.02-Hypoxemia; N17.9-Acute kidney failure, unspecified;  B17.9-Acute viral hepatitis, unspecified; F10.10-Alcohol abuse,  uncomplicated; K92.2-Gastrointestinal hemorrhage, unspecified; R79.89.      TECHNIQUE: Ultrasound of the right upper quadrant abdomen.     COMPARISON: NONE.     FINDINGS: The visualized portions of the pancreas are within normal  limits.     The liver demonstrates diffusely increased echogenicity with  hepatomegaly demonstrating a 3.2 cm hyperechoic area of likely focal  fatty infiltration adjacent to the gallbladder fossa without significant  internal flow or complex features.     Trace perihepatic ascites.     The gallbladder contains sludge and debris in the dependent portion  without shadowing calculus of cholelithiasis. No gallbladder wall  thickening or pericholecystic fluid.     The common bile duct measures 5 mm in diameter at the level of the guille  hepatis within normal limits.     The right kidney measures 11 cm in length without evidence of  hydronephrosis, contour deforming mass or obvious calculi.       Impression:       Hepatomegaly with diffusely increased echogenicity of  hepatic parenchymal disease process such as hepatic steatosis. Area of  hyperechogenicity adjacent to the gallbladder may represent focal fatty  infiltration, however with shadowing associated, concern for hemangioma  or mass lesion alternatively considered with dedicated liver protocol CT  or MRI considered for further evaluation. Trace perihepatic ascites.      D:  04/28/2020  E:  04/28/2020     This report was finalized on 4/28/2020 6:57 PM by Dr. Luis Eduardo Mosley.       XR Chest 1 View [[id number]] Collected:  04/28/20 1530     Updated:  04/28/20 1859    Narrative:       EXAMINATION: XR  CHEST 1 VW-04/28/2020:      INDICATION: Diminished lung sounds; A41.9-Sepsis, unspecified organism;  R65.20-Severe sepsis without septic shock; K72.00-Acute and subacute  hepatic failure without coma; R09.02-Hypoxemia; N17.9-Acute kidney  failure, unspecified; B17.9-Acute viral hepatitis, unspecified;  F10.10-Alcohol abuse, uncomplicated; K92.2-Gastrointestinal hemorrhage,  unspecified; R79.89-Other specified abnormal findings of blood chem.      COMPARISON: Chest x-ray 04/25/2020.     FINDINGS: Support hardware evaluation demonstrates interval extubation  from prior comparison with interval removal of right internal jugular  central venous catheter and placement of a right arm PICC now noted  terminating within the distal SVC. Cardiac silhouette enlarged with  increasing right layering pleural effusion of likely small-to-moderate  involvement right and small left effusion with adjacent opacifications  of atelectasis versus airspace disease, and central pulmonary  vascularity increased.       Impression:       Interval extubation and removal of central venous catheter  right internal jugular approach with placement of right arm PICC  terminating in the distal SVC. No postprocedural pneumothorax, however,  increased bibasilar opacifications and effusions now small-to-moderate  volume right and likely small left effusions with adjacent bibasilar  opacifications, right greater than left, of atelectasis versus airspace  disease.     D:  04/28/2020  E:  04/28/2020     This report was finalized on 4/28/2020 6:56 PM by Dr. Luis Eduardo Mosley.       XR Abdomen KUB [[id number]] Collected:  04/27/20 1817     Updated:  04/28/20 1022    Narrative:       EXAMINATION: XR ABDOMEN KUB- 04/27/2020     INDICATION: NG placement confirmation; A41.9-Sepsis, unspecified  organism; R65.20-Severe sepsis without septic shock; K72.00-Acute and  subacute hepatic failure without coma; R09.02-Hypoxemia; N17.9-Acute  kidney failure, unspecified;  B17.9-Acute viral hepatitis, unspecified;  F10.10-Alcohol abuse, uncomplicated; K92.2-Gastrointestinal hemorrhage,  unspecified; R79.89-Other specified abnormal findings of blood c     COMPARISON: KUB 04/24/2020     FINDINGS: NG tube is seen in the distal stomach. There is mild  distention of the transverse colon with gas. No abnormally dilated bowel  loops are appreciated elsewhere in the upper abdomen.       Impression:       1. NG tube in the distal stomach.  2. Mild gaseous distention of the transverse colon.     D:  04/27/2020  E:  04/27/2020         This report was finalized on 4/28/2020 10:19 AM by Dr. Moi Rich MD.       XR Abdomen KUB [[id number]] Collected:  04/28/20 0525     Updated:  04/28/20 0527    Narrative:       ABDOMEN, 4/28/2020 (05:01)    HISTORY:    NG tube placement.      TECHNIQUE:  AP portable radiograph of the upper abdomen.      FINDINGS:  Newly placed NG tube in good position within the distal portion of the stomach.      Signer Name: Lamine Hirsch MD   Signed: 4/28/2020 5:25 AM   Workstation Name: [email or web address]    [facility]    XR Chest 1 View [[id number]] Collected:  04/25/20 1945     Updated:  04/25/20 1947    Narrative:       CR Chest 1 Vw    INDICATION:   Status post right IJ line placement.     COMPARISON:    Same date at 2:51 AM    FINDINGS:  Single portable AP view(s) of the chest.    The endotracheal tube and enteric tube remain in place. Interval placement of right IJ line. The tip overlies the expected location of the right atrium. No pneumothorax.    The heart and mediastinal contours are normal. Bibasilar atelectatic/infiltrative change. No pleural fluid. No pneumothorax.      Impression:       Right IJ line demonstrated with tip overlying the expected location of the right atrium. No pneumothorax.    Signer Name: Rigoberto Love MD   Signed: 4/25/2020 7:45 PM   Workstation Name: [email or web address]    [facility]    XR Chest 1 View  [[id number]] Collected:  04/25/20 0818     Updated:  04/25/20 1251    Narrative:          EXAMINATION: XR CHEST, SINGLE VIEW - 04/25/2020     INDICATION: Respiratory failure, hypoxia;      A41.9-Sepsis, unspecified organism; R65.20-Severe sepsis without septic  shock; K72.00-Acute and subacute hepatic failure without coma;  R09.02-Hypoxemia; N17.9-Acute kidney failure, unspecified; B17.9-Acute  viral hepatitis, unspecified; F10.10-Alcohol abuse, uncomplicated;  K92.2-Gastrointestinal hemorrhage, unspecified; R79.89-Other specified  abnormal findings of . . .      COMPARISON: 04/24/2020     FINDINGS: Interval intubation with endotracheal tube well positioned  above the level of the antonina. Esophagogastric tube courses below the  diaphragm and out of the field of view. Cardiac silhouette borderline  enlarged with bibasilar opacifications greatest on the right with  increase from prior comparison of worsening airspace disease. Improved  aeration left lung base with stable to subtly decreased left pleural  effusion. No pneumothorax.           Impression:       Interval intubation with endotracheal tube well positioned  above the level of the antonina. Esophagogastric tube courses below the  diaphragm and out of the field of view. Cardiac silhouette borderline  enlarged with bibasilar opacifications greatest on the right with  increase from prior comparison of worsening airspace disease. Improved  aeration left lung base with stable to subtly decreased left pleural  effusion. No pneumothorax.         DICTATED:   04/25/2020  EDITED/ls :   04/25/2020      This report was finalized on 4/25/2020 12:48 PM by Dr. Luis Eduardo Mosley.       XR Abdomen KUB [[id number]] Collected:  04/25/20 0800     Updated:  04/25/20 1154    Narrative:          EXAMINATION: XR ABDOMEN KUB - 04/24/2020     INDICATION: NG placement confirmation;      A41.9-Sepsis, unspecified organism; R65.20-Severe sepsis without septic  shock; K72.00-Acute and subacute  hepatic failure without coma;  R09.02-Hypoxemia; N17.9-Acute kidney failure, unspecified; B17.9-Acute  viral hepatitis, unspecified; F10.10-Alcohol abuse, uncomplicated;  K92.2-Gastrointestinal hemorrhage, unspecified; R79.89-Other specified  abnormal findings . . .       COMPARISON: None.     FINDINGS: Nasogastric tube terminates within the distal stomach.           Impression:       Nasogastric tube terminates within the distal stomach.     DICTATED:   04/25/2020  EDITED/ls :   04/25/2020      This report was finalized on 4/25/2020 11:51 AM by Dr. Luis Eduardo Mosley.       CT Chest Without Contrast [[id number]] Collected:  04/24/20 0618     Updated:  04/24/20 0620    Narrative:       CT Chest WO, CT Abdomen Pelvis WO    INDICATION:   24-year-old male with fever and sepsis today. Upper GI bleed with hematemesis. History of acute alcoholic hepatitis. Shortness of breath and hypoxia today. Abdominal pain, nausea, and jaundice today. History of EtOH abuse. Order does request evaluation  for possible COVID-19 pneumonia.    TECHNIQUE:   CT of the chest, abdomen and pelvis without IV contrast. Coronal and sagittal reconstructions were obtained.  Radiation dose reduction techniques included automated exposure control or exposure modulation based on body size. Count of known CT and cardiac  nuc med studies performed in previous 12 months: 0.     COMPARISON:   None available.    FINDINGS:  Chest:  Thoracic aorta normal in course and caliber. Heart size normal. No pericardial effusion.    Moderate to large left pleural effusion with compressive atelectasis and consolidation throughout the majority of the left lung. There is a small portion of the left upper lobe which remains aerated. Small right pleural effusion with associated  compressive atelectasis/consolidation in the right lower lobe. Mild atelectasis of the right middle lobe. Right upper lobe remains relatively clear. Superimposed pneumonia cannot be excluded, though the  infiltrate pattern is atypical for COVID-19. No  pneumothorax.    Abdomen:   The liver is enlarged. There is an ill-defined 3.1 cm area of hypoattenuation adjacent to the gallbladder fossa (suspected liver segment IVb). This is nonspecific and differential includes focal fatty sparing versus hepatic lesion. There is a large  amount of abdominal and pelvic ascites. The spleen and pancreas are normal. Hyperdense material noted within the gallbladder lumen, possibly vicarious excretion of contrast. Correlation with any recent contrast administration. The gallbladder appears  mildly distended, nonspecific. Both adrenal glands are normal. The kidneys are normal. Abdominal aorta normal in course and caliber. Small bowel is unremarkable without obstruction. The appendix is not clearly visualized. There is mild generalized bowel  wall thickening throughout the colon. This may be secondary inflammation from the ascites, but colitis is not excluded in the appropriate clinical setting. No free intraperitoneal air.    Pelvis:   The urinary bladder is unremarkable.  The prostate gland is unremarkable. Pelvic ascites.    No acute osseous abnormality. Mild generalized anasarca.        Impression:         1. Moderate to large left pleural effusion with compressive atelectasis and consolidation throughout the majority of the left lung. Small right pleural effusion with associated compressive atelectasis/consolidation in the right lower lobe and mild  atelectasis of the right middle lobe. Superimposed pneumonia is not excluded, though the infiltrate pattern is atypical for COVID-19.     COVID-19 reporting category: Atypical. Imaging features are atypical or uncommonly reported for COVID-19 pneumonia. Alternative diagnoses should be considered.    2. Hepatomegaly. Ill-defined 3.1 cm area of hypoattenuation adjacent to the gallbladder fossa (suspected liver segment IVb). This is nonspecific and could be secondary to hepatic mass versus  focal fatty sparing. Further characterization with nonemergent  contrast-enhanced hepatic mass protocol MRI of the abdomen is recommended.  3. Large amount of abdominal and pelvic ascites.  4. Mild generalized circumferential bowel wall thickening throughout the colon. This may represent secondary inflammation related to the ascites, but primary infectious or inflammatory colitis is not excluded in the appropriate clinical setting.  5. Appendix not clearly visualized.  6. Mild generalized anasarca.        Signer Name: Francisco Love MD   Signed: 4/24/2020 6:18 AM   Workstation Name: [email or web address]    [facility]    CT Abdomen Pelvis Without Contrast [[id number]] Collected:  04/24/20 0618     Updated:  04/24/20 0620    Narrative:       CT Chest WO, CT Abdomen Pelvis WO    INDICATION:   24-year-old male with fever and sepsis today. Upper GI bleed with hematemesis. History of acute alcoholic hepatitis. Shortness of breath and hypoxia today. Abdominal pain, nausea, and jaundice today. History of EtOH abuse. Order does request evaluation  for possible COVID-19 pneumonia.    TECHNIQUE:   CT of the chest, abdomen and pelvis without IV contrast. Coronal and sagittal reconstructions were obtained.  Radiation dose reduction techniques included automated exposure control or exposure modulation based on body size. Count of known CT and cardiac  nuc med studies performed in previous 12 months: 0.     COMPARISON:   None available.    FINDINGS:  Chest:  Thoracic aorta normal in course and caliber. Heart size normal. No pericardial effusion.    Moderate to large left pleural effusion with compressive atelectasis and consolidation throughout the majority of the left lung. There is a small portion of the left upper lobe which remains aerated. Small right pleural effusion with associated  compressive atelectasis/consolidation in the right lower lobe. Mild atelectasis of the right middle lobe. Right upper lobe  remains relatively clear. Superimposed pneumonia cannot be excluded, though the infiltrate pattern is atypical for COVID-19. No  pneumothorax.    Abdomen:   The liver is enlarged. There is an ill-defined 3.1 cm area of hypoattenuation adjacent to the gallbladder fossa (suspected liver segment IVb). This is nonspecific and differential includes focal fatty sparing versus hepatic lesion. There is a large  amount of abdominal and pelvic ascites. The spleen and pancreas are normal. Hyperdense material noted within the gallbladder lumen, possibly vicarious excretion of contrast. Correlation with any recent contrast administration. The gallbladder appears  mildly distended, nonspecific. Both adrenal glands are normal. The kidneys are normal. Abdominal aorta normal in course and caliber. Small bowel is unremarkable without obstruction. The appendix is not clearly visualized. There is mild generalized bowel  wall thickening throughout the colon. This may be secondary inflammation from the ascites, but colitis is not excluded in the appropriate clinical setting. No free intraperitoneal air.    Pelvis:   The urinary bladder is unremarkable.  The prostate gland is unremarkable. Pelvic ascites.    No acute osseous abnormality. Mild generalized anasarca.        Impression:         1. Moderate to large left pleural effusion with compressive atelectasis and consolidation throughout the majority of the left lung. Small right pleural effusion with associated compressive atelectasis/consolidation in the right lower lobe and mild  atelectasis of the right middle lobe. Superimposed pneumonia is not excluded, though the infiltrate pattern is atypical for COVID-19.     COVID-19 reporting category: Atypical. Imaging features are atypical or uncommonly reported for COVID-19 pneumonia. Alternative diagnoses should be considered.    2. Hepatomegaly. Ill-defined 3.1 cm area of hypoattenuation adjacent to the gallbladder fossa (suspected liver  segment IVb). This is nonspecific and could be secondary to hepatic mass versus focal fatty sparing. Further characterization with nonemergent  contrast-enhanced hepatic mass protocol MRI of the abdomen is recommended.  3. Large amount of abdominal and pelvic ascites.  4. Mild generalized circumferential bowel wall thickening throughout the colon. This may represent secondary inflammation related to the ascites, but primary infectious or inflammatory colitis is not excluded in the appropriate clinical setting.  5. Appendix not clearly visualized.  6. Mild generalized anasarca.        Signer Name: Francisco Love MD   Signed: 4/24/2020 6:18 AM   Workstation Name: [email or web address]    [facility]    XR Chest 1 View [[id number]] Collected:  04/24/20 0602     Updated:  04/24/20 0604    Narrative:       CR Chest 1 Vw    INDICATION:   24-year-old male with shortness of breath and hypoxia today.     COMPARISON:    Chest x-ray and CT chest 4/23/2020    FINDINGS:  Single portable AP view(s) of the chest.    Slight interval worsening moderate to large left pleural effusion with compressive atelectasis/infiltrate in the left lung. Stable small right pleural effusion with mild right basilar atelectasis/infiltrate. No pneumothorax. Heart size and mediastinum  are within normal limits. Low lung volumes with mild crowding of the pulmonary vasculature.      Impression:         1. Slight interval worsening moderate to large left pleural effusion with associated compressive atelectasis/infiltrate in the left lung.  2. Stable small right pleural effusion and mild right basilar atelectasis/infiltrate.    Signer Name: Francisco Love MD   Signed: 4/24/2020 6:02 AM   Workstation Name: [facility]    XR Chest 1 View [[id number]] Collected:  04/23/20 2341     Updated:  04/23/20 2343    Narrative:       CR Chest 1 Vw    INDICATION:   Sepsis.     COMPARISON:    None  available.    FINDINGS:  Single portable AP view(s) of the chest.    Lung volumes are very low. There is infiltrate or atelectasis in the bases, left greater than right. There is a small left pleural effusion. No pneumothorax is seen. Heart size is likely normal given the low volume inspiration.       Impression:       Very low lung volumes with bibasilar infiltrates/atelectasis, left worse than right. There is also a small left pleural effusion.    Signer Name: Christian Villa MD   Signed: 4/23/2020 11:41 PM   Workstation Name: [email or web address]    [facility]                    Results for orders placed during the hospital encounter of 04/23/20   Adult Transthoracic Echo Complete W/ Cont if Necessary Per Protocol    Narrative · Estimated EF = 70%.  · Left ventricular systolic function is mildly hyperdynamic  · There is a small (<1cm) pericardial effusion. No chamber impingement is   seen  · Trace mitral and tricuspid regurgitation  · A large left pleural effusion is seen with what appears to be compressed   lung tissue noted.  · Tachycardia is noted throughout the study.  · No obvious valvular vegetations are seen.             Order Current Status    Fungus Culture - Body Fluid, Peritoneum In process    AFB Culture - Body Fluid, Peritoneum Preliminary result        Discharge Details        Discharge Medications      New Medications      Instructions Start Date   furosemide 20 MG tablet  Commonly known as:  Lasix   20 mg, Oral, 2 Times Daily      lactulose 10 GM/15ML solution  Commonly known as:  CHRONULAC   20 g, Oral, 2 Times Daily      magnesium oxide 400 MG tablet  Commonly known as:  MAG-OX   400 mg, Oral, Daily      pantoprazole 40 MG EC tablet  Commonly known as:  PROTONIX   40 mg, Oral, Daily      propranolol 10 MG tablet  Commonly known as:  INDERAL   10 mg, Oral, 3 Times Daily      riFAXIMin 550 MG tablet  Commonly known as:  XIFAXAN   550 mg, Nasogastric, Every 12 Hours Scheduled       spironolactone 50 MG tablet  Commonly known as:  ALDACTONE   50 mg, Oral, Daily   Start Date:  May 14, 2020     thiamine 100 MG tablet  Commonly known as:  VITAMIN B1   100 mg, Oral, Daily   Start Date:  May 14, 2020     zinc gluconate 50 MG tablet   50 mg, Oral, Daily   Start Date:  May 14, 2020            Allergies   Allergen Reactions   • Penicillins Hives         Discharge Disposition:  Home or Self Care    Diet:  Hospital:  Diet Order   Procedures   • Diet Regular; Thin; Low Sodium; 2,000 mg Na       Activity:  As tolerated, encouraged to slowly build up endurance at home    Restrictions or Other Recommendations:  Abstain for ETOH       CODE STATUS:    Code Status and Medical Interventions:   Ordered at: 04/23/20 6064     Code Status:    CPR     Medical Interventions (Level of Support Prior to Arrest):    Full       No future appointments.    Additional Instructions for the Follow-ups that You Need to Schedule     Discharge Follow-up with PCP   As directed       Currently Documented PCP:    Provider, No Known    PCP Phone Number:    None     Follow Up Details:  establish care with [facility] -- needs post hospital f/u within 1 week, CM to help secure appt prior to discharge from hospital         Discharge Follow-up with Specialty: [facility]; 2 Weeks   As directed      Specialty:  [facility]    Follow Up:  2 Weeks               Time Spent on Discharge:  45 minutes    Electronically signed by Neda Carter MD, 05/13/20, 12:42 PM.

## 2020-05-13 NOTE — PLAN OF CARE
Problem: Patient Care Overview  Goal: Plan of Care Review  Flowsheets (Taken 5/13/2020 1112)  Progress: improving  Plan of Care Reviewed With: patient  Outcome Summary: Pt ambulated 300 ft with RW and CGA. Distance limited by fatigue, weakness, and SOA. VSS on RA. Improved stability with gait. Will continue to progress pt as able per POC.

## 2020-05-13 NOTE — THERAPY TREATMENT NOTE
Patient Name: John Varma  : 1995    MRN: [id number]                              Today's Date: 2020       Admit Date: 2020    Visit Dx:     ICD-10-CM ICD-9-CM   1. Sepsis with acute liver failure without hepatic coma or septic shock, due to unspecified organism (CMS/HCC) A41.9 038.9    R65.20 995.92    K72.00 570   2. Hypoxia R09.02 799.02   3. Acute renal failure, unspecified acute renal failure type (CMS/HCC) N17.9 584.9   4. Acute hepatitis B17.9 570   5. Alcohol abuse F10.10 305.00   6. Acute upper GI bleed K92.2 578.9   7. Elevated lactic acid level R79.89 276.2   8. Ascites due to alcoholic hepatitis K70.11 789.59   9. Leukocytosis, unspecified type D72.829 288.60   10. Sepsis with acute renal failure without septic shock, due to unspecified organism, unspecified acute renal failure type (CMS/HCC) A41.9 038.9    R65.20 995.92    N17.9 584.9   11. Oropharyngeal dysphagia R13.12 787.22   12. Cognitive communication deficit R41.841 799.52     Patient Active Problem List   Diagnosis   • Acute alcoholic hepatitis   • Acute variceal GI bleeding   • BORIS (acute kidney injury) (CMS/HCC)   • Sepsis with acute renal failure without septic shock due to SBP (CMS/HCC)   • Acute respiratory failure with hypoxia. Intubated - and - (CMS/HCC)   • Alcohol withdrawal delirium (CMS/HCC)   • Hepatic encephalopathy (CMS/HCC)   • Ascites due to alcoholic hepatitis. S/P paracentesis , , and    • Pleural effusion on left   • S/P TIPS (transjugular intrahepatic portosystemic shunt) 2020   • Tobacco abuse   • SBP (spontaneous bacterial peritonitis) (CMS/HCC)   • Acute blood loss anemia due to variceal UGIB     Past Medical History:   Diagnosis Date   • Alcohol abuse    • GERD (gastroesophageal reflux disease)    • GIB (gastrointestinal bleeding)    • Tobacco abuse      Past Surgical History:   Procedure Laterality Date   • ARM WOUND REPAIR / CLOSURE     • ENDOSCOPY N/A 2020     Procedure: ESOPHAGOGASTRODUODENOSCOPY;  Surgeon: Brunner, Mark I, MD;  Location: [facility] ENDOSCOPY;  Service: Gastroenterology;  Laterality: N/A;     General Information     Row Name 05/13/20 1109          PT Evaluation Time/Intention    Document Type  therapy note (daily note)  -VG     Mode of Treatment  individual therapy;physical therapy  -VG     Row Name 05/13/20 1109          General Information    Existing Precautions/Restrictions  fall;oxygen therapy device and L/min  -VG     Row Name 05/13/20 1109          Cognitive Assessment/Intervention- PT/OT    Orientation Status (Cognition)  oriented x 3  -VG       User Key  (r) = Recorded By, (t) = Taken By, (c) = Cosigned By    Initials Name Provider Type    VG Josselin Wu, PT Physical Therapist        Mobility     Row Name 05/13/20 1110          Bed Mobility Assessment/Treatment    Supine-Sit [place] (Bed Mobility)  verbal cues;moderate assist (50% patient effort)  -VG     Assistive Device (Bed Mobility)  bed rails;head of bed elevated  -VG     Comment (Bed Mobility)  Assist at trunk for supine to EOB. Cues for hand placement and sequencing. Inc time/effort.  -VG     Row Name 05/13/20 1110          Transfer Assessment/Treatment    Comment (Transfers)  Cues for hand placement and sequencing. Inc time/effort.  -VG     Row Name 05/13/20 1110          Sit-Stand Transfer    Sit-Stand [place] (Transfers)  minimum assist (75% patient effort);verbal cues  -VG     Assistive Device (Sit-Stand Transfers)  walker, front-wheeled  -VG     Row Name 05/13/20 1110          Gait/Stairs Assessment/Training    [place] Level (Gait)  contact guard;verbal cues  -VG     Assistive Device (Gait)  walker, front-wheeled  -VG     Distance in Feet (Gait)  300  -VG     Deviations/Abnormal Patterns (Gait)  sherwin decreased;festinating/shuffling;gait speed decreased  -VG     Bilateral Gait Deviations  forward flexed posture  -VG     Right Sided Gait Deviations  heel strike  decreased;foot drop/toe drag  -VG     Comment (Gait/Stairs)  Distance limited by fatigue, weakness, SOA, and R foot drop. Cues for upright posture, AD management, and heel strike R foot. Improved stability.  -VG       User Key  (r) = Recorded By, (t) = Taken By, (c) = Cosigned By    Initials Name Provider Type    VG Josselin Wu, PT Physical Therapist        Obj/Interventions     Row Name 05/13/20 [facility]          Therapeutic Exercise    Lower Extremity (Therapeutic Exercise)  LAQ (long arc quad), bilateral;marching while seated  -VG     Lower Extremity Range of Motion (Therapeutic Exercise)  hip abduction/adduction, bilateral;ankle dorsiflexion/plantar flexion, bilateral  -VG     Exercise Type (Therapeutic Exercise)  AROM (active range of motion)  -VG     Position (Therapeutic Exercise)  seated  -VG     Sets/Reps (Therapeutic Exercise)  10x  -VG     Comment (Therapeutic Exercise)  Cues for technique.  -VG     Row Name 05/13/20 [facility]          Static Sitting Balance    Level of [place] (Unsupported Sitting, Static Balance)  supervision  -VG     Sitting Position (Unsupported Sitting, Static Balance)  sitting on edge of bed  -VG     Row Name 05/13/20 [facility]          Static Standing Balance    Level of [place] (Supported Standing, Static Balance)  contact guard assist  -VG     Assistive Device Utilized (Supported Standing, Static Balance)  walker, rolling  -VG       User Key  (r) = Recorded By, (t) = Taken By, (c) = Cosigned By    Initials Name Provider Type    VG Josselin Wu, PT Physical Therapist        Goals/Plan    No documentation.       Clinical Impression     Row Name 05/13/20 [facility]          Pain Assessment    Additional Documentation  Pain Scale: Numbers Pre/Post-Treatment (Group)  -VG     Row Name 05/13/20 [facility]          Pain Scale: Numbers Pre/Post-Treatment    Pain Scale: Numbers, Pretreatment  0/10 - no pain  -VG     Pain Scale: Numbers, Post-Treatment  0/10 - no pain  -VG     Row Name 05/13/20  1112          Plan of Care Review    Plan of Care Reviewed With  patient  -VG     Progress  improving  -VG     Outcome Summary  Pt ambulated 300 ft with RW and CGA. Distance limited by fatigue, weakness, and SOA. VSS on RA. Improved stability with gait. Will continue to progress pt as able per POC.  -VG     Row Name 05/13/20 1112          Vital Signs    Pre Systolic BP Rehab  111  -VG     Pre Treatment Diastolic BP  69  -VG     Post Systolic BP Rehab  117  -VG     Post Treatment Diastolic BP  72  -VG     Pretreatment Heart Rate (beats/min)  95  -VG     Posttreatment Heart Rate (beats/min)  96  -VG     Pre SpO2 (%)  95  -VG     O2 Delivery Pre Treatment  room air  -VG     Post SpO2 (%)  94  -VG     O2 Delivery Post Treatment  room air  -VG     Pre Patient Position  Supine  -VG     Post Patient Position  Sitting  -VG     Row Name 05/13/20 1112          Positioning and Restraints    Pre-Treatment Position  in bed  -VG     Post Treatment Position  chair  -VG     In Chair  reclined;call light within reach;encouraged to call for assist;exit alarm on;waffle cushion;legs elevated  -VG       User Key  (r) = Recorded By, (t) = Taken By, (c) = Cosigned By    Initials Name Provider Type    VG Josselin Wu, PT Physical Therapist        Outcome Measures     Row Name 05/13/20 1115          How much help from another person do you currently need...    Turning from your back to your side while in flat bed without using bedrails?  4  -VG     Moving from lying on back to sitting on the side of a flat bed without bedrails?  3  -VG     Moving to and from a bed to a chair (including a wheelchair)?  3  -VG     Standing up from a chair using your arms (e.g., wheelchair, bedside chair)?  3  -VG     Climbing 3-5 steps with a railing?  2  -VG     To walk in hospital room?  3  -VG     AM-PAC 6 Clicks Score (PT)  18  -VG     Row Name 05/13/20 1115          Functional Assessment    Outcome Measure Options  AM-PAC 6 Clicks Basic Mobility (PT)   -VG       User Key  (r) = Recorded By, (t) = Taken By, (c) = Cosigned By    Initials Name Provider Type    VG Josselin Wu PT Physical Therapist        Physical Therapy Education                 Title: PT OT SLP Therapies (In Progress)     Topic: Physical Therapy (Done)     Point: Mobility training (Done)     Description:   Instruct learner(s) on safety and technique for assisting patient out of bed, chair or wheelchair.  Instruct in the proper use of assistive devices, such as walker, crutches, cane or brace.              Patient Friendly Description:   It's important to get you on your feet again, but we need to do so in a way that is safe for you. Falling has serious consequences, and your personal safety is the most important thing of all.        When it's time to get out of bed, one of us or a family member will sit next to you on the bed to give you support.     If your doctor or nurse tells you to use a walker, crutches, a cane, or a brace, be sure you use it every time you get out of bed, even if you think you don't need it.    Learning Progress Summary           Patient Acceptance, E, VU by VG at 5/13/2020 1116    Acceptance, E,D, VU,NR by MP at 5/12/2020 1140    Acceptance, E,D, NR by LS at 5/11/2020 1548    Acceptance, E, NR by KR at 5/10/2020 1357    Acceptance, E, NR by KR at 5/9/2020 1420    Acceptance, E,D, VU,NR by LS at 5/8/2020 1612    Nonacceptance, E, NR,NL by SINDY at 5/7/2020 1508    Acceptance, E,D, VU,NR by MP at 5/7/2020 1120    Acceptance, E,D, VU,NR by LS at 5/6/2020 1552    Acceptance, E, NR by KR at 5/5/2020 1426    Acceptance, E,D, NR by LS at 5/4/2020 1605    Acceptance, E,D, NR by LS at 5/2/2020 1507    Acceptance, E,D, NR by LS at 4/29/2020 1009                   Point: Home exercise program (Done)     Description:   Instruct learner(s) on appropriate technique for monitoring, assisting and/or progressing patient with therapeutic exercises and activities.              Learning  Progress Summary           Patient Acceptance, E, VU by VG at 5/13/2020 1116    Acceptance, E,D, NR by LS at 5/11/2020 1548    Acceptance, E, NR by KR at 5/10/2020 1357    Acceptance, E, NR by KR at 5/9/2020 1420    Acceptance, E,D, VU,NR by LS at 5/8/2020 1612    Nonacceptance, E, NR,NL by SINDY at 5/7/2020 1508    Acceptance, E,D, VU,NR by LS at 5/6/2020 1552    Acceptance, E, NR by KR at 5/5/2020 1426    Acceptance, E,D, NR by LS at 5/4/2020 1605    Acceptance, E,D, NR by LS at 5/2/2020 1507    Acceptance, E,D, NR by LS at 4/29/2020 1009                   Point: Body mechanics (Done)     Description:   Instruct learner(s) on proper positioning and spine alignment for patient and/or caregiver during mobility tasks and/or exercises.              Learning Progress Summary           Patient Acceptance, E, VU by VG at 5/13/2020 1116    Acceptance, E,D, VU,NR by MP at 5/12/2020 1140    Acceptance, E,D, NR by LS at 5/11/2020 1548    Acceptance, E, NR by KR at 5/10/2020 1357    Acceptance, E, NR by KR at 5/9/2020 1420    Acceptance, E,D, VU,NR by LS at 5/8/2020 1612    Nonacceptance, E, NR,NL by SINDY at 5/7/2020 1508    Acceptance, E,D, VU,NR by MP at 5/7/2020 1120    Acceptance, E,D, VU,NR by LS at 5/6/2020 1552    Acceptance, E, NR by KR at 5/5/2020 1426    Acceptance, E,D, NR by LS at 5/4/2020 1605    Acceptance, E,D, NR by LS at 5/2/2020 1507    Acceptance, E,D, NR by LS at 4/29/2020 1009                   Point: Precautions (Done)     Description:   Instruct learner(s) on prescribed precautions during mobility and gait tasks              Learning Progress Summary           Patient Acceptance, E, VU by VG at 5/13/2020 1116    Acceptance, E,D, VU,NR by MP at 5/12/2020 1140    Acceptance, E,D, NR by LS at 5/11/2020 1548    Acceptance, E, NR by KR at 5/10/2020 1357    Acceptance, E, NR by KR at 5/9/2020 1420    Acceptance, E,D, VU,NR by LS at 5/8/2020 1612    Nonacceptance, E, NR,NL by SINDY at 5/7/2020 1508    Acceptance, E,D,  VU,NR by MP at 5/7/2020 1120    Acceptance, E,D, VU,NR by LS at 5/6/2020 1552    Acceptance, E, NR by KR at 5/5/2020 1426    Acceptance, E,D, NR by LS at 5/4/2020 1605    Acceptance, E,D, NR by LS at 5/2/2020 1507    Acceptance, E,D, NR by LS at 4/29/2020 1009                               User Key     Initials Effective Dates Name Provider Type Discipline    LS 06/19/15 -  Savanah Matthews, PT Physical Therapist PT    KR 04/03/18 -  Nataliya Patel, PT Physical Therapist PT    VG 05/29/18 -  Josselin Wu, PT Physical Therapist PT    SINDY 11/19/18 -  Piedad Lao, RN Registered Nurse Nurse    MP 11/06/19 -  Michael Allen, PT Physical Therapist PT              PT Recommendation and Plan     Outcome Summary/Treatment Plan (PT)  Anticipated Discharge Disposition (PT): inpatient rehabilitation facility  Plan of Care Reviewed With: patient  Progress: improving  Outcome Summary: Pt ambulated 300 ft with RW and CGA. Distance limited by fatigue, weakness, and SOA. VSS on RA. Improved stability with gait. Will continue to progress pt as able per POC.     Time Calculation:   PT Charges     Row Name 05/13/20 0944             Time Calculation    Start Time  0944  -VG      PT Received On  05/13/20  -VG      PT Goal Re-Cert Due Date  05/22/20  -VG         Time Calculation- PT    Total Timed Code Minutes- PT  27 minute(s)  -VG         Timed Charges    54493 - PT Therapeutic Exercise Minutes  10  -VG      03025 - Gait Training Minutes   17  -VG        User Key  (r) = Recorded By, (t) = Taken By, (c) = Cosigned By    Initials Name Provider Type    VG Josselin Wu, PT Physical Therapist        Therapy Charges for Today     Code Description Service Date Service Provider Modifiers Qty    05278901832 HC PT THER PROC EA 15 MIN 5/13/2020 Josselin Wu, PT GP 1    36576036740 HC GAIT TRAINING EA 15 MIN 5/13/2020 Josselin Wu, PT GP 1          PT G-Codes  Outcome Measure Options: AM-PAC 6 Clicks Basic Mobility (PT)  AM-PAC  6 Clicks Score (PT): 18  AM-PAC 6 Clicks Score (OT): 18    Jane Wu, PT  5/13/2020

## 2020-05-13 NOTE — PROGRESS NOTES
John Varma  1995  [id number]      Requesting Provider: Dory Wells MD  Evaluating Physician: Hipolito Barkley MD    Chief Complaint: GI bleed    History of present illness:   5/7: Patient is a 24 y.o.  Yr old male with history of alcoholism and alcohol cirrhosis who was admitted 4/23 with ETOH hepatitis and decompensated cirrhosis, BORIS, hepatic encephalopathy, and hematemesis secondary to esophageal variceal bleed s/p EGD on 4/24. He was intubated for the EGD and then subsequently extubated on 4/27. He then underwent a TIPS procedure on 4/29 and was intubated for that until subsequent extubation on 5/1/20. He has additionally undergone paracentesis x2 with 5 liters removed during first paracentesis and 2.5L removed on second paracentesis.  He has had a leukocytosis since arrival, initially up to 41.55. Peritoneal fluid with 46 WBC on 4/24 with 6% neutrophils. BF culture was no growth at 5 days. Subsequent paracentesis on 4/30 with 1,0018 WBC with 77% neutrophils but I do not see a culture was sent from this fluid sample. multiple blood culture sets have been negative. He was initially on Aztreonam for SBP (has PCN allergy) but was more recently on levaquin. Given recent worsening of his leukocytosis again to 45.8, the patient was switched to meropenem today. Cr has improved. LFTs still somewhat elevated. CT Chest/A/P today showed mod-large bilateral pleural effusions with complete left lower lobe atelectasis and moderate right lower lobe atelectasis and moderate ascites. Sputum culture done with few GPCs in pairs and clusters. C diff screen was negative. COVID-19 PCR was negative on 5/6. Blood cultures were repeat today and are pending. Of noted, MRSA PCR nares from 4/25 was positive.The patient has experienced new fevers up to 101.5F since 5/3.  ID consulted for antibiotic recs.     5/8: Patient sitting up in bed awake upon arrival.  Patient answering some questions with some confusion noted.   Patient said T-max of 100.4 and has been tachycardic overnight.  Labs show patient remains with significant leukocytosis at 33.2 with a neutrophilia of 81.2%.  Procalcitonin is elevated 4.61.  BUN/creatinine is 25 0.4.  5/8 chest x-ray shows improving perihilar interstitial disease.  Cultures remain no growth to date.  Patient was placed and mitt restraints as he continued to try to pull out Flores catheter in [place] overnight.  He was subsequently moved from room 222 to room 234 to be closer to the nurses station.    5/9/20: Following for Dr. Hipolito Barkley: LGF yesterday afternoon, has remained afebrile since.  He is tachycardic with intermittent hypotension. WBC 29,000 with 79% neutrophils, PCT 2.1, both which have improved. Culture remain negative. Had paracentesis yesterday with 1 liter of serous fluid removed.  Culture negative to date.  NGT in place. On tube feeding.  He is very tearful today and appears a little confused. He denies any fever or chills, abdominal pain, nausea, vomiting, or rashes. He has intermittent shortness of breath and cough.  He has some diarrhea, but has been on TPN and now on tube feeding.      5/10/20: Afebrile, tolerating tube feeds.  Still with diarrhea on lactulose and tube feedings.  He denies f/c, sob, n/v, rashes.  He appears more alert and following commands.  Would like to go home, but discussed with him that he is not ready and still very ill.     5/11/20: The patient is less confused. Denies any pain including abdominal pain. Having constant watery diarrhea but on lactulose. No rash. Still jaundiced. No fevers.    5/12/20: patient was doing ok today. No worsening confusion. Denies any abdominal pain or nausea. Having diarrhea with lactulose. No new rashes. No fevers.      5/13/20: feels ok to and wants to go home. No fevers. Denies any abdominal pain. Feeding tube has been removed and he has been eating ok. No new rashes.    Past Medical History:   Diagnosis Date   •  Alcohol abuse    • GERD (gastroesophageal reflux disease)    • GIB (gastrointestinal bleeding)    • Tobacco abuse        Past Surgical History:   Procedure Laterality Date   • ARM WOUND REPAIR / CLOSURE     • ENDOSCOPY N/A 4/24/2020    Procedure: ESOPHAGOGASTRODUODENOSCOPY;  Surgeon: Brunner, Mark I, MD;  Location: [facility] ENDOSCOPY;  Service: Gastroenterology;  Laterality: N/A;       Pediatric History   Patient Guardian Status   • Father:  ROBERTA GUEVARA     Other Topics Concern   • Not on file   Social History Narrative   • Not on file       family history is not on file.    Allergies   Allergen Reactions   • Penicillins Hives       Medication:  Current Facility-Administered Medications   Medication Dose Route Frequency Provider Last Rate Last Dose   • acetaminophen (TYLENOL) tablet 650 mg  650 mg Oral Q8H PRN Ariel Galindo MD   650 mg at 05/06/20 2106   • albuterol (PROVENTIL) nebulizer solution 0.083% 2.5 mg/3mL  2.5 mg Nebulization Q4H PRN Ariel Galindo MD       • bisacodyl (DULCOLAX) suppository 10 mg  10 mg Rectal Daily PRN Ariel Galindo MD   10 mg at 04/27/20 1839   • enoxaparin (LOVENOX) syringe 40 mg  40 mg Subcutaneous Q24H Ariel Galindo MD   40 mg at 05/13/20 0902   • folic acid (FOLVITE) tablet 1 mg  1 mg Oral Daily Ariel Galindo MD   1 mg at 05/13/20 0902   • furosemide (LASIX) injection 20 mg  20 mg Intravenous Q12H Ariel Galindo MD   20 mg at 05/13/20 0549   • insulin detemir (LEVEMIR) injection 15 Units  15 Units Subcutaneous Daily Ariel Galindo MD   15 Units at 05/13/20 0902   • lactulose (CHRONULAC) 10 GM/15ML solution 20 g  20 g Nasogastric BID Ariel Galindo MD   20 g at 05/13/20 0902   • LORazepam (ATIVAN) injection 1 mg  1 mg Intravenous Q1H PRN Ariel Galindo MD   1 mg at 05/07/20 0025   • Magnesium Sulfate 2 gram Bolus, followed by 8 gram infusion (total Mg dose 10 grams)- Mg less than or equal to 1mg/dL  2 g Intravenous PRN Ariel Galindo MD        Or    • Magnesium Sulfate 2 gram / 50mL Infusion (GIVE X 3 BAGS TO EQUAL 6GM TOTAL DOSE) - Mg 1.1 - 1.5 mg/dl  2 g Intravenous PRN Ariel Galindo MD        Or   • Magnesium Sulfate 4 gram infusion- Mg 1.6-1.9 mg/dL  4 g Intravenous PRN Ariel Galindo MD 25 mL/hr at 05/10/20 0837 4 g at 05/10/20 0837   • melatonin tablet 5 mg  5 mg Oral Nightly Ariel Galindo MD   5 mg at 05/12/20 2120   • meropenem (MERREM) 1 g/50 mL 0.9% NS IVPB (mbp)  1 g Intravenous Q8H Ariel Galindo MD   1 g at 05/13/20 0901   • multivitamin and minerals liquid 15 mL  15 mL Oral Daily Ariel Galindo MD   15 mL at 05/13/20 0902   • ondansetron (ZOFRAN) injection 4 mg  4 mg Intravenous Q6H PRN Ariel Galindo MD   4 mg at 05/07/20 0025   • pantoprazole (PROTONIX) EC tablet 40 mg  40 mg Oral Q AM Ariel Galindo MD   40 mg at 05/13/20 0549   • Pharmacy to dose vancomycin   Does not apply Continuous PRN Ariel Galindo MD       • Pharmacy to dose vancomycin   Does not apply Continuous PRN Francisco Barber, [facility]       • potassium chloride 20 mEq in 50 mL IVPB  20 mEq Intravenous Q1H PRN Ariel Galindo MD       • propranolol (INDERAL) tablet 10 mg  10 mg Oral Q8H Ariel Galindo MD   10 mg at 05/13/20 0549   • QUEtiapine (SEROquel) tablet 100 mg  100 mg Oral Nightly Ariel Galindo MD   100 mg at 05/12/20 2133   • QUEtiapine (SEROquel) tablet 50 mg  50 mg Oral Daily Ariel Galindo MD   50 mg at 05/13/20 0902   • riFAXIMin (XIFAXAN) tablet 550 mg  550 mg Nasogastric Q12H Ariel Galindo MD   550 mg at 05/13/20 0902   • sodium chloride 0.9 % flush 10 mL  10 mL Intravenous PRN Ariel Galindo MD   10 mL at 05/11/20 2106   • sodium chloride 0.9 % flush 10 mL  10 mL Intravenous PRN Ariel Galindo MD   10 mL at 05/12/20 0848   • sodium chloride 0.9 % flush 20 mL  20 mL Intravenous PRN Ariel Galindo MD       • spironolactone (ALDACTONE) tablet 25 mg  25 mg Oral Daily Ariel Galindo MD   25 mg at 05/13/20 0902   •  thiamine (VITAMIN B-1) tablet 100 mg  100 mg Oral Daily Ariel Galindo MD   100 mg at 20 0902   • vancomycin 1250 mg/250 mL 0.9% NS IVPB ([facility])  1,250 mg Intravenous Q8H Ariel Galindo MD   1,250 mg at 20 0915   • zinc gluconate tablet 50 mg  50 mg Oral Daily Ariel Galindo MD   50 mg at 20 0902         Infectious History:  MRSA    Antibiotics:  Anti-Infectives (From admission, onward)    Ordered     Dose/Rate Route Frequency Start Stop    20 1128  Pharmacy to dose vancomycin  Review   Ordering Provider:  Francisco Barber [facility]     Does not apply Continuous PRN 20 1128 20 1127    20 1242  riFAXIMin (XIFAXAN) 550 MG tablet     Ordering Provider:  Neda Carter MD    550 mg Nasogastric Every 12 Hours Scheduled 20 0000      20 1503  vancomycin 1250 mg/250 mL 0.9% NS IVPB ([facility])     Francisco Barber [facility] reviewed the order on 20 1132.   Ordering Provider:  Ariel Galindo MD    1,250 mg  over 90 Minutes Intravenous Every 8 Hours 20 1600 20 1759    20 1240  vancomycin 1500 mg/500 mL 0.9% NS IVPB ([facility])     Ordering Provider:  Miesha Santiago, PharmD    1,500 mg  over 90 Minutes Intravenous Once 20 1330 20 1544    20 0944  meropenem (MERREM) 1 g/50 mL 0.9% NS IVPB (mbp)     Hipolito Barkley MD reviewed the order on 20 1845.   Ordering Provider:  Ariel Galindo MD    1 g  over 3 Hours Intravenous Every 8 Hours 20 1630 20 1629    20 0944  meropenem (MERREM) 1 g/50 mL 0.9% NS IVPB (mbp)     Ordering Provider:  Valerio Emmanuel MD    1 g  over 30 Minutes Intravenous Once 20 1030 20 1126    20 0944  vancomycin 1500 mg/500 mL 0.9% NS IVPB ([facility])     Ordering Provider:  Valerio Emmanuel MD    1,500 mg  over 90 Minutes Intravenous Once 20 1030 20 1227    20 0944  Pharmacy to dose vancomycin  Undo Let    Miesha Santiago, PharmD  "let the order  on 20 1121.   Ordering Provider:  Ariel Galindo MD     Does not apply Continuous PRN 20 0942 20 0941    20 1316  levoFLOXacin (LEVAQUIN) 500 mg/100 mL D5W (premix) (LEVAQUIN) 500 mg     Miesha Santiago, PharmD reviewed the order on 20 0903.   Ordering Provider:  Kaz Arellano MD    500 mg Intravenous Every 24 Hours Scheduled 20 1430 20 1300    20 1501  levoFLOXacin (LEVAQUIN) 500 mg/100 mL D5W (premix) (LEVAQUIN) 500 mg     Note to Pharmacy:  For on call to Radiology for TIPS, estimated 10am   Ordering Provider:  Cullen Walker MD    500 mg Intravenous Once When Specified 20 0900 20 1118    20 1906  aztreonam (AZACTAM) 1 g in 50 mL NS IVPB     Ordering Provider:  Len Baires RP    1 g  over 30 Minutes Intravenous Every 8 Hours 20 0000 20 1615    20 1411  riFAXIMin (XIFAXAN) tablet 550 mg     Kya Christianson [facility] reviewed the order on 20 0716.   Ordering Provider:  Ariel Galindo MD    550 mg Nasogastric Every 12 Hours Scheduled 20 2100      20 0630  vancomycin 500 mg/100 mL 0.9% NS IVPB (mbp)     Ordering Provider:  Ashwin Siegel [facility]    9 mg/kg × 65.8 kg Intravenous Once 20 0730 20 1013    20 2314  vancomycin 1000 mg/250 mL 0.9% NS IVPB ([facility])     Ordering Provider:  Dory Wells MD    15 mg/kg × 65.8 kg  over 60 Minutes Intravenous Once 20 2316 20 0223    20 2314  aztreonam (AZACTAM) 2 g/100 mL 0.9% NS (mbp)     Ordering Provider:  Dory Wells MD    2 g  over 30 Minutes Intravenous Once 20 2316 20 0100            Review of Systems  See HPI    Physical Exam:   Vital Signs   /69   Pulse 92   Temp 98.8 °F (37.1 °C) (Oral)   Resp 20   Ht 172.7 cm (68\")   Wt 82.6 kg (182 lb 1.6 oz)   SpO2 94%   BMI 27.69 kg/m²     GENERAL: Awake and alert, in no acute distress. Chronically ill appearing but " less ill appearing than last week. Sitting up in bedside chair.  HEENT: Normocephalic, atraumatic. No external oral lesions noted  Eyes: +scleral icterus. No conjunctival hemorrhages.   HEART: RRR; No murmurs. anasarca  LUNGS: CTAB. Non-labored breathing  ABDOMEN: distended abdomen. Normal bowel sounds. No tenderness  EXT:  No cyanosis. 1+ pitting edema over BLE  SKIN: Jaundiced. No rash noted. No peripheral stigmata of infective endocarditis noted  NEURO: AAOX4. Normal speech. Answering questions appropriately.  PSYCHIATRIC: poor insight, but cooperative. Following commands. Oriented to person, place, and time  LUE PICC line site is without erythema or drainage     Laboratory Data    Results from last 7 days   Lab Units 05/11/20  0444 05/10/20  0521 05/09/20  0459   WBC 10*3/mm3 29.78* 31.32* 28.99*   HEMOGLOBIN g/dL 8.8* 9.7* 8.5*   HEMATOCRIT % 26.8* 31.8* 27.4*   PLATELETS 10*3/mm3 305 282 257     Results from last 7 days   Lab Units 05/12/20  0537   SODIUM mmol/L 136   POTASSIUM mmol/L 4.2   CHLORIDE mmol/L 105   CO2 mmol/L 21.0*   BUN mg/dL 18   CREATININE mg/dL 0.24*   GLUCOSE mg/dL 79   CALCIUM mg/dL 7.7*     Results from last 7 days   Lab Units 05/11/20  1131   ALK PHOS U/L 205*   BILIRUBIN mg/dL 4.3*   ALT (SGPT) U/L 61*   AST (SGOT) U/L 101*         Results from last 7 days   Lab Units 05/11/20  1131   CRP mg/dL 3.01*       Estimated Creatinine Clearance: 493.1 mL/min (A) (by C-G formula based on SCr of 0.24 mg/dL (L)).       Microbiology:    Blood Culture   Date Value Ref Range Status   05/03/2020 No growth at 4 days  Preliminary   05/03/2020 No growth at 4 days  Preliminary     COVID-19 PCR: negative     4/24 peritoneal fluid cx: NG    5/8 peritoneal fluid cx: NGSF  5/7: Cdiff not detected  5/7: Sputum cx Normal amado   5/7: Blood cx NGSF  MRSA PCR positive.    Radiology:  Ct Abdomen Pelvis Without Contrast    Result Date: 5/7/2020  Symmetric, moderate or moderate-to-large bilateral pleural effusions,  with complete left lower lobe atelectasis, and moderate right lower lobe atelectasis. Minimal pulmonary interstitial disease elsewhere.    CT SCAN OF THE ABDOMEN AND PELVIS WITHOUT CONTRAST: Note is made of patient's tips shunt. Liver parenchyma appears uniform on today's unenhanced exam. There is vicarious excretion of contrast into the gallbladder. Spleen is not enlarged. No significant abnormalities are seen of the pancreas, adrenal glands, or kidneys. There is mild ascites, stable in extent from the prior exam. NG tube is seen in the distal stomach. No free or discrete inflammatory focus is seen. Coronal images show a few shotty left mid abdominal small bowel mesenteric lymph nodes, but these are markedly improved from the prior study. Bowel loops are normal in caliber.  Regarding the lower abdomen and pelvis, there is mild to moderate ascites. No discrete inflammatory focus is appreciated. No abnormally dilated bowel loops or grossly abnormal appearing bowel loops are seen. Bony structures appear intact.  IMPRESSION:  1. Moderately extensive ascites, but not increased from prior study. 2. Interval tips shunt placement. 3. Near resolution of small bowel mesenteric lymphadenopathy since prior scan. No evidence of new intra-abdominal or intrapelvic pathology is identified elsewhere.  D:  05/07/2020 E:  05/07/2020  This report was finalized on 5/7/2020 10:28 PM by Dr. Moi Rich MD.      Ct Chest Without Contrast    Result Date: 5/7/2020  Symmetric, moderate or moderate-to-large bilateral pleural effusions, with complete left lower lobe atelectasis, and moderate right lower lobe atelectasis. Minimal pulmonary interstitial disease elsewhere.    CT SCAN OF THE ABDOMEN AND PELVIS WITHOUT CONTRAST: Note is made of patient's tips shunt. Liver parenchyma appears uniform on today's unenhanced exam. There is vicarious excretion of contrast into the gallbladder. Spleen is not enlarged. No significant abnormalities are seen  of the pancreas, adrenal glands, or kidneys. There is mild ascites, stable in extent from the prior exam. NG tube is seen in the distal stomach. No free or discrete inflammatory focus is seen. Coronal images show a few shotty left mid abdominal small bowel mesenteric lymph nodes, but these are markedly improved from the prior study. Bowel loops are normal in caliber.  Regarding the lower abdomen and pelvis, there is mild to moderate ascites. No discrete inflammatory focus is appreciated. No abnormally dilated bowel loops or grossly abnormal appearing bowel loops are seen. Bony structures appear intact.  IMPRESSION:  1. Moderately extensive ascites, but not increased from prior study. 2. Interval tips shunt placement. 3. Near resolution of small bowel mesenteric lymphadenopathy since prior scan. No evidence of new intra-abdominal or intrapelvic pathology is identified elsewhere.  D:  05/07/2020 E:  05/07/2020  This report was finalized on 5/7/2020 10:28 PM by Dr. Moi Rich MD.      Fl Video Swallow With Speech Single Contrast    Result Date: 5/7/2020  Fluoroscopy provided for a modified barium swallow. Please see speech therapy report for full details and recommendations.    This report was finalized on 5/7/2020 5:13 PM by Dr. Luis Eduardo Mosley.      Xr Chest 1 View    Result Date: 5/8/2020  Improving perihilar interstitial disease, perhaps resolving edema, decreasing left basilar atelectasis and minimal remaining effusions. No new chest pathology is seen.   D:  05/08/2020 E:  05/08/2020  This report was finalized on 5/8/2020 11:01 PM by Dr. Moi Rich MD.      Xr Chest 1 View    Result Date: 5/7/2020  1. Increased perihilar disease that may represent interstitial edema or ARDS. 2. Left lower lung atelectasis, probably unchanged.  D:  05/07/2020 E:  05/07/2020    This report was finalized on 5/7/2020 10:14 PM by Dr. Moi Rich MD.      Xr Chest 1 View    Result Date: 5/6/2020  Bilateral pleural effusions with patchy  airspace disease seen within the right mid and left lower lung field. Lines and tubes in satisfactory position.  D:  05/06/2020 E:  05/06/2020  This report was finalized on 5/6/2020 4:02 PM by Dr. Carmen Morales MD.      Us Paracentesis    Result Date: 5/8/2020   Ultrasound-guided paracentesis with drainage of 1000 mL of serous fluid. Portion sent for requested laboratory analysis.  Plan:  Resume care by clinical team. _______________________________________________________________  PROCEDURE SUMMARY: - Limited ultrasound - Ultrasound-guided paracentesis - Additional procedure(s): None  PROCEDURE DETAILS:  Pre-procedure Consent: Informed consent for the procedure including risks, benefits and alternatives was obtained and time-out was performed prior to the procedure. Preparation: The site was prepared and draped using maximal sterile barrier technique including cutaneous antisepsis.  Anesthesia/sedation Level of anesthesia/sedation: No sedation  Initial abdominal ultrasound Initial abdominal ultrasound was performed. Findings: Small volume ascites. A safe window for paracentesis was identified.  Paracentesis Local anesthesia was administered. An 18-gauge Chiba needle was advanced under ultrasound guidance into the ascites. Ascites was drained. The needle was then removed, and a sterile bandage was applied. Paracentesis access technique: Ultrasound-guided Needle placed: 18-gauge Chiba  Additional Details Additional description of procedure: None Equipment details: None Specimens removed: Abdominal fluid Estimated blood loss (mL): Less than 10 Standardized report: Paracentesis  Attestation I was present and scrubbed for the entire procedure. Imaging reviewed. Agree with final report as written.  This report was finalized on 5/8/2020 8:37 PM by Cullen Walker.      Xr Abdomen Kub    Result Date: 5/10/2020  Feeding tube tip in the proximal jejunum. NG tube remains in the distal stomach.   DICTATED:   05/08/2020  EDITED/ls :   05/09/2020  This report was finalized on 5/10/2020 11:37 AM by Dr. Moi Rich MD.      Xr Abdomen Kub    Result Date: 5/8/2020  Feeding tube and NG tube both in the distal stomach.  DICTATED:   05/08/2020 EDITED/ls :   05/08/2020    This report was finalized on 5/8/2020 11:04 PM by Dr. Moi Rich MD.      Xr Abdomen Kub    Result Date: 5/7/2020  Nonobstructive bowel gas pattern with paucity of air throughout the visualized bowel loops.  This report was finalized on 5/7/2020 11:37 AM by Dr. Luis Eduardo Mosley.      Xr Abdomen Kub    Result Date: 5/6/2020  NG tube in the distal stomach. Borderline dilatation of the transverse colon with air but no evidence of dilated bowel elsewhere to suggest significant ileus or obstruction.  D:  05/06/2020 E:  05/06/2020    This report was finalized on 5/6/2020 9:24 PM by Dr. Moi Rich MD.        Impression:   Patient is a 24 y.o.  Yr old male with history of alcoholism and alcohol cirrhosis who was admitted 4/23 with ETOH hepatitis and decompensated cirrhosis, BORIS, hepatic encephalopathy, and hematemesis secondary to esophageal variceal bleed s/p EGD on 4/24. Was treated for hepatic encephalopathy and has clinically improved. Also has been treated for SBP and has clinically improved with resolution of fevers, down-trending of his leukocytosis, cell counts on last paracentesis improved, and without abdominal pain any longer.     Problems:  -Sepsis with fevers, tachycardia, lactic acidosis, and Leukocytosis with neutrophilia- improving  -SBP  -Decompensated alcohol cirrhosis with ascites, hepatic encephalopathy, and esophageal varices- encephalopathy improving  -Alcohol hepatitis/elevated LFTs  -Hematemesis due to bleeding esophageal varices s/p EGD and TIPS -procedure  -BORIS- likely ATN due to bleeding. Improved  -Acute blood loss anemia  -MRSA colonization  -Alcoholism  -diarrhea- likely due to lactulose. C diff negative  -Penicillin allergy (Hives)    PLAN: Thank you for  asking us to see John Varma, I recommend the following:     -s/p paracentesis today with repeat peritoneal fluid cx--NG (although most recent peritoneal fluid cultures were antibiotic modified)  -stop antibiotics today    UM/YNES:  I am ok with his discharge home today off antibiotics. I have encouraged alcohol cessation. He will follow up in hepatology clinic soon. I will leave him with our clinic number and he can follow up prn with any fever >101F or worsening abdominal pain.    I discussed with Dr. Carter today      Hipolito Barkley MD  5/13/2020

## 2020-05-13 NOTE — PROGRESS NOTES
"GI Daily Progress Note  Subjective:    Chief Complaint:  Follow up alcoholic hepatitis     John is up in the chair and states he feels \"fine\" and is ready to go home.   Still with 2-3 bowel movements per day.  Tolerating food with discontinuation of Reglan yesterday.      Objective:    /69   Pulse 92   Temp 98.8 °F (37.1 °C) (Oral)   Resp 20   Ht 172.7 cm (68\")   Wt 82.6 kg (182 lb 1.6 oz)   SpO2 94%   BMI 27.69 kg/m²     Physical Exam   Constitutional: He is oriented to person, place, and time.   Chronically ill appearing    Eyes: Scleral icterus is present.   Cardiovascular: Normal rate and regular rhythm.   Pulmonary/Chest: Effort normal. No respiratory distress.   Abdominal: Soft. Bowel sounds are normal. There is no tenderness.   Neurological: He is alert and oriented to person, place, and time.   Skin: Skin is warm and dry.   Nursing note and vitals reviewed.      Lab  Lab Results   Component Value Date    WBC 29.78 (H) 05/11/2020    HGB 8.8 (L) 05/11/2020    HGB 9.7 (L) 05/10/2020    HGB 8.5 (L) 05/09/2020    .3 (H) 05/11/2020     05/11/2020    INR 2.16 (H) 05/09/2020    INR 1.96 (H) 05/08/2020    INR 1.79 (H) 05/07/2020    INR 2.01 (H) 05/06/2020    INR 1.84 (H) 05/05/2020       Lab Results   Component Value Date    GLUCOSE 79 05/12/2020    BUN 18 05/12/2020    CREATININE 0.24 (L) 05/12/2020    EGFRIFNONA >150 05/12/2020    EGFRIFAFRI 37 (L) 04/23/2020    BCR 75.0 (H) 05/12/2020     05/12/2020    K 4.2 05/12/2020    CO2 21.0 (L) 05/12/2020    CALCIUM 7.7 (L) 05/12/2020    ALBUMIN 2.40 (L) 05/11/2020    ALKPHOS 205 (H) 05/11/2020    BILITOT 4.3 (H) 05/11/2020    BILIDIR 3.3 (H) 05/04/2020    ALT 61 (H) 05/11/2020     (H) 05/11/2020       Assessment:    1. Acute blood loss anemia, improved.  3 units transfused since admission. No signs of GI bleeding.  2. Gastric varices (GOV type I) with hemorrhage, status post TIPS 4/29/2020 (pressure gradient reduced from " 16 to 6)  3. Large esophageal varices with red hanh signs.  4. Acute alcoholic hepatitis, severe.  Bilirubin is stable.  Maddrey discriminant function 56 at time of admission.  Not a candidate for corticosteroids (nil effect with MDF >50).  5. Alcohol cirrhosis with ascites.    6. Sepsis.  C. difficile negative.  Had white cell criteria for SBP on 4/30/2020.  Repeat paracentesis on 5/8 was negative for SBP.      Plan:    >>> Complete abstinence from alcohol was again encouraged   >>> Adequate nutrition with good protein choices.   Low sodium diet.   High protein snack at bedtime  >>> Xifaxan was not approved and would cost $2,418.26 out of pocket.  I gave him 8 days worth of samples and will do prior authorization with our office today.    >>> Continue Lactulose   >>> Continue Lasix and Aldactone    Outpatient follow up with [facility] in 2 weeks.       TORI Harmon  05/13/20  14:02

## 2020-05-14 ENCOUNTER — READMISSION MANAGEMENT (OUTPATIENT)
Dept: CALL CENTER | Facility: HOSPITAL | Age: 25
End: 2020-05-14

## 2020-05-14 RX ORDER — PROPRANOLOL HYDROCHLORIDE 10 MG/1
10 TABLET ORAL 3 TIMES DAILY
Qty: 90 TABLET | Refills: 1 | Status: SHIPPED | OUTPATIENT
Start: 2020-05-14 | End: 2020-10-26

## 2020-05-14 RX ORDER — PANTOPRAZOLE SODIUM 40 MG/1
40 TABLET, DELAYED RELEASE ORAL DAILY
Qty: 30 TABLET | Refills: 1 | Status: SHIPPED | OUTPATIENT
Start: 2020-05-14 | End: 2020-08-12 | Stop reason: SDUPTHER

## 2020-05-14 RX ORDER — SPIRONOLACTONE 50 MG/1
50 TABLET, FILM COATED ORAL DAILY
Qty: 30 TABLET | Refills: 1 | Status: SHIPPED | OUTPATIENT
Start: 2020-05-14 | End: 2020-08-12 | Stop reason: SDUPTHER

## 2020-05-14 RX ORDER — ZINC GLUCONATE 50 MG
50 TABLET ORAL DAILY
Qty: 30 TABLET | Refills: 1 | Status: SHIPPED | OUTPATIENT
Start: 2020-05-14 | End: 2020-10-26

## 2020-05-14 RX ORDER — LACTULOSE 10 G/15ML
20 SOLUTION ORAL 2 TIMES DAILY
Qty: 946 ML | Refills: 1 | Status: SHIPPED | OUTPATIENT
Start: 2020-05-14 | End: 2020-07-14 | Stop reason: SDUPTHER

## 2020-05-14 RX ORDER — LANOLIN ALCOHOL/MO/W.PET/CERES
100 CREAM (GRAM) TOPICAL DAILY
Qty: 30 TABLET | Refills: 1 | Status: SHIPPED | OUTPATIENT
Start: 2020-05-14 | End: 2020-10-26

## 2020-05-14 NOTE — OUTREACH NOTE
Sepsis Week 1 Survey      Responses   [facility] patient discharged from?  [place]   COVID-19 Test Status  Negative   Does the patient have one of the following disease processes/diagnoses(primary or secondary)?  Sepsis   Is there a successful TCM telephone encounter documented?  No   Week 1 attempt successful?  No   Unsuccessful attempts  Attempt 1          Douglas Easton RN

## 2020-05-15 ENCOUNTER — READMISSION MANAGEMENT (OUTPATIENT)
Dept: CALL CENTER | Facility: HOSPITAL | Age: 25
End: 2020-05-15

## 2020-05-15 NOTE — OUTREACH NOTE
Sepsis Week 1 Survey      Responses   [facility] patient discharged from?  [place]   COVID-19 Test Status  Negative   Does the patient have one of the following disease processes/diagnoses(primary or secondary)?  Sepsis   Is there a successful TCM telephone encounter documented?  No   Week 1 attempt successful?  No          Jesus Salinas RN

## 2020-05-16 ENCOUNTER — READMISSION MANAGEMENT (OUTPATIENT)
Dept: CALL CENTER | Facility: HOSPITAL | Age: 25
End: 2020-05-16

## 2020-05-17 ENCOUNTER — NURSE TRIAGE (OUTPATIENT)
Dept: CALL CENTER | Facility: HOSPITAL | Age: 25
End: 2020-05-17

## 2020-05-19 ENCOUNTER — READMISSION MANAGEMENT (OUTPATIENT)
Dept: CALL CENTER | Facility: HOSPITAL | Age: 25
End: 2020-05-19

## 2020-05-19 NOTE — OUTREACH NOTE
Sepsis Week 1 Survey      Responses   [facility] patient discharged from?  [place]   COVID-19 Test Status  Negative   Does the patient have one of the following disease processes/diagnoses(primary or secondary)?  Sepsis   Is there a successful TCM telephone encounter documented?  No   Week 1 attempt successful?  No   Unsuccessful attempts  Attempt 2   Revoked  No further contact(revokes)-requires comment   Graduated/Revoked comments  Unsuccessful attempts to reach X 4.           Fernanda Watson RN

## 2020-05-20 ENCOUNTER — OFFICE VISIT (OUTPATIENT)
Dept: FAMILY MEDICINE CLINIC | Facility: CLINIC | Age: 25
End: 2020-05-20

## 2020-05-20 VITALS
WEIGHT: 151 LBS | BODY MASS INDEX: 22.88 KG/M2 | RESPIRATION RATE: 18 BRPM | DIASTOLIC BLOOD PRESSURE: 68 MMHG | HEIGHT: 68 IN | SYSTOLIC BLOOD PRESSURE: 114 MMHG | TEMPERATURE: 98.4 F | HEART RATE: 72 BPM

## 2020-05-20 DIAGNOSIS — F10.21 ALCOHOL DEPENDENCE IN REMISSION (HCC): ICD-10-CM

## 2020-05-20 DIAGNOSIS — K70.11 ALCOHOLIC HEPATITIS WITH ASCITES: ICD-10-CM

## 2020-05-20 DIAGNOSIS — Z09 HOSPITAL DISCHARGE FOLLOW-UP: Primary | ICD-10-CM

## 2020-05-20 DIAGNOSIS — K76.82 HEPATIC ENCEPHALOPATHY (HCC): ICD-10-CM

## 2020-05-20 DIAGNOSIS — Z95.828 S/P TIPS (TRANSJUGULAR INTRAHEPATIC PORTOSYSTEMIC SHUNT): ICD-10-CM

## 2020-05-20 PROBLEM — F10.20 ALCOHOL ADDICTION (HCC): Status: ACTIVE | Noted: 2020-05-20

## 2020-05-20 PROCEDURE — 99204 OFFICE O/P NEW MOD 45 MIN: CPT | Performed by: FAMILY MEDICINE

## 2020-05-20 NOTE — PROGRESS NOTES
Transitional Care Follow Up Visit  Subjective     John Varma is a 24 y.o. male who presents for a transitional care management visit.    Within 48 business hours after discharge our office contacted him via telephone to coordinate his care and needs.      I reviewed and discussed the details of that call along with the discharge summary, hospital problems, inpatient lab results, inpatient diagnostic studies, and consultation reports with John.     Current outpatient and discharge medications have been reconciled for the patient.  Reviewed by: Hima Patterson MD      No flowsheet data found.  Risk for Readmission (LACE) Score: 15 (5/13/2020  6:00 AM)    He was admitted on 4/23/20 with GI bleed, alcohol abuse, hepatic encephalopathy, needed intubation and TIPS procedure  He was discharged home on 5/13/2020 and phone call made on     History of Present Illness   Course During Hospital Stay:      Started drinking at age 14-15 with beer  Then alcohol use got worse and he went to whiskey  He had been drinking on consistent basis until he was admitted, but has not had anything to drink since he left the hospital    He started to throw up blood and was admitted to hospital on 4/23  He had cirrhosis, UGI and esophageal bleed along with acute renal insufficiency.  He also had sepsis and and had fluid drained from ascities after TIPS procedure and completed antibiotics while he was in the hospital  Had 3 units PRB transfused during hospital stay    He was discharged on lactulose, xifaxan, aldactone, propranolol, and lasix by GI.  He has an appointment with GI next week    See hospital records in chart    He is almost out of xifaxan, GI note is working on PA for him  He is supposed to see [facility] in 2 weeks       The following portions of the patient's history were reviewed and updated as appropriate: allergies, current medications, past family history, past medical history, past social history, past surgical history and  problem list.    Review of Systems   Constitutional: Positive for fatigue. Negative for fever.   HENT: Negative.    Eyes: Negative.    Respiratory: Negative.  Negative for shortness of breath.    Cardiovascular: Negative.  Negative for chest pain and palpitations.   Gastrointestinal: Negative.  Negative for nausea and vomiting.   Musculoskeletal: Negative.    Skin: Negative.    Neurological: Negative.    Psychiatric/Behavioral: Negative.  Negative for dysphoric mood. The patient is not nervous/anxious.    All other systems reviewed and are negative.      Objective   Physical Exam   Constitutional: He is oriented to person, place, and time. He appears well-developed and well-nourished. No distress.   HENT:   Head: Normocephalic and atraumatic.   Right Ear: Tympanic membrane, external ear and ear canal normal.   Left Ear: Tympanic membrane, external ear and ear canal normal.   Nose: Nose normal.   Eyes: Conjunctivae and EOM are normal. Scleral icterus is present.   Neck: Normal range of motion. Neck supple. No thyromegaly present.   Cardiovascular: Normal rate, regular rhythm and normal heart sounds.   No murmur heard.  Pulmonary/Chest: Effort normal and breath sounds normal. No respiratory distress.   Abdominal: Soft. Bowel sounds are normal. He exhibits distension. He exhibits no ascites and no mass. There is no tenderness.   Lymphadenopathy:     He has no cervical adenopathy.   Neurological: He is alert and oriented to person, place, and time.   Skin: Skin is warm and dry.   Psychiatric: He has a normal mood and affect. His behavior is normal. Judgment and thought content normal.   Nursing note and vitals reviewed.      Assessment/Plan   John was seen today for establish care.    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  His GF and GM are with him and a good source of support.  His father is also not drinking at this time.  I gave him all the xifaxan samples I had but he needs to reach out to GI who are working on PA for this medicine (as least per GI PA hospital notes)  No change in medicine but he needs to follow up with GI for further management.  Pt agrees and will see Dr. Gregorio as scheduled.  Pt agrees and will call back INB

## 2020-05-28 ENCOUNTER — OFFICE VISIT (OUTPATIENT)
Dept: GASTROENTEROLOGY | Facility: CLINIC | Age: 25
End: 2020-05-28

## 2020-05-28 VITALS
WEIGHT: 137 LBS | TEMPERATURE: 98.2 F | SYSTOLIC BLOOD PRESSURE: 105 MMHG | DIASTOLIC BLOOD PRESSURE: 58 MMHG | BODY MASS INDEX: 20.83 KG/M2 | HEART RATE: 68 BPM

## 2020-05-28 DIAGNOSIS — F10.21 ALCOHOL DEPENDENCE IN REMISSION (HCC): ICD-10-CM

## 2020-05-28 DIAGNOSIS — K76.82 HEPATIC ENCEPHALOPATHY (HCC): ICD-10-CM

## 2020-05-28 DIAGNOSIS — Z95.828 S/P TIPS (TRANSJUGULAR INTRAHEPATIC PORTOSYSTEMIC SHUNT): ICD-10-CM

## 2020-05-28 DIAGNOSIS — K70.11 ALCOHOLIC HEPATITIS WITH ASCITES: Primary | ICD-10-CM

## 2020-05-28 PROCEDURE — 99214 OFFICE O/P EST MOD 30 MIN: CPT | Performed by: INTERNAL MEDICINE

## 2020-05-28 NOTE — PROGRESS NOTES
GASTROENTEROLOGY OFFICE NOTE  John Varma  [id number]  1995    CARE TEAM  Patient Care Team:  Hima Patterson MD as PCP - General (Family Medicine)    No ref. provider found     Chief Complaint   Patient presents with   • Follow-up     from [facility] for esophagela varices         HISTORY OF PRESENT ILLNESS:  Posthospitalization follow-up for alcoholic hepatitis, alcoholic cirrhosis, variceal bleeding status post TIPS.    Patient presents today doing well.  He has been abstinent of alcohol since his recent discharge.  He has a longstanding history of drinking about 1/5 of vodka per day for perhaps a decade or more.  He presented to the hospital with ascites, gastric varices, esophageal varices and variceal bleeding.  Upper endoscopy was performed and he ultimately underwent a TIPS procedure.    He presents today feeling well.  His appetite is good he is eating well.  He is having no problems with nausea, vomiting, melena or bright red blood per rectum.  Abdominal distention/ascites is resolved.  He is run out of his Xifaxan but is not noticing any mental status changes.  In fact if anything he has mental clarity now that he is off alcohol that he is not noted in a long time.    PAST MEDICAL HISTORY  Past Medical History:   Diagnosis Date   • Alcohol abuse    • Alcohol addiction (CMS/HCC) 5/20/2020   • GERD (gastroesophageal reflux disease)    • GIB (gastrointestinal bleeding)    • Tobacco abuse         PAST SURGICAL HISTORY  Past Surgical History:   Procedure Laterality Date   • ARM WOUND REPAIR / CLOSURE     • ENDOSCOPY N/A 4/24/2020    Procedure: ESOPHAGOGASTRODUODENOSCOPY;  Surgeon: Brunner, Mark I, MD;  Location: [facility] ENDOSCOPY;  Service: Gastroenterology;  Laterality: N/A;   • TIPS PROCEDURE  2020        MEDICATIONS:    Current Outpatient Medications:   •  furosemide (Lasix) 20 MG tablet, Take 1 tablet by mouth 2 (Two) Times a Day., Disp: 60 tablet, Rfl: 1  •  lactulose (CHRONULAC) 10  "GM/15ML solution, Take 30 mL by mouth 2 (Two) Times a Day., Disp: 946 mL, Rfl: 1  •  Magnesium Oxide 400 (240 Mg) MG tablet, Take 1 tablet by mouth Daily., Disp: 30 tablet, Rfl: 1  •  pantoprazole (PROTONIX) 40 MG EC tablet, Take 1 tablet by mouth Daily., Disp: 30 tablet, Rfl: 1  •  propranolol (INDERAL) 10 MG tablet, Take 1 tablet by mouth 3 (Three) Times a Day., Disp: 90 tablet, Rfl: 1  •  riFAXIMin (XIFAXAN) 550 MG tablet, Take 1 tablet by Nasogastric route Every 12 (Twelve) Hours., Disp: 60 tablet, Rfl: 1  •  spironolactone (ALDACTONE) 50 MG tablet, Take 1 tablet by mouth Daily., Disp: 30 tablet, Rfl: 1  •  thiamine (VITAMIN B1) 100 MG tablet, Take 1 tablet by mouth Daily., Disp: 30 tablet, Rfl: 1  •  zinc gluconate 50 MG tablet, Take 1 tablet by mouth Daily., Disp: 30 tablet, Rfl: 1    ALLERGIES  Allergies   Allergen Reactions   • Penicillins Hives       FAMILY HISTORY:  Family History   Problem Relation Age of Onset   • Parkinsonism Mother    • Alcohol abuse Father    • Colon cancer Neg Hx    • Colon polyps Neg Hx        SOCIAL HISTORY  Social History     Socioeconomic History   • Marital status: Single     Spouse name: Not on file   • Number of children: Not on file   • Years of education: Not on file   • Highest education level: Not on file   Occupational History   • Occupation: unemployed   Tobacco Use   • Smoking status: Current Every Day Smoker     Types: Cigarettes   • Smokeless tobacco: Never Used   • Tobacco comment: 2-3 cigarettes a day   Substance and Sexual Activity   • Alcohol use: Not Currently     Comment: PT STATES THAT HIS ALCOHOL INTAKE VARIES - \"SOMETIMES I DRINK 3 BEERS, SOMETIMES I DRINK A PINT\". PT REPORTS LAST DRINK WAS 4/20/20   • Drug use: Yes     Types: Marijuana     Comment: PIPE - 3-4 TIMES PER DAY   • Sexual activity: Defer     Comment: single     Socioeconomic History:  Single.  Unemployed.  Does not have children.  He lives with his grandparents and his father.       REVIEW OF " SYSTEMS  Review of Systems   Constitutional: Positive for appetite change. Negative for activity change, chills, diaphoresis, fatigue, fever, unexpected weight gain and unexpected weight loss.   HENT: Negative for congestion, dental problem, drooling, ear discharge, ear pain, facial swelling, hearing loss, mouth sores, nosebleeds, postnasal drip, rhinorrhea, sinus pressure, sneezing, sore throat, swollen glands, tinnitus, trouble swallowing and voice change.    Respiratory: Negative for apnea, cough, choking, chest tightness, shortness of breath, wheezing and stridor.    Cardiovascular: Negative for chest pain, palpitations and leg swelling.   Gastrointestinal: Negative for abdominal distention, abdominal pain, anal bleeding, blood in stool, constipation, diarrhea, nausea, rectal pain, vomiting, GERD and indigestion.   Endocrine: Negative for cold intolerance, heat intolerance, polydipsia, polyphagia and polyuria.   Musculoskeletal: Negative for arthralgias, back pain, gait problem, joint swelling, myalgias, neck pain, neck stiffness and bursitis.   Allergic/Immunologic: Negative for environmental allergies, food allergies and immunocompromised state.   Neurological: Negative for dizziness, tremors, seizures, facial asymmetry, speech difficulty, weakness, light-headedness, numbness and confusion.   Hematological: Negative for adenopathy. Does not bruise/bleed easily.   Psychiatric/Behavioral: Negative for agitation, behavioral problems, decreased concentration, dysphoric mood, hallucinations, self-injury, sleep disturbance, suicidal ideas, negative for hyperactivity, depressed mood and stress. The patient is not nervous/anxious.      I reviewed the above-noted review of systems    PHYSICAL EXAM   There were no vitals taken for this visit.  General: Alert and oriented x 3. In no apparent or acute distress.  and No stigmata of chronic liver disease  HEENT: Scleral icterus is noted.  Neck: Supple. Without  lymphadenopathy  CV: Regular rate and rhythm, S1, S2  Lungs: Clear to ausculation. Without rales, rhonchi and wheezing  Abdomen:  Soft,non-distended without palpable masses or hepatosplenomeagaly, areas of rebound tenderness or guarding.   Extremeties: without clubbing, cyanosis or edema  Neurologic:  Alert and oriented x 3 without focal motor or sensory deficits  Rectal exam: deferred  Skin: Multiple tattoos noted    Results Review:  I reviewed the patient's new clinical results.      ASSESSMENT  1.-  Longstanding alcoholism resulting in alcohol-related cirrhosis and portal hypertension  2.-  Status post gastric and esophageal variceal bleeding status post TIPS  3.-  No evidence of encephalopathy status post TIPS  4.-  Alcoholic hepatitis.  Monitor liver enzymes and PT/INR    PLAN  1.-  Referral to [facility]  2.-  Weekly liver enzymes and PT/INR/BMP  3.-  Follow-up appointment in 4 weeks  5.-  Discontinue Xifaxan.  No evidence of encephalopathy.      I discussed the patient's findings and my recommendations with patient    Ranjan Gavin Gregorio MD  5/28/2020   10:18    Much of this note is an electronic transcription of spoken language to printed text. Electronic transcription of spoken language may permit erroneous, nonsensical word phrases to be inadvertently transcribed.  Although I have reviewed the note for these errors, some may still be present.

## 2020-06-01 ENCOUNTER — TELEPHONE (OUTPATIENT)
Dept: GASTROENTEROLOGY | Facility: CLINIC | Age: 25
End: 2020-06-01

## 2020-06-01 NOTE — TELEPHONE ENCOUNTER
----- Message from Ranjan Gregorio MD sent at 5/28/2020 10:46 AM EDT -----  Peggy  Could you make sure he knows where to go to get his weekly CBC/BMP/PT/INR.  He lives in [place].  He has a standing order for weekly labs    Referral to [facility] please also thank you

## 2020-06-10 ENCOUNTER — TELEPHONE (OUTPATIENT)
Dept: GASTROENTEROLOGY | Facility: CLINIC | Age: 25
End: 2020-06-10

## 2020-06-10 NOTE — TELEPHONE ENCOUNTER
Tiny from [facility] called to notify us the patient has been scheduled for a appointment on 06/26/2020 at 8:15AM.

## 2020-06-19 LAB
FUNGUS WND CULT: NORMAL
MYCOBACTERIUM SPEC CULT: NORMAL
NIGHT BLUE STAIN TISS: NORMAL

## 2020-06-30 ENCOUNTER — OFFICE VISIT (OUTPATIENT)
Dept: GASTROENTEROLOGY | Facility: CLINIC | Age: 25
End: 2020-06-30

## 2020-06-30 VITALS
DIASTOLIC BLOOD PRESSURE: 68 MMHG | HEART RATE: 65 BPM | TEMPERATURE: 98.2 F | WEIGHT: 148.8 LBS | BODY MASS INDEX: 22.62 KG/M2 | SYSTOLIC BLOOD PRESSURE: 121 MMHG

## 2020-06-30 DIAGNOSIS — F10.21 ALCOHOL DEPENDENCE IN REMISSION (HCC): ICD-10-CM

## 2020-06-30 DIAGNOSIS — K70.11 ALCOHOLIC HEPATITIS WITH ASCITES: Primary | ICD-10-CM

## 2020-06-30 PROCEDURE — 99214 OFFICE O/P EST MOD 30 MIN: CPT | Performed by: INTERNAL MEDICINE

## 2020-06-30 RX ORDER — HYDROXYZINE HYDROCHLORIDE 25 MG/1
TABLET, FILM COATED ORAL
COMMUNITY
Start: 2020-06-26 | End: 2020-10-26

## 2020-07-09 ENCOUNTER — LAB (OUTPATIENT)
Dept: LAB | Facility: HOSPITAL | Age: 25
End: 2020-07-09

## 2020-07-09 DIAGNOSIS — K70.11 ALCOHOLIC HEPATITIS WITH ASCITES: ICD-10-CM

## 2020-07-09 LAB
AMMONIA BLD-SCNC: 59 UMOL/L (ref 16–60)
INR PPP: 1.75 (ref 0.85–1.16)
PROTHROMBIN TIME: 20.1 SECONDS (ref 11.5–14)

## 2020-07-09 PROCEDURE — 82248 BILIRUBIN DIRECT: CPT

## 2020-07-09 PROCEDURE — 82140 ASSAY OF AMMONIA: CPT

## 2020-07-09 PROCEDURE — 80053 COMPREHEN METABOLIC PANEL: CPT

## 2020-07-09 PROCEDURE — 36415 COLL VENOUS BLD VENIPUNCTURE: CPT

## 2020-07-09 PROCEDURE — 85610 PROTHROMBIN TIME: CPT

## 2020-07-10 LAB
ALBUMIN SERPL-MCNC: 3.3 G/DL (ref 3.5–5.2)
ALBUMIN/GLOB SERPL: 0.9 G/DL
ALP SERPL-CCNC: 274 U/L (ref 39–117)
ALT SERPL W P-5'-P-CCNC: 45 U/L (ref 1–41)
ANION GAP SERPL CALCULATED.3IONS-SCNC: 9.5 MMOL/L (ref 5–15)
AST SERPL-CCNC: 76 U/L (ref 1–40)
BILIRUB CONJ SERPL-MCNC: 1.9 MG/DL (ref 0–0.3)
BILIRUB SERPL-MCNC: 5.9 MG/DL (ref 0–1.2)
BUN SERPL-MCNC: 8 MG/DL (ref 6–20)
BUN/CREAT SERPL: 12.3 (ref 7–25)
CALCIUM SPEC-SCNC: 9.2 MG/DL (ref 8.6–10.5)
CHLORIDE SERPL-SCNC: 106 MMOL/L (ref 98–107)
CO2 SERPL-SCNC: 21.5 MMOL/L (ref 22–29)
CREAT SERPL-MCNC: 0.65 MG/DL (ref 0.76–1.27)
GFR SERPL CREATININE-BSD FRML MDRD: 150 ML/MIN/1.73
GLOBULIN UR ELPH-MCNC: 3.6 GM/DL
GLUCOSE SERPL-MCNC: 140 MG/DL (ref 65–99)
POTASSIUM SERPL-SCNC: 4 MMOL/L (ref 3.5–5.2)
PROT SERPL-MCNC: 6.9 G/DL (ref 6–8.5)
SODIUM SERPL-SCNC: 137 MMOL/L (ref 136–145)

## 2020-07-14 RX ORDER — LACTULOSE 10 G/15ML
20 SOLUTION ORAL 2 TIMES DAILY
Qty: 946 ML | Refills: 1 | Status: SHIPPED | OUTPATIENT
Start: 2020-07-14 | End: 2020-10-26 | Stop reason: SDUPTHER

## 2020-07-14 NOTE — TELEPHONE ENCOUNTER
Caller: CASIE GUEVARA    Relationship: GRANDFATHER    Best call back number: [phone]    Medication needed:   Requested Prescriptions     Pending Prescriptions Disp Refills   • lactulose (CHRONULAC) 10 GM/15ML solution 946 mL 1     Sig: Take 30 mL by mouth 2 (Two) Times a Day.       What details did the patient provide when requesting the medication: PHARMACY TOLD GRANDFATHER THAT THEY'VE TRIED CONTACTING PRESCRIBER OF THE MEDICATION AND WAS UNABLE TO GET INTO CONTACT SO THEY ARE NEEDING PCP APPROVAL BEFORE REFILLING.     Does the patient have less than a 3 day supply:  [] Yes  [] No N/A    What is the patient's preferred pharmacy: [facility] [address] [place] [address] [phone] [facility] [phone]

## 2020-07-16 ENCOUNTER — LAB (OUTPATIENT)
Dept: LAB | Facility: HOSPITAL | Age: 25
End: 2020-07-16

## 2020-07-16 DIAGNOSIS — K70.11 ALCOHOLIC HEPATITIS WITH ASCITES: ICD-10-CM

## 2020-07-16 LAB
ALBUMIN SERPL-MCNC: 3.3 G/DL (ref 3.5–5.2)
ALBUMIN/GLOB SERPL: 1 G/DL
ALP SERPL-CCNC: 237 U/L (ref 39–117)
ALT SERPL W P-5'-P-CCNC: 40 U/L (ref 1–41)
AMMONIA BLD-SCNC: 62 UMOL/L (ref 16–60)
ANION GAP SERPL CALCULATED.3IONS-SCNC: 11.3 MMOL/L (ref 5–15)
AST SERPL-CCNC: 66 U/L (ref 1–40)
BILIRUB CONJ SERPL-MCNC: 1.5 MG/DL (ref 0–0.3)
BILIRUB SERPL-MCNC: 5 MG/DL (ref 0–1.2)
BUN SERPL-MCNC: 8 MG/DL (ref 6–20)
BUN/CREAT SERPL: 12.7 (ref 7–25)
CALCIUM SPEC-SCNC: 9.1 MG/DL (ref 8.6–10.5)
CHLORIDE SERPL-SCNC: 103 MMOL/L (ref 98–107)
CO2 SERPL-SCNC: 22.7 MMOL/L (ref 22–29)
CREAT SERPL-MCNC: 0.63 MG/DL (ref 0.76–1.27)
GFR SERPL CREATININE-BSD FRML MDRD: >150 ML/MIN/1.73
GLOBULIN UR ELPH-MCNC: 3.4 GM/DL
GLUCOSE SERPL-MCNC: 105 MG/DL (ref 65–99)
INR PPP: 1.87 (ref 0.85–1.16)
POTASSIUM SERPL-SCNC: 4 MMOL/L (ref 3.5–5.2)
PROT SERPL-MCNC: 6.7 G/DL (ref 6–8.5)
PROTHROMBIN TIME: 21.2 SECONDS (ref 11.5–14)
SODIUM SERPL-SCNC: 137 MMOL/L (ref 136–145)

## 2020-07-16 PROCEDURE — 80053 COMPREHEN METABOLIC PANEL: CPT

## 2020-07-16 PROCEDURE — 36415 COLL VENOUS BLD VENIPUNCTURE: CPT

## 2020-07-16 PROCEDURE — 85610 PROTHROMBIN TIME: CPT

## 2020-07-16 PROCEDURE — 82248 BILIRUBIN DIRECT: CPT

## 2020-07-16 PROCEDURE — 82140 ASSAY OF AMMONIA: CPT

## 2020-07-19 NOTE — PROGRESS NOTES
Meld score calculated from July 16, 2020 labs is 20 up from 19.  That said his bilirubin is improving with simply his INR went up a little bit.  Will need to continue to monitor has already been seen by [facility] and has chosen to follow him conservatively

## 2020-07-23 ENCOUNTER — LAB (OUTPATIENT)
Dept: LAB | Facility: HOSPITAL | Age: 25
End: 2020-07-23

## 2020-07-23 DIAGNOSIS — K70.11 ALCOHOLIC HEPATITIS WITH ASCITES: ICD-10-CM

## 2020-07-23 LAB
ALBUMIN SERPL-MCNC: 3.2 G/DL (ref 3.5–5.2)
ALBUMIN/GLOB SERPL: 1 G/DL
ALP SERPL-CCNC: 284 U/L (ref 39–117)
ALT SERPL W P-5'-P-CCNC: 38 U/L (ref 1–41)
AMMONIA BLD-SCNC: 69 UMOL/L (ref 16–60)
ANION GAP SERPL CALCULATED.3IONS-SCNC: 9.2 MMOL/L (ref 5–15)
AST SERPL-CCNC: 50 U/L (ref 1–40)
BILIRUB CONJ SERPL-MCNC: 1.3 MG/DL (ref 0–0.3)
BILIRUB SERPL-MCNC: 4.4 MG/DL (ref 0–1.2)
BUN SERPL-MCNC: 5 MG/DL (ref 6–20)
BUN/CREAT SERPL: 8.5 (ref 7–25)
CALCIUM SPEC-SCNC: 9.1 MG/DL (ref 8.6–10.5)
CHLORIDE SERPL-SCNC: 103 MMOL/L (ref 98–107)
CO2 SERPL-SCNC: 22.8 MMOL/L (ref 22–29)
CREAT SERPL-MCNC: 0.59 MG/DL (ref 0.76–1.27)
GFR SERPL CREATININE-BSD FRML MDRD: >150 ML/MIN/1.73
GLOBULIN UR ELPH-MCNC: 3.3 GM/DL
GLUCOSE SERPL-MCNC: 120 MG/DL (ref 65–99)
INR PPP: 1.85 (ref 0.85–1.16)
POTASSIUM SERPL-SCNC: 4.9 MMOL/L (ref 3.5–5.2)
PROT SERPL-MCNC: 6.5 G/DL (ref 6–8.5)
PROTHROMBIN TIME: 21 SECONDS (ref 11.5–14)
SODIUM SERPL-SCNC: 135 MMOL/L (ref 136–145)

## 2020-07-23 PROCEDURE — 85610 PROTHROMBIN TIME: CPT

## 2020-07-23 PROCEDURE — 82140 ASSAY OF AMMONIA: CPT

## 2020-07-23 PROCEDURE — 80053 COMPREHEN METABOLIC PANEL: CPT

## 2020-07-23 PROCEDURE — 36415 COLL VENOUS BLD VENIPUNCTURE: CPT

## 2020-07-23 PROCEDURE — 82248 BILIRUBIN DIRECT: CPT

## 2020-07-24 NOTE — PROGRESS NOTES
Same as prior of 7/16/20,  MELD score remains stable at 19 (7/23/20 labs). Drop in T. Bili noted. Slight increase in NH4.

## 2020-07-30 ENCOUNTER — LAB (OUTPATIENT)
Dept: LAB | Facility: HOSPITAL | Age: 25
End: 2020-07-30

## 2020-07-30 DIAGNOSIS — K70.11 ALCOHOLIC HEPATITIS WITH ASCITES: ICD-10-CM

## 2020-07-30 LAB
ALBUMIN SERPL-MCNC: 3.6 G/DL (ref 3.5–5.2)
ALBUMIN/GLOB SERPL: 1.2 G/DL
ALP SERPL-CCNC: 227 U/L (ref 39–117)
ALT SERPL W P-5'-P-CCNC: 30 U/L (ref 1–41)
AMMONIA BLD-SCNC: 91 UMOL/L (ref 16–60)
ANION GAP SERPL CALCULATED.3IONS-SCNC: 8.8 MMOL/L (ref 5–15)
AST SERPL-CCNC: 43 U/L (ref 1–40)
BILIRUB CONJ SERPL-MCNC: 1.2 MG/DL (ref 0–0.3)
BILIRUB SERPL-MCNC: 4.9 MG/DL (ref 0–1.2)
BUN SERPL-MCNC: 7 MG/DL (ref 6–20)
BUN/CREAT SERPL: 11.5 (ref 7–25)
CALCIUM SPEC-SCNC: 9 MG/DL (ref 8.6–10.5)
CHLORIDE SERPL-SCNC: 104 MMOL/L (ref 98–107)
CO2 SERPL-SCNC: 23.2 MMOL/L (ref 22–29)
CREAT SERPL-MCNC: 0.61 MG/DL (ref 0.76–1.27)
GFR SERPL CREATININE-BSD FRML MDRD: >150 ML/MIN/1.73
GLOBULIN UR ELPH-MCNC: 2.9 GM/DL
GLUCOSE SERPL-MCNC: 109 MG/DL (ref 65–99)
INR PPP: 2.01 (ref 0.85–1.16)
POTASSIUM SERPL-SCNC: 4 MMOL/L (ref 3.5–5.2)
PROT SERPL-MCNC: 6.5 G/DL (ref 6–8.5)
PROTHROMBIN TIME: 22.4 SECONDS (ref 11.5–14)
SODIUM SERPL-SCNC: 136 MMOL/L (ref 136–145)

## 2020-07-30 PROCEDURE — 80053 COMPREHEN METABOLIC PANEL: CPT

## 2020-07-30 PROCEDURE — 36415 COLL VENOUS BLD VENIPUNCTURE: CPT

## 2020-07-30 PROCEDURE — 82140 ASSAY OF AMMONIA: CPT

## 2020-07-30 PROCEDURE — 82248 BILIRUBIN DIRECT: CPT

## 2020-07-30 PROCEDURE — 85610 PROTHROMBIN TIME: CPT

## 2020-08-11 ENCOUNTER — OFFICE VISIT (OUTPATIENT)
Dept: GASTROENTEROLOGY | Facility: CLINIC | Age: 25
End: 2020-08-11

## 2020-08-11 VITALS
TEMPERATURE: 98 F | SYSTOLIC BLOOD PRESSURE: 118 MMHG | HEART RATE: 59 BPM | WEIGHT: 152 LBS | DIASTOLIC BLOOD PRESSURE: 65 MMHG | BODY MASS INDEX: 23.11 KG/M2

## 2020-08-11 DIAGNOSIS — K76.82 HEPATIC ENCEPHALOPATHY (HCC): ICD-10-CM

## 2020-08-11 DIAGNOSIS — F10.21 ALCOHOL DEPENDENCE IN REMISSION (HCC): ICD-10-CM

## 2020-08-11 DIAGNOSIS — Z95.828 S/P TIPS (TRANSJUGULAR INTRAHEPATIC PORTOSYSTEMIC SHUNT): ICD-10-CM

## 2020-08-11 DIAGNOSIS — K70.11 ALCOHOLIC HEPATITIS WITH ASCITES: Primary | ICD-10-CM

## 2020-08-11 PROCEDURE — 99214 OFFICE O/P EST MOD 30 MIN: CPT | Performed by: INTERNAL MEDICINE

## 2020-08-11 NOTE — PROGRESS NOTES
GASTROENTEROLOGY OFFICE NOTE  John Varma  [id number]  1995    CARE TEAM    Patient Care Team:  Hima Patterson MD as PCP - General (Family Medicine)    No ref. provider found     Chief Complaint   Patient presents with   • Follow-up     Alcoholic Hepatitis with Ascites         HISTORY OF PRESENT ILLNESS:  Patient presents for follow-up of alcoholic hepatitis with a meld score that remains around 20.  Recently seen the [facility] transplant clinic apparently he was told that his need for transplantation may be reassessed if his meld score persists is elevated as it is now.    He remains entirely abstinent from alcohol for the most part has nothing in the way of any localizing complaints his energy level is good he is not practically fatigued his appetite is good he denies dysphagia to solids odynophagia early satiety or unexplained weight loss and denies melena/bright red blood per rectum abdominal distention or peripheral edema.    His mental status remains clear    PAST MEDICAL HISTORY  Past Medical History:   Diagnosis Date   • Alcohol abuse    • Alcohol addiction (CMS/HCC) 5/20/2020   • GERD (gastroesophageal reflux disease)    • GIB (gastrointestinal bleeding)    • Tobacco abuse         PAST SURGICAL HISTORY  Past Surgical History:   Procedure Laterality Date   • ARM WOUND REPAIR / CLOSURE     • ENDOSCOPY N/A 4/24/2020    Procedure: ESOPHAGOGASTRODUODENOSCOPY;  Surgeon: Brunner, Mark I, MD;  Location: [facility] ENDOSCOPY;  Service: Gastroenterology;  Laterality: N/A;   • TIPS PROCEDURE  2020        MEDICATIONS:    Current Outpatient Medications:   •  furosemide (Lasix) 20 MG tablet, Take 1 tablet by mouth 2 (Two) Times a Day., Disp: 60 tablet, Rfl: 1  •  hydrOXYzine (ATARAX) 25 MG tablet, , Disp: , Rfl:   •  lactulose (CHRONULAC) 10 GM/15ML solution, Take 30 mL by mouth 2 (Two) Times a Day., Disp: 946 mL, Rfl: 1  •  Magnesium Oxide 400 (240 Mg) MG tablet, Take 1 tablet by mouth Daily., Disp: 30  "tablet, Rfl: 1  •  pantoprazole (PROTONIX) 40 MG EC tablet, Take 1 tablet by mouth Daily., Disp: 30 tablet, Rfl: 1  •  propranolol (INDERAL) 10 MG tablet, Take 1 tablet by mouth 3 (Three) Times a Day., Disp: 90 tablet, Rfl: 1  •  riFAXIMin (XIFAXAN) 550 MG tablet, Take 1 tablet by Nasogastric route Every 12 (Twelve) Hours., Disp: 60 tablet, Rfl: 1  •  spironolactone (ALDACTONE) 50 MG tablet, Take 1 tablet by mouth Daily., Disp: 30 tablet, Rfl: 1  •  thiamine (VITAMIN B1) 100 MG tablet, Take 1 tablet by mouth Daily., Disp: 30 tablet, Rfl: 1  •  zinc gluconate 50 MG tablet, Take 1 tablet by mouth Daily., Disp: 30 tablet, Rfl: 1    ALLERGIES  Allergies   Allergen Reactions   • Penicillins Hives       FAMILY HISTORY:  Family History   Problem Relation Age of Onset   • Parkinsonism Mother    • Alcohol abuse Father    • Colon cancer Neg Hx    • Colon polyps Neg Hx        SOCIAL HISTORY  Social History     Socioeconomic History   • Marital status: Single     Spouse name: Not on file   • Number of children: Not on file   • Years of education: Not on file   • Highest education level: Not on file   Occupational History   • Occupation: unemployed   Tobacco Use   • Smoking status: Current Every Day Smoker     Types: Cigarettes   • Smokeless tobacco: Never Used   • Tobacco comment: 2-3 cigarettes a day   Substance and Sexual Activity   • Alcohol use: Not Currently     Comment: PT STATES THAT HIS ALCOHOL INTAKE VARIES - \"SOMETIMES I DRINK 3 BEERS, SOMETIMES I DRINK A PINT\". PT REPORTS LAST DRINK WAS 4/20/20   • Drug use: Yes     Types: Marijuana     Comment: PIPE - 3-4 TIMES PER DAY   • Sexual activity: Defer     Comment: single     Socioeconomic History:  Single.  Unemployed.  Does not have children.  Lives with his grandparents and his father       REVIEW OF SYSTEMS  Review of Systems   Constitutional: Negative for activity change, appetite change, chills, diaphoresis, fatigue, fever, unexpected weight gain and unexpected " weight loss.   HENT: Negative for trouble swallowing and voice change.    Gastrointestinal: Negative for abdominal distention, abdominal pain, anal bleeding, blood in stool, constipation, diarrhea, nausea, rectal pain, vomiting, GERD and indigestion.     I reviewed the above-noted review of systems    PHYSICAL EXAM   /65 (BP Location: Left arm, Patient Position: Sitting, Cuff Size: Adult)   Pulse 59   Temp 98 °F (36.7 °C) (Temporal)   Wt 68.9 kg (152 lb)   BMI 23.11 kg/m²   General: Alert and oriented x 3. In no apparent or acute distress.  and No stigmata of chronic liver disease  HEENT: Scleral icterus is noted  Neck: Supple. Without lymphadenopathy  CV: Regular rate and rhythm, S1, S2  Lungs: Clear to ausculation. Without rales, rhonchi and wheezing  Abdomen:  Soft,non-distended without palpable masses or hepatosplenomeagaly, areas of rebound tenderness or guarding.   Extremeties: without clubbing, cyanosis or edema  Neurologic:  Alert and oriented x 3 without focal motor or sensory deficits  Rectal exam: deferred     Results for orders placed or performed in visit on 07/30/20   Ammonia   Result Value Ref Range    Ammonia 91 (H) 16 - 60 umol/L   Comprehensive Metabolic Panel   Result Value Ref Range    Glucose 109 (H) 65 - 99 mg/dL    BUN 7 6 - 20 mg/dL    Creatinine 0.61 (L) 0.76 - 1.27 mg/dL    Sodium 136 136 - 145 mmol/L    Potassium 4.0 3.5 - 5.2 mmol/L    Chloride 104 98 - 107 mmol/L    CO2 23.2 22.0 - 29.0 mmol/L    Calcium 9.0 8.6 - 10.5 mg/dL    Total Protein 6.5 6.0 - 8.5 g/dL    Albumin 3.60 3.50 - 5.20 g/dL    ALT (SGPT) 30 1 - 41 U/L    AST (SGOT) 43 (H) 1 - 40 U/L    Alkaline Phosphatase 227 (H) 39 - 117 U/L    Total Bilirubin 4.9 (H) 0.0 - 1.2 mg/dL    eGFR Non African Amer >150 >60 mL/min/1.73    Globulin 2.9 gm/dL    A/G Ratio 1.2 g/dL    BUN/Creatinine Ratio 11.5 7.0 - 25.0    Anion Gap 8.8 5.0 - 15.0 mmol/L   Protime-INR   Result Value Ref Range    Protime 22.4 (H) 11.5 - 14.0 Seconds     INR 2.01 (H) 0.85 - 1.16   Bilirubin, Direct   Result Value Ref Range    Bilirubin, Direct 1.2 (H) 0.0 - 0.3 mg/dL        Results Review:  I reviewed the patient's new clinical results.      ASSESSMENT  1.-  Alcoholic hepatitis/alcohol-related cirrhosis.  Meld score not improving.  2.-  Status post variceal bleeding and TIPS placement  3.-  Elevated ammonia level without clinical signs of encephalopathy  4.-  Alcoholism currently abstinent    PLAN  1.-  Continue to monitor serum liver enzymes and conjunction with management at [facility] transplant center.  I agree that his meld score not improving is concerning.  2.-  Patient is counseled to maintain adequate nutrition and increase his protein intake as long as no mental status changes are observed.  3.-  Continue to monitor serum liver enzymes every 2 weeks  4.-  Follow-up appointment in 6 weeks.  5.-  Obtain most recent records from [facility]      Ranjan Gregorio MD  8/11/2020

## 2020-08-12 NOTE — TELEPHONE ENCOUNTER
Caller: DENVER    Relationship: Grandparent    Best call back number: [phone]    Medication needed:   Requested Prescriptions     Pending Prescriptions Disp Refills   • spironolactone (ALDACTONE) 50 MG tablet 30 tablet 1     Sig: Take 1 tablet by mouth Daily.   • pantoprazole (PROTONIX) 40 MG EC tablet 30 tablet 1     Sig: Take 1 tablet by mouth Daily.   • furosemide (Lasix) 20 MG tablet 60 tablet 1     Sig: Take 1 tablet by mouth 2 (Two) Times a Day.       When do you need the refill by: ASAP    What details did the patient provide when requesting the medication: PATIENT IS OUT OF MEDICATION AND DOESN'T HAVE ANY REFILLS.     Does the patient have less than a 3 day supply:  [x] Yes  [] No    What is the patient's preferred pharmacy: [facility] [address] [phone] [facility] [phone]

## 2020-08-13 ENCOUNTER — TELEPHONE (OUTPATIENT)
Dept: FAMILY MEDICINE CLINIC | Facility: CLINIC | Age: 25
End: 2020-08-13

## 2020-08-13 NOTE — TELEPHONE ENCOUNTER
PT GRANDFATHER CALLED IN WANTING TO KNOW IF WE COULD REACH OUT TO THE [facility] AND IF THEY COULD GO OVER HIS MEDICATION LIST TO SEE IF THERE IS ANYTHING THAT HE NEEDS TO BE ON OR NEEDS TO BE TAKEN OFF OF.      CALL BACK TO    [phone] OR [phone]

## 2020-08-14 NOTE — TELEPHONE ENCOUNTER
At this time I would defer to the transplant center as far as what medications he is taking.   The family can reach out to them about their recommendations, but they also are overdue for an appointment with the transplant clinic.  .  It looks like in June the  transplant clinic was not able to get in touch with pt.  The can call [phone] to reach the transplant clinic and schedule this needed follow up.    I would strongly encouraged them to call this number and set up the follow up.

## 2020-08-17 ENCOUNTER — TELEPHONE (OUTPATIENT)
Dept: GASTROENTEROLOGY | Facility: CLINIC | Age: 25
End: 2020-08-17

## 2020-08-17 RX ORDER — FUROSEMIDE 20 MG/1
20 TABLET ORAL 2 TIMES DAILY
Qty: 60 TABLET | Refills: 1 | Status: SHIPPED | OUTPATIENT
Start: 2020-08-17 | End: 2020-10-26 | Stop reason: SDUPTHER

## 2020-08-17 RX ORDER — SPIRONOLACTONE 50 MG/1
50 TABLET, FILM COATED ORAL DAILY
Qty: 30 TABLET | Refills: 1 | Status: SHIPPED | OUTPATIENT
Start: 2020-08-17 | End: 2020-10-19

## 2020-08-17 RX ORDER — PANTOPRAZOLE SODIUM 40 MG/1
40 TABLET, DELAYED RELEASE ORAL DAILY
Qty: 30 TABLET | Refills: 1 | Status: SHIPPED | OUTPATIENT
Start: 2020-08-17 | End: 2020-10-19

## 2020-08-17 NOTE — TELEPHONE ENCOUNTER
[phone]  Pts grandfather called repoting that his scripts that he rec'd at the hospital from Dr. PETER have ran out and they are unable to refill them. Pt needs to know what medications Dr PETER wants him to continue to take and send refills for those.  Please call number listed above to advise.

## 2020-08-17 NOTE — TELEPHONE ENCOUNTER
Called patients grandfather ( Bowen Varma) and grandmother (Denise Wrightham) and at this time Dr. Patterson has already refilled the patients prescriptions. Confirmed the grandmother has no additional at this time but notified them if they had any further questions to please give our office a call back.

## 2020-09-23 NOTE — OUTREACH NOTE
Patient left message asking for CPT code so she can call her insurance about authorization.  I returned her call and left a detailed message with the CPT 31672 however told her she can check her benefits but do not start an authorization [facility]     Prep Survey      Responses   Judaism facility patient discharged from?  [place]   Is LACE score < 7 ?  No   Eligibility  Readm Mgmt   Discharge diagnosis  acute ETOH hepatitis,   Sepsis with acute renal failure without septic shock due to SBP,   Acute respiratory failure with hypoxia. Intubated 4/24-4/27 and 4/29-5/1,     Ascites due to alcoholic hepatitis. S/P paracentesis 4/24, 4/30, and 5/8     COVID-19 Test Status  Negative   Does the patient have one of the following disease processes/diagnoses(primary or secondary)?  Sepsis   Does the patient have Home health ordered?  No   Is there a DME ordered?  Yes   What DME was ordered?  rolling walker - [employer]    General alerts for this patient  high risk home environment for ETOH relapse after discharge   Prep survey completed?  Yes          Alissa Boles RN

## 2020-10-17 ENCOUNTER — TELEPHONE (OUTPATIENT)
Dept: FAMILY MEDICINE CLINIC | Facility: CLINIC | Age: 25
End: 2020-10-17

## 2020-10-19 RX ORDER — PANTOPRAZOLE SODIUM 40 MG/1
TABLET, DELAYED RELEASE ORAL
Qty: 30 TABLET | Refills: 0 | Status: SHIPPED | OUTPATIENT
Start: 2020-10-19 | End: 2020-10-26

## 2020-10-19 RX ORDER — SPIRONOLACTONE 50 MG/1
TABLET, FILM COATED ORAL
Qty: 30 TABLET | Refills: 0 | Status: SHIPPED | OUTPATIENT
Start: 2020-10-19 | End: 2020-10-26 | Stop reason: SDUPTHER

## 2020-10-26 ENCOUNTER — OFFICE VISIT (OUTPATIENT)
Dept: FAMILY MEDICINE CLINIC | Facility: CLINIC | Age: 25
End: 2020-10-26

## 2020-10-26 VITALS
HEIGHT: 68 IN | WEIGHT: 146 LBS | RESPIRATION RATE: 16 BRPM | SYSTOLIC BLOOD PRESSURE: 118 MMHG | BODY MASS INDEX: 22.13 KG/M2 | HEART RATE: 68 BPM | TEMPERATURE: 98.6 F | DIASTOLIC BLOOD PRESSURE: 72 MMHG

## 2020-10-26 DIAGNOSIS — K70.11 ALCOHOLIC HEPATITIS WITH ASCITES: ICD-10-CM

## 2020-10-26 DIAGNOSIS — K76.82 HEPATIC ENCEPHALOPATHY (HCC): Primary | ICD-10-CM

## 2020-10-26 DIAGNOSIS — Z95.828 S/P TIPS (TRANSJUGULAR INTRAHEPATIC PORTOSYSTEMIC SHUNT): ICD-10-CM

## 2020-10-26 PROCEDURE — 99213 OFFICE O/P EST LOW 20 MIN: CPT | Performed by: FAMILY MEDICINE

## 2020-10-26 RX ORDER — SPIRONOLACTONE 50 MG/1
50 TABLET, FILM COATED ORAL DAILY
Qty: 30 TABLET | Refills: 5 | Status: SHIPPED | OUTPATIENT
Start: 2020-10-26 | End: 2021-05-05 | Stop reason: SDUPTHER

## 2020-10-26 RX ORDER — FUROSEMIDE 20 MG/1
20 TABLET ORAL 2 TIMES DAILY
Qty: 60 TABLET | Refills: 5 | Status: SHIPPED | OUTPATIENT
Start: 2020-10-26 | End: 2021-05-05 | Stop reason: SDUPTHER

## 2020-10-26 RX ORDER — LACTULOSE 10 G/15ML
10 SOLUTION ORAL 2 TIMES DAILY
Qty: 946 ML | Refills: 5 | Status: SHIPPED | OUTPATIENT
Start: 2020-10-26 | End: 2021-05-05 | Stop reason: SDUPTHER

## 2020-10-26 NOTE — PROGRESS NOTES
Subjective   John Varma is a 25 y.o. male.     History of Present Illness     He continues to see  for his liver disease  They continue to modify his regimen  He is seeing the transplant clinic at this time but they want me to refill medications for pt  I do not have any noted from their office but he brings in a list of the 3 meds he is taking on [facility] paperwork    No alcohol use at all at this time and he has been doing well      Review of Systems   Constitutional: Negative.        Objective   Physical Exam  Vitals signs and nursing note reviewed.   Constitutional:       General: He is not in acute distress.     Appearance: Normal appearance. He is well-developed.   Cardiovascular:      Rate and Rhythm: Normal rate and regular rhythm.      Heart sounds: Normal heart sounds.   Pulmonary:      Effort: Pulmonary effort is normal.      Breath sounds: Normal breath sounds.   Abdominal:      General: Abdomen is flat. Bowel sounds are normal. There is no distension.      Palpations: Abdomen is soft. There is no mass.      Tenderness: There is no abdominal tenderness.   Neurological:      Mental Status: He is alert and oriented to person, place, and time.   Psychiatric:         Mood and Affect: Mood normal.         Behavior: Behavior normal.         Thought Content: Thought content normal.         Judgment: Judgment normal.         Assessment/Plan   Diagnoses and all orders for this visit:    1. Hepatic encephalopathy (CMS/HCC) (Primary)  -     furosemide (Lasix) 20 MG tablet; Take 1 tablet by mouth 2 (Two) Times a Day.  Dispense: 60 tablet; Refill: 5  -     lactulose (CHRONULAC) 10 GM/15ML solution; Take 15 mL by mouth 2 (Two) Times a Day.  Dispense: 946 mL; Refill: 5  -     spironolactone (ALDACTONE) 50 MG tablet; Take 1 tablet by mouth Daily.  Dispense: 30 tablet; Refill: 5    2. 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  -     furosemide (Lasix) 20 MG tablet; Take 1 tablet by mouth 2  (Two) Times a Day.  Dispense: 60 tablet; Refill: 5  -     lactulose (CHRONULAC) 10 GM/15ML solution; Take 15 mL by mouth 2 (Two) Times a Day.  Dispense: 946 mL; Refill: 5  -     spironolactone (ALDACTONE) 50 MG tablet; Take 1 tablet by mouth Daily.  Dispense: 30 tablet; Refill: 5    3. Alcoholic hepatitis with ascites  -     furosemide (Lasix) 20 MG tablet; Take 1 tablet by mouth 2 (Two) Times a Day.  Dispense: 60 tablet; Refill: 5  -     lactulose (CHRONULAC) 10 GM/15ML solution; Take 15 mL by mouth 2 (Two) Times a Day.  Dispense: 946 mL; Refill: 5  -     spironolactone (ALDACTONE) 50 MG tablet; Take 1 tablet by mouth Daily.  Dispense: 30 tablet; Refill: 5    will refill 3 meds for pt and request notes from [facility] again including DC note from April as well as recent notes.  Unsure why they want me to refill  Medicine for pt but I will send in 6 months worth at this time.

## 2020-11-23 RX ORDER — PANTOPRAZOLE SODIUM 40 MG/1
TABLET, DELAYED RELEASE ORAL
Qty: 30 TABLET | Refills: 0 | OUTPATIENT
Start: 2020-11-23

## 2020-11-25 RX ORDER — PANTOPRAZOLE SODIUM 40 MG/1
TABLET, DELAYED RELEASE ORAL
Qty: 30 TABLET | Refills: 1 | Status: SHIPPED | OUTPATIENT
Start: 2020-11-25 | End: 2021-05-05 | Stop reason: SDUPTHER

## 2021-05-05 ENCOUNTER — OFFICE VISIT (OUTPATIENT)
Dept: FAMILY MEDICINE CLINIC | Facility: CLINIC | Age: 26
End: 2021-05-05

## 2021-05-05 VITALS
HEART RATE: 76 BPM | HEIGHT: 68 IN | SYSTOLIC BLOOD PRESSURE: 114 MMHG | TEMPERATURE: 97.5 F | WEIGHT: 154 LBS | RESPIRATION RATE: 18 BRPM | BODY MASS INDEX: 23.34 KG/M2 | DIASTOLIC BLOOD PRESSURE: 72 MMHG

## 2021-05-05 DIAGNOSIS — K70.11 ALCOHOLIC HEPATITIS WITH ASCITES: ICD-10-CM

## 2021-05-05 DIAGNOSIS — Z95.828 S/P TIPS (TRANSJUGULAR INTRAHEPATIC PORTOSYSTEMIC SHUNT): ICD-10-CM

## 2021-05-05 DIAGNOSIS — K76.82 HEPATIC ENCEPHALOPATHY (HCC): Primary | ICD-10-CM

## 2021-05-05 DIAGNOSIS — K21.9 GASTROESOPHAGEAL REFLUX DISEASE, UNSPECIFIED WHETHER ESOPHAGITIS PRESENT: ICD-10-CM

## 2021-05-05 DIAGNOSIS — L30.9 DERMATITIS: ICD-10-CM

## 2021-05-05 PROCEDURE — 99214 OFFICE O/P EST MOD 30 MIN: CPT | Performed by: FAMILY MEDICINE

## 2021-05-05 RX ORDER — NADOLOL 40 MG/1
TABLET ORAL
COMMUNITY
Start: 2021-04-13

## 2021-05-05 RX ORDER — TRIAMCINOLONE ACETONIDE 5 MG/G
CREAM TOPICAL 2 TIMES DAILY
Qty: 30 G | Refills: 0 | Status: SHIPPED | OUTPATIENT
Start: 2021-05-05

## 2021-05-05 RX ORDER — SPIRONOLACTONE 50 MG/1
50 TABLET, FILM COATED ORAL DAILY
Qty: 30 TABLET | Refills: 5 | Status: SHIPPED | OUTPATIENT
Start: 2021-05-05

## 2021-05-05 RX ORDER — LACTULOSE 10 G/15ML
10 SOLUTION ORAL 2 TIMES DAILY
Qty: 946 ML | Refills: 5 | Status: SHIPPED | OUTPATIENT
Start: 2021-05-05

## 2021-05-05 RX ORDER — PANTOPRAZOLE SODIUM 40 MG/1
40 TABLET, DELAYED RELEASE ORAL DAILY
Qty: 30 TABLET | Refills: 5 | Status: SHIPPED | OUTPATIENT
Start: 2021-05-05

## 2021-05-05 RX ORDER — FUROSEMIDE 20 MG/1
20 TABLET ORAL 2 TIMES DAILY
Qty: 60 TABLET | Refills: 5 | Status: SHIPPED | OUTPATIENT
Start: 2021-05-05

## 2021-05-05 NOTE — PROGRESS NOTES
Subjective   John Varma is a 25 y.o. male.     History of Present Illness     His transplant doctor at  does not think he is going to need a transplant at this time  They are optimistic that his enzymes look a little better  Pt is feeling better, increasing activity  No alcohol use at all  No SE with his medicine as and he does need refills today      He needs a refill of his protonix  Takes this for GERD with good control  No SE noted and he is out      He notes an itchy red rash on his right forearm that has been present for several days  It itches, no pain  Not changing in size    The following portions of the patient's history were reviewed and updated as appropriate: allergies, current medications, past family history, past medical history, past social history, past surgical history and problem list.    Review of Systems   Constitutional: Negative.    Psychiatric/Behavioral: Negative.        Objective   Physical Exam  Vitals and nursing note reviewed.   Constitutional:       General: He is not in acute distress.     Appearance: Normal appearance. He is well-developed.   Cardiovascular:      Rate and Rhythm: Normal rate and regular rhythm.      Heart sounds: Murmur heard.   Crescendo systolic murmur is present with a grade of 2/6.     Pulmonary:      Effort: Pulmonary effort is normal.      Breath sounds: Normal breath sounds.   Skin:         Neurological:      Mental Status: He is alert and oriented to person, place, and time.   Psychiatric:         Mood and Affect: Mood normal.         Behavior: Behavior normal.         Thought Content: Thought content normal.         Judgment: Judgment normal.         Assessment/Plan   Diagnoses and all orders for this visit:    1. Hepatic encephalopathy (CMS/HCC) (Primary)  -     spironolactone (ALDACTONE) 50 MG tablet; Take 1 tablet by mouth Daily.  Dispense: 30 tablet; Refill: 5  -     lactulose (CHRONULAC) 10 GM/15ML solution; Take 15 mL by mouth 2 (Two) Times a  Day.  Dispense: 946 mL; Refill: 5  -     furosemide (Lasix) 20 MG tablet; Take 1 tablet by mouth 2 (Two) Times a Day.  Dispense: 60 tablet; Refill: 5    2. 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  -     spironolactone (ALDACTONE) 50 MG tablet; Take 1 tablet by mouth Daily.  Dispense: 30 tablet; Refill: 5  -     lactulose (CHRONULAC) 10 GM/15ML solution; Take 15 mL by mouth 2 (Two) Times a Day.  Dispense: 946 mL; Refill: 5  -     furosemide (Lasix) 20 MG tablet; Take 1 tablet by mouth 2 (Two) Times a Day.  Dispense: 60 tablet; Refill: 5    3. Alcoholic hepatitis with ascites  -     spironolactone (ALDACTONE) 50 MG tablet; Take 1 tablet by mouth Daily.  Dispense: 30 tablet; Refill: 5  -     lactulose (CHRONULAC) 10 GM/15ML solution; Take 15 mL by mouth 2 (Two) Times a Day.  Dispense: 946 mL; Refill: 5  -     furosemide (Lasix) 20 MG tablet; Take 1 tablet by mouth 2 (Two) Times a Day.  Dispense: 60 tablet; Refill: 5    4. Gastroesophageal reflux disease, unspecified whether esophagitis present  -     pantoprazole (PROTONIX) 40 MG EC tablet; Take 1 tablet by mouth Daily.  Dispense: 30 tablet; Refill: 5    5. Dermatitis  -     triamcinolone (KENALOG) 0.5 % cream; Apply  topically to the appropriate area as directed 2 (Two) Times a Day.  Dispense: 30 g; Refill: 0    overall pt feels like he is doing well. [facility] told him that his numbers were improved but I do not have those records and so will request them.  Will continue lactulose, lasix, and spironolactone.  These were recommended by [facility] but I refill them.  Continue protonix for GERD. control is good.  Will try steroid cream for the redness on his arm, f/u INB.  Pt agrees

## 2021-06-30 ENCOUNTER — OFFICE VISIT (OUTPATIENT)
Dept: FAMILY MEDICINE CLINIC | Facility: CLINIC | Age: 26
End: 2021-06-30

## 2021-06-30 VITALS
HEART RATE: 74 BPM | HEIGHT: 68 IN | TEMPERATURE: 99 F | BODY MASS INDEX: 22.43 KG/M2 | SYSTOLIC BLOOD PRESSURE: 120 MMHG | WEIGHT: 148 LBS | RESPIRATION RATE: 18 BRPM | DIASTOLIC BLOOD PRESSURE: 72 MMHG

## 2021-06-30 DIAGNOSIS — K76.82 HEPATIC ENCEPHALOPATHY (HCC): Primary | ICD-10-CM

## 2021-06-30 DIAGNOSIS — Z95.828 S/P TIPS (TRANSJUGULAR INTRAHEPATIC PORTOSYSTEMIC SHUNT): ICD-10-CM

## 2021-06-30 DIAGNOSIS — F63.81 INTERMITTENT EXPLOSIVE DISORDER: ICD-10-CM

## 2021-06-30 PROCEDURE — 99214 OFFICE O/P EST MOD 30 MIN: CPT | Performed by: FAMILY MEDICINE

## 2021-06-30 RX ORDER — ESCITALOPRAM OXALATE 10 MG/1
10 TABLET ORAL DAILY
Qty: 30 TABLET | Refills: 2 | Status: SHIPPED | OUTPATIENT
Start: 2021-06-30 | End: 2021-07-18

## 2021-06-30 NOTE — PROGRESS NOTES
Subjective   John Varma is a 25 y.o. male.     History of Present Illness     He has been staying away from alcohol and overall doing well from a liver standpoint  He is seeing  [facility] for ongoing liver disease and is still on the transplant list  He has an upcoming appointment with GI in July and is due for labs      His mood is more irritable  He gets very angry real easily  Just on edge all the time  He tries to leave  The situation and this helps  howeer a lot of time his anger is getting worse    The following portions of the patient's history were reviewed and updated as appropriate: allergies, current medications, past family history, past medical history, past social history, past surgical history and problem list.    Review of Systems   Constitutional: Negative.    Respiratory: Negative.    Cardiovascular: Negative.    Psychiatric/Behavioral: Positive for agitation.       Objective   Physical Exam  Vitals and nursing note reviewed.   Constitutional:       General: He is not in acute distress.     Appearance: Normal appearance. He is well-developed.   Cardiovascular:      Rate and Rhythm: Normal rate and regular rhythm.      Heart sounds: Murmur heard.   Crescendo systolic murmur is present with a grade of 4/6.     Pulmonary:      Effort: Pulmonary effort is normal.      Breath sounds: Normal breath sounds.   Neurological:      Mental Status: He is alert and oriented to person, place, and time.   Psychiatric:         Mood and Affect: Mood normal.         Behavior: Behavior normal.         Thought Content: Thought content normal.         Judgment: Judgment normal.         Assessment/Plan   Diagnoses and all orders for this visit:    1. Hepatic encephalopathy (CMS/HCC) (Primary)  -     CBC & Differential  -     Comprehensive Metabolic Panel  -     Ammonia  -     Bilirubin, Direct  -     Protime-INR    2. 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  -     CBC & Differential  -      Comprehensive Metabolic Panel  -     Ammonia  -     Bilirubin, Direct  -     Protime-INR    3. Intermittent explosive disorder  -     escitalopram (Lexapro) 10 MG tablet; Take 1 tablet by mouth Daily.  Dispense: 30 tablet; Refill: 2    will recheck labs for hepatic encephalopathy and he will f/u with GI  Pros/cons/SE discussed with pt and we will use lexapro for mood.  Start at low dose and plan to recheck in one month

## 2021-07-01 LAB
ALBUMIN SERPL-MCNC: 4.3 G/DL (ref 3.5–5.2)
ALBUMIN/GLOB SERPL: 1.8 G/DL
ALP SERPL-CCNC: 123 U/L (ref 39–117)
ALT SERPL-CCNC: 39 U/L (ref 1–41)
AMMONIA PLAS-MCNC: 85 UMOL/L (ref 16–60)
AST SERPL-CCNC: 43 U/L (ref 1–40)
BASOPHILS # BLD MANUAL: 0.11 10*3/MM3 (ref 0–0.2)
BASOPHILS NFR BLD MANUAL: 2 % (ref 0–1.5)
BILIRUB DIRECT SERPL-MCNC: 0.5 MG/DL (ref 0–0.3)
BILIRUB SERPL-MCNC: 1.6 MG/DL (ref 0–1.2)
BUN SERPL-MCNC: 12 MG/DL (ref 6–20)
BUN/CREAT SERPL: 20.3 (ref 7–25)
CALCIUM SERPL-MCNC: 9.3 MG/DL (ref 8.6–10.5)
CHLORIDE SERPL-SCNC: 106 MMOL/L (ref 98–107)
CO2 SERPL-SCNC: 21.1 MMOL/L (ref 22–29)
CREAT SERPL-MCNC: 0.59 MG/DL (ref 0.76–1.27)
DIFFERENTIAL COMMENT: ABNORMAL
EOSINOPHIL # BLD MANUAL: 0.17 10*3/MM3 (ref 0–0.4)
EOSINOPHIL NFR BLD MANUAL: 3 % (ref 0.3–6.2)
ERYTHROCYTE [DISTWIDTH] IN BLOOD BY AUTOMATED COUNT: 14.5 % (ref 12.3–15.4)
GLOBULIN SER CALC-MCNC: 2.4 GM/DL
GLUCOSE SERPL-MCNC: 97 MG/DL (ref 65–99)
HCT VFR BLD AUTO: 34.4 % (ref 37.5–51)
HGB BLD-MCNC: 11.6 G/DL (ref 13–17.7)
INR PPP: 1.43 (ref 0.85–1.16)
LYMPHOCYTES # BLD MANUAL: 1.52 10*3/MM3 (ref 0.7–3.1)
LYMPHOCYTES NFR BLD MANUAL: 27 % (ref 19.6–45.3)
MCH RBC QN AUTO: 29.3 PG (ref 26.6–33)
MCHC RBC AUTO-ENTMCNC: 33.7 G/DL (ref 31.5–35.7)
MCV RBC AUTO: 86.9 FL (ref 79–97)
MONOCYTES # BLD MANUAL: 0.34 10*3/MM3 (ref 0.1–0.9)
MONOCYTES NFR BLD MANUAL: 6 % (ref 5–12)
NEUTROPHILS # BLD MANUAL: 3.48 10*3/MM3 (ref 1.7–7)
NEUTROPHILS NFR BLD MANUAL: 62 % (ref 42.7–76)
PLATELET # BLD AUTO: 167 10*3/MM3 (ref 140–450)
PLATELET BLD QL SMEAR: ABNORMAL
POTASSIUM SERPL-SCNC: 4.2 MMOL/L (ref 3.5–5.2)
PROT SERPL-MCNC: 6.7 G/DL (ref 6–8.5)
PROTHROMBIN TIME: 17 SECONDS (ref 11.4–14.4)
RBC # BLD AUTO: 3.96 10*6/MM3 (ref 4.14–5.8)
RBC MORPH BLD: ABNORMAL
SODIUM SERPL-SCNC: 140 MMOL/L (ref 136–145)
WBC # BLD AUTO: 5.62 10*3/MM3 (ref 3.4–10.8)

## 2021-07-13 ENCOUNTER — TELEPHONE (OUTPATIENT)
Dept: FAMILY MEDICINE CLINIC | Facility: CLINIC | Age: 26
End: 2021-07-13

## 2021-07-13 DIAGNOSIS — F39 MOOD DISORDER (HCC): Primary | ICD-10-CM

## 2021-07-13 NOTE — TELEPHONE ENCOUNTER
PT CALLED TO REQUEST A CALL BACK IN REGARDS TO HIS MEDICATION FOR HIS ANGER ISSUES. PT STATED THAT RX MADE HIM SICK AND HAS STOPPED TAKING RX, PT NOT SURE THE NAME OF RX.    PLEASE ADVISE.  CALL BACK:[phone]

## 2021-08-03 ENCOUNTER — TELEPHONE (OUTPATIENT)
Dept: FAMILY MEDICINE CLINIC | Facility: CLINIC | Age: 26
End: 2021-08-03

## 2021-08-03 DIAGNOSIS — F39 MOOD DISORDER (HCC): ICD-10-CM

## 2021-08-03 NOTE — TELEPHONE ENCOUNTER
Caller: JACLYN    Relationship:     Best call back number: [phone]    Medication needed:   Requested Prescriptions     Pending Prescriptions Disp Refills   • Cariprazine HCl (Vraylar) 1.5 MG capsule capsule 30 capsule 1     Sig: Take 1 capsule by mouth Daily.       When do you need the refill by: ASAP    What additional details did the patient provide when requesting the medication: [facility] FROM [facility] STATE THAT THIS MEDICATION NEEDS A PRIOR AUTHORIZATION.    Does the patient have less than a 3 day supply:  [x] Yes  [] No    What is the patient's preferred pharmacy: [facility] [address] [phone] [facility] [phone]

## 2021-08-05 NOTE — TELEPHONE ENCOUNTER
[employer] returned with a response stating PA not needed for this strength and days supply.   Key# [id number]

## 2021-10-28 ENCOUNTER — LAB (OUTPATIENT)
Dept: FAMILY MEDICINE CLINIC | Facility: CLINIC | Age: 26
End: 2021-10-28

## 2022-04-20 ENCOUNTER — HOSPITAL ENCOUNTER (EMERGENCY)
Facility: HOSPITAL | Age: 27
Discharge: HOME | End: 2022-04-20
Attending: EMERGENCY MEDICINE | Admitting: EMERGENCY MEDICINE

## 2022-04-20 ENCOUNTER — APPOINTMENT (OUTPATIENT)
Dept: GENERAL RADIOLOGY | Facility: HOSPITAL | Age: 27
End: 2022-04-20

## 2022-04-20 VITALS
TEMPERATURE: 100.8 F | HEIGHT: 68 IN | BODY MASS INDEX: 22.73 KG/M2 | HEART RATE: 118 BPM | SYSTOLIC BLOOD PRESSURE: 152 MMHG | RESPIRATION RATE: 16 BRPM | DIASTOLIC BLOOD PRESSURE: 90 MMHG | OXYGEN SATURATION: 96 % | WEIGHT: 150 LBS

## 2022-04-20 DIAGNOSIS — U07.1 COVID-19: Primary | ICD-10-CM

## 2022-04-20 LAB
ALBUMIN SERPL-MCNC: 4.8 G/DL (ref 3.5–5.2)
ALBUMIN/GLOB SERPL: 1.8 G/DL
ALP SERPL-CCNC: 95 U/L (ref 39–117)
ALT SERPL W P-5'-P-CCNC: 66 U/L (ref 1–41)
ANION GAP SERPL CALCULATED.3IONS-SCNC: 14 MMOL/L (ref 5–15)
AST SERPL-CCNC: 55 U/L (ref 1–40)
BASOPHILS # BLD MANUAL: 0.06 10*3/MM3 (ref 0–0.2)
BASOPHILS NFR BLD MANUAL: 1 % (ref 0–1.5)
BILIRUB SERPL-MCNC: 1.2 MG/DL (ref 0–1.2)
BUN SERPL-MCNC: 8 MG/DL (ref 6–20)
BUN/CREAT SERPL: 11.8 (ref 7–25)
CALCIUM SPEC-SCNC: 9 MG/DL (ref 8.6–10.5)
CHLORIDE SERPL-SCNC: 103 MMOL/L (ref 98–107)
CO2 SERPL-SCNC: 20 MMOL/L (ref 22–29)
CREAT SERPL-MCNC: 0.68 MG/DL (ref 0.76–1.27)
D-LACTATE SERPL-SCNC: 2.3 MMOL/L (ref 0.5–2)
DACRYOCYTES BLD QL SMEAR: ABNORMAL
DEPRECATED RDW RBC AUTO: 44.1 FL (ref 37–54)
EGFRCR SERPLBLD CKD-EPI 2021: 130.7 ML/MIN/1.73
EOSINOPHIL # BLD MANUAL: 0 10*3/MM3 (ref 0–0.4)
EOSINOPHIL NFR BLD MANUAL: 0 % (ref 0.3–6.2)
ERYTHROCYTE [DISTWIDTH] IN BLOOD BY AUTOMATED COUNT: 14.8 % (ref 12.3–15.4)
FLUAV RNA RESP QL NAA+PROBE: NOT DETECTED
FLUBV RNA RESP QL NAA+PROBE: NOT DETECTED
GLOBULIN UR ELPH-MCNC: 2.6 GM/DL
GLUCOSE SERPL-MCNC: 114 MG/DL (ref 65–99)
HCT VFR BLD AUTO: 39.2 % (ref 37.5–51)
HGB BLD-MCNC: 13.5 G/DL (ref 13–17.7)
LYMPHOCYTES # BLD MANUAL: 0.22 10*3/MM3 (ref 0.7–3.1)
LYMPHOCYTES NFR BLD MANUAL: 4 % (ref 5–12)
MCH RBC QN AUTO: 28.5 PG (ref 26.6–33)
MCHC RBC AUTO-ENTMCNC: 34.4 G/DL (ref 31.5–35.7)
MCV RBC AUTO: 82.9 FL (ref 79–97)
MONOCYTES # BLD: 0.22 10*3/MM3 (ref 0.1–0.9)
NEUTROPHILS # BLD AUTO: 5.01 10*3/MM3 (ref 1.7–7)
NEUTROPHILS NFR BLD MANUAL: 77 % (ref 42.7–76)
NEUTS BAND NFR BLD MANUAL: 14 % (ref 0–5)
PLAT MORPH BLD: NORMAL
PLATELET # BLD AUTO: 147 10*3/MM3 (ref 140–450)
PMV BLD AUTO: 9.8 FL (ref 6–12)
POTASSIUM SERPL-SCNC: 3.5 MMOL/L (ref 3.5–5.2)
PROCALCITONIN SERPL-MCNC: 0.06 NG/ML (ref 0–0.25)
PROT SERPL-MCNC: 7.4 G/DL (ref 6–8.5)
RBC # BLD AUTO: 4.73 10*6/MM3 (ref 4.14–5.8)
SARS-COV-2 RNA RESP QL NAA+PROBE: DETECTED
SODIUM SERPL-SCNC: 137 MMOL/L (ref 136–145)
VARIANT LYMPHS NFR BLD MANUAL: 4 % (ref 19.6–45.3)
WBC MORPH BLD: NORMAL
WBC NRBC COR # BLD: 5.5 10*3/MM3 (ref 3.4–10.8)

## 2022-04-20 PROCEDURE — 96374 THER/PROPH/DIAG INJ IV PUSH: CPT

## 2022-04-20 PROCEDURE — 83605 ASSAY OF LACTIC ACID: CPT | Performed by: PHYSICIAN ASSISTANT

## 2022-04-20 PROCEDURE — 87636 SARSCOV2 & INF A&B AMP PRB: CPT | Performed by: PHYSICIAN ASSISTANT

## 2022-04-20 PROCEDURE — 71045 X-RAY EXAM CHEST 1 VIEW: CPT

## 2022-04-20 PROCEDURE — 85025 COMPLETE CBC W/AUTO DIFF WBC: CPT | Performed by: PHYSICIAN ASSISTANT

## 2022-04-20 PROCEDURE — 25010000002 KETOROLAC TROMETHAMINE PER 15 MG: Performed by: PHYSICIAN ASSISTANT

## 2022-04-20 PROCEDURE — 99283 EMERGENCY DEPT VISIT LOW MDM: CPT

## 2022-04-20 PROCEDURE — 87040 BLOOD CULTURE FOR BACTERIA: CPT | Performed by: PHYSICIAN ASSISTANT

## 2022-04-20 PROCEDURE — 84145 PROCALCITONIN (PCT): CPT | Performed by: PHYSICIAN ASSISTANT

## 2022-04-20 PROCEDURE — 36415 COLL VENOUS BLD VENIPUNCTURE: CPT

## 2022-04-20 PROCEDURE — 85007 BL SMEAR W/DIFF WBC COUNT: CPT | Performed by: PHYSICIAN ASSISTANT

## 2022-04-20 PROCEDURE — 80053 COMPREHEN METABOLIC PANEL: CPT | Performed by: PHYSICIAN ASSISTANT

## 2022-04-20 RX ORDER — KETOROLAC TROMETHAMINE 15 MG/ML
15 INJECTION, SOLUTION INTRAMUSCULAR; INTRAVENOUS ONCE
Status: COMPLETED | OUTPATIENT
Start: 2022-04-20 | End: 2022-04-20

## 2022-04-20 RX ORDER — SODIUM CHLORIDE 0.9 % (FLUSH) 0.9 %
10 SYRINGE (ML) INJECTION AS NEEDED
Status: DISCONTINUED | OUTPATIENT
Start: 2022-04-20 | End: 2022-04-20 | Stop reason: HOSPADM

## 2022-04-20 RX ADMIN — SODIUM CHLORIDE 1000 ML: 9 INJECTION, SOLUTION INTRAVENOUS at 14:14

## 2022-04-20 RX ADMIN — KETOROLAC TROMETHAMINE 15 MG: 15 INJECTION, SOLUTION INTRAMUSCULAR; INTRAVENOUS at 14:14

## 2022-04-20 NOTE — ED PROVIDER NOTES
Subjective   Mr. Varma is a 26 year old male who presents to the ER with complaints of body aches and fever.  His pain is primarily in the upper and lower back and in his legs.  The symptoms began last evening.  He denies any cough or sore throat.  No urinary symptoms.  He has had some nausea and has vomited once today.  Mild generalized abdominal pain.  No diarrhea.  He has a history of alcoholic cirrhosis.  He has not had any alcohol in 2 years.  He smokes 1/2 ppd.  He had a TIPS procedure 2 yrs ago.  No known exposure to COVID.  He has not been vaccinated against COVID.              Review of Systems   Constitutional: Positive for chills, fatigue and fever.   HENT: Negative for sore throat.    Respiratory: Negative for cough and shortness of breath.    Cardiovascular: Negative for chest pain.   Gastrointestinal: Positive for abdominal pain (mild, generalized), nausea and vomiting (once).   Genitourinary: Negative for dysuria.   Musculoskeletal: Positive for back pain and myalgias.   Skin: Negative for rash.   Neurological: Positive for light-headedness. Negative for headaches.   Hematological: Negative.    Psychiatric/Behavioral: Negative.        Past Medical History:   Diagnosis Date   • Alcohol abuse    • Alcohol addiction (HCC) 05/20/2020   • Alcoholic cirrhosis (HCC)    • GERD (gastroesophageal reflux disease)    • GIB (gastrointestinal bleeding)    • Intermittent explosive disorder 06/30/2021   • Tobacco abuse        Allergies   Allergen Reactions   • Penicillins Hives       Past Surgical History:   Procedure Laterality Date   • ARM WOUND REPAIR / CLOSURE     • ENDOSCOPY N/A 4/24/2020    Procedure: ESOPHAGOGASTRODUODENOSCOPY;  Surgeon: Brunner, Mark I, MD;  Location: [facility] ENDOSCOPY;  Service: Gastroenterology;  Laterality: N/A;   • TIPS PROCEDURE  2020       Family History   Problem Relation Age of Onset   • Parkinsonism Mother    • Alcohol abuse Father    • Colon cancer Neg Hx    • Colon polyps Neg Hx   "      Social History     Socioeconomic History   • Marital status: Single   Tobacco Use   • Smoking status: Current Every Day Smoker     Packs/day: 0.25     Types: Cigarettes   • Smokeless tobacco: Never Used   • Tobacco comment: 2-3 cigarettes a day   Substance and Sexual Activity   • Alcohol use: Not Currently     Comment: PT STATES THAT HIS ALCOHOL INTAKE VARIES - \"SOMETIMES I DRINK 3 BEERS, SOMETIMES I DRINK A PINT\". PT REPORTS LAST DRINK WAS 4/20/20   • Drug use: Yes     Types: Marijuana     Comment: PIPE - 3-4 TIMES PER DAY   • Sexual activity: Yes     Comment: single           Objective   Physical Exam  Constitutional:       Appearance: Normal appearance.   HENT:      Head: Normocephalic.      Nose: Nose normal.      Mouth/Throat:      Mouth: Mucous membranes are moist.   Eyes:      General: No scleral icterus.     Conjunctiva/sclera: Conjunctivae normal.      Pupils: Pupils are equal, round, and reactive to light.   Cardiovascular:      Rate and Rhythm: Regular rhythm. Tachycardia present.      Pulses: Normal pulses.      Heart sounds: No murmur heard.     Comments: Rate of 118  Pulmonary:      Effort: Pulmonary effort is normal.      Breath sounds: Normal breath sounds. No wheezing, rhonchi or rales.   Abdominal:      General: Bowel sounds are normal.      Tenderness: There is no abdominal tenderness. There is no guarding.   Musculoskeletal:         General: No tenderness. Normal range of motion.      Cervical back: Normal range of motion.   Skin:     General: Skin is warm and dry.   Neurological:      General: No focal deficit present.      Mental Status: He is alert and oriented to person, place, and time.   Psychiatric:         Mood and Affect: Mood normal.         Procedures           ED Course      DIfferential dx includes COVID, other viral syndrome, UTI, pneumonia, etc.    White count is 5.50.  LFTs are up moderately with ALT of 66 and AST of 55.  COVID swab is positive for COVID.  Chest xray is " normal.  Pt's O2 sats are in upper 90s on RA.       Pt was given fluids and Toradol.  He states he feels much better after meds and fluids.  I spoke with him about his workup.  I will d/c home to quarantine.  I will give him a pulse ox to follow at home.                                               [facility]    Final diagnoses:   COVID-19       ED Disposition  ED Disposition     ED Disposition   Discharge    Condition   Stable    Comment   --             Hima Patterson MD  [address] [address]  [phone]      As needed    [facility] Emergency Department  [address] [place] [id number] [phone]    If symptoms worsen         Medication List      No changes were made to your prescriptions during this visit.          Remy Payne PA  04/20/22 [id number]

## 2022-04-20 NOTE — DISCHARGE INSTRUCTIONS
Rest.  Quarantine at home for 10 days from onset of symptoms.  Tylenol or Motrin as directed for fever and body aches.  Return if O2 sats drop down to 94% or below or you have other debilitating symptoms.

## 2022-04-22 ENCOUNTER — TELEPHONE (OUTPATIENT)
Dept: FAMILY MEDICINE CLINIC | Facility: CLINIC | Age: 27
End: 2022-04-22

## 2022-04-22 NOTE — TELEPHONE ENCOUNTER
Caller: John Varma    Relationship: Self    Best call back number: [phone]    What is the best time to reach you: ANYTIME     Who are you requesting to speak with (clinical staff, provider,  specific staff member):   CLINICAL     Do you know the name of the person who called: PATIENT     What was the call regarding: PATIENT STATES HE TESTED POSITIVE FOR COVID ON 4-20-22 AND HE WANTS TO KNOW HOW LONG SHOULD HE WAIT BEFORE HE GETS THE COVID VACCINE   THIS WILL BE HIS FIRST SHOT     Do you require a callback: PLEASE CALL

## 2022-04-25 LAB
BACTERIA SPEC AEROBE CULT: NORMAL
BACTERIA SPEC AEROBE CULT: NORMAL

## 2022-04-25 NOTE — TELEPHONE ENCOUNTER
If he did not have any infusion nor IV medical treatment (was not admitted to hospital or did not get treatment from hospital) then 2-4 weeks would be fine.      If he had IV treatment in the ER or hospital than he should wait 90 days. (this is much more rare right now)

## 2022-05-25 NOTE — OUTREACH NOTE
Sepsis Week 1 Survey      Responses   [facility] patient discharged from?  [facility]   COVID-19 Test Status  Negative   Does the patient have one of the following disease processes/diagnoses(primary or secondary)?  Sepsis   Is there a successful TCM telephone encounter documented?  No   Week 1 attempt successful?  No   Unsuccessful attempts  Attempt 1          Patty Islas LPN  
ambulate

## 2022-10-12 ENCOUNTER — TELEPHONE (OUTPATIENT)
Dept: FAMILY MEDICINE CLINIC | Facility: CLINIC | Age: 27
End: 2022-10-12

## 2022-10-12 DIAGNOSIS — Z11.1 VISIT FOR TB SKIN TEST: Primary | ICD-10-CM

## 2023-01-16 ENCOUNTER — TELEPHONE (OUTPATIENT)
Dept: FAMILY MEDICINE CLINIC | Facility: CLINIC | Age: 28
End: 2023-01-16
Payer: COMMERCIAL

## 2023-01-16 NOTE — TELEPHONE ENCOUNTER
Not sure what this is about  Pt has not been seen here since June 2021, not sure how I would complete any paperwork when he has not been seen in over 18 months!

## 2023-01-16 NOTE — TELEPHONE ENCOUNTER
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 CAN NOT RETURN TO WORK UNTIL THIS IS TURNED IN.    Additional notes: PLEASE CALL PATIENT WHEN READY TO . PAPERWORK WAS DROPPED OFF LAST WEEK AT THE OFFICE.

## 2023-10-11 NOTE — THERAPY TREATMENT NOTE
Patient Name: John Varma  : 1995    MRN: [id number]                              Today's Date: 2020       Admit Date: 2020    Visit Dx:     ICD-10-CM ICD-9-CM   1. Sepsis with acute liver failure without hepatic coma or septic shock, due to unspecified organism (CMS/HCC) A41.9 038.9    R65.20 995.92    K72.00 570   2. Hypoxia R09.02 799.02   3. Acute renal failure, unspecified acute renal failure type (CMS/HCC) N17.9 584.9   4. Acute hepatitis B17.9 570   5. Alcohol abuse F10.10 305.00   6. Acute upper GI bleed K92.2 578.9   7. Elevated lactic acid level R79.89 276.2   8. Ascites due to alcoholic hepatitis K70.11 789.59   9. Leukocytosis, unspecified type D72.829 288.60   10. Sepsis with acute renal failure without septic shock, due to unspecified organism, unspecified acute renal failure type (CMS/HCC) A41.9 038.9    R65.20 995.92    N17.9 584.9   11. Oropharyngeal dysphagia R13.12 787.22     Patient Active Problem List   Diagnosis   • Acute alcoholic hepatitis   • Acute variceal GI bleeding   • BORIS (acute kidney injury) (CMS/HCC)   • Sepsis with acute renal failure without septic shock due to SBP (CMS/HCC)   • Acute respiratory failure with hypoxia. Intubated - and - (CMS/HCC)   • Alcohol withdrawal delirium (CMS/HCC)   • Hepatic encephalopathy (CMS/HCC)   • Ascites due to alcoholic hepatitis. S/P paracentesis , , and    • Pleural effusion on left   • S/P TIPS (transjugular intrahepatic portosystemic shunt) 2020   • Tobacco abuse   • SBP (spontaneous bacterial peritonitis) (CMS/HCC)   • Acute blood loss anemia due to variceal UGIB     Past Medical History:   Diagnosis Date   • Alcohol abuse    • GERD (gastroesophageal reflux disease)    • GIB (gastrointestinal bleeding)    • Tobacco abuse      Past Surgical History:   Procedure Laterality Date   • ARM WOUND REPAIR / CLOSURE     • ENDOSCOPY N/A 2020    Procedure: ESOPHAGOGASTRODUODENOSCOPY;  Surgeon:  Brunner, Mark I, MD;  Location: [facility] ENDOSCOPY;  Service: Gastroenterology;  Laterality: N/A;     General Information     Row Name 05/11/20 1538          PT Evaluation Time/Intention    Document Type  therapy note (daily note)  -LS     Mode of Treatment  physical therapy  -     Row Name 05/11/20 1538          General Information    Existing Precautions/Restrictions  fall  -LS     Row Name 05/11/20 1538          Cognitive Assessment/Intervention- PT/OT    Orientation Status (Cognition)  oriented to;person;place  -LS       User Key  (r) = Recorded By, (t) = Taken By, (c) = Cosigned By    Initials Name Provider Type    LS Savanah Matthews, PT Physical Therapist        Mobility     Row Name 05/11/20 1539          Bed Mobility Assessment/Treatment    Comment (Bed Mobility)  EOB with OT upon arrival; transitioned to PT session  -     Row Name 05/11/20 1539          Sit-Stand Transfer    Sit-Stand [place] (Transfers)  minimum assist (75% patient effort);verbal cues  -LS     Assistive Device (Sit-Stand Transfers)  walker, front-wheeled  -LS     Row Name 05/11/20 1539          Gait/Stairs Assessment/Training    Gait/Stairs Assessment/Training  gait/ambulation independence  -LS     [place] Level (Gait)  minimum assist (75% patient effort);verbal cues  -LS     Assistive Device (Gait)  walker, front-wheeled  -LS     Distance in Feet (Gait)  250  -LS     Deviations/Abnormal Patterns (Gait)  bilateral deviations;sherwin decreased;stride length decreased  -LS     Bilateral Gait Deviations  forward flexed posture;heel strike decreased  -LS     Comment (Gait/Stairs)  Constant vc's for keeping RW close to body and increasing heel strike and foot clearance. Required 4 brief standing rest breaks due to fatigue.  -LS       User Key  (r) = Recorded By, (t) = Taken By, (c) = Cosigned By    Initials Name Provider Type    LS Savanah Matthews, PT Physical Therapist        Obj/Interventions     Row Name 05/11/20 1545           Therapeutic Exercise    Lower Extremity (Therapeutic Exercise)  LAQ (long arc quad), bilateral;marching while standing  -     Lower Extremity Range of Motion (Therapeutic Exercise)  ankle dorsiflexion/plantar flexion, bilateral;hip internal/external rotation, bilateral  -     Exercise Type (Therapeutic Exercise)  AROM (active range of motion)  -     Position (Therapeutic Exercise)  seated  -     Sets/Reps (Therapeutic Exercise)  1/10; educated pt re: benefit of additional ther ex throughout day  -     Row Name 05/11/20 1542          Static Sitting Balance    Level of [place] (Unsupported Sitting, Static Balance)  supervision  -     Sitting Position (Unsupported Sitting, Static Balance)  sitting on edge of bed  -     Row Name 05/11/20 1542          Static Standing Balance    Level of [place] (Supported Standing, Static Balance)  contact guard assist  -     Assistive Device Utilized (Supported Standing, Static Balance)  walker, rolling  -       User Key  (r) = Recorded By, (t) = Taken By, (c) = Cosigned By    Initials Name Provider Type    Savanah Neri, PT Physical Therapist        Goals/Plan    No documentation.       Clinical Impression     Row Name 05/11/20 1544          Pain Scale: Numbers Pre/Post-Treatment    Pain Scale: Numbers, Pretreatment  0/10 - no pain  -     Pain Scale: Numbers, Post-Treatment  0/10 - no pain  -     Row Name 05/11/20 1544          Plan of Care Review    Plan of Care Reviewed With  patient  -     Progress  improving  -     Outcome Summary  Pt demonstrated ability to progress forward ambulation distance; required 4 standing rest breaks due to fatigue and repeated vc's for increasing heel strike and foot clearance. Cont to be limited by decreased safety awareness and decreased functional endurance, but motivated to participate and progress. Will cont PT POC.   -     Row Name 05/11/20 1544          Vital Signs    Pre Systolic BP Rehab  114  -      Pre Treatment Diastolic BP  62  -LS     Post Systolic BP Rehab  118  -LS     Post Treatment Diastolic BP  73  -LS     Posttreatment Heart Rate (beats/min)  101  -LS     O2 Delivery Pre Treatment  room air  -LS     Post SpO2 (%)  95  -LS     O2 Delivery Post Treatment  room air  -LS     Pre Patient Position  Supine  -LS     Intra Patient Position  Standing  -LS     Post Patient Position  Sitting  -LS     Row Name 05/11/20 1544          Positioning and Restraints    Pre-Treatment Position  in bed  -LS     Post Treatment Position  chair  -LS     In Chair  notified nsg;reclined;call light within reach;encouraged to call for assist;exit alarm on;waffle cushion;legs elevated;RUE elevated;LUE elevated;heels elevated  -LS       User Key  (r) = Recorded By, (t) = Taken By, (c) = Cosigned By    Initials Name Provider Type    Savanah Neri, PT Physical Therapist        Outcome Measures     Row Name 05/11/20 1548          How much help from another person do you currently need...    Turning from your back to your side while in flat bed without using bedrails?  3  -LS     Moving from lying on back to sitting on the side of a flat bed without bedrails?  3  -LS     Moving to and from a bed to a chair (including a wheelchair)?  3  -LS     Standing up from a chair using your arms (e.g., wheelchair, bedside chair)?  3  -LS     Climbing 3-5 steps with a railing?  2  -LS     To walk in hospital room?  3  -LS     AM-PAC 6 Clicks Score (PT)  17  -LS       User Key  (r) = Recorded By, (t) = Taken By, (c) = Cosigned By    Initials Name Provider Type    Savanah Neri, PT Physical Therapist        Physical Therapy Education                 Title: PT OT SLP Therapies (In Progress)     Topic: Physical Therapy (In Progress)     Point: Mobility training (In Progress)     Description:   Instruct learner(s) on safety and technique for assisting patient out of bed, chair or wheelchair.  Instruct in the proper use of assistive devices, such  Debility as walker, crutches, cane or brace.              Patient Friendly Description:   It's important to get you on your feet again, but we need to do so in a way that is safe for you. Falling has serious consequences, and your personal safety is the most important thing of all.        When it's time to get out of bed, one of us or a family member will sit next to you on the bed to give you support.     If your doctor or nurse tells you to use a walker, crutches, a cane, or a brace, be sure you use it every time you get out of bed, even if you think you don't need it.    Learning Progress Summary           Patient Acceptance, E,D, NR by LS at 5/11/2020 1548    Acceptance, E, NR by KR at 5/10/2020 1357    Acceptance, E, NR by KR at 5/9/2020 1420    Acceptance, E,D, VU,NR by LS at 5/8/2020 1612    Nonacceptance, E, NR,NL by SINDY at 5/7/2020 1508    Acceptance, E,D, VU,NR by MP at 5/7/2020 1120    Acceptance, E,D, VU,NR by LS at 5/6/2020 1552    Acceptance, E, NR by KR at 5/5/2020 1426    Acceptance, E,D, NR by LS at 5/4/2020 1605    Acceptance, E,D, NR by LS at 5/2/2020 1507    Acceptance, E,D, NR by LS at 4/29/2020 1009                   Point: Home exercise program (In Progress)     Description:   Instruct learner(s) on appropriate technique for monitoring, assisting and/or progressing patient with therapeutic exercises and activities.              Learning Progress Summary           Patient Acceptance, E,D, NR by LS at 5/11/2020 1548    Acceptance, E, NR by KR at 5/10/2020 1357    Acceptance, E, NR by KR at 5/9/2020 1420    Acceptance, E,D, VU,NR by LS at 5/8/2020 1612    Nonacceptance, E, NR,NL by SINDY at 5/7/2020 1508    Acceptance, E,D, VU,NR by LS at 5/6/2020 1552    Acceptance, E, NR by KR at 5/5/2020 1426    Acceptance, E,D, NR by LS at 5/4/2020 1605    Acceptance, E,D, NR by LS at 5/2/2020 1507    Acceptance, E,D, NR by LS at 4/29/2020 1009                   Point: Body mechanics (In Progress)     Description:    Instruct learner(s) on proper positioning and spine alignment for patient and/or caregiver during mobility tasks and/or exercises.              Learning Progress Summary           Patient Acceptance, E,D, NR by LS at 5/11/2020 1548    Acceptance, E, NR by KR at 5/10/2020 1357    Acceptance, E, NR by KR at 5/9/2020 1420    Acceptance, E,D, VU,NR by LS at 5/8/2020 1612    Nonacceptance, E, NR,NL by SINDY at 5/7/2020 1508    Acceptance, E,D, VU,NR by MP at 5/7/2020 1120    Acceptance, E,D, VU,NR by LS at 5/6/2020 1552    Acceptance, E, NR by KR at 5/5/2020 1426    Acceptance, E,D, NR by LS at 5/4/2020 1605    Acceptance, E,D, NR by LS at 5/2/2020 1507    Acceptance, E,D, NR by LS at 4/29/2020 1009                   Point: Precautions (In Progress)     Description:   Instruct learner(s) on prescribed precautions during mobility and gait tasks              Learning Progress Summary           Patient Acceptance, E,D, NR by LS at 5/11/2020 1548    Acceptance, E, NR by KR at 5/10/2020 1357    Acceptance, E, NR by KR at 5/9/2020 1420    Acceptance, E,D, VU,NR by LS at 5/8/2020 1612    Nonacceptance, E, NR,NL by SINDY at 5/7/2020 1508    Acceptance, E,D, VU,NR by MP at 5/7/2020 1120    Acceptance, E,D, VU,NR by LS at 5/6/2020 1552    Acceptance, E, NR by KR at 5/5/2020 1426    Acceptance, E,D, NR by LS at 5/4/2020 1605    Acceptance, E,D, NR by LS at 5/2/2020 1507    Acceptance, E,D, NR by LS at 4/29/2020 1009                               User Key     Initials Effective Dates Name Provider Type Discipline     06/19/15 -  Savanah Matthews, PT Physical Therapist PT    KR 04/03/18 -  Nataliya Patel, PT Physical Therapist PT    SINDY 11/19/18 -  Piedad Lao, RN Registered Nurse Nurse    MP 11/06/19 -  Michael Allen, PT Physical Therapist PT              PT Recommendation and Plan  Planned Therapy Interventions (PT Eval): balance training, bed mobility training, gait training, home exercise program, patient/family education,  strengthening, transfer training, stair training  Outcome Summary/Treatment Plan (PT)  Anticipated Discharge Disposition (PT): inpatient rehabilitation facility  Plan of Care Reviewed With: patient  Progress: improving  Outcome Summary: Pt demonstrated ability to progress forward ambulation distance; required 4 standing rest breaks due to fatigue and repeated vc's for increasing heel strike and foot clearance. Cont to be limited by decreased safety awareness and decreased functional endurance, but motivated to participate and progress. Will cont PT POC.      Time Calculation:   PT Charges     Row Name 05/11/20 1549             Time Calculation    Start Time  1351  -LS      PT Received On  05/11/20  -         Time Calculation- PT    Total Timed Code Minutes- PT  23 minute(s)  -LS         Timed Charges    02623 - PT Therapeutic Exercise Minutes  8  -LS      29298 - Gait Training Minutes   15  -LS        User Key  (r) = Recorded By, (t) = Taken By, (c) = Cosigned By    Initials Name Provider Type    LS Savanah Matthews, PT Physical Therapist        Therapy Charges for Today     Code Description Service Date Service Provider Modifiers Qty    83034486471 HC PT THER PROC EA 15 MIN 5/11/2020 Savanah Matthews, PT GP 1    26805260361 HC GAIT TRAINING EA 15 MIN 5/11/2020 Savanah Matthews, PT GP 1          PT G-Codes  Outcome Measure Options: AM-PAC 6 Clicks Daily Activity (OT)  AM-PAC 6 Clicks Score (PT): 17  AM-PAC 6 Clicks Score (OT): 15    Savanah LINTON. Byron, PT  5/11/2020

## (undated) DEVICE — LUBE GEL ENDOGLIDE 1.1OZ

## (undated) DEVICE — DUAL LUMEN STOMACH TUBE,ANTI-REFLUX VALVE: Brand: SALEM SUMP

## (undated) DEVICE — SPNG ENDO BEDSIDE TUB ENZYM

## (undated) DEVICE — KT ORCA ORCAPOD DISP STRL

## (undated) DEVICE — SOL IRR H2O BTL 1000ML STRL

## (undated) DEVICE — SYR LUERLOK 50ML

## (undated) DEVICE — THE BITE BLOCK MAXI, LATEX FREE STRAP IS USED TO PROTECT THE ENDOSCOPE INSERTION TUBE FROM BEING BITTEN BY THE PATIENT.

## (undated) DEVICE — CONTN GRAD MEAS TRIANG 32OZ BLK

## (undated) DEVICE — TUBING, SUCTION, 1/4" X 10', STRAIGHT: Brand: MEDLINE

## (undated) DEVICE — INTRO ACCSR BLNT TP

## (undated) DEVICE — HYBRID CO2 TUBING/CAP SET FOR OLYMPUS® SCOPES & CO2 SOURCE: Brand: ERBE